# Patient Record
Sex: FEMALE | Race: WHITE
[De-identification: names, ages, dates, MRNs, and addresses within clinical notes are randomized per-mention and may not be internally consistent; named-entity substitution may affect disease eponyms.]

---

## 2017-03-01 ENCOUNTER — HOSPITAL ENCOUNTER (EMERGENCY)
Dept: HOSPITAL 17 - NEPA | Age: 77
Discharge: HOME | End: 2017-03-01
Payer: COMMERCIAL

## 2017-03-01 VITALS
HEART RATE: 72 BPM | SYSTOLIC BLOOD PRESSURE: 143 MMHG | RESPIRATION RATE: 16 BRPM | OXYGEN SATURATION: 100 % | DIASTOLIC BLOOD PRESSURE: 61 MMHG

## 2017-03-01 VITALS
HEIGHT: 68 IN | OXYGEN SATURATION: 100 % | DIASTOLIC BLOOD PRESSURE: 57 MMHG | WEIGHT: 134.48 LBS | HEART RATE: 71 BPM | RESPIRATION RATE: 16 BRPM | SYSTOLIC BLOOD PRESSURE: 114 MMHG | TEMPERATURE: 98.2 F | BODY MASS INDEX: 20.38 KG/M2

## 2017-03-01 VITALS
SYSTOLIC BLOOD PRESSURE: 119 MMHG | RESPIRATION RATE: 16 BRPM | DIASTOLIC BLOOD PRESSURE: 56 MMHG | OXYGEN SATURATION: 99 % | HEART RATE: 78 BPM

## 2017-03-01 DIAGNOSIS — I10: ICD-10-CM

## 2017-03-01 DIAGNOSIS — M25.552: ICD-10-CM
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
## 2017-03-01 DIAGNOSIS — Z87.440: ICD-10-CM

## 2017-03-01 DIAGNOSIS — T40.2X5A: ICD-10-CM

## 2017-03-01 DIAGNOSIS — G89.29: ICD-10-CM

## 2017-03-01 DIAGNOSIS — R40.0: Primary | ICD-10-CM

## 2017-03-01 LAB
ALP SERPL-CCNC: 78 U/L (ref 45–117)
ALT SERPL-CCNC: 15 U/L (ref 10–53)
AMPHETAMINE, URINE: (no result)
ANION GAP SERPL CALC-SCNC: 11 MEQ/L (ref 5–15)
APAP SERPL-MCNC: 10.3 MCG/ML (ref 10–30)
APTT BLD: 23.2 SEC (ref 24.3–30.1)
AST SERPL-CCNC: 12 U/L (ref 15–37)
BARBITURATES, URINE: (no result)
BASOPHILS # BLD AUTO: 0 TH/MM3 (ref 0–0.2)
BASOPHILS NFR BLD: 0.5 % (ref 0–2)
BILIRUB SERPL-MCNC: 0.2 MG/DL (ref 0.2–1)
BUN SERPL-MCNC: 20 MG/DL (ref 7–18)
CHLORIDE SERPL-SCNC: 105 MEQ/L (ref 98–107)
COCAINE UR-MCNC: (no result) NG/ML
COLOR UR: YELLOW
COMMENT (UR): (no result)
CULTURE IF INDICATED: (no result)
EOSINOPHIL # BLD: 0.1 TH/MM3 (ref 0–0.4)
EOSINOPHIL NFR BLD: 1.4 % (ref 0–4)
ERYTHROCYTE [DISTWIDTH] IN BLOOD BY AUTOMATED COUNT: 13.8 % (ref 11.6–17.2)
GFR SERPLBLD BASED ON 1.73 SQ M-ARVRAT: 33 ML/MIN (ref 89–?)
GLUCOSE UR STRIP-MCNC: (no result) MG/DL
GRAN CASTS #/AREA URNS LPF: 1 /LPF
HCO3 BLD-SCNC: 21.3 MEQ/L (ref 21–32)
HCT VFR BLD CALC: 28.9 % (ref 35–46)
HEMO FLAGS: (no result)
HGB UR QL STRIP: (no result)
HYALINE CASTS #/AREA URNS LPF: 2 /LPF
INR PPP: 0.9 RATIO
KETONES UR STRIP-MCNC: (no result) MG/DL
LYMPHOCYTES # BLD AUTO: 1.3 TH/MM3 (ref 1–4.8)
LYMPHOCYTES NFR BLD AUTO: 16.5 % (ref 9–44)
MCH RBC QN AUTO: 32.1 PG (ref 27–34)
MCHC RBC AUTO-ENTMCNC: 32.9 % (ref 32–36)
MCV RBC AUTO: 97.5 FL (ref 80–100)
MONOCYTES NFR BLD: 5.4 % (ref 0–8)
NEUTROPHILS # BLD AUTO: 6.1 TH/MM3 (ref 1.8–7.7)
NEUTROPHILS NFR BLD AUTO: 76.2 % (ref 16–70)
NITRITE UR QL STRIP: (no result)
PLATELET # BLD: 354 TH/MM3 (ref 150–450)
POTASSIUM SERPL-SCNC: 4.6 MEQ/L (ref 3.5–5.1)
PROTHROMBIN TIME: 10.1 SEC (ref 9.8–11.6)
RBC # BLD AUTO: 2.96 MIL/MM3 (ref 4–5.3)
SODIUM SERPL-SCNC: 137 MEQ/L (ref 136–145)
SP GR UR STRIP: 1.01 (ref 1–1.03)
WBC # BLD AUTO: 8.1 TH/MM3 (ref 4–11)

## 2017-03-01 PROCEDURE — 70450 CT HEAD/BRAIN W/O DYE: CPT

## 2017-03-01 PROCEDURE — 85610 PROTHROMBIN TIME: CPT

## 2017-03-01 PROCEDURE — 80320 DRUG SCREEN QUANTALCOHOLS: CPT

## 2017-03-01 PROCEDURE — 87086 URINE CULTURE/COLONY COUNT: CPT

## 2017-03-01 PROCEDURE — 80329 ANALGESICS NON-OPIOID 1 OR 2: CPT

## 2017-03-01 PROCEDURE — 80053 COMPREHEN METABOLIC PANEL: CPT

## 2017-03-01 PROCEDURE — 84443 ASSAY THYROID STIM HORMONE: CPT

## 2017-03-01 PROCEDURE — 93005 ELECTROCARDIOGRAM TRACING: CPT

## 2017-03-01 PROCEDURE — 85730 THROMBOPLASTIN TIME PARTIAL: CPT

## 2017-03-01 PROCEDURE — 96360 HYDRATION IV INFUSION INIT: CPT

## 2017-03-01 PROCEDURE — P9612 CATHETERIZE FOR URINE SPEC: HCPCS

## 2017-03-01 PROCEDURE — 81001 URINALYSIS AUTO W/SCOPE: CPT

## 2017-03-01 PROCEDURE — 73502 X-RAY EXAM HIP UNI 2-3 VIEWS: CPT

## 2017-03-01 PROCEDURE — 96361 HYDRATE IV INFUSION ADD-ON: CPT

## 2017-03-01 PROCEDURE — G0480 DRUG TEST DEF 1-7 CLASSES: HCPCS

## 2017-03-01 PROCEDURE — 80307 DRUG TEST PRSMV CHEM ANLYZR: CPT

## 2017-03-01 PROCEDURE — 85025 COMPLETE CBC W/AUTO DIFF WBC: CPT

## 2017-03-01 PROCEDURE — 99285 EMERGENCY DEPT VISIT HI MDM: CPT
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
## 2017-03-01 NOTE — RADRPT
EXAM DATE/TIME:  03/01/2017 15:19 

 

[facility] COMPARISON:     No previous studies available for comparison.

 

                     

INDICATIONS :     Patient fell one month ago.

                     

MEDICAL HISTORY :            Hypertension. Diabetes mellitus type II. Cervical cancer.   

SURGICAL HISTORY :     None.   

ENCOUNTER:     Initial                                        

ACUITY:     1 month      

PAIN SCORE:     8/10

LOCATION:     Left  Hip.

 

FINDINGS:     

Examination of the left hip was performed with AP Pelvis.  The primary and secondary trabecular patte
rn of the femoral neck is intact.  The hip joint is of normal width without significant sclerosis or 
bony hypertrophy.  The acetabulum is grossly intact.

 

CONCLUSION:     Negative for fracture.  

 Pb López MD FACR on March 01, 2017 at 15:26           

Board Certified Radiologist.

 This report was verified electronically.
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
## 2017-03-03 NOTE — EKG
Date Performed: 03/01/2017       Time Performed: 14:08:28

 

PTAGE:      77 years

 

EKG:      Sinus rhythm 

 

 NORMAL ECG

 

PREVIOUS TRACING       : 09/14/2014 06.27 Compared to prior tracing no significant change

 

DOCTOR:   Griffin Sanchez  Interpretating Date/Time  03/03/2017 00:00:07

## 2017-05-14 ENCOUNTER — HOSPITAL ENCOUNTER (EMERGENCY)
Dept: HOSPITAL 17 - NEPD | Age: 77
Discharge: HOME | End: 2017-05-14
Payer: MEDICARE

## 2017-05-14 VITALS — WEIGHT: 125.66 LBS | HEIGHT: 68 IN | BODY MASS INDEX: 19.05 KG/M2

## 2017-05-14 VITALS
HEART RATE: 93 BPM | RESPIRATION RATE: 16 BRPM | OXYGEN SATURATION: 95 % | DIASTOLIC BLOOD PRESSURE: 58 MMHG | TEMPERATURE: 97.8 F | SYSTOLIC BLOOD PRESSURE: 121 MMHG

## 2017-05-14 DIAGNOSIS — S93.401A: Primary | ICD-10-CM

## 2017-05-14 DIAGNOSIS — E78.00: ICD-10-CM

## 2017-05-14 DIAGNOSIS — K21.9: ICD-10-CM

## 2017-05-14 DIAGNOSIS — S90.01XA: ICD-10-CM

## 2017-05-14 DIAGNOSIS — I10: ICD-10-CM

## 2017-05-14 DIAGNOSIS — E11.9: ICD-10-CM

## 2017-05-14 DIAGNOSIS — Z86.73: ICD-10-CM

## 2017-05-14 DIAGNOSIS — Z79.02: ICD-10-CM

## 2017-05-14 PROCEDURE — 73630 X-RAY EXAM OF FOOT: CPT

## 2017-05-14 PROCEDURE — 73610 X-RAY EXAM OF ANKLE: CPT

## 2017-05-14 PROCEDURE — 73590 X-RAY EXAM OF LOWER LEG: CPT

## 2017-05-14 PROCEDURE — 73060 X-RAY EXAM OF HUMERUS: CPT

## 2017-05-14 PROCEDURE — 99283 EMERGENCY DEPT VISIT LOW MDM: CPT

## 2017-05-14 NOTE — PD
Data


Data


Last Documented VS





Vital Signs








  Date Time  Temp Pulse Resp B/P Pulse Ox O2 Delivery O2 Flow Rate FiO2


 


5/14/17 11:14 97.8 93 16 121/58 95   








Orders





 Ankle, Complete ([id number]) (5/14/17 )


Foot, Complete ([id number]) (5/14/17 )


Humerus (Min 2vws) (5/14/17 )


Tibia/Fibula (Ap/Lat) (5/14/17 )


Complete Blood Count With Diff (5/14/17 11:31)


Basic Metabolic Panel (Bmp) (5/14/17 11:31)








MDM


Supervised Visit with CORNELIUS:  Yes


Narrative Course


The history, exam, and medical decision-making in the associated mid-level 

provider note were completed with my assistance. I reviewed and agree with the 

findings presented.  I attest that I had a face-to-face encounter with the 

patient on the same day, and personally performed and documented my assessment 

and findings in the medical record. 





*My assessment and Findings:





77-year-old with a fall last night.  She landed on her foot.  She is some pain 

on the lateral aspect of her left ankle.  X-rays are negative.  States she has 

been walking on it albeit with some discomfort.  She is a walker.  She lives 

with her sister.  We did test her in the emergency department in both the 

patient and her sister comfortable with discharge and outpatient follow-up.








Zackary Mills MD May 14, 2017 12:30

## 2017-05-14 NOTE — PD
HPI


Chief Complaint:  Injury


Time Seen by Provider:  11:15


Travel History


International Travel<30 days:  No


Contact w/Intl Traveler<30days:  No


Traveled to known affect area:  No





History of Present Illness


HPI


77 year old female presents to the ED with c/o of right ankle pain after 

falling last night. She reports she lost her balance while walking causing her 

to twist her R ankle and fell onto the ground. She denies head injury, Denies 

LOC, Denies headache, dizziness, change in vision, N/V, ADB pain , CP, or SOB. 

Patient has a PMH of CVA w/ left sided weakness and ambulates with a walker at 

home. She lives with her sister who was having difficulty helping her ambulate 

today prompting her to come to the ER.





PFSH


Past Medical History


Hx Anticoagulant Therapy:  Yes (plavix)


Anemia:  Yes


Arthritis:  Yes


Anxiety:  Yes


Depression:  Yes


Cancer:  Yes (CERVICAL CA)


High Cholesterol:  Yes


Chest Pain:  No


Congestive Heart Failure:  No


Cerebrovascular Accident:  Yes


Diabetes:  Yes


Patient Takes Glucophage:  No


Diminished Hearing:  No


Gastrointestinal Disorders:  Yes (esophageal strictures)


GERD:  Yes


Genitourinary:  Yes (incontinent)


Headaches:  No


Hypertension:  Yes


Immunizations Current:  No


Migraines:  No


Seizures:  Yes


PNEUMOCCOCAL Vaccine (Year):  2


Menopausal:  Yes





Past Surgical History


Appendectomy:  Yes


Cardiac Surgery:  No


Cholecystectomy:  Yes


Ear Surgery:  No


Endocrine Surgery:  Yes


Eye Surgery:  No


Genitourinary Surgery:  Yes


Gynecologic Surgery:  Yes (HYSTERECTOMY,CERVICAL CA REMOVED.)


Hysterectomy:  No


Neurologic Surgery:  Yes (C4,C5,C6 PLATE PLACED)


Oral Surgery:  Yes


Thoracic Surgery:  No


Other Surgery:  Yes (LAPAROSCOPIC GREGG(WRAP STOMACH AROUND ESOPHAGUS TO HELP 

WITH GERD))





Social History


Alcohol Use:  No (DENIES)


Tobacco Use:  No (DENIES)


Substance Use:  No





Allergies-Medications


(Allergen,Severity, Reaction):  


Coded Allergies:  


     Egg Allergy (Verified  Allergy, Intermediate, 5/14/17)


     Flu Vaccine (Verified  Allergy, Intermediate, 5/14/17)


 "ALLERGIC TO EGGS SO I CAN NOT HAVE THE FLU SHOT"


     Codeine (Verified  Allergy, Mild, 5/14/17)


     Demerol (Verified  Allergy, Mild, 5/14/17)


     Morphine (Verified  Allergy, Mild, 5/14/17)


     Lortab (Verified  Adverse Reaction, Mild, HEADACHE, 5/14/17)


Reported Meds & Prescriptions





Reported Meds & Active Scripts


Active


Reported


Metformin ER (Metformin HCl) 1,000 Mg Jass 1,000 Mg PO BID


     With evening meal


Vimpat (Lacosamide) 50 Mg Tab 50 Mg PO DAILY


Valium (Diazepam) 10 Mg Tab 10 Mg PO TID PRN


Plavix (Clopidogrel Bisulfate) 75 Mg Tab 75 Mg PO DAILY


Toprol XL (Metoprolol Succinate) 50 Mg Tab 50 Mg PO BID


Pantoprazole (Pantoprazole Sodium) 40 Mg Tab 40 Mg PO DAILY


Pravachol (Pravastatin) 40 Mg Tab 40 Mg PO HS


Sertraline (Sertraline HCl) 25 Mg Tab 25 Mg PO DAILY








Review of Systems


Except as stated in HPI:  all other systems reviewed are Neg





Physical Exam


Narrative


GENERAL: Alert well appearing elderly white female.


SKIN: Warm and dry.


HEAD: Atraumatic. Normocephalic. 


EYES: Pupils equal and round. No scleral icterus. No injection or drainage. 


ENT: No nasal bleeding or discharge.  Mucous membranes pink and moist.


NECK: Trachea midline. No JVD. No midline tenderness.


CARDIOVASCULAR: Regular rate and rhythm.  


RESPIRATORY: No accessory muscle use. Clear to auscultation. Breath sounds 

equal bilaterally. 


GASTROINTESTINAL: Abdomen soft, non-tender, nondistended. Hepatic and splenic 

margins not palpable. 


MUSCULOSKELETAL: R ankle: moderate amount of swelling ,ecchymosis, and 

tenderness over lateral malleolus & base of 5th metatarsal. Pulses present. 

Extremity warm, brisk cap refill. N/V/S intact.  R humerus: No deformity, 

proximal tenderness, N/V/S intact. Pulses present. No obvious deformities. 


NEUROLOGICAL: Awake and alert. No obvious cranial nerve deficits.  Motor 

grossly within normal limits.   Normal speech.


PSYCHIATRIC: Appropriate mood and affect; insight and judgment normal.





Data


Data


Last Documented VS





Vital Signs








  Date Time  Temp Pulse Resp B/P Pulse Ox O2 Delivery O2 Flow Rate FiO2


 


5/14/17 11:14 97.8 93 16 121/58 95   








Orders





 Ankle, Complete ([id number]) (5/14/17 )


Foot, Complete ([id number]) (5/14/17 )


Humerus (Min 2vws) (5/14/17 )


Tibia/Fibula (Ap/Lat) (5/14/17 )








MDM


Medical Decision Making


Medical Screen Exam Complete:  Yes


Emergency Medical Condition:  Yes


Medical Record Reviewed:  Yes


Differential Diagnosis


distal Tib/Fib fx vs metatarsal fx vs humeral fx


Narrative Course


77 year old elderly white female who sustained a fall last night at her home 

injuring her R ankle/foot & R humerus. She has PMH of CVA-left sided deficits, 

chronic pain, DM type 2, dyslipidemia. She has no evidence of head/facial 

trauma & reports she recalls the entire event. She declined head CT & given her 

appearance & PE no concern for subdural hematoma at this time. There is concern 

of Fx in the R distal Tib/Fib/foot. Xrays are pending.    





Xrays all negative for FX. Discussed findings with patient,. She would like to 

be discharge. Ankle/stirup splint applied by Nursing staff, patient ambulated 

without difficulty using walker. She has appointment with ortho on Tuesday.





Diagnosis





 Primary Impression:  


 Right ankle sprain


 Qualified Code:  S93.401A - Sprain of right ankle, unspecified ligament, 

initial encounter


 Additional Impressions:  


 Contusion


 Qualified Code:  S90.01XA - Contusion of right ankle, initial encounter


 Fall


 Qualified Code:  W19.XXXA - Fall, initial encounter


Patient Instructions:  General Instructions


Disposition:  01 DISCHARGE HOME


Condition:  Stable








Arin Patrick May 14, 2017 11:40





Arin Patrick May 14, 2017 11:40

## 2017-05-14 NOTE — RADRPT
EXAM DATE/TIME:  05/14/2017 11:37 

 

[facility] COMPARISON:     

No previous studies available for comparison.

 

                     

INDICATIONS :     

Right ankle pain post fall last night. 

                     

 

MEDICAL HISTORY :     

None.          

 

SURGICAL HISTORY :     

None.   

 

ENCOUNTER:     

Initial                                        

 

ACUITY:     

2 days      

 

PAIN SCORE:     

10/10

 

LOCATION:     

Right  ankle. 

 

FINDINGS:     

Three view exam was performed of the right ankle.  The bony structures are in normal alignment.  No e
vidence of fracture, dislocation. There is lateral soft tissue swelling.  The ankle mortise is intact
.  No radiopaque foreign bodies are seen.  Bony mineralization is under mineralized.

 

CONCLUSION:     

Soft tissue swelling without fracture.

 

 

 

 Roddy Morley MD on May 14, 2017 at 11:53           

Board Certified Radiologist.

 This report was verified electronically.

## 2017-05-14 NOTE — RADRPT
EXAM DATE/TIME:  05/14/2017 11:43 

 

[facility] COMPARISON:     

HUMERUS RIGHT (MIN 2VWS), November 15, 2015, 16:14.

 

                     

INDICATIONS :     

Right humerus pain post fall last night. 

                     

 

MEDICAL HISTORY :     

None.          

 

SURGICAL HISTORY :     

None.   

 

ENCOUNTER:     

Initial                                        

 

ACUITY:     

2 days      

 

PAIN SCORE:     

10/10

 

LOCATION:     

Right  humerus. 

 

FINDINGS:     

Two view examination of the right humerus demonstrates no evidence of fracture or dislocation.  Bony 
mineralization is normal.  The soft tissue structures are intact.

 

CONCLUSION:     No acute fracture.  

 

 

 

 Roddy Morley MD on May 14, 2017 at 11:54           

Board Certified Radiologist.

 This report was verified electronically.

## 2017-05-14 NOTE — RADRPT
EXAM DATE/TIME:  05/14/2017 11:40 

 

[facility] COMPARISON:     

No previous studies available for comparison.

 

                     

INDICATIONS :     

Right lower leg pain post fall last night.

                     

 

MEDICAL HISTORY :     

None.          

 

SURGICAL HISTORY :     

None.   

 

ENCOUNTER:     

Initial                                        

 

ACUITY:     

2 days      

 

PAIN SCORE:     

10/10

 

LOCATION:     

Right  tibia/fibula. 

 

FINDINGS:     

Two view examination of the right tibia demonstrates no evidence of fracture or dislocation.  Bony mi
neralization is normal. Soft tissue swelling.

 

CONCLUSION:     

Soft tissue swelling without fracture.

 

 

 

 Roddy Morley MD on May 14, 2017 at 12:02           

Board Certified Radiologist.

 This report was verified electronically.

## 2017-05-14 NOTE — RADRPT
EXAM DATE/TIME:  05/14/2017 11:38 

 

[facility] COMPARISON:     

No previous studies available for comparison.

 

                     

INDICATIONS :     

Right foot pain post fall last night. 

                     

 

MEDICAL HISTORY :     

None.          

 

SURGICAL HISTORY :     

None.   

 

ENCOUNTER:     

Initial                                        

 

ACUITY:     

2 days      

 

PAIN SCORE:     

10/10

 

LOCATION:     

Right  foot. 

 

FINDINGS:     

Three view examination of the right foot demonstrates soft tissue swelling. Questionable fracture bas
e of distal phalanx great toe. Degenerative changes first metatarsophalangeal joint and interphalange
al joints. Osteopenia.  The calcaneus is intact.  

 

CONCLUSION:     

1. Soft tissue swelling with questionable fracture base of distal phalanx of great toe.

2. Osteopenia.

 

 

 

 Roddy Morley MD on May 14, 2017 at 11:59           

Board Certified Radiologist.

 This report was verified electronically.

## 2017-06-11 ENCOUNTER — HOSPITAL ENCOUNTER (INPATIENT)
Dept: HOSPITAL 17 - NEPE | Age: 77
LOS: 3 days | Discharge: NURSING, REHAB OR LONG-TERM CARE | DRG: 301 | End: 2017-06-14
Attending: FAMILY MEDICINE | Admitting: FAMILY MEDICINE
Payer: MEDICARE

## 2017-06-11 VITALS
RESPIRATION RATE: 16 BRPM | OXYGEN SATURATION: 96 % | SYSTOLIC BLOOD PRESSURE: 151 MMHG | DIASTOLIC BLOOD PRESSURE: 65 MMHG | HEART RATE: 101 BPM

## 2017-06-11 VITALS
RESPIRATION RATE: 16 BRPM | SYSTOLIC BLOOD PRESSURE: 174 MMHG | DIASTOLIC BLOOD PRESSURE: 77 MMHG | HEART RATE: 84 BPM | TEMPERATURE: 98.4 F | OXYGEN SATURATION: 100 %

## 2017-06-11 VITALS
RESPIRATION RATE: 18 BRPM | OXYGEN SATURATION: 100 % | DIASTOLIC BLOOD PRESSURE: 74 MMHG | HEART RATE: 94 BPM | TEMPERATURE: 98.4 F | SYSTOLIC BLOOD PRESSURE: 144 MMHG

## 2017-06-11 VITALS
RESPIRATION RATE: 20 BRPM | SYSTOLIC BLOOD PRESSURE: 144 MMHG | DIASTOLIC BLOOD PRESSURE: 65 MMHG | TEMPERATURE: 98.1 F | HEART RATE: 84 BPM | OXYGEN SATURATION: 99 %

## 2017-06-11 VITALS — HEIGHT: 68 IN | WEIGHT: 130.95 LBS | BODY MASS INDEX: 19.85 KG/M2

## 2017-06-11 VITALS
DIASTOLIC BLOOD PRESSURE: 90 MMHG | RESPIRATION RATE: 16 BRPM | OXYGEN SATURATION: 98 % | SYSTOLIC BLOOD PRESSURE: 180 MMHG | HEART RATE: 78 BPM

## 2017-06-11 VITALS — OXYGEN SATURATION: 98 %

## 2017-06-11 VITALS — SYSTOLIC BLOOD PRESSURE: 158 MMHG | DIASTOLIC BLOOD PRESSURE: 75 MMHG

## 2017-06-11 VITALS — OXYGEN SATURATION: 96 %

## 2017-06-11 DIAGNOSIS — J42: ICD-10-CM

## 2017-06-11 DIAGNOSIS — M19.90: ICD-10-CM

## 2017-06-11 DIAGNOSIS — Z96.651: ICD-10-CM

## 2017-06-11 DIAGNOSIS — Z91.81: ICD-10-CM

## 2017-06-11 DIAGNOSIS — Z79.02: ICD-10-CM

## 2017-06-11 DIAGNOSIS — E11.9: ICD-10-CM

## 2017-06-11 DIAGNOSIS — I82.411: Primary | ICD-10-CM

## 2017-06-11 DIAGNOSIS — F32.9: ICD-10-CM

## 2017-06-11 DIAGNOSIS — Z86.73: ICD-10-CM

## 2017-06-11 DIAGNOSIS — F41.9: ICD-10-CM

## 2017-06-11 DIAGNOSIS — Z87.891: ICD-10-CM

## 2017-06-11 DIAGNOSIS — G40.909: ICD-10-CM

## 2017-06-11 DIAGNOSIS — S82.891D: ICD-10-CM

## 2017-06-11 DIAGNOSIS — Z85.41: ICD-10-CM

## 2017-06-11 DIAGNOSIS — Z79.84: ICD-10-CM

## 2017-06-11 DIAGNOSIS — E78.5: ICD-10-CM

## 2017-06-11 DIAGNOSIS — Z86.12: ICD-10-CM

## 2017-06-11 DIAGNOSIS — I82.441: ICD-10-CM

## 2017-06-11 DIAGNOSIS — I10: ICD-10-CM

## 2017-06-11 DIAGNOSIS — K21.9: ICD-10-CM

## 2017-06-11 LAB
ANION GAP SERPL CALC-SCNC: 11 MEQ/L (ref 5–15)
APTT BLD: 110.9 SEC (ref 24.3–30.1)
APTT BLD: 18.7 SEC (ref 24.3–30.1)
BASOPHILS # BLD AUTO: 0.1 TH/MM3 (ref 0–0.2)
BASOPHILS NFR BLD: 1 % (ref 0–2)
BUN SERPL-MCNC: 25 MG/DL (ref 7–18)
CHLORIDE SERPL-SCNC: 113 MEQ/L (ref 98–107)
CK MB SERPL-MCNC: 0.9 NG/ML (ref 0.5–3.6)
CK SERPL-CCNC: 121 U/L (ref 26–192)
EOSINOPHIL # BLD: 0.2 TH/MM3 (ref 0–0.4)
EOSINOPHIL NFR BLD: 3.2 % (ref 0–4)
ERYTHROCYTE [DISTWIDTH] IN BLOOD BY AUTOMATED COUNT: 15.1 % (ref 11.6–17.2)
GFR SERPLBLD BASED ON 1.73 SQ M-ARVRAT: 34 ML/MIN (ref 89–?)
HCO3 BLD-SCNC: 14.7 MEQ/L (ref 21–32)
HCT VFR BLD CALC: 29.4 % (ref 35–46)
HEMO FLAGS: (no result)
INR PPP: 1 RATIO
LYMPHOCYTES # BLD AUTO: 1.7 TH/MM3 (ref 1–4.8)
LYMPHOCYTES NFR BLD AUTO: 28.2 % (ref 9–44)
MCH RBC QN AUTO: 30.1 PG (ref 27–34)
MCHC RBC AUTO-ENTMCNC: 31.9 % (ref 32–36)
MCV RBC AUTO: 94.4 FL (ref 80–100)
MONOCYTES NFR BLD: 7.2 % (ref 0–8)
NEUTROPHILS # BLD AUTO: 3.6 TH/MM3 (ref 1.8–7.7)
NEUTROPHILS NFR BLD AUTO: 60.4 % (ref 16–70)
PLATELET # BLD: 357 TH/MM3 (ref 150–450)
POTASSIUM SERPL-SCNC: 4.8 MEQ/L (ref 3.5–5.1)
PROTHROMBIN TIME: 10.6 SEC (ref 9.8–11.6)
RBC # BLD AUTO: 3.12 MIL/MM3 (ref 4–5.3)
SODIUM SERPL-SCNC: 139 MEQ/L (ref 136–145)
WBC # BLD AUTO: 6 TH/MM3 (ref 4–11)

## 2017-06-11 PROCEDURE — 99285 EMERGENCY DEPT VISIT HI MDM: CPT

## 2017-06-11 PROCEDURE — 85027 COMPLETE CBC AUTOMATED: CPT

## 2017-06-11 PROCEDURE — 82552 ASSAY OF CPK IN BLOOD: CPT

## 2017-06-11 PROCEDURE — 82948 REAGENT STRIP/BLOOD GLUCOSE: CPT

## 2017-06-11 PROCEDURE — 85610 PROTHROMBIN TIME: CPT

## 2017-06-11 PROCEDURE — 71275 CT ANGIOGRAPHY CHEST: CPT

## 2017-06-11 PROCEDURE — 85730 THROMBOPLASTIN TIME PARTIAL: CPT

## 2017-06-11 PROCEDURE — 82550 ASSAY OF CK (CPK): CPT

## 2017-06-11 PROCEDURE — 85025 COMPLETE CBC W/AUTO DIFF WBC: CPT

## 2017-06-11 PROCEDURE — 84484 ASSAY OF TROPONIN QUANT: CPT

## 2017-06-11 PROCEDURE — 94664 DEMO&/EVAL PT USE INHALER: CPT

## 2017-06-11 PROCEDURE — 85379 FIBRIN DEGRADATION QUANT: CPT

## 2017-06-11 PROCEDURE — 94667 MNPJ CHEST WALL 1ST: CPT

## 2017-06-11 PROCEDURE — 70450 CT HEAD/BRAIN W/O DYE: CPT

## 2017-06-11 PROCEDURE — 94640 AIRWAY INHALATION TREATMENT: CPT

## 2017-06-11 PROCEDURE — 94150 VITAL CAPACITY TEST: CPT

## 2017-06-11 PROCEDURE — 76775 US EXAM ABDO BACK WALL LIM: CPT

## 2017-06-11 PROCEDURE — 71010: CPT

## 2017-06-11 PROCEDURE — 80048 BASIC METABOLIC PNL TOTAL CA: CPT

## 2017-06-11 PROCEDURE — 94668 MNPJ CHEST WALL SBSQ: CPT

## 2017-06-11 PROCEDURE — 93971 EXTREMITY STUDY: CPT

## 2017-06-11 PROCEDURE — 81001 URINALYSIS AUTO W/SCOPE: CPT

## 2017-06-11 RX ADMIN — ONDANSETRON PRN MG: 4 TABLET, ORALLY DISINTEGRATING ORAL at 19:59

## 2017-06-11 RX ADMIN — Medication SCH ML: at 13:45

## 2017-06-11 RX ADMIN — Medication SCH ML: at 19:58

## 2017-06-11 RX ADMIN — METOPROLOL SUCCINATE SCH MG: 50 TABLET, FILM COATED, EXTENDED RELEASE ORAL at 19:59

## 2017-06-11 RX ADMIN — ALBUTEROL SULFATE SCH MG: 2.5 SOLUTION RESPIRATORY (INHALATION) at 09:17

## 2017-06-11 RX ADMIN — INSULIN ASPART SCH: 100 INJECTION, SOLUTION INTRAVENOUS; SUBCUTANEOUS at 19:58

## 2017-06-11 RX ADMIN — HEPARIN SODIUM SCH MLS/HR: 10000 INJECTION, SOLUTION INTRAVENOUS at 14:46

## 2017-06-11 RX ADMIN — ALBUTEROL SULFATE SCH MG: 2.5 SOLUTION RESPIRATORY (INHALATION) at 09:18

## 2017-06-11 RX ADMIN — HYDROMORPHONE HYDROCHLORIDE PRN MG: 1 INJECTION, SOLUTION INTRAMUSCULAR; INTRAVENOUS; SUBCUTANEOUS at 20:00

## 2017-06-11 RX ADMIN — PRAVASTATIN SODIUM SCH MG: 40 TABLET ORAL at 19:59

## 2017-06-11 NOTE — RADRPT
EXAM DATE/TIME:  06/11/2017 10:00 

 

[facility] COMPARISON:     CT BRAIN W/O CONTRAST, March 01, 2017, 14:47.

 

INDICATIONS :     Posterior cephalgia for three days.

                      

RADIATION DOSE:     56.35 CTDIvol (mGy) 

 

 

MEDICAL HISTORY :     Stroke.  

SURGICAL HISTORY :      Appendectomy. Cholecystectomy.

ENCOUNTER:      Initial

ACUITY:      3 days

PAIN SCALE:      6/10

LOCATION:       Bilateral occipital head

 

TECHNIQUE:     Multiple contiguous axial images were obtained of the head.  Using automated exposure 
control and adjustment of the mA and/or kV according to patient size, radiation dose was kept as low 
as reasonably achievable to obtain optimal diagnostic quality images. 

 

FINDINGS:     

CEREBRUM:     The ventricles are normal for age.  No evidence of midline shift, mass lesion, hemorrha
ge or acute infarction.  No extra-axial fluid collections are seen.

POSTERIOR FOSSA:     The cerebellum and brainstem are intact.  The 4th ventricle is midline.  The cer
ebellopontine angle is unremarkable.

EXTRACRANIAL:     The visualized portion of the orbits is intact.

SKULL:     The calvaria is intact.  No evidence of skull fracture.

 

CONCLUSION:     Negative for acute process.  

 

 Pb López MD FACR on June 11, 2017 at 10:34           

Board Certified Radiologist.

 This report was verified electronically.

## 2017-06-11 NOTE — RADRPT
EXAM DATE/TIME:  06/11/2017 09:18 

 

[facility] COMPARISON:     

No previous studies available for comparison.

        

 

 

INDICATIONS :                

Right leg swelling. 

            

 

MEDICAL HISTORY :     

Hypercholesterolemia.  Arthritis.  Osteoporosis.  Cervical cancer. GERD. CVA. Seizures. Head trauma. 
Numbness. HTN. Esophageal strictures. Diabetes. Anemia. Depression. Anxiety. Anitcoagulant therapy, P
lavix. 

 

SURGICAL HISTORY :     

Appendectomy. Cholecystectomy.  Hysterectomy. C4-C6 plate placed. Cervical conization. Righ knee abby
nstruction. Right hammer toe repair. Laparoscopic owen. 

 

ENCOUNTER:     

Initial

 

ACUITY:     

4 - 6 days

 

PAIN SCORE:      

10/10

 

LOCATION:      

Right  leg.

            

                      

 

TECHNIQUE:     

Venous ultrasound of the leg was performed from the inguinal ligament to the proximal calf.  Real-kailash
e, color Doppler and spectral tracing, compression and augmentation techniques were used.  

 

FINDINGS:     

There is deep venous thrombosis from posterior tibial vein extending across the knee into the mid yogi
p femoral vein.

 

CONCLUSION:     

Venous Doppler positive for deep venous thrombosis beginning below the knee and extending to mid thig
h.

 

 

 

 Pb López MD FACR on June 11, 2017 at 9:46           

Board Certified Radiologist.

 This report was verified electronically.

## 2017-06-11 NOTE — RADRPT
EXAM DATE/TIME:  06/11/2017 09:12 

 

[facility] COMPARISON:     

CHEST SINGLE AP, September 14, 2014, 6:50.

 

                     

INDICATIONS :     

Short of breath

                     

 

MEDICAL HISTORY :     

Diabetes mellitus type II.          

 

SURGICAL HISTORY :     

Fusion, cervical.   Fracture right foot

 

ENCOUNTER:     

Initial                                        

 

ACUITY:     

1 month      

 

PAIN SCORE:     

0/10

 

LOCATION:     

Bilateral chest 

 

FINDINGS:     

A single view of the chest demonstrates the lungs to be symmetrically aerated without evidence of mas
s, infiltrate or effusion.  The cardiomediastinal contours are unremarkable.  Osseous structures are 
intact.  Previous surgical fusion is noted.

 

CONCLUSION:     No acute disease.  

 

 

 

 Pb López MD FACR on June 11, 2017 at 9:23           

Board Certified Radiologist.

 This report was verified electronically.

## 2017-06-11 NOTE — PD
HPI


Chief Complaint:  Respiratory Symptoms


Time Seen by Provider:  09:02


Travel History


International Travel<30 days:  No


Contact w/Intl Traveler<30days:  No


Traveled to known affect area:  No





History of Present Illness


HPI


This is a 77-year-old female history of type 2 diabetes mellitus, who presents 

today with complaints of shortness of breath and cough.  Patient states that 

last night she was up most the night coughing.  She denies any production in 

her cough.  She denies any fevers, chills.  She states she has a inhaler that 

does not seem to be working.  She has a doctor that comes to her house to 

listen to her lungs and stated that she sounded clear.  The patient has a right 

ankle fracture and is wearing a fracture boot.  She denies any increased 

swelling or pain of her leg.  She does reports spasms and cramping in her left 

thigh at times.  There are no other complaints time my examination.





PFSH


Past Medical History


Hx Anticoagulant Therapy:  Yes (plavix)


Anemia:  Yes


Arthritis:  Yes


Anxiety:  Yes


Depression:  Yes


Cancer:  Yes (CERVICAL CA)


High Cholesterol:  Yes


Chest Pain:  No


Congestive Heart Failure:  No


Cerebrovascular Accident:  Yes


Diabetes:  Yes


Patient Takes Glucophage:  No


Diminished Hearing:  No


Gastrointestinal Disorders:  Yes (esophageal strictures)


GERD:  Yes


Genitourinary:  Yes (incontinent)


Headaches:  No


Hypertension:  Yes


Immunizations Current:  No


Migraines:  No


Seizures:  Yes


PNEUMOCCOCAL Vaccine (Year):  2


Menopausal:  Yes





Past Surgical History


Appendectomy:  Yes


Cardiac Surgery:  No


Cholecystectomy:  Yes


Ear Surgery:  No


Endocrine Surgery:  Yes


Eye Surgery:  No


Genitourinary Surgery:  Yes


Gynecologic Surgery:  Yes (HYSTERECTOMY,CERVICAL CA REMOVED.)


Hysterectomy:  No


Neurologic Surgery:  Yes (C4,C5,C6 PLATE PLACED)


Oral Surgery:  Yes


Thoracic Surgery:  No


Other Surgery:  Yes (LAPAROSCOPIC GREGG(WRAP STOMACH AROUND ESOPHAGUS TO HELP 

WITH GERD))





Social History


Alcohol Use:  No (DENIES)


Tobacco Use:  No (DENIES)


Substance Use:  No





Allergies-Medications


(Allergen,Severity, Reaction):  


Coded Allergies:  


     Egg Allergy (Verified  Allergy, Intermediate, 5/14/17)


     Flu Vaccine (Verified  Allergy, Intermediate, 5/14/17)


 "ALLERGIC TO EGGS SO I CAN NOT HAVE THE FLU SHOT"


     Codeine (Verified  Allergy, Mild, 5/14/17)


     Demerol (Verified  Allergy, Mild, 5/14/17)


     Morphine (Verified  Allergy, Mild, 5/14/17)


     Lortab (Verified  Adverse Reaction, Mild, HEADACHE, 5/14/17)


Reported Meds & Prescriptions





Reported Meds & Active Scripts


Active


Reported


Proair Hfa 8.5 GM Inh (Albuterol Sulfate) 90 Mcg/Act Aer 2 Puff INH Q6H PRN


     108 mcg/actuation


Metformin ER (Metformin HCl) 1,000 Mg Jass 1,000 Mg PO BID


     With evening meal


Vimpat (Lacosamide) 50 Mg Tab 50 Mg PO DAILY


Valium (Diazepam) 10 Mg Tab 10 Mg PO TID PRN


Plavix (Clopidogrel Bisulfate) 75 Mg Tab 75 Mg PO DAILY


Toprol XL (Metoprolol Succinate) 50 Mg Tab 50 Mg PO BID


Pantoprazole (Pantoprazole Sodium) 40 Mg Tab 40 Mg PO DAILY


Pravachol (Pravastatin) 40 Mg Tab 40 Mg PO HS


Sertraline (Sertraline HCl) 25 Mg Tab 25 Mg PO DAILY








Review of Systems


Except as stated in HPI:  all other systems reviewed are Neg


General / Constitutional:  No: Fever, Chills


HENT:  No: Headaches, Lightheadedness


Cardiovascular:  No: Chest Pain or Discomfort, Palpitations


Respiratory:  Positive: Cough, Shortness of Breath,  No: Wheezing


Gastrointestinal:  No: Nausea, Vomiting, Abdominal Pain


Genitourinary:  No: Urgency, Frequency, Decreased Urinary Output


Musculoskeletal:  Positive: Other (right ankle fracture and a fracture boot.),  

No: Weakness, Pain


Neurologic:  No: Weakness, Dizziness, Headache





Physical Exam


Narrative


GENERAL: Well-nourished, well-developed patient, in no acute respiratory 

distress.


SKIN: Focused skin assessment warm/dry.


HEAD: Normocephalic/atraumatic.


EYES: No scleral icterus. No injection or drainage. 


NECK: Supple, trachea midline. No JVD or lymphadenopathy.


CARDIOVASCULAR: Regular rate and rhythm without murmurs, gallops, or rubs. 


RESPIRATORY: Breath sounds equal bilaterally. No accessory muscle use.  No 

Rales or rhonchi appreciated


GASTROINTESTINAL: Abdomen soft, non-tender, nondistended. 


MUSCULOSKELETAL: No cyanosis, or edema.  Right lower extremity in a fracture 

boot.  No palpable popliteal cords.  No calf tenderness.


NEUROLOGICAL: Awake and alert. Cranial nerves II through XII intact.  Motor 

grossly within normal limits. Five out of 5 muscle strength in all muscle 

groups.  Normal speech.





Data


Data


Last Documented VS





Vital Signs








  Date Time  Temp Pulse Resp B/P Pulse Ox O2 Delivery O2 Flow Rate FiO2


 


6/11/17 10:56  78 16 180/90 98   


 


6/11/17 09:15      Room Air  


 


6/11/17 08:30 98.4       








Orders





 Complete Blood Count With Diff (6/11/17 09:02)


Basic Metabolic Panel (Bmp) (6/11/17 09:02)


D-Dimer (6/11/17 09:02)


Ckmb (Isoenzyme) Profile (6/11/17 09:02)


Troponin I (6/11/17 09:02)


Iv Access Insert/Monitor (6/11/17 09:02)


Ecg Monitoring (6/11/17 09:02)


Oximetry (6/11/17 09:02)


Oxygen Administration (6/11/17 09:02)


Chest, Single Ap (6/11/17 09:02)


Sodium Chloride 0.9% Flush (Ns Flush) (6/11/17 09:15)


Albuterol-Ipratropium Neb (Duoneb Neb) (6/11/17 09:15)


Albuterol Neb (Albuterol Neb) (6/11/17 09:15)


Us Leg Venous Doppler (6/11/17 09:07)


Ct Brain W/O Iv Contrast(Rout) (6/11/17 09:18)


CKMB (6/11/17 09:10)


CK[facility]% (6/11/17 09:10)


Ct Pulmonary Angiogram (6/11/17 09:58)


Iodixanol 320 Inj (Rad Ct) (Visipaque 32 (6/11/17 10:20)


Admit Order (Ed Use Only) (6/11/17 11:52)





Labs





 Laboratory Tests








Test 6/11/17





 09:10


 


White Blood Count 6.0 TH/MM3


 


Red Blood Count 3.12 MIL/MM3


 


Hemoglobin 9.4 GM/DL


 


Hematocrit 29.4 %


 


Mean Corpuscular Volume 94.4 FL


 


Mean Corpuscular Hemoglobin 30.1 PG


 


Mean Corpuscular Hemoglobin 31.9 %





Concent 


 


Red Cell Distribution Width 15.1 %


 


Platelet Count 357 TH/MM3


 


Mean Platelet Volume 7.2 FL


 


Neutrophils (%) (Auto) 60.4 %


 


Lymphocytes (%) (Auto) 28.2 %


 


Monocytes (%) (Auto) 7.2 %


 


Eosinophils (%) (Auto) 3.2 %


 


Basophils (%) (Auto) 1.0 %


 


Neutrophils # (Auto) 3.6 TH/MM3


 


Lymphocytes # (Auto) 1.7 TH/MM3


 


Monocytes # (Auto) 0.4 TH/MM3


 


Eosinophils # (Auto) 0.2 TH/MM3


 


Basophils # (Auto) 0.1 TH/MM3


 


CBC Comment DIFF FINAL 


 


Differential Comment  


 


D-Dimer Quantitative (PE/DVT) 1.74 MG/L FEU


 


Sodium Level 139 MEQ/L


 


Potassium Level 4.8 MEQ/L


 


Chloride Level 113 MEQ/L


 


Carbon Dioxide Level 14.7 MEQ/L


 


Anion Gap 11 MEQ/L


 


Blood Urea Nitrogen 25 MG/DL


 


Creatinine 1.48 MG/DL


 


Estimat Glomerular Filtration 34 ML/MIN





Rate 


 


Random Glucose 104 MG/DL


 


Calcium Level 9.0 MG/DL


 


Total Creatine Kinase 121 U/L


 


Creatine Kinase MB 0.9 NG/ML


 


Troponin I 0.03 NG/ML











MDM


Medical Decision Making


Medical Screen Exam Complete:  Yes


Emergency Medical Condition:  Yes


Differential Diagnosis


COPD exacerbation versus bronchitis versus pulmonary embolus


Narrative Course


77-year-old female presents with right ankle fracture 6 weeks prior, presents 

today with shortness of breath.  The patient has a MDI at home that is not 

working.  She has a physician that comes to her house who listened to her lungs 

and said he did not hear anything.  The patient states that she coughed all 

night last night.  The patient had some swelling noted in the right lower 

extremity.  DVT ultrasound reveals DVT.  Pulmonary angiogram does not show 

pulmonary embolus in the central vessels.  Patient's hematomas noted to be 9.2.

  The patient had had a fall and was complaining of headache.  CT brain shows 

no evidence of acute cranial injury.  Given the fact that she is falling, has a 

DVT, has a hemoglobin of 9.2, I feel she meets inpatient criteria.  Case 

discussed with the resident service for admission.





Diagnosis





 Primary Impression:  


 Deep vein thrombosis (DVT) of right lower extremity


 Additional Impressions:  


 Anemia


 At risk for falls


 Shortness of breath


 cough


 Diabetes mellitus





Admitting Information


Admitting Physician Requests:  Admit








Jesus Milligan MD Jun 11, 2017 09:07





Jesus Milligan MD Jun 11, 2017 09:07

## 2017-06-11 NOTE — HHI.FPPN
Subjective


Remarks


Pt. seen, examined and discussed with Don Knowles and Olivier.





This is a 78 yo female with recent ankle fracture wearing a boot who presents 

with SOB and pain below her right knee.  Has been homebound until the evening 

prior to admission.  Report hx of chronic bronchitis, quit smoking 45 years 

ago.  She doesn't have a regular doctor; is seen by PAs in her home.  Used to 

see Dr. Galicia.  She has been falling quite a bit of late.  





Home meds are extensive and were reviewed with patient and her sister, with 

whom she lives.





Hx seizure disorder, elevated cholesterol, cervical CA, stroke on Plavix.  See H

&P for this admission for additional past, family, social history and ROS.  She 

does admit to frequent sweats and multiple episodes of nocturia.





At the time seen with the medicine time, she was no longer SOB and only c/o 

discomfort below right knee and in her right ankle.





Objective


Vitals





 Vital Signs








  Date Time  Temp Pulse Resp B/P Pulse Ox O2 Delivery O2 Flow Rate FiO2


 


6/11/17 10:56  78 16 180/90 98   


 


6/11/17 09:15      Room Air  


 


6/11/17 09:15      Room Air  


 


6/11/17 08:30 98.4 84 16 174/77 100   








Result Diagram:  


6/11/17 0910                                                                   

             6/11/17 0910





Other Results





Imaging





Last Impressions








CT Angiography 6/11/17 0958 Signed





Impressions: 





 Service Date/Time:  Sunday, June 11, 2017 10:36 - CONCLUSION:  Negative for 





 negative for central pulmonary emboli..     Pb López MD  FACR


 


Head CT 6/11/17 0918 Signed





Impressions: 





 Service Date/Time:  Sunday, June 11, 2017 10:00 - CONCLUSION: Negative for 

acute 





 process.     Pb López MD  FACR


 


Lower Extremity Ultrasound 6/11/17 0907 Signed





Impressions: 





 Service Date/Time:  Sunday, June 11, 2017 09:18 - CONCLUSION:  Venous Doppler 





 positive for deep venous thrombosis beginning below the knee and extending to 





 mid thigh.     Pb López MD  FACR


 


Chest X-Ray 6/11/17 0902 Signed





Impressions: 





 Service Date/Time:  Sunday, June 11, 2017 09:12 - CONCLUSION: No acute 

disease.  





      Pb López MD  FACR








Objective Remarks


O.  CONSTITUTIONAL/GEN:  normally nourished, in NAD.  


EYES: conjunctiva normal, PERRLA, EOMI.


ENT:    Mouth and  pharynx normal.  Dentures.


NECK:  thyroid midline, carotids symmetrical.  Neck supple.


LUNGS:  clear A-P, respiratory effort is normal.  


CARDIOVASCULAR:  RR without murmur or gallop.  No significant edema.  


GI/ABD:  soft without masses, without organomegaly.  BS +.


:  no CVA tenderness  


NEURO:  No focal deficits.  


SKIN:  color normal, no rashes noted.


HEME/LYMPH:  no bruising, petechia or significant adenopathy


MUSC:  back is normal in appearance.  Extremities are normal in appearance with 

boot right lower leg.  Some swelling below right knee with scar from previous 

surgery.


PSYCH/MENTAL STATUS:  Alert and oriented x 3.  Some mild cognitive issues.





A/P


Assessment and Plan


78 yo with DVT right leg and history of recent ankle fracture.  Also with hx 

stroke, cervical CA, seizures (last seizure 2 years ago).


Attending Attestation


Patient seen and examined.  Case reviewed and discussed with the resident team.

  Agree with plan of care as discussed with me and documented in the resident 

note.


Problem List:  


(1) Deep vein thrombosis (DVT) of right lower extremity


Status:  Acute


(2) Chronic disease


Status:  Acute


(3) Nutrition, metabolism, and development symptoms


Status:  Acute


(4) No contraindication to deep vein thrombosis (DVT) prophylaxis


Status:  Acute








Kira Bullock MD Jun 11, 2017 13:21

## 2017-06-11 NOTE — HHI.HP
HPI


Service


Family Medicine


Primary Care Physician


Non-Staff


Admission Diagnosis


right lower extremity dvt, respiratory distress, anemia,


Diagnoses:  


International Travel<30 Days:  No


Contact w/Intl Traveler<30days:  No


Known Affected Area:  No


History of Present Illness


Ms. Dickinson is a 78 y/o F with an extensive PMHx presenting with SOB. She 

states that over the last 2 weeks she has become increasingly short of breath 

with a nonproductive cough. She denies any fevers, chills, or hemoptysis. 

Overnight she states that she had a cough all night with shortness of breath 

resulting her to not sleep. She then decided to come to the ER for further 

evaluation. She has tried to use her Proair to assist with the SOB however her 

symptoms have not been relieved. She has been more inactive recently as she 

recently fractured her ankle and is in a medical boot. She had difficulty 

ambulating at baseline and requires a walker. She is seen regularly by [facility]. She was recently evaluated by a PA from the service without any 

complications. She currently lives at home with her adopted sister who assists 

her with care.  (Gabriel Knowles MD R1)





Review of Systems


Constitutional:  COMPLAINS OF: Night Sweats (Since ankle fracture),  DENIES: 

Chills


Eyes:  DENIES: Blurred vision


Ears, nose, mouth, throat:  COMPLAINS OF: Throat pain,  DENIES: Running Nose


Respiratory:  COMPLAINS OF: Cough (Since her fracture), Shortness of breath (

Since her fracture )


Cardiovascular:  DENIES: Chest pain


Gastrointestinal:  DENIES: Diarrhea, Nausea, Vomiting


Genitourinary:  DENIES: Dysuria


Musculoskeletal:  COMPLAINS OF: Joint pain


Integumentary:  DENIES: Rash


Hematologic/lymphatic:  DENIES: Lymphadenopathy


Neurologic:  DENIES: Headache


Psychiatric:  DENIES: Mood changes (Gabriel Knowles MD R1)





Past Family Social History


Past Medical History


Anemia - denies history of bleeding (disorder, GI, etc.)


Arthritis


Anxiety/depression


Cervical cancer


DM


Hyperlipidemia


Prior CVA


Diabetes mellitus


Esophageal stricture


Incontinence - wears pad at night only


Seizures - absence seizure, last seizure 2 years ago 


Chronic bronchitis


Hx of polio


Past Surgical History


Appendectomy


Cholecystectomy


Hysterectomy with cervical cancer.


C4-C6 plate placed


Laparoscopic Nissen procedure


Oral surgery


R knee replacement (Gabriel Knowles MD R1)


Allergies:  


Coded Allergies:  


     Egg Allergy (Verified  Allergy, Intermediate, 5/14/17)


     Flu Vaccine (Verified  Allergy, Intermediate, 5/14/17)


 "ALLERGIC TO EGGS SO I CAN NOT HAVE THE FLU SHOT"


     Codeine (Verified  Allergy, Mild, 5/14/17)


     Demerol (Verified  Allergy, Mild, 5/14/17)


     Morphine (Verified  Allergy, Mild, 5/14/17)


     Lortab (Verified  Adverse Reaction, Mild, HEADACHE, 5/14/17)


Family History


Unknown history as patient is adopted


Son - CAD


Social History


She lives at home with her adopted sister in a home. At baseline she has a 

walker to assist with ambulation. Has a dog at home


Smokingdenies tobacco history, stopped smoking 45 years ago


Alcoholdenies alcohol history, social drinker until the mid-90s, has abstained 

since


Illicit drugsdenies illicit drug history (Gabriel Knowles MD R1)





Physical Exam


Vital Signs





Vital Signs








  Date Time  Temp Pulse Resp B/P Pulse Ox O2 Delivery O2 Flow Rate FiO2


 


6/11/17 10:56  78 16 180/90 98   


 


6/11/17 09:15      Room Air  


 


6/11/17 09:15      Room Air  


 


6/11/17 08:30 98.4 84 16 174/77 100   








Physical Exam


GENERAL: Well-nourished, well-developed patient. No acute distress.


SKIN: Warm and dry. No rash.


HEENT: Atraumatic, normocephalic with EOMI. PERRLA. Oropharynx clear without 

erythema or exudate. Dentures in place. No rhinorrhea. No LAD or JVD 

appreciated.


CARDIOVASCULAR: Regular rate and rhythm without obvious murmurs, gallops, or 

rubs. 


RESPIRATORY: Clear to auscultation bilaterally with no CRW. No increased work 

of breathing.


GASTROINTESTINAL: Abdomen soft, non-tender, nondistended with positive bowel 

sounds. No masses or hepatosplenomegaly appreciated. Mild suprapubic 

tenderness. 


MUSCULOSKELETAL: No cyanosis or edema. Strength grossly WNL. 2+ pulses in all 4 

extremities.


   Right lower extremity: Medical boot in place from toes to knees. Tenderness 

to palpation of the calf. Extremity appears swollen compared to left lower 

extremity without changes in skin. Sensation and range of motion intact 

throughout the lower extremity.


BACK: Nontender without obvious deformity. No CVA tenderness.


NEURO/PSYCH: Afocal. Awake, alert, and oriented x3. No gross abnormalities. 

Normal speech and interaction with examiners.


Laboratory





Laboratory Tests








Test 6/11/17





 09:10


 


White Blood Count 6.0 


 


Red Blood Count 3.12 


 


Hemoglobin 9.4 


 


Hematocrit 29.4 


 


Mean Corpuscular Volume 94.4 


 


Mean Corpuscular Hemoglobin 30.1 


 


Mean Corpuscular Hemoglobin 31.9 





Concent 


 


Red Cell Distribution Width 15.1 


 


Platelet Count 357 


 


Mean Platelet Volume 7.2 


 


Neutrophils (%) (Auto) 60.4 


 


Lymphocytes (%) (Auto) 28.2 


 


Monocytes (%) (Auto) 7.2 


 


Eosinophils (%) (Auto) 3.2 


 


Basophils (%) (Auto) 1.0 


 


Neutrophils # (Auto) 3.6 


 


Lymphocytes # (Auto) 1.7 


 


Monocytes # (Auto) 0.4 


 


Eosinophils # (Auto) 0.2 


 


Basophils # (Auto) 0.1 


 


CBC Comment DIFF FINAL 


 


Differential Comment  


 


D-Dimer Quantitative (PE/DVT) 1.74 


 


Sodium Level 139 


 


Potassium Level 4.8 


 


Chloride Level 113 


 


Carbon Dioxide Level 14.7 


 


Anion Gap 11 


 


Blood Urea Nitrogen 25 


 


Creatinine 1.48 


 


Estimat Glomerular Filtration 34 





Rate 


 


Random Glucose 104 


 


Calcium Level 9.0 


 


Total Creatine Kinase 121 


 


Creatine Kinase MB 0.9 


 


Troponin I 0.03 





 (Gabriel Knowles MD R1)


Result Diagram:  


6/11/17 0910                                                                   

             6/11/17 0910





Imaging





Last 72 hours Impressions








CT Angiography 6/11/17 0958 Signed





Impressions: 





 Service Date/Time:  Sunday, June 11, 2017 10:36 - CONCLUSION:  Negative for 





 negative for central pulmonary emboli..     Pb López MD  FACR


 


Head CT 6/11/17 0918 Signed





Impressions: 





 Service Date/Time:  Sunday, June 11, 2017 10:00 - CONCLUSION: Negative for 

acute 





 process.     Pb López MD  FACR


 


Lower Extremity Ultrasound 6/11/17 0907 Signed





Impressions: 





 Service Date/Time:  Sunday, June 11, 2017 09:18 - CONCLUSION:  Venous Doppler 





 positive for deep venous thrombosis beginning below the knee and extending to 





 mid thigh.     Pb López MD  FACR


 


Chest X-Ray 6/11/17 0902 Signed





Impressions: 





 Service Date/Time:  Sunday, June 11, 2017 09:12 - CONCLUSION: No acute 

disease.  





      Pb López MD  FACR





 (Gabriel Knowles MD R1)





Assessment and Plan


Assessment and Plan


Ms. Dickinson is a 78 y/o F with an extensive PMHx presenting with SOB found to 

have DVT without PE. She will be admitted for medical management of her DVT.


Code Status


FULL


Discussed Condition With


Dr. Milligan, ER physician


Dr. Kobe Aguayo (Gabriel Knowles MD R1)


Attending Attestation


Patient seen and examined.  Case reviewed and discussed with the resident team.

  Agree with plan of care as discussed with me and documented in the resident 

note. (Kira Bullokc MD)


Problem List:  


(1) Deep vein thrombosis (DVT) of right lower extremity


Status:  Acute


Plan:  Patient admitted with RLE DVT without evidence of PE. Patient has had 

decreased mobility secondary to R ankle fracture secondary to fall. 





Chest x-ray: No acute disease


Right lower extremity Doppler ultrasound: Venous Doppler positive for DVT 

beginning below the knee and extending to the mid thigh.


Pulmonary CTA: Negative for central pulmonary emboli.





CBC: H/H 9/29


BMP: BUN 25, creatinine 1.48


Troponin 0.03


Coagulation panel: Pending


D-dimer: 1.74


Hemoccult: Pending





Fall precautions


Pulse oximetry with nasal cannula when necessary


Incentive spirometry with Acapella





Medications:


Toradol and breathing treatments given in ER


Tylenol 500 mg every 6 hours when necessary for pain or fever


DuoNeb every 6 hours when necessary for shortness of breath or wheezing


Heparin drip per protocol with 80 units per kilogram bolus administered





(2) Creatinine elevation


Status:  Acute


Plan:  Patient with elevated creatinine to 1.48. Baseline per chart review 

approximately less than 1 per 2014.





BMP: BUN 25, creatinine 1.48


Renal ultrasound: Pending


1 L normal saline at maintenance rate 





(3) Chronic disease


Status:  Acute


Plan:  Seizure disordercontinued Vimpat 50 mg daily


Diabetes mellitushold metformin, sliding scale insulin per protocol


Anxietycontinue Valium 2 mg 3 times a day


GERDcontinue Protonix 40 mg daily


Hyperlipidemiacontinue pravastatin 40 mg nightly


Depressioncontinue sertraline 25 mg daily


History TIAcontinue Plavix





(4) Nutrition, metabolism, and development symptoms


Status:  Acute


Plan:  Diet: Diabetic diet as tolerated


Fluids: Tolerating by mouth fluids, 1 L bolus given at maintenance and ER


Electrolytes: Within normal limits, continue to monitor


Prophylaxis: DuoNeb's when necessary for shortness of breath/wheezing, Tylenol 

when necessary for pain





(5) No contraindication to deep vein thrombosis (DVT) prophylaxis


Status:  Acute


Plan:  Heparin drip per protocol for DVT


SCD/TEDs


 (Gabriel Knowles MD R1)





Physician Certification


2 Midnight Certification Type:  Admission for Inpatient Services


Order for Inpatient Services


The services are ordered in accordance with Medicare regulations or non-

Medicare payer requirements, as applicable.  In the case of services not 

specified as inpatient-only, they are appropriately provided as inpatient 

services in accordance with the 2-midnight benchmark.


Estimated LOS (days):  3


3 days is the estimated time the patient will need to remain in the hospital, 

assuming treatment plan goals are met and no additional complications.


Post-Hospital Plan:  Home (Gabriel Knowles MD R1)








Gabriel Knowles MD R1 Jun 11, 2017 12:19


Kira Bullock MD Jun 12, 2017 11:42

## 2017-06-11 NOTE — RADRPT
EXAM DATE/TIME:  06/11/2017 19:56 

 

[facility] COMPARISON:     

No previous studies available for comparison.

 

EXTERNAL COMPARISON :    

[facility], CT ABDOMEN & PELVIS W & W/O CONTRAST, November 29, 2012

 

 

INDICATIONS :     

Increased BUN/Creatinine.

                     

 

MEDICAL HISTORY :     

Hypercholesterolemia. Hypertension. Gastroesophageal reflux disease. Cerebrovascular accident. Seizur
es. Head trauma. Anticoagulant therapy. Incontinence. Right ankle fracture. Esophageal strictures. Ar
thritis. Osteoporosis. Diabetes. Depression. Anxiety. Cervical cancer.

 

SURGICAL HISTORY :     

Cholecystectomy. Appendectomy.   Cervical conization. Multiple spine surgeries. Right knee surgery. R
ight foot hammer toe repair. 

 

ENCOUNTER:     

Initial

 

ACUITY:     

1 day

 

PAIN SCORE:     

3/10

 

LOCATION:     

Bilateral flank 

MEASUREMENTS:     

 

RIGHT KIDNEY:     

10.1 x 4.0 x 4.3 cm

 

LEFT KIDNEY:     

10.8 x 5.0 x 5.2 cm

 

FINDINGS:     

 

RIGHT KIDNEY:     

Renal cortex is normal in thickness and echotexture.  No hydronephrosis, stone, or mass.  

 

LEFT KIDNEY:     

Renal cortex is normal in thickness and echotexture.  No hydronephrosis, stone, or mass.  

 

BLADDER:     

Mildly distended.

 

CONCLUSION:     

Negative renal sonogram.  No evidence of hydronephrosis.

 

 

 

 Sean Barajas MD on June 11, 2017 at 21:09           

Board Certified Radiologist.

 This report was verified electronically.

## 2017-06-11 NOTE — RADRPT
EXAM DATE/TIME:  06/11/2017 10:36 

 

[facility] COMPARISON:     

No previous studies available for comparison.

 

 

INDICATIONS :     

Shortness of breath.

                      

 

IV CONTRAST:     

50 cc Visipaque (iodixanol) IV 

 

 

RADIATION DOSE:     

5.21 CTDIvol (mGy) 

 

 

MEDICAL HISTORY :     

Stroke. Carcinoma, not otherwise specified. 

 

SURGICAL HISTORY :      

Hysterectomy. Appendectomy.Cholecystectomy.Neck.

 

ENCOUNTER:      

Initial

 

ACUITY:      

1 day

 

PAIN SCALE:      

4/10

 

LOCATION:       

Bilateral chest 

 

TECHNIQUE:     

Volumetric scanning of the chest was performed using a pulmonary embolism protocol MIP images were re
constructed.  Using automated exposure control and adjustment of the mA and/or kV according to patien
t size, radiation dose was kept as low as reasonably achievable to obtain optimal diagnostic quality 
images. 

 

FINDINGS:     

 

PULMONARY ARTERIES:

No filling defects are seen in the pulmonary arteries through the segmental level.

 

LUNGS:     

There is no consolidation or pneumothorax .  No concerning pulmonary nodule is visualized.

 

PLEURAE:     

There is no pleural thickening or pleural effusion.

 

MEDIASTINUM:     

There is good visualization of the great vessels of the middle mediastinum.  No evidence of mediastin
al or hilar adenopathy/mass.

 

MUSCULOSKELETAL:     

Within normal limits for patient age.

 

MISCELLANEOUS:     

The visualized upper abdominal organs demonstrate no acute abnormality.

 

CONCLUSION:     

Negative for negative for central pulmonary emboli..

 

 

 

 Pb López MD FACR on June 11, 2017 at 10:48           

Board Certified Radiologist.

 This report was verified electronically.

## 2017-06-12 VITALS — OXYGEN SATURATION: 97 %

## 2017-06-12 VITALS
TEMPERATURE: 98.3 F | HEART RATE: 91 BPM | SYSTOLIC BLOOD PRESSURE: 157 MMHG | DIASTOLIC BLOOD PRESSURE: 75 MMHG | OXYGEN SATURATION: 100 % | RESPIRATION RATE: 16 BRPM

## 2017-06-12 VITALS
HEART RATE: 81 BPM | OXYGEN SATURATION: 99 % | DIASTOLIC BLOOD PRESSURE: 81 MMHG | RESPIRATION RATE: 20 BRPM | SYSTOLIC BLOOD PRESSURE: 176 MMHG | TEMPERATURE: 98.3 F

## 2017-06-12 VITALS
SYSTOLIC BLOOD PRESSURE: 151 MMHG | OXYGEN SATURATION: 96 % | RESPIRATION RATE: 20 BRPM | TEMPERATURE: 98.1 F | DIASTOLIC BLOOD PRESSURE: 69 MMHG | HEART RATE: 74 BPM

## 2017-06-12 VITALS
TEMPERATURE: 98.1 F | DIASTOLIC BLOOD PRESSURE: 61 MMHG | SYSTOLIC BLOOD PRESSURE: 135 MMHG | HEART RATE: 83 BPM | OXYGEN SATURATION: 97 % | RESPIRATION RATE: 18 BRPM

## 2017-06-12 VITALS
TEMPERATURE: 98 F | HEART RATE: 76 BPM | OXYGEN SATURATION: 96 % | DIASTOLIC BLOOD PRESSURE: 66 MMHG | RESPIRATION RATE: 18 BRPM | SYSTOLIC BLOOD PRESSURE: 140 MMHG

## 2017-06-12 VITALS
OXYGEN SATURATION: 96 % | TEMPERATURE: 98.2 F | HEART RATE: 85 BPM | RESPIRATION RATE: 18 BRPM | SYSTOLIC BLOOD PRESSURE: 159 MMHG | DIASTOLIC BLOOD PRESSURE: 69 MMHG

## 2017-06-12 LAB
ANION GAP SERPL CALC-SCNC: 9 MEQ/L (ref 5–15)
APTT BLD: 41 SEC (ref 24.3–30.1)
APTT BLD: 43.9 SEC (ref 24.3–30.1)
APTT BLD: 44.9 SEC (ref 24.3–30.1)
BASOPHILS # BLD AUTO: 0 TH/MM3 (ref 0–0.2)
BASOPHILS NFR BLD: 0.6 % (ref 0–2)
BUN SERPL-MCNC: 21 MG/DL (ref 7–18)
CHLORIDE SERPL-SCNC: 114 MEQ/L (ref 98–107)
COLOR UR: (no result)
COMMENT (UR): (no result)
CULTURE IF INDICATED: (no result)
EOSINOPHIL # BLD: 0.1 TH/MM3 (ref 0–0.4)
EOSINOPHIL NFR BLD: 1.8 % (ref 0–4)
ERYTHROCYTE [DISTWIDTH] IN BLOOD BY AUTOMATED COUNT: 15.1 % (ref 11.6–17.2)
GFR SERPLBLD BASED ON 1.73 SQ M-ARVRAT: 44 ML/MIN (ref 89–?)
GLUCOSE UR STRIP-MCNC: (no result) MG/DL
HCO3 BLD-SCNC: 20.1 MEQ/L (ref 21–32)
HCT VFR BLD CALC: 26.8 % (ref 35–46)
HEMO FLAGS: (no result)
HGB UR QL STRIP: (no result)
HYALINE CASTS #/AREA URNS LPF: 1 /LPF
KETONES UR STRIP-MCNC: (no result) MG/DL
LYMPHOCYTES # BLD AUTO: 3 TH/MM3 (ref 1–4.8)
LYMPHOCYTES NFR BLD AUTO: 44.8 % (ref 9–44)
MCH RBC QN AUTO: 30.2 PG (ref 27–34)
MCHC RBC AUTO-ENTMCNC: 31.9 % (ref 32–36)
MCV RBC AUTO: 94.5 FL (ref 80–100)
MONOCYTES NFR BLD: 8.6 % (ref 0–8)
MUCOUS THREADS #/AREA URNS LPF: (no result) /LPF
NEUTROPHILS # BLD AUTO: 3 TH/MM3 (ref 1.8–7.7)
NEUTROPHILS NFR BLD AUTO: 44.2 % (ref 16–70)
NITRITE UR QL STRIP: (no result)
PLATELET # BLD: 277 TH/MM3 (ref 150–450)
POTASSIUM SERPL-SCNC: 4.7 MEQ/L (ref 3.5–5.1)
RBC # BLD AUTO: 2.84 MIL/MM3 (ref 4–5.3)
SODIUM SERPL-SCNC: 143 MEQ/L (ref 136–145)
SP GR UR STRIP: 1.02 (ref 1–1.03)
WBC # BLD AUTO: 6.8 TH/MM3 (ref 4–11)

## 2017-06-12 RX ADMIN — HEPARIN SODIUM SCH MLS/HR: 10000 INJECTION, SOLUTION INTRAVENOUS at 16:25

## 2017-06-12 RX ADMIN — INSULIN ASPART SCH: 100 INJECTION, SOLUTION INTRAVENOUS; SUBCUTANEOUS at 21:00

## 2017-06-12 RX ADMIN — INSULIN ASPART SCH: 100 INJECTION, SOLUTION INTRAVENOUS; SUBCUTANEOUS at 17:32

## 2017-06-12 RX ADMIN — Medication SCH ML: at 21:15

## 2017-06-12 RX ADMIN — METOPROLOL SUCCINATE SCH MG: 50 TABLET, FILM COATED, EXTENDED RELEASE ORAL at 09:28

## 2017-06-12 RX ADMIN — ONDANSETRON PRN MG: 4 TABLET, ORALLY DISINTEGRATING ORAL at 16:25

## 2017-06-12 RX ADMIN — HYDROMORPHONE HYDROCHLORIDE PRN MG: 1 INJECTION, SOLUTION INTRAMUSCULAR; INTRAVENOUS; SUBCUTANEOUS at 10:31

## 2017-06-12 RX ADMIN — SERTRALINE HYDROCHLORIDE SCH MG: 50 TABLET, FILM COATED ORAL at 09:27

## 2017-06-12 RX ADMIN — PANTOPRAZOLE SCH MG: 40 TABLET, DELAYED RELEASE ORAL at 09:27

## 2017-06-12 RX ADMIN — HYDROMORPHONE HYDROCHLORIDE PRN MG: 1 INJECTION, SOLUTION INTRAMUSCULAR; INTRAVENOUS; SUBCUTANEOUS at 02:08

## 2017-06-12 RX ADMIN — DIAZEPAM PRN MG: 10 TABLET ORAL at 21:15

## 2017-06-12 RX ADMIN — LACOSAMIDE SCH MG: 50 TABLET, FILM COATED ORAL at 09:29

## 2017-06-12 RX ADMIN — INSULIN ASPART SCH: 100 INJECTION, SOLUTION INTRAVENOUS; SUBCUTANEOUS at 12:42

## 2017-06-12 RX ADMIN — HYDROMORPHONE HYDROCHLORIDE PRN MG: 1 INJECTION, SOLUTION INTRAMUSCULAR; INTRAVENOUS; SUBCUTANEOUS at 16:25

## 2017-06-12 RX ADMIN — Medication SCH ML: at 09:00

## 2017-06-12 RX ADMIN — APIXABAN SCH MG: 5 TABLET, FILM COATED ORAL at 21:15

## 2017-06-12 RX ADMIN — HYDROMORPHONE HYDROCHLORIDE PRN MG: 1 INJECTION, SOLUTION INTRAMUSCULAR; INTRAVENOUS; SUBCUTANEOUS at 22:35

## 2017-06-12 RX ADMIN — PRAVASTATIN SODIUM SCH MG: 40 TABLET ORAL at 21:15

## 2017-06-12 RX ADMIN — METOPROLOL SUCCINATE SCH MG: 50 TABLET, FILM COATED, EXTENDED RELEASE ORAL at 21:15

## 2017-06-12 RX ADMIN — INSULIN ASPART SCH: 100 INJECTION, SOLUTION INTRAVENOUS; SUBCUTANEOUS at 06:10

## 2017-06-12 RX ADMIN — ONDANSETRON PRN MG: 4 TABLET, ORALLY DISINTEGRATING ORAL at 02:10

## 2017-06-12 RX ADMIN — HYDROMORPHONE HYDROCHLORIDE PRN MG: 1 INJECTION, SOLUTION INTRAMUSCULAR; INTRAVENOUS; SUBCUTANEOUS at 06:11

## 2017-06-12 RX ADMIN — CLOPIDOGREL BISULFATE SCH MG: 75 TABLET, FILM COATED ORAL at 09:29

## 2017-06-12 NOTE — HHI.FPPN
Subjective


Remarks


Patient seen and examined this morning by medical team. No acute events 

overnight with vital signs stable. Patient states that Dilaudid medication was 

well-tolerated with Zofran administration and has helped her pain. Otherwise 

she has no complaints and denies any fevers, chills, shortness of breath, chest 

pain, abdominal pain, or NVD. (Gabriel Knowles MD R1)





Objective


Vitals





 Vital Signs








  Date Time  Temp Pulse Resp B/P Pulse Ox O2 Delivery O2 Flow Rate FiO2


 


6/12/17 12:00 98.1 74 20 151/69 96   


 


6/12/17 11:01   18     


 


6/12/17 08:42     97   21


 


6/12/17 08:00 98.2 85 18 159/69 96   


 


6/12/17 04:00      Room Air  


 


6/12/17 04:00 98.0 76 18 140/66 96   


 


6/12/17 00:00      Room Air  


 


6/12/17 00:00 98.1 83 18 135/61 97   


 


6/11/17 20:00      Room Air  


 


6/11/17 20:00 98.1 84 20 144/65 99   


 


6/11/17 16:24     96   21


 


6/11/17 16:00 98.4 94 18 144/74 100   


 


6/11/17 14:49  101 16 151/65 96   








 I/O








 6/11/17 6/11/17 6/11/17 6/12/17 6/12/17 6/12/17





 07:00 15:00 23:00 07:00 15:00 23:00


 


Intake Total   1112 ml 480 ml  


 


Balance   1112 ml 480 ml  


 


      


 


Intake Oral   240 ml 480 ml  


 


IV Total   872 ml   


 


# Voids   1 2  


 


# Bowel Movements   0 0  





 (Gabriel Knowles MD R1)


Result Diagram:  


6/12/17 0112 6/12/17 0112





Objective Remarks


GENERAL: Well-nourished, well-developed patient. No acute distress.


SKIN: Warm and dry. No rash. Multiple small ecchymosis on extremities likely 

secondary to anticoagulation.


HEENT: Atraumatic, normocephalic with EOMI. MMM. No rhinorrhea. No JVD or LAD 

appreciated.


CARDIOVASCULAR: Regular rate and rhythm without obvious murmurs, gallops, or 

rubs. 


RESPIRATORY: Clear to auscultation bilaterally with no CRW. No increased work 

of breathing.


GASTROINTESTINAL: Abdomen soft, non-tender, nondistended with positive bowel 

sounds. No masses or hepatosplenomegaly appreciated. Mild suprapubic 

tenderness. 


MUSCULOSKELETAL: No cyanosis or edema. Strength grossly WNL. 2+ pulses in all 4 

extremities.


   Right lower extremity: Medical boot in place from toes to knees. Boot 

removed to check for sensation and pulses are both intact. Tenderness to 

palpation of the calf and ankle. Extremity appears swollen compared to left 

lower extremity without changes in skin or warmth. Sensation and range of 

motion intact throughout the lower extremity..


NEURO/PSYCH: Afocal. Awake, alert, and oriented x3. No gross abnormalities. 

Normal speech and interaction with examiners. (Gabriel Knowles MD R1)





A/P


Assessment and Plan


Ms. Dickinson is a 76 y/o F with an extensive PMHx presenting with SOB found to 

have DVT without PE. She will be admitted for medical management of her DVT.


Discharge Planning


Pending clinical improvement and initiation of oral anticoagulant. (Gabriel Knowles MD R1)


Attending Attestation


Patient seen and examined.  Case reviewed and discussed with the resident team.

  Agree with plan of care as discussed with me and documented in the resident 

note. (Kira Bullock MD)


Problem List:  


(1) Deep vein thrombosis (DVT) of right lower extremity


Status:  Acute


Plan:  Patient admitted with RLE DVT without evidence of PE. Patient has had 

decreased mobility secondary to R ankle fracture secondary to fall. 





Chest x-ray: No acute disease


Right lower extremity Doppler ultrasound: Venous Doppler positive for DVT 

beginning below the knee and extending to the mid thigh.


Pulmonary CTA: Negative for central pulmonary emboli.





CBC: H/H 8.6/26.8


BMP: Creatinine improved from 1.48-1.2


Hemoccult: Pending





Fall precautions


Pulse oximetry with nasal cannula when necessary


Incentive spirometry with Acapella





Medications:


Toradol and breathing treatments given in ER


Dilaudid 0.2 mg every 4 hours when necessary for pain (pretreat with Zofran 4 

mg)


Tylenol 500 mg every 6 hours when necessary for pain or fever


DuoNeb every 6 hours when necessary for shortness of breath or wheezing


Heparin drip per protocol with 80 units per kilogram bolus administered; 

scheduled for discontinuation on 6/12 at 2100


Start Eliquis 10 mg twice a day for first 7 days followed by maintenance 

therapy of 5 mg twice a day at discharge





(2) Creatinine elevation


Status:  Acute


Plan:  Patient with elevated creatinine to 1.48. Baseline per chart review 

approximately less than 1 per 2014.





BMP: Creatinine improved from 1.48 to 1.2


Renal ultrasound: Negative renal ultrasound


1 L normal saline at maintenance rate 





(3) Chronic disease


Status:  Acute


Plan:  Seizure disordercontinued Vimpat 50 mg daily


Diabetes mellitushold metformin, sliding scale insulin per protocol


Anxietycontinue Valium 2 mg 3 times a day


GERDcontinue Protonix 40 mg daily


Hyperlipidemiacontinue pravastatin 40 mg nightly


Depressioncontinue sertraline 25 mg daily


History TIAcontinue Plavix





(4) Nutrition, metabolism, and development symptoms


Status:  Acute


Plan:  Diet: Diabetic diet as tolerated


Fluids: Tolerating by mouth fluids, 1 L bolus given at maintenance and ER


Electrolytes: Within normal limits, continue to monitor


Prophylaxis: DuoNeb's when necessary for shortness of breath/wheezing, Tylenol 

when necessary for pain/fever





(5) No contraindication to deep vein thrombosis (DVT) prophylaxis


Status:  Acute


Plan:  Heparin drip per protocol for DVT and starting Eliquis for continued 

anticoagulation


SCD/TEDs


 (Gabriel Knowles MD R1)








Gabriel Knowles MD R1 Jun 12, 2017 14:52


Kira Bullock MD Jun 13, 2017 08:22

## 2017-06-13 VITALS
SYSTOLIC BLOOD PRESSURE: 152 MMHG | OXYGEN SATURATION: 98 % | HEART RATE: 76 BPM | TEMPERATURE: 98 F | RESPIRATION RATE: 16 BRPM | DIASTOLIC BLOOD PRESSURE: 73 MMHG

## 2017-06-13 VITALS
TEMPERATURE: 98 F | HEART RATE: 119 BPM | SYSTOLIC BLOOD PRESSURE: 158 MMHG | RESPIRATION RATE: 20 BRPM | DIASTOLIC BLOOD PRESSURE: 90 MMHG | OXYGEN SATURATION: 98 %

## 2017-06-13 VITALS
OXYGEN SATURATION: 99 % | DIASTOLIC BLOOD PRESSURE: 82 MMHG | RESPIRATION RATE: 18 BRPM | SYSTOLIC BLOOD PRESSURE: 136 MMHG | TEMPERATURE: 98.5 F | HEART RATE: 82 BPM

## 2017-06-13 VITALS
TEMPERATURE: 97.4 F | OXYGEN SATURATION: 99 % | HEART RATE: 113 BPM | RESPIRATION RATE: 20 BRPM | SYSTOLIC BLOOD PRESSURE: 159 MMHG | DIASTOLIC BLOOD PRESSURE: 86 MMHG

## 2017-06-13 VITALS
OXYGEN SATURATION: 100 % | RESPIRATION RATE: 20 BRPM | SYSTOLIC BLOOD PRESSURE: 163 MMHG | HEART RATE: 88 BPM | DIASTOLIC BLOOD PRESSURE: 80 MMHG | TEMPERATURE: 98.3 F

## 2017-06-13 VITALS
RESPIRATION RATE: 18 BRPM | OXYGEN SATURATION: 99 % | DIASTOLIC BLOOD PRESSURE: 79 MMHG | TEMPERATURE: 98.1 F | SYSTOLIC BLOOD PRESSURE: 147 MMHG | HEART RATE: 89 BPM

## 2017-06-13 LAB
ANION GAP SERPL CALC-SCNC: 11 MEQ/L (ref 5–15)
BUN SERPL-MCNC: 16 MG/DL (ref 7–18)
CHLORIDE SERPL-SCNC: 108 MEQ/L (ref 98–107)
ERYTHROCYTE [DISTWIDTH] IN BLOOD BY AUTOMATED COUNT: 14.3 % (ref 11.6–17.2)
GFR SERPLBLD BASED ON 1.73 SQ M-ARVRAT: 41 ML/MIN (ref 89–?)
HCO3 BLD-SCNC: 17.7 MEQ/L (ref 21–32)
HCT VFR BLD CALC: 32.4 % (ref 35–46)
MCH RBC QN AUTO: 29.5 PG (ref 27–34)
MCHC RBC AUTO-ENTMCNC: 31.3 % (ref 32–36)
MCV RBC AUTO: 94.5 FL (ref 80–100)
PLATELET # BLD: 340 TH/MM3 (ref 150–450)
POTASSIUM SERPL-SCNC: 4.8 MEQ/L (ref 3.5–5.1)
RBC # BLD AUTO: 3.42 MIL/MM3 (ref 4–5.3)
REVIEW FLAG: (no result)
SODIUM SERPL-SCNC: 137 MEQ/L (ref 136–145)
WBC # BLD AUTO: 5.9 TH/MM3 (ref 4–11)

## 2017-06-13 RX ADMIN — APIXABAN SCH MG: 5 TABLET, FILM COATED ORAL at 20:45

## 2017-06-13 RX ADMIN — Medication SCH ML: at 09:00

## 2017-06-13 RX ADMIN — METOPROLOL SUCCINATE SCH MG: 50 TABLET, FILM COATED, EXTENDED RELEASE ORAL at 09:21

## 2017-06-13 RX ADMIN — METOPROLOL SUCCINATE SCH MG: 50 TABLET, FILM COATED, EXTENDED RELEASE ORAL at 20:45

## 2017-06-13 RX ADMIN — PANTOPRAZOLE SCH MG: 40 TABLET, DELAYED RELEASE ORAL at 09:21

## 2017-06-13 RX ADMIN — INSULIN ASPART SCH: 100 INJECTION, SOLUTION INTRAVENOUS; SUBCUTANEOUS at 12:58

## 2017-06-13 RX ADMIN — HYDROMORPHONE HYDROCHLORIDE PRN MG: 1 INJECTION, SOLUTION INTRAMUSCULAR; INTRAVENOUS; SUBCUTANEOUS at 22:11

## 2017-06-13 RX ADMIN — PRAVASTATIN SODIUM SCH MG: 40 TABLET ORAL at 20:45

## 2017-06-13 RX ADMIN — DIAZEPAM PRN MG: 10 TABLET ORAL at 04:25

## 2017-06-13 RX ADMIN — APIXABAN SCH MG: 5 TABLET, FILM COATED ORAL at 09:22

## 2017-06-13 RX ADMIN — CLOPIDOGREL BISULFATE SCH MG: 75 TABLET, FILM COATED ORAL at 09:21

## 2017-06-13 RX ADMIN — INSULIN ASPART SCH: 100 INJECTION, SOLUTION INTRAVENOUS; SUBCUTANEOUS at 16:00

## 2017-06-13 RX ADMIN — INSULIN ASPART SCH: 100 INJECTION, SOLUTION INTRAVENOUS; SUBCUTANEOUS at 20:46

## 2017-06-13 RX ADMIN — HYDROMORPHONE HYDROCHLORIDE PRN MG: 1 INJECTION, SOLUTION INTRAMUSCULAR; INTRAVENOUS; SUBCUTANEOUS at 17:38

## 2017-06-13 RX ADMIN — HYDROMORPHONE HYDROCHLORIDE PRN MG: 1 INJECTION, SOLUTION INTRAMUSCULAR; INTRAVENOUS; SUBCUTANEOUS at 09:25

## 2017-06-13 RX ADMIN — INSULIN ASPART SCH: 100 INJECTION, SOLUTION INTRAVENOUS; SUBCUTANEOUS at 06:05

## 2017-06-13 RX ADMIN — LACOSAMIDE SCH MG: 50 TABLET, FILM COATED ORAL at 09:21

## 2017-06-13 RX ADMIN — SERTRALINE HYDROCHLORIDE SCH MG: 50 TABLET, FILM COATED ORAL at 09:21

## 2017-06-13 RX ADMIN — HYDROMORPHONE HYDROCHLORIDE PRN MG: 1 INJECTION, SOLUTION INTRAMUSCULAR; INTRAVENOUS; SUBCUTANEOUS at 05:02

## 2017-06-13 RX ADMIN — Medication SCH ML: at 20:46

## 2017-06-13 NOTE — HHI.DS
Gabriel Knowles MD R1 6/13/17 1037:


Discharge Summary


Admission Date


Jun 11, 2017 at 12:45


Discharge Date:  Jun 14, 2017


Admitting Diagnosis


right lower extremity dvt, respiratory distress, anemia,





(1) Deep vein thrombosis (DVT) of right lower extremity


Diagnosis:  Principal


Plan:  Patient admitted with RLE DVT without evidence of PE. Patient has had 

decreased mobility secondary to R ankle fracture secondary to fall. 





Chest x-ray: No acute disease


Right lower extremity Doppler ultrasound: Venous Doppler positive for DVT 

beginning below the knee and extending to the mid thigh.


Pulmonary CTA: Negative for central pulmonary emboli.





CBC: H/H 10.1/32.4


BMP: Creatinine improved from 1.27


Hemoccult: Pending





Fall precautions


Pulse oximetry with nasal cannula when necessary


Incentive spirometry with Acapella





Medications:


Toradol and breathing treatments given in ER


Dilaudid 0.2 mg every 4 hours when necessary for pain (pretreat with Zofran 4 

mg)


Tylenol 500 mg every 6 hours when necessary for pain or fever


DuoNeb every 6 hours when necessary for shortness of breath or wheezing


Heparin drip per protocol with 80 units per kilogram bolus administered; 

scheduled for discontinuation on 6/12 at 2100


Start Eliquis 10 mg twice a day for first 7 days followed by maintenance 

therapy of 5 mg twice a day at discharge; BMP ordered one week after discharge 

for evaluation of kidney function





(2) Creatinine elevation


Diagnosis:  Principal


Plan:  Patient with elevated creatinine to 1.48. Baseline per chart review 

approximately less than 1 per 2014.





BMP: Creatinine 1.27


Renal ultrasound: Negative renal ultrasound


1 L normal saline at maintenance rate 


BMP ordered one week after discharge for evaluation of kidney function while 

on Eliquis





(3) Chronic disease


Diagnosis:  Secondary


Plan:  Seizure disordercontinued Vimpat 50 mg daily


Diabetes mellitushold metformin, sliding scale insulin per protocol


Anxietycontinue Valium 2 mg 3 times a day


GERDcontinue Protonix 40 mg daily


Hyperlipidemiacontinue pravastatin 40 mg nightly


Depressioncontinue sertraline 25 mg daily


History TIAcontinue Plavix





(4) Nutrition, metabolism, and development symptoms


Diagnosis:  Principal


Plan:  Diet: Diabetic diet as tolerated


Fluids: Tolerating by mouth fluids, 1 L bolus given at maintenance and ER


Electrolytes: Within normal limits, continue to monitor


Prophylaxis: DuoNeb's when necessary for shortness of breath/wheezing, Tylenol 

when necessary for pain/fever





(5) No contraindication to deep vein thrombosis (DVT) prophylaxis


Diagnosis:  Principal


Plan:  Heparin drip per protocol for DVT discontinued 6/12. Patient started on 

Eliquis for discharge. 


SCD/TEDs





Brief History


Ms. Dickinson is a 78 y/o F with an extensive PMHx presenting with SOB. She 

states that over the last 2 weeks she has become increasingly short of breath 

with a nonproductive cough. She denies any fevers, chills, or hemoptysis. 

Overnight she states that she had a cough all night with shortness of breath 

resulting her to not sleep. She then decided to come to the ER for further 

evaluation. She has tried to use her Proair to assist with the SOB however her 

symptoms have not been relieved. She has been more inactive recently as she 

recently fractured her ankle and is in a medical boot. She had difficulty 

ambulating at baseline and requires a walker. She is seen regularly by [facility]. She was recently evaluated by a PA from the service without any 

complications. She currently lives at home with her adopted sister who assists 

her with care.


CBC/BMP:  


6/13/17 0503                                                                   

             6/13/17 0503





Significant Findings





Laboratory Tests








Test 6/11/17 6/11/17 6/11/17 6/12/17





 09:10 14:15 22:00 00:30


 


Red Blood Count 3.12 MIL/MM3   





 (4.00-5.30)   


 


Hemoglobin 9.4 GM/DL   





 (11.6-15.3)   


 


Hematocrit 29.4 %   





 (35.0-46.0)   


 


Mean Corpuscular Hemoglobin 31.9 %   





Concent (32.0-36.0)   


 


D-Dimer Quantitative (PE/DVT) 1.74 MG/L FEU   





 (0.00-0.50)   


 


Chloride Level 113 MEQ/L   





 ()   


 


Carbon Dioxide Level 14.7 MEQ/L   





 (21.0-32.0)   


 


Blood Urea Nitrogen 25 MG/DL (7-18)   


 


Creatinine 1.48 MG/DL   





 (0.50-1.00)   


 


Estimat Glomerular Filtration 34 ML/MIN (>89)   





Rate    


 


Activated Partial  18.7 .9 SEC 





Thromboplast Time  (24.3-30.1) (24.3-30.1) 


 


Urine Mucus    FEW /lpf (OCC)


 


    





Test 6/12/17 6/12/17 6/12/17 6/13/17





 01:12 11:00 19:34 05:03


 


Red Blood Count 2.84 MIL/MM3   3.42 MIL/MM3





 (4.00-5.30)   (4.00-5.30)


 


Hemoglobin 8.6 GM/DL   10.1 GM/DL





 (11.6-15.3)   (11.6-15.3)


 


Hematocrit 26.8 %   32.4 %





 (35.0-46.0)   (35.0-46.0)


 


Mean Corpuscular Hemoglobin 31.9 %   31.3 %





Concent (32.0-36.0)   (32.0-36.0)


 


Lymphocytes (%) (Auto) 44.8 %   





 (9.0-44.0)   


 


Monocytes (%) (Auto) 8.6 % (0.0-8.0)   


 


Activated Partial 44.9 SEC 41.0 SEC 43.9 SEC 





Thromboplast Time (24.3-30.1) (24.3-30.1) (24.3-30.1) 


 


Chloride Level 114 MEQ/L   108 MEQ/L





 ()   ()


 


Carbon Dioxide Level 20.1 MEQ/L   17.7 MEQ/L





 (21.0-32.0)   (21.0-32.0)


 


Blood Urea Nitrogen 21 MG/DL (7-18)   


 


Creatinine 1.20 MG/DL   1.27 MG/DL





 (0.50-1.00)   (0.50-1.00)


 


Estimat Glomerular Filtration 44 ML/MIN (>89)   41 ML/MIN (>89)





Rate    


 


Calcium Level 8.4 MG/DL   





 (8.5-10.1)   








PE at Discharge


GENERAL: Well-nourished, well-developed patient. No acute distress.


SKIN: Warm and dry. No rash. Multiple small ecchymosis on extremities likely 

secondary to initiation of anticoagulation.


HEENT: Atraumatic, normocephalic with EOMI. MMM. No rhinorrhea. No JVD or LAD 

appreciated.


CARDIOVASCULAR: Regular rate and rhythm without obvious murmurs, gallops, or 

rubs. 


RESPIRATORY: Clear to auscultation bilaterally with no CRW. No increased work 

of breathing.


GASTROINTESTINAL: Abdomen soft, non-tender, nondistended with positive bowel 

sounds. No masses or hepatosplenomegaly appreciated. Mild suprapubic tenderness 

resolved. 


MUSCULOSKELETAL: No cyanosis or edema. Strength grossly WNL. 2+ pulses in all 4 

extremities.


   Right lower extremity: Medical boot in place from toes to knees. Boot 

removed to check for sensation and pulses are both intact. Tenderness to 

palpation of the calf and ankle. Extremity appears swollen compared to left 

lower extremity without changes in skin or warmth. Sensation and range of 

motion intact throughout the lower extremity. Popliteal, DP, and PT pulses 

intact.


NEURO/PSYCH: Afocal. Awake, alert, and oriented x3. No gross abnormalities. 

Normal speech and interaction with examiners.


Hospital Course


Patient was admitted for right lower extremity DVT. Patient placed on heparin 

drip per protocol with 80 units per kilogram bolus administered. She was 

thoroughly monitored with repeat PTT and drip titrated appropriately. Her pain 

was controlled with Dilaudid 0.2 mg an premedication by Zofran without 

complication. On hospital day 2 she was transitioned off heparin drip and 

started on Eliquis was 10 mg twice a day. On hospital day 3 she was discharged 

home with home health nursing and PT orders per PT's evaluation with 

recommendations. However, her sister, who is her caregiver at home, refused 

discharge on hospital day three after initially agreeing to discharge with the 

Medical team earlier in the day. Per Case Management and Nursing staff, her 

sister needs to arrange an outside extra caregiver to assist both of them 

before she can be taken home. Discharge to rehabilitation was discussed, 

however PT recommended home with home health and was initially refused by 

patient and caregiver. On hospital day 4, after further discussion with staff, 

the patient and caregiver requested discharge to rehabilitation facility for 

further monitoring, PT, and assistance with ADLs. She will continue Eliquis 

10mg twice a day for a total of 7 days (10 tabs and prescription). She will 

decrease Eliquis to 5mg twice a day for a minimum of 3 months. A follow up BMP 

was ordered for 1 week to evaluated kidney function while on Eliquis. She was 

also advised to follow up with her PCP in 1 week for re-evaluation. Patient 

reports she will follow up with Dr. Joseph for her RLE fracture.


Pt Condition on Discharge:  Stable


Discharge Disposition:  Discharge to SNF


Discharge Instructions


DIET: Follow Instructions for:  As Tolerated, No Restrictions


Speech Therapy-Diet Recommends:  Regular


Activities you can perform:  Partial Weight Bearing


Follow up Referrals:  


PCP Follow-up - 1 Week





New Orders:  


BASIC METABOLIC PROF - 1 Week





New Medications:  


Apixaban (Eliquis) 5 Mg Tab


5 MG PO BID Please take Eliquis 10mg (2 tablets) twice a day for the first 6 

days upon discharge. Then continue Eliquis with 5mg (1 tablet) twice a day for 

up to 3 months. Blood Clot Prevention #180 Ref 1 TAB


Apixaban (Eliquis) 5 Mg Tab


10 MG PO BID Please take Eliquis 10mg (2 tablets) twice a day for the first 6 

days upon discharge. Then continue Eliquis with 5mg (1 tablet) twice a day for 

up to 3 months. #12 TAB


 


Continued Medications:  


Albuterol 8.5 GM Inh (Proair Hfa 8.5 GM Inh) 90 Mcg/Act Aer


2 PUFF INH Q6H 108 mcg/actuation PRN SHORTNESS OF BREATH #1 Ref 0 INHALER


Clopidogrel (Plavix) 75 Mg Tab


75 MG PO DAILY Blood Clot Prevention #30 Ref 0 TAB


Diazepam (Valium) 10 Mg Tab


10 MG PO TID PRN ANXIETY Ref 0 TAB


Lacosamide (Vimpat) 50 Mg Tab


50 MG PO DAILY Control Seizures #60 Ref 0 TAB


Metformin ER (Metformin ER) 1,000 Mg Jass


1000 MG PO BID With evening meal Blood Sugar Management #30 Ref 0 TAB


Metoprolol Succinate ER 24 HR (Toprol XL) 50 Mg Tab


50 MG PO BID #30 Ref 0 TAB


Pantoprazole (Pantoprazole) 40 Mg Tab


40 MG PO DAILY Reflux #30 Ref 0 TAB


Pravastatin (Pravachol) 40 Mg Tab


40 MG PO HS Cholesterol Management #30 Ref 0 TAB


Sertraline (Sertraline) 25 Mg Tab


25 MG PO DAILY #30 Ref 0 TAB








Kira Bullock MD 6/14/17 1152:


Discharge Summary


Discharge Date:  Jun 14, 2017





CBC/BMP:  


6/13/17 0503                                                                   

             6/13/17 0503





Discharge Instructions


Follow up Referrals:  


PCP Follow-up - 1 Week





New Orders:  


BASIC METABOLIC PROF - 1 Week





New Medications:  


Apixaban (Eliquis) 5 Mg Tab


5 MG PO BID Please take Eliquis 10mg (2 tablets) twice a day for the first 6 

days upon discharge. Then continue Eliquis with 5mg (1 tablet) twice a day for 

up to 3 months. Blood Clot Prevention #180 Ref 1 TAB


Apixaban (Eliquis) 5 Mg Tab


10 MG PO BID Please take Eliquis 10mg (2 tablets) twice a day for the first 6 

days upon discharge. Then continue Eliquis with 5mg (1 tablet) twice a day for 

up to 3 months. #12 TAB


 


Continued Medications:  


Albuterol 8.5 GM Inh (Proair Hfa 8.5 GM Inh) 90 Mcg/Act Aer


2 PUFF INH Q6H 108 mcg/actuation PRN SHORTNESS OF BREATH #1 Ref 0 INHALER


Clopidogrel (Plavix) 75 Mg Tab


75 MG PO DAILY Blood Clot Prevention #30 Ref 0 TAB


Diazepam (Valium) 10 Mg Tab


10 MG PO TID PRN ANXIETY Ref 0 TAB


Lacosamide (Vimpat) 50 Mg Tab


50 MG PO DAILY Control Seizures #60 Ref 0 TAB


Metformin ER (Metformin ER) 1,000 Mg Jass


1000 MG PO BID With evening meal Blood Sugar Management #30 Ref 0 TAB


Metoprolol Succinate ER 24 HR (Toprol XL) 50 Mg Tab


50 MG PO BID #30 Ref 0 TAB


Pantoprazole (Pantoprazole) 40 Mg Tab


40 MG PO DAILY Reflux #30 Ref 0 TAB


Pravastatin (Pravachol) 40 Mg Tab


40 MG PO HS Cholesterol Management #30 Ref 0 TAB


Sertraline (Sertraline) 25 Mg Tab


25 MG PO DAILY #30 Ref 0 TAB











Gabriel Knowles MD R1 Jun 13, 2017 10:37


Kira Bullock MD Jun 14, 2017 11:52

## 2017-06-13 NOTE — HHI.FPPN
Subjective


Remarks


Patient seen and examined this morning. No acute events overnight. Patient 

hypertensive up to 176/81 that was treated with clonidine which responded 

appropriately. Patient states that she is back at her baseline and would like 

to be discharged home today in the medical clearance. Her only complaint this 

morning is continued pain in her right lower extremity from ankle to her knee 

related to her fracture and DVT. She states that the pain has significantly 

decreased since her admission that is tolerable currently. She denies any fevers

, chills, shortness of breath, chest pain, NVD, or abdominal pain. (Gabriel Knowles MD R1)





Objective


Vitals





 Vital Signs








  Date Time  Temp Pulse Resp B/P Pulse Ox O2 Delivery O2 Flow Rate FiO2


 


6/13/17 08:00 98.5 82 18 136/82 99   


 


6/13/17 04:00 98.3 88 20 163/80 100   


 


6/13/17 04:00      Room Air  


 


6/13/17 00:00      Nasal Cannula 1.00 


 


6/13/17 00:00 98.0 76 16 152/73 98   


 


6/12/17 20:00 98.3 91 16 157/75 100   


 


6/12/17 20:00      Nasal Cannula 1.00 


 


6/12/17 16:55   18     


 


6/12/17 16:00 98.3 81 20 176/81 99   


 


6/12/17 12:00 98.1 74 20 151/69 96   








 I/O








 6/12/17 6/12/17 6/12/17 6/13/17 6/13/17 6/13/17





 07:00 15:00 23:00 07:00 15:00 23:00


 


Intake Total 480 ml 480 ml 58 ml   


 


Output Total  1050 ml 950 ml   


 


Balance 480 ml -570 ml -892 ml   


 


      


 


Intake Oral 480 ml 480 ml    


 


IV Total   58 ml   


 


Output Urine Total  1050 ml 950 ml   


 


# Voids 2  3 0  


 


# Bowel Movements 0 0    





 (Gabriel Knowles MD R1)


Result Diagram:  


6/13/17 0503                                                                   

             6/13/17 0503





Objective Remarks


GENERAL: Well-nourished, well-developed patient. No acute distress.


SKIN: Warm and dry. No rash. Multiple small ecchymosis on extremities likely 

secondary to initiation of anticoagulation.


HEENT: Atraumatic, normocephalic with EOMI. MMM. No rhinorrhea. No JVD or LAD 

appreciated.


CARDIOVASCULAR: Regular rate and rhythm without obvious murmurs, gallops, or 

rubs. 


RESPIRATORY: Clear to auscultation bilaterally with no CRW. No increased work 

of breathing.


GASTROINTESTINAL: Abdomen soft, non-tender, nondistended with positive bowel 

sounds. No masses or hepatosplenomegaly appreciated. Mild suprapubic tenderness 

resolved. 


MUSCULOSKELETAL: No cyanosis or edema. Strength grossly WNL. 2+ pulses in all 4 

extremities.


   Right lower extremity: Medical boot in place from toes to knees. Boot 

removed to check for sensation and pulses are both intact. Tenderness to 

palpation of the calf and ankle. Extremity appears swollen compared to left 

lower extremity without changes in skin or warmth. Sensation and range of 

motion intact throughout the lower extremity. Popliteal, DP, and PT pulses 

intact.


NEURO/PSYCH: Afocal. Awake, alert, and oriented x3. No gross abnormalities. 

Normal speech and interaction with examiners. (Gabriel Knowles MD R1)





A/P


Assessment and Plan


Ms. Dickinson is a 76 y/o F with an extensive PMHx presenting with SOB found to 

have DVT without PE. She will be admitted for medical management of her DVT.


Discharge Planning


Medical team will plan for discharge home today with home health PT. (Gabriel Knowles MD R1)


Attending Attestation


Patient and her sister were in the room, the told me they felt safe to go home, 

there is a bedside commode at home, and she has her own walker.


Patient seen and examined.  Case reviewed and discussed with the resident team.

  Agree with plan of care as discussed with me and documented in the resident 

note. (Kira Bullock MD)


Problem List:  


(1) Deep vein thrombosis (DVT) of right lower extremity


Status:  Acute


Plan:  Patient admitted with RLE DVT without evidence of PE. Patient has had 

decreased mobility secondary to R ankle fracture secondary to fall. 





Chest x-ray: No acute disease


Right lower extremity Doppler ultrasound: Venous Doppler positive for DVT 

beginning below the knee and extending to the mid thigh.


Pulmonary CTA: Negative for central pulmonary emboli.





CBC: H/H 10.1/32.4


BMP: Creatinine improved from 1.27


Hemoccult: Pending





Fall precautions


Pulse oximetry with nasal cannula when necessary


Incentive spirometry with Acapella





Medications:


Toradol and breathing treatments given in ER


Dilaudid 0.2 mg every 4 hours when necessary for pain (pretreat with Zofran 4 

mg)


Tylenol 500 mg every 6 hours when necessary for pain or fever


DuoNeb every 6 hours when necessary for shortness of breath or wheezing


Heparin drip per protocol with 80 units per kilogram bolus administered; 

scheduled for discontinuation on 6/12 at 2100


Start Eliquis 10 mg twice a day for first 7 days followed by maintenance 

therapy of 5 mg twice a day at discharge; BMP ordered one week after discharge 

for evaluation of kidney function





(2) Creatinine elevation


Status:  Acute


Plan:  Patient with elevated creatinine to 1.48. Baseline per chart review 

approximately less than 1 per 2014.





BMP: Creatinine 1.27


Renal ultrasound: Negative renal ultrasound


1 L normal saline at maintenance rate 


BMP ordered one week after discharge for evaluation of kidney function while 

on Eliquis





(3) Chronic disease


Status:  Acute


Plan:  Seizure disordercontinued Vimpat 50 mg daily


Diabetes mellitushold metformin, sliding scale insulin per protocol


Anxietycontinue Valium 2 mg 3 times a day


GERDcontinue Protonix 40 mg daily


Hyperlipidemiacontinue pravastatin 40 mg nightly


Depressioncontinue sertraline 25 mg daily


History TIAcontinue Plavix





(4) Nutrition, metabolism, and development symptoms


Status:  Acute


Plan:  Diet: Diabetic diet as tolerated


Fluids: Tolerating by mouth fluids, 1 L bolus given at maintenance and ER


Electrolytes: Within normal limits, continue to monitor


Prophylaxis: DuoNeb's when necessary for shortness of breath/wheezing, Tylenol 

when necessary for pain/fever





(5) No contraindication to deep vein thrombosis (DVT) prophylaxis


Status:  Acute


Plan:  Heparin drip per protocol for DVT discontinued 6/12. Patient started on 

Eliquis for discharge. 


SCD/TEDs


 (Gabriel Knowles MD R1)








Gabreil Knowles MD R1 Jun 13, 2017 10:24


Kira Bullock MD Jun 13, 2017 16:28

## 2017-06-13 NOTE — HHI.DCPOC
Discharge Care Plan


Diagnosis:  


(1) Deep vein thrombosis (DVT) of right lower extremity


Goals to Promote Your Health


* To prevent worsening of your condition and complications


* To maintain your health at the optimal level


Directions to Meet Your Goals


*** Take your medications as prescribed


*** Follow your dietary instruction


*** Follow activity as directed








*** Keep your appointments as scheduled


*** Take your immunizations and boosters as scheduled


*** If your symptoms worsen call your PCP, if no PCP go to Urgent Care Center 

or Emergency Room***


*** Smoking is Dangerous to Your Health. Avoid second hand smoke***


***Call the 24-hour hour crisis hotline for domestic abuse at 1-883.616.5517***








Gabriel Knowles MD R1 Jun 13, 2017 07:25

## 2017-06-13 NOTE — HHI.FF
Face to Face Verification


Diagnosis:  


(1) Deep vein thrombosis (DVT) of right lower extremity


Physical Therapy


Order:  Evaluate and Treat, Improve ambulation, Strength and gait training





Home Health Nursing


Order:     Medical education


      Signs/symptoms of disease process


      Medication education-adverse effect


      Nursing assessment with vital signs








I have seen patient Simi Dickinson on 6/13/17. My clinical findings 

support the need for the requested home health care services because:


Ltd mobility - disease progression


Patient has SOB


Deconditioned w/ increased weakness


Med compliance is questionable


Impaired cognition/judgement


High risk of falls


Infection w/ risk of complications








I certify that my clinical findings support that this patient is homebound 

because:


Impaired cognitive ability/safety


Unsteady gait/balance


Non-ambulatory-confined bed/chair








Gabriel Knowles MD R1 Jun 13, 2017 07:27

## 2017-06-14 VITALS
DIASTOLIC BLOOD PRESSURE: 70 MMHG | RESPIRATION RATE: 18 BRPM | HEART RATE: 83 BPM | OXYGEN SATURATION: 98 % | TEMPERATURE: 97.9 F | SYSTOLIC BLOOD PRESSURE: 158 MMHG

## 2017-06-14 VITALS
SYSTOLIC BLOOD PRESSURE: 168 MMHG | TEMPERATURE: 98.2 F | DIASTOLIC BLOOD PRESSURE: 76 MMHG | RESPIRATION RATE: 20 BRPM | HEART RATE: 94 BPM | OXYGEN SATURATION: 98 %

## 2017-06-14 VITALS
OXYGEN SATURATION: 99 % | TEMPERATURE: 97.9 F | RESPIRATION RATE: 16 BRPM | HEART RATE: 83 BPM | SYSTOLIC BLOOD PRESSURE: 153 MMHG | DIASTOLIC BLOOD PRESSURE: 77 MMHG

## 2017-06-14 VITALS
OXYGEN SATURATION: 99 % | HEART RATE: 80 BPM | TEMPERATURE: 98.4 F | SYSTOLIC BLOOD PRESSURE: 151 MMHG | RESPIRATION RATE: 18 BRPM | DIASTOLIC BLOOD PRESSURE: 83 MMHG

## 2017-06-14 RX ADMIN — HYDROMORPHONE HYDROCHLORIDE PRN MG: 1 INJECTION, SOLUTION INTRAMUSCULAR; INTRAVENOUS; SUBCUTANEOUS at 14:19

## 2017-06-14 RX ADMIN — HYDROMORPHONE HYDROCHLORIDE PRN MG: 1 INJECTION, SOLUTION INTRAMUSCULAR; INTRAVENOUS; SUBCUTANEOUS at 02:38

## 2017-06-14 RX ADMIN — SERTRALINE HYDROCHLORIDE SCH MG: 50 TABLET, FILM COATED ORAL at 08:26

## 2017-06-14 RX ADMIN — Medication SCH ML: at 08:26

## 2017-06-14 RX ADMIN — DIAZEPAM PRN MG: 10 TABLET ORAL at 08:26

## 2017-06-14 RX ADMIN — INSULIN ASPART SCH: 100 INJECTION, SOLUTION INTRAVENOUS; SUBCUTANEOUS at 11:00

## 2017-06-14 RX ADMIN — HYDROMORPHONE HYDROCHLORIDE PRN MG: 1 INJECTION, SOLUTION INTRAMUSCULAR; INTRAVENOUS; SUBCUTANEOUS at 10:16

## 2017-06-14 RX ADMIN — METOPROLOL SUCCINATE SCH MG: 50 TABLET, FILM COATED, EXTENDED RELEASE ORAL at 08:26

## 2017-06-14 RX ADMIN — INSULIN ASPART SCH: 100 INJECTION, SOLUTION INTRAVENOUS; SUBCUTANEOUS at 06:03

## 2017-06-14 RX ADMIN — PANTOPRAZOLE SCH MG: 40 TABLET, DELAYED RELEASE ORAL at 08:26

## 2017-06-14 RX ADMIN — HYDROMORPHONE HYDROCHLORIDE PRN MG: 1 INJECTION, SOLUTION INTRAMUSCULAR; INTRAVENOUS; SUBCUTANEOUS at 06:01

## 2017-06-14 RX ADMIN — APIXABAN SCH MG: 5 TABLET, FILM COATED ORAL at 08:26

## 2017-06-14 RX ADMIN — CLOPIDOGREL BISULFATE SCH MG: 75 TABLET, FILM COATED ORAL at 08:26

## 2017-06-14 RX ADMIN — LACOSAMIDE SCH MG: 50 TABLET, FILM COATED ORAL at 08:26

## 2017-06-14 NOTE — HHI.FPPN
Subjective


Remarks


Patient seen and examined this morning. No acute events with vital signs 

stable. Patient reports that she discussed with case management and her sister/

caregiver that she is more suited to go to rehabilitation Center as compared to 

home with home health physical therapy. She says that she feels back to her 

baseline and is ready to be discharged to rehabilitation facility. She 

continues to endorse right lower extremity pain related to her ankle fracture. 

She denies any fevers, chills, shortness of breath, chest pain, NVD, or 

abdominal pain. (Gabriel Knowles MD R1)





Objective


Vitals





 Vital Signs








  Date Time  Temp Pulse Resp B/P Pulse Ox O2 Delivery O2 Flow Rate FiO2


 


6/14/17 04:00      Room Air  


 


6/14/17 04:00 97.9 83 16 153/77 99   


 


6/14/17 00:00      Room Air  


 


6/14/17 00:00 97.9 83 18 158/70 98   


 


6/13/17 20:00 98.1 89 18 147/79 99   


 


6/13/17 20:00      Room Air  


 


6/13/17 16:00 97.4 113 20 159/86 99   


 


6/13/17 12:00 98.0 119 20 158/90 98   








 I/O








 6/13/17 6/13/17 6/13/17 6/14/17 6/14/17 6/14/17





 07:00 15:00 23:00 07:00 15:00 23:00


 


Intake Total  480 ml    


 


Output Total  850 ml    


 


Balance  -370 ml    


 


      


 


Intake Oral  480 ml    


 


Output Urine Total  850 ml    


 


# Voids 0  1 2  


 


# Bowel Movements  1 0 1  





 (Gabriel Knowles MD R1)


Result Diagram:  


6/13/17 0503                                                                   

             6/13/17 0503





Objective Remarks


GENERAL: Elderly 77-year-old female lying in bed in no acute distress. 


SKIN: Warm and dry. No rash. Multiple small ecchymosis on extremities likely 

secondary to initiation of anticoagulation.


HEENT: Atraumatic, normocephalic with EOMI. MMM. No rhinorrhea. No JVD or LAD 

appreciated.


CARDIOVASCULAR: Regular rate and rhythm without obvious murmurs, gallops, or 

rubs. 


RESPIRATORY: Clear to auscultation bilaterally with no CRW. No increased work 

of breathing.


GASTROINTESTINAL: Abdomen soft, non-tender, nondistended with positive bowel 

sounds. No masses or hepatosplenomegaly appreciated. Mild suprapubic tenderness 

resolved. 


MUSCULOSKELETAL: No cyanosis or edema. Strength grossly WNL. 2+ pulses in all 4 

extremities.


   Right lower extremity: Medical boot in place from toes to knees. Boot 

removed to check for sensation and pulses are both intact. Tenderness to 

palpation of the calf and ankle. Extremity without changes in skin or warmth. 

Swelling of right lower extremity improved upon admission exam. Sensation and 

range of motion intact throughout the lower extremity. Popliteal, DP, and PT 

pulses intact.


NEURO/PSYCH: Afocal. Awake, alert, and oriented x3. No gross abnormalities. 

Normal speech and interaction with examiners. (Gabriel Knowles MD R1)





A/P


Assessment and Plan


Ms. Dickinson is a 78 y/o F with an extensive PMHx presenting with SOB found to 

have DVT without PE. She will be admitted for medical management of her DVT.


Discharge Planning


Medical team will plan for discharge to rehabilitation facility. (Gabriel Knowles MD R1)


Attending Attestation


Patient seen and examined.  Case reviewed and discussed with the resident team.

  Agree with plan of care as discussed with me and documented in the resident 

note. (Kira Bullock MD)


Problem List:  


(1) Deep vein thrombosis (DVT) of right lower extremity


Status:  Acute


Plan:  Patient admitted with RLE DVT without evidence of PE. Patient has had 

decreased mobility secondary to R ankle fracture secondary to fall. 





Chest x-ray: No acute disease


Right lower extremity Doppler ultrasound: Venous Doppler positive for DVT 

beginning below the knee and extending to the mid thigh.


Pulmonary CTA: Negative for central pulmonary emboli.





CBC: H/H 10.1/32.4


BMP: Creatinine improved from 1.27





Fall precautions


Pulse oximetry with nasal cannula when necessary


Incentive spirometry with Acapella





Medications:


Toradol and breathing treatments given in ER


Dilaudid 0.2 mg every 4 hours when necessary for pain (pretreat with Zofran 4 

mg)


Tylenol 500 mg every 6 hours when necessary for pain or fever


DuoNeb every 6 hours when necessary for shortness of breath or wheezing


Heparin drip per protocol with 80 units per kilogram bolus administered; 

scheduled for discontinuation on 6/12 at 2100


Start Eliquis 10 mg twice a day for first 7 days followed by maintenance 

therapy of 5 mg twice a day at discharge; BMP ordered one week after discharge 

for evaluation of kidney function





(2) Creatinine elevation


Status:  Acute


Plan:  Patient with elevated creatinine to 1.48. Baseline per chart review 

approximately less than 1 per 2014.





BMP: Creatinine 1.27


Renal ultrasound: Negative renal ultrasound


1 L normal saline at maintenance rate 


BMP ordered one week after discharge for evaluation of kidney function while 

on Eliquis





(3) Chronic disease


Status:  Acute


Plan:  Seizure disordercontinued Vimpat 50 mg daily


Diabetes mellitushold metformin, sliding scale insulin per protocol


Anxietycontinue Valium 2 mg 3 times a day


GERDcontinue Protonix 40 mg daily


Hyperlipidemiacontinue pravastatin 40 mg nightly


Depressioncontinue sertraline 25 mg daily


History TIAcontinue Plavix





(4) Nutrition, metabolism, and development symptoms


Status:  Acute


Plan:  Diet: Diabetic diet as tolerated


Fluids: Tolerating by mouth fluids, 1 L bolus given at maintenance and ER


Electrolytes: Within normal limits, continue to monitor


Prophylaxis: DuoNeb's when necessary for shortness of breath/wheezing, Tylenol 

when necessary for pain/fever





(5) No contraindication to deep vein thrombosis (DVT) prophylaxis


Status:  Acute


Plan:  Heparin drip per protocol for DVT discontinued 6/12. Patient started on 

Eliquis for discharge. 


SCD/TEDs


 (Gabriel Knowles MD R1)








Gabriel Knowles MD R1 Jun 14, 2017 09:01


Kira Bullock MD Jun 14, 2017 11:55

## 2017-07-18 ENCOUNTER — HOSPITAL ENCOUNTER (EMERGENCY)
Dept: HOSPITAL 17 - NEPE | Age: 77
Discharge: HOME | End: 2017-07-18
Payer: MEDICARE

## 2017-07-18 VITALS
HEART RATE: 68 BPM | SYSTOLIC BLOOD PRESSURE: 134 MMHG | OXYGEN SATURATION: 99 % | RESPIRATION RATE: 16 BRPM | DIASTOLIC BLOOD PRESSURE: 60 MMHG

## 2017-07-18 VITALS
OXYGEN SATURATION: 99 % | SYSTOLIC BLOOD PRESSURE: 155 MMHG | TEMPERATURE: 98.4 F | HEART RATE: 69 BPM | DIASTOLIC BLOOD PRESSURE: 70 MMHG | RESPIRATION RATE: 18 BRPM

## 2017-07-18 VITALS — DIASTOLIC BLOOD PRESSURE: 65 MMHG | SYSTOLIC BLOOD PRESSURE: 121 MMHG

## 2017-07-18 VITALS — WEIGHT: 141.1 LBS | HEIGHT: 65 IN | BODY MASS INDEX: 23.51 KG/M2

## 2017-07-18 VITALS
HEART RATE: 72 BPM | OXYGEN SATURATION: 99 % | SYSTOLIC BLOOD PRESSURE: 172 MMHG | RESPIRATION RATE: 18 BRPM | DIASTOLIC BLOOD PRESSURE: 74 MMHG

## 2017-07-18 VITALS
RESPIRATION RATE: 18 BRPM | HEART RATE: 68 BPM | SYSTOLIC BLOOD PRESSURE: 155 MMHG | DIASTOLIC BLOOD PRESSURE: 70 MMHG | OXYGEN SATURATION: 99 %

## 2017-07-18 DIAGNOSIS — G89.29: ICD-10-CM

## 2017-07-18 DIAGNOSIS — E78.00: ICD-10-CM

## 2017-07-18 DIAGNOSIS — M13.80: ICD-10-CM

## 2017-07-18 DIAGNOSIS — I10: ICD-10-CM

## 2017-07-18 DIAGNOSIS — R51: ICD-10-CM

## 2017-07-18 DIAGNOSIS — D64.9: ICD-10-CM

## 2017-07-18 DIAGNOSIS — M54.9: Primary | ICD-10-CM

## 2017-07-18 DIAGNOSIS — K21.9: ICD-10-CM

## 2017-07-18 DIAGNOSIS — E11.9: ICD-10-CM

## 2017-07-18 LAB
ALP SERPL-CCNC: 90 U/L (ref 45–117)
ALT SERPL-CCNC: 16 U/L (ref 10–53)
AMPHETAMINE, URINE: (no result)
ANION GAP SERPL CALC-SCNC: 8 MEQ/L (ref 5–15)
APAP SERPL-MCNC: (no result) MCG/ML (ref 10–30)
AST SERPL-CCNC: 20 U/L (ref 15–37)
BACTERIA #/AREA URNS HPF: (no result) /HPF
BARBITURATES, URINE: (no result)
BILIRUB SERPL-MCNC: 0.2 MG/DL (ref 0.2–1)
BUN SERPL-MCNC: 17 MG/DL (ref 7–18)
CHLORIDE SERPL-SCNC: 109 MEQ/L (ref 98–107)
CK SERPL-CCNC: 76 U/L (ref 26–192)
COCAINE UR-MCNC: (no result) NG/ML
COLOR UR: YELLOW
COMMENT (UR): (no result)
CULTURE IF INDICATED: (no result)
EOSINOPHIL NFR BLD: 6 % (ref 0–4)
ERYTHROCYTE [DISTWIDTH] IN BLOOD BY AUTOMATED COUNT: 14.2 % (ref 11.6–17.2)
GFR SERPLBLD BASED ON 1.73 SQ M-ARVRAT: 33 ML/MIN (ref 89–?)
GLUCOSE UR STRIP-MCNC: (no result) MG/DL
HCO3 BLD-SCNC: 21.3 MEQ/L (ref 21–32)
HCT VFR BLD CALC: 29 % (ref 35–46)
HEMO FLAGS: (no result)
HGB UR QL STRIP: (no result)
HYALINE CASTS #/AREA URNS LPF: 5 /LPF
INR PPP: 0.9 RATIO
KETONES UR STRIP-MCNC: (no result) MG/DL
MCH RBC QN AUTO: 31.2 PG (ref 27–34)
MCHC RBC AUTO-ENTMCNC: 33.6 % (ref 32–36)
MCV RBC AUTO: 93 FL (ref 80–100)
MUCOUS THREADS #/AREA URNS LPF: (no result) /LPF
NEUTS BAND # BLD MANUAL: 4.1 TH/MM3 (ref 1.8–7.7)
NEUTS SEG NFR BLD MANUAL: 59 % (ref 16–70)
NITRITE UR QL STRIP: (no result)
OVALOCYTES BLD QL SMEAR: (no result)
PLAT MORPH BLD: NORMAL
PLATELET # BLD: 402 TH/MM3 (ref 150–450)
PLATELET BLD QL SMEAR: NORMAL
POTASSIUM SERPL-SCNC: 4.5 MEQ/L (ref 3.5–5.1)
PROTHROMBIN TIME: 9.9 SEC (ref 9.8–11.6)
RBC # BLD AUTO: 3.12 MIL/MM3 (ref 4–5.3)
SCAN/DIFF: (no result)
SODIUM SERPL-SCNC: 138 MEQ/L (ref 136–145)
SP GR UR STRIP: 1.01 (ref 1–1.03)
SQUAMOUS #/AREA URNS HPF: <1 /HPF (ref 0–5)
WBC # BLD AUTO: 6.9 TH/MM3 (ref 4–11)
WBC DIFF SAMPLE: 100

## 2017-07-18 PROCEDURE — 71010: CPT

## 2017-07-18 PROCEDURE — 85027 COMPLETE CBC AUTOMATED: CPT

## 2017-07-18 PROCEDURE — 85007 BL SMEAR W/DIFF WBC COUNT: CPT

## 2017-07-18 PROCEDURE — 84484 ASSAY OF TROPONIN QUANT: CPT

## 2017-07-18 PROCEDURE — 96360 HYDRATION IV INFUSION INIT: CPT

## 2017-07-18 PROCEDURE — 82550 ASSAY OF CK (CPK): CPT

## 2017-07-18 PROCEDURE — 70450 CT HEAD/BRAIN W/O DYE: CPT

## 2017-07-18 PROCEDURE — 83605 ASSAY OF LACTIC ACID: CPT

## 2017-07-18 PROCEDURE — 93005 ELECTROCARDIOGRAM TRACING: CPT

## 2017-07-18 PROCEDURE — 81001 URINALYSIS AUTO W/SCOPE: CPT

## 2017-07-18 PROCEDURE — 99285 EMERGENCY DEPT VISIT HI MDM: CPT

## 2017-07-18 PROCEDURE — 80053 COMPREHEN METABOLIC PANEL: CPT

## 2017-07-18 PROCEDURE — 87040 BLOOD CULTURE FOR BACTERIA: CPT

## 2017-07-18 PROCEDURE — 85610 PROTHROMBIN TIME: CPT

## 2017-07-18 PROCEDURE — 80307 DRUG TEST PRSMV CHEM ANLYZR: CPT

## 2017-07-18 PROCEDURE — 82140 ASSAY OF AMMONIA: CPT

## 2017-07-18 PROCEDURE — 87086 URINE CULTURE/COLONY COUNT: CPT

## 2017-07-18 NOTE — RADRPT
EXAM DATE/TIME:  07/18/2017 09:21 

 

[facility] COMPARISON:     

CT BRAIN W/O CONTRAST, June 11, 2017, 10:00.

 

 

INDICATIONS :     

Headache, altered mental status

                      

 

RADIATION DOSE:     

56.38 CTDIvol (mGy) 

 

 

 

MEDICAL HISTORY :     

Cerebrovascular disease. Diabetes mellitus type 1. Hypertension.

 

SURGICAL HISTORY :      

Appendectomy. Cholecystectomy.

 

ENCOUNTER:      

Initial

 

ACUITY:      

1 day

 

PAIN SCALE:      

Non-responsive

 

LOCATION:        

cranial 

 

TECHNIQUE:     

Multiple contiguous axial images were obtained of the head.  Using automated exposure control and adj
ustment of the mA and/or kV according to patient size, radiation dose was kept as low as reasonably a
chievable to obtain optimal diagnostic quality images.   DICOM format image data is available electro
nically for review and comparison.  

 

FINDINGS:     

 

CEREBRUM:     

The ventricles are normal for age.  No evidence of midline shift, mass lesion, hemorrhage or acute in
farction.  No extra-axial fluid collections are seen.

 

POSTERIOR FOSSA:     

The cerebellum and brainstem are intact.  The 4th ventricle is midline.  The cerebellopontine angle i
s unremarkable.

 

EXTRACRANIAL:     

The visualized portion of the orbits is intact.

 

SKULL:     

The calvaria is intact.  No evidence of skull fracture.

 

CONCLUSION:     

Normal examination.  Age-appropriate atrophy

 

 

 

 Zackary Dyer MD on July 18, 2017 at 9:35           

Board Certified Radiologist.

 This report was verified electronically.

## 2017-07-18 NOTE — PD
HPI


Chief Complaint:  headache and back pain


Time Seen by Provider:  08:23


Travel History


International Travel<30 days:  No


Contact w/Intl Traveler<30days:  No


Traveled to known affect area:  No





History of Present Illness


HPI


77-year-old female was brought in by EMS when sister called 911.  Patient is 

with her sister and apparently was complaining of headache and back pain.  

Patient here was very dysarthric and I was having difficult time understanding 

what she was saying.  I was told that patient has chronic pain and is on quite 

a few pain medications.  Vital signs were stable.  She was awake and 

maintaining her respirations.  Patient appears much older than her stated age.  

There was no family member at that time and history was obtained mostly from 

the paramedics.





[facility]


Past Medical History


*** Narrative Medical


List of her past medical, surgical, social and family history is reviewed from 

the nursing note.


Hx Anticoagulant Therapy:  Yes (plavix)


Anemia:  Yes


Arthritis:  Yes


Anxiety:  Yes


Depression:  Yes


Cancer:  Yes (CERVICAL CA)


High Cholesterol:  Yes


Chest Pain:  No


Congestive Heart Failure:  No


Cerebrovascular Accident:  Yes


Diabetes:  Yes


Diminished Hearing:  No


Gastrointestinal Disorders:  Yes (esophageal strictures)


GERD:  Yes


Genitourinary:  Yes (incontinent)


Headaches:  No


Hypertension:  Yes


Immunizations Current:  No


Migraines:  No


Seizures:  Yes


PNEUMOCCOCAL Vaccine (Year):  2


Menopausal:  Yes





Past Surgical History


Appendectomy:  Yes


Cardiac Surgery:  No


Cholecystectomy:  Yes


Ear Surgery:  No


Endocrine Surgery:  Yes


Eye Surgery:  No


Genitourinary Surgery:  Yes


Gynecologic Surgery:  Yes (HYSTERECTOMY,CERVICAL CA REMOVED.)


Hysterectomy:  No


Neurologic Surgery:  Yes (C4,C5,C6 PLATE PLACED)


Oral Surgery:  Yes


Thoracic Surgery:  No


Other Surgery:  Yes (LAPAROSCOPIC GREGG(WRAP STOMACH AROUND ESOPHAGUS TO HELP 

WITH GERD))





Social History


Alcohol Use:  No (DENIES)


Tobacco Use:  No (DENIES)


Substance Use:  No





Allergies-Medications


(Allergen,Severity, Reaction):  


Coded Allergies:  


     Egg Allergy (Verified  Allergy, Intermediate, 7/18/17)


     Flu Vaccine (Verified  Allergy, Intermediate, 7/18/17)


 "ALLERGIC TO EGGS SO I CAN NOT HAVE THE FLU SHOT"


     Codeine (Verified  Allergy, Mild, 7/18/17)


     Demerol (Verified  Allergy, Mild, 7/18/17)


     Morphine (Verified  Allergy, Mild, 7/18/17)


     Lortab (Verified  Adverse Reaction, Mild, HEADACHE, 7/18/17)


Comments


List of her allergies reviewed from the nursing note.


Reported Meds & Prescriptions





Reported Meds & Active Scripts


Active


Eliquis (Apixaban) 5 Mg Tab 10 Mg PO BID


     Please take Eliquis 10mg (2 tablets) twice a day for the first 6 days


     upon discharge. Then continue Eliquis with 5mg (1 tablet) twice a day


     for up to 3 months.


Eliquis (Apixaban) 5 Mg Tab 5 Mg PO BID


     Please take Eliquis 10mg (2 tablets) twice a day for the first 6 days


     upon discharge. Then continue Eliquis with 5mg (1 tablet) twice a day


     for up to 3 months.


Reported


Proair Hfa 8.5 GM Inh (Albuterol Sulfate) 90 Mcg/Act Aer 2 Puff INH Q6H PRN


     108 mcg/actuation


Metformin ER (Metformin HCl) 1,000 Mg Jass 1,000 Mg PO BID


     With evening meal


Vimpat (Lacosamide) 50 Mg Tab 50 Mg PO DAILY


Valium (Diazepam) 10 Mg Tab 10 Mg PO TID PRN


Plavix (Clopidogrel Bisulfate) 75 Mg Tab 75 Mg PO DAILY


Toprol XL (Metoprolol Succinate) 50 Mg Tab 50 Mg PO BID


Pantoprazole (Pantoprazole Sodium) 40 Mg Tab 40 Mg PO DAILY


Pravachol (Pravastatin) 40 Mg Tab 40 Mg PO HS


Sertraline (Sertraline HCl) 25 Mg Tab 25 Mg PO DAILY





Narrative Medication


List of her home medications reviewed from the nursing note





Review of Systems


Except as stated in HPI:  all other systems reviewed are Neg





Physical Exam


Narrative


GENERAL: Awake, moaning and difficult to understand.  Not verbalizing answers 

appropriately.  Elderly but appears older than the stated age


SKIN: Focused skin assessment warm/dry.


HEAD: Atraumatic. Normocephalic. 


EYES: Pupils equal and round. No scleral icterus. No injection or drainage. 


ENT: No nasal bleeding or discharge.  Mucous membranes pink and moist.


NECK: Trachea midline. No JVD. 


CARDIOVASCULAR: Regular rate and rhythm.  No murmur appreciated.


RESPIRATORY: No accessory muscle use. Clear to auscultation. Breath sounds 

equal bilaterally. 


GASTROINTESTINAL: Abdomen soft, non-tender, nondistended. Hepatic and splenic 

margins not palpable. 


MUSCULOSKELETAL: No obvious deformities. No clubbing.  No cyanosis.  No edema.  

Patient was rolled of and back was palpated.  No step-offs or contusions.


NEUROLOGICAL: Awake and alert. No obvious cranial nerve deficits.  Motor 

grossly within normal limits. Normal speech.


PSYCHIATRIC: Appropriate mood and affect; insight and judgment normal.





Data


Data


Last Documented VS





Vital Signs








  Date Time  Temp Pulse Resp B/P Pulse Ox O2 Delivery O2 Flow Rate FiO2


 


7/18/17 08:37 98.4 71 18 155/70 99 Room Air  








Orders





 Electrocardiogram (7/18/17 08:26)


Ammonia (7/18/17 08:26)


Complete Blood Count With Diff (7/18/17 08:26)


Comprehensive Metabolic Panel (7/18/17 08:26)


Creatine Kinase (Cpk) (7/18/17 08:26)


Prothrombin Time / Inr (Pt) (7/18/17 08:26)


Troponin I (7/18/17 08:26)


Urinalysis - C+S If Indicated (7/18/17 08:26)


Lactic Acid Sepsis Protocol (7/18/17 08:26)


Blood Culture (7/18/17 08:26)


Chest, Single Ap (7/18/17 08:26)


Ct Brain W/O Iv Contrast(Rout) (7/18/17 08:26)


Blood Glucose (7/18/17 08:26)


Ecg Monitoring (7/18/17 08:26)


Iv Access Insert/Monitor (7/18/17 08:26)


Oximetry (7/18/17 08:26)


Sodium Chloride 0.9% Flush (Ns Flush) (7/18/17 08:30)


Sodium Chlor 0.9% 1000 Ml Inj (Ns 1000 M (7/18/17 08:26)


Drug Screen, Random Urine (7/18/17 08:26)


Alcohol (Ethanol) (7/18/17 08:26)


Tylenol (Acetaminophen) (7/18/17 08:26)


Salicylates (Aspirin) (7/18/17 08:26)


Urine Culture (7/18/17 09:05)





Labs





 Laboratory Tests








Test 7/18/17





 09:05


 


White Blood Count 6.9 TH/MM3


 


Red Blood Count 3.12 MIL/MM3


 


Hemoglobin 9.7 GM/DL


 


Hematocrit 29.0 %


 


Mean Corpuscular Volume 93.0 FL


 


Mean Corpuscular Hemoglobin 31.2 PG


 


Mean Corpuscular Hemoglobin 33.6 %





Concent 


 


Red Cell Distribution Width 14.2 %


 


Platelet Count 402 TH/MM3


 


Mean Platelet Volume 7.4 FL


 


Neutrophils (%) (Auto)  %


 


Lymphocytes (%) (Auto)  %


 


Monocytes (%) (Auto)  %


 


Eosinophils (%) (Auto)  %


 


Basophils (%) (Auto)  %


 


Neutrophils # (Auto)  TH/MM3


 


Lymphocytes # (Auto)  TH/MM3


 


Monocytes # (Auto)  TH/MM3


 


Eosinophils # (Auto)  TH/MM3


 


Basophils # (Auto)  TH/MM3


 


CBC Comment AUTO DIFF 


 


Differential Total Cells 100 





Counted 


 


Neutrophils % (Manual) 59 %


 


Lymphocytes % 28 %


 


Monocytes % 7 %


 


Eosinophils % 6 %


 


Neutrophils # (Manual) 4.1 TH/MM3


 


Differential Comment FINAL DIFF





 MANUAL


 


Platelet Estimate NORMAL 


 


Platelet Morphology Comment NORMAL 


 


Ovalocytes 2+ 


 


Prothrombin Time 9.9 SEC


 


Prothromb Time International 0.9 RATIO





Ratio 


 


Urine Color YELLOW 


 


Urine Turbidity CLEAR 


 


Urine pH 5.5 


 


Urine Specific Gravity 1.015 


 


Urine Protein TRACE mg/dL


 


Urine Glucose (UA) NEG mg/dL


 


Urine Ketones NEG mg/dL


 


Urine Occult Blood NEG 


 


Urine Nitrite NEG 


 


Urine Bilirubin NEG 


 


Urine Urobilinogen LESS THAN 2.0





 MG/DL


 


Urine Leukocyte Esterase NEG 


 


Urine RBC LESS THAN 1





 /hpf


 


Urine WBC LESS THAN 1





 /hpf


 


Urine Squamous Epithelial <1 /hpf





Cells 


 


Urine Bacteria RARE /hpf


 


Urine Hyaline Casts 5 /lpf


 


Urine Mucus FEW /lpf


 


Microscopic Urinalysis Comment CATH-CULTURE





 IND


 


Sodium Level 138 MEQ/L


 


Potassium Level 4.5 MEQ/L


 


Chloride Level 109 MEQ/L


 


Carbon Dioxide Level 21.3 MEQ/L


 


Anion Gap 8 MEQ/L


 


Blood Urea Nitrogen 17 MG/DL


 


Creatinine 1.52 MG/DL


 


Estimat Glomerular Filtration 33 ML/MIN





Rate 


 


Random Glucose 101 MG/DL


 


Lactic Acid Level 1.7 mmol/L


 


Calcium Level 8.7 MG/DL


 


Total Bilirubin 0.2 MG/DL


 


Aspartate Amino Transf 20 U/L





(AST/SGOT) 


 


Alanine Aminotransferase 16 U/L





(ALT/SGPT) 


 


Alkaline Phosphatase 90 U/L


 


Ammonia 30 MCMOL/L


 


Total Creatine Kinase 76 U/L


 


Troponin I 0.02 NG/ML


 


Total Protein 7.9 GM/DL


 


Albumin 3.6 GM/DL


 


Salicylates Level LESS THAN 1.7





 MG/DL


 


Urine Opiates Screen NEG 


 


Acetaminophen Level LESS THAN 2.0





 MCG/ML


 


Urine Barbiturates Screen NEG 


 


Urine Amphetamines Screen NEG 


 


Urine Benzodiazepines Screen POS 


 


Urine Cocaine Screen NEG 


 


Urine Cannabinoids Screen NEG 


 


Ethyl Alcohol Level LESS THAN 3





 MG/DL











MDM


Medical Decision Making


Medical Screen Exam Complete:  Yes


Emergency Medical Condition:  Yes


Medical Record Reviewed:  Yes


Interpretation(s)


Twelve-lead EKG was reviewed by me.  Normal sinus rhythm, normal axis, 

nonspecific ST-T wave changes.  Heart rate of 65 bpm.


Differential Diagnosis


Intracranial bleed, electrolyte abnormalities, chronic pain


Narrative Course


10:12 AM blood test results of back and within acceptable limits CT scan of the 

head and chest x-rays within normal limit as well.  At this point I have 

nothing else to pursue and I'm comfortable discharging this patient home.  I 

was told her sister is coming and she'll take her home.





11:13 AM her sister is here and I just spoke with her.  There has been no falls 

involved.  Patient said that she was just recently discharged from a 

rehabilitation place.  I discussed the patient's insurance situation with the 

ED case management and I was told that patient has Medicare and qualifies 

rehabilitation only after 3 days of hospitalization.  I do not have any reason 

to admit her at this point.  I will order home health care through face-to-face.





Procedures


EKG Prior to Arrival:  No





Diagnosis





 Primary Impression:  


 Chronic back pain


 Qualified Code:  M54.9 - Chronic back pain, unspecified back location, 

unspecified back pain laterality


Referrals:  


Primary Care Physician





***Additional Instructions:


Please return to the ER if the condition worsens or any other new concerns.  

Otherwise follow-up with your primary care.


***Med/Other Pt SpecificInfo:  No Change to Meds


Disposition:  01 DISCHARGE HOME


Condition:  Stable








Johnna Lackey MD Jul 18, 2017 08:23

## 2017-07-18 NOTE — RADRPT
EXAM DATE/TIME:  07/18/2017 08:38 

 

[facility] COMPARISON:     

CHEST SINGLE AP, June 11, 2017, 9:12.

 

                     

INDICATIONS :     

Headaches

                     

 

MEDICAL HISTORY :     

Stroke.          

 

SURGICAL HISTORY :     

None.   

 

ENCOUNTER:     

Initial                                        

 

ACUITY:     

1 day      

 

PAIN SCORE:     

Non-responsive.

 

LOCATION:     

Bilateral chest 

 

FINDINGS:     

A single view of the chest demonstrates the lungs to be symmetrically aerated without evidence of mas
s, infiltrate or effusion.  The cardiomediastinal contours are unremarkable.  Osseous structures are 
intact.

 

CONCLUSION:     Normal examination.  

 

 

 

 Zackary Dyer MD on July 18, 2017 at 9:04           

Board Certified Radiologist.

 This report was verified electronically.

## 2017-07-18 NOTE — HHI.FF
Face to Face Verification


Diagnosis:  


(1) Difficulty walking


(2) Chronic pain


Physical Therapy


Order:  Evaluate and Treat, Improve ambulation, Strength and gait training





Occupational Therapy


Order:  Evaluate and Treat, Improve ADL, Gross motor coordination, Fine motor 

coordination





Home Health Nursing


Order:     Medical education


      Diabetic education


      Medication education-adverse effect


      Nursing assessment with vital signs





Home Health Aide


Order: To Assist In:  Bathing and personal care








Order: To Evaluate:  Support services


Order: To Provide:  Long range planning, Community services








I have seen patient Simi Dickinson on 7/18/17. My clinical findings 

support the need for the requested home health care services because:





Patient seems debilitated and her sister who tries to take care of her is frail 

as well.  Patient requires help with strengthening exercises and ADLs.


Ltd mobility - disease progression


Deconditioned w/ increased weakness


Limited ability to care for self


High risk of falls








I certify that my clinical findings support that this patient is homebound 

because:





Patient seems debilitated and her sister who tries to take care of her is frail 

as well.  Patient requires help with strengthening exercises and ADLs.


Unsteady gait/balance


Non-ambulatory-confined bed/chair


Unable to use public transportation








Johnna Lackey MD Jul 18, 2017 11:18

## 2017-07-18 NOTE — EKG
Date Performed: 07/18/2017       Time Performed: 08:52:41

 

PTAGE:      77 years

 

EKG:      Sinus rhythm 

 

 NON-SPECIFIC ST/T WAVE CHANGES INTERPRETATION BASED ON A DEFAULT AGE OF 42 YEARS 

 

 PREVIOUS TRACING            : 03/01/2017 14.08 Compared to prior tracing no significant change

 

DOCTOR:   Victoriano Todd  Interpretating Date/Time  07/18/2017 18:01:34

## 2017-09-25 ENCOUNTER — HOSPITAL ENCOUNTER (INPATIENT)
Dept: HOSPITAL 17 - NEPC | Age: 77
LOS: 7 days | Discharge: NURSING, REHAB OR LONG-TERM CARE | DRG: 917 | End: 2017-10-02
Attending: HOSPITALIST | Admitting: HOSPITALIST
Payer: MEDICARE

## 2017-09-25 VITALS
SYSTOLIC BLOOD PRESSURE: 152 MMHG | RESPIRATION RATE: 20 BRPM | OXYGEN SATURATION: 98 % | TEMPERATURE: 98.3 F | HEART RATE: 64 BPM | DIASTOLIC BLOOD PRESSURE: 69 MMHG

## 2017-09-25 VITALS — HEIGHT: 68 IN | WEIGHT: 147.27 LBS | BODY MASS INDEX: 22.32 KG/M2

## 2017-09-25 VITALS — SYSTOLIC BLOOD PRESSURE: 146 MMHG | HEART RATE: 65 BPM | OXYGEN SATURATION: 100 % | DIASTOLIC BLOOD PRESSURE: 65 MMHG

## 2017-09-25 VITALS — SYSTOLIC BLOOD PRESSURE: 177 MMHG | OXYGEN SATURATION: 97 % | DIASTOLIC BLOOD PRESSURE: 71 MMHG

## 2017-09-25 VITALS
RESPIRATION RATE: 18 BRPM | HEART RATE: 77 BPM | DIASTOLIC BLOOD PRESSURE: 81 MMHG | SYSTOLIC BLOOD PRESSURE: 177 MMHG | OXYGEN SATURATION: 100 %

## 2017-09-25 VITALS
DIASTOLIC BLOOD PRESSURE: 79 MMHG | OXYGEN SATURATION: 100 % | RESPIRATION RATE: 18 BRPM | SYSTOLIC BLOOD PRESSURE: 194 MMHG | TEMPERATURE: 98.1 F | HEART RATE: 79 BPM

## 2017-09-25 VITALS
RESPIRATION RATE: 20 BRPM | DIASTOLIC BLOOD PRESSURE: 72 MMHG | HEART RATE: 66 BPM | TEMPERATURE: 99 F | OXYGEN SATURATION: 100 % | SYSTOLIC BLOOD PRESSURE: 159 MMHG

## 2017-09-25 VITALS
RESPIRATION RATE: 16 BRPM | OXYGEN SATURATION: 99 % | SYSTOLIC BLOOD PRESSURE: 161 MMHG | TEMPERATURE: 96.5 F | DIASTOLIC BLOOD PRESSURE: 73 MMHG | HEART RATE: 77 BPM

## 2017-09-25 VITALS — OXYGEN SATURATION: 99 %

## 2017-09-25 DIAGNOSIS — R32: ICD-10-CM

## 2017-09-25 DIAGNOSIS — E86.0: ICD-10-CM

## 2017-09-25 DIAGNOSIS — Z79.84: ICD-10-CM

## 2017-09-25 DIAGNOSIS — M25.521: ICD-10-CM

## 2017-09-25 DIAGNOSIS — M19.90: ICD-10-CM

## 2017-09-25 DIAGNOSIS — A04.72: ICD-10-CM

## 2017-09-25 DIAGNOSIS — F41.9: ICD-10-CM

## 2017-09-25 DIAGNOSIS — E78.00: ICD-10-CM

## 2017-09-25 DIAGNOSIS — D64.9: ICD-10-CM

## 2017-09-25 DIAGNOSIS — F32.9: ICD-10-CM
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
## 2017-09-25 DIAGNOSIS — R78.81: ICD-10-CM

## 2017-09-25 DIAGNOSIS — Z86.73: ICD-10-CM

## 2017-09-25 DIAGNOSIS — Z85.41: ICD-10-CM

## 2017-09-25 DIAGNOSIS — I12.9: ICD-10-CM

## 2017-09-25 DIAGNOSIS — M25.511: ICD-10-CM
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
## 2017-09-25 DIAGNOSIS — K21.9: ICD-10-CM

## 2017-09-25 DIAGNOSIS — T50.991A: Primary | ICD-10-CM

## 2017-09-25 DIAGNOSIS — G40.909: ICD-10-CM

## 2017-09-25 DIAGNOSIS — Z86.12: ICD-10-CM

## 2017-09-25 DIAGNOSIS — M79.671: ICD-10-CM

## 2017-09-25 DIAGNOSIS — Z96.651: ICD-10-CM

## 2017-09-25 DIAGNOSIS — N17.9: ICD-10-CM

## 2017-09-25 DIAGNOSIS — N18.3: ICD-10-CM

## 2017-09-25 DIAGNOSIS — G92: ICD-10-CM

## 2017-09-25 DIAGNOSIS — E11.22: ICD-10-CM

## 2017-09-25 DIAGNOSIS — S40.811A: ICD-10-CM
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
## 2017-09-25 DIAGNOSIS — S40.812A: ICD-10-CM

## 2017-09-25 DIAGNOSIS — F03.90: ICD-10-CM

## 2017-09-25 DIAGNOSIS — J42: ICD-10-CM

## 2017-09-25 LAB
ACANTHOCYTES BLD QL SMEAR: (no result)
ALP SERPL-CCNC: 111 U/L (ref 45–117)
ALT SERPL-CCNC: 16 U/L (ref 10–53)
ANION GAP SERPL CALC-SCNC: 11 MEQ/L (ref 5–15)
APTT BLD: 23.6 SEC (ref 24.3–30.1)
AST SERPL-CCNC: 15 U/L (ref 15–37)
BACTERIA #/AREA URNS HPF: (no result) /HPF
BASE EXCESS BLD CALC-SCNC: -6.9 MMOL/L (ref -2–2)
BASOPHILS # BLD AUTO: 0.1 TH/MM3 (ref 0–0.2)
BASOPHILS NFR BLD: 0.8 % (ref 0–2)
BENZODIAZEPINES PNL UR: 96 % (ref 90–100)
BILIRUB SERPL-MCNC: 0.2 MG/DL (ref 0.2–1)
BLOOD GAS CARBOXYHEMOGLOBIN: 1.1 % (ref 0–4)
BLOOD GAS HCO3: 18 MMOL/L (ref 22–26)
BLOOD GAS OXYGEN CONTENT: 12.1 VOL % (ref 12–20)
BLOOD GAS PCO2: 37 MMHG (ref 38–42)
BUN SERPL-MCNC: 23 MG/DL (ref 7–18)
CHLORIDE SERPL-SCNC: 109 MEQ/L (ref 98–107)
CK SERPL-CCNC: 61 U/L (ref 26–192)
COLOR UR: YELLOW
COMMENT (UR): (no result)
CRITICAL VALUE: NO
CULTURE IF INDICATED: (no result)
DRAW SITE: (no result)
EOSINOPHIL # BLD: 0.2 TH/MM3 (ref 0–0.4)
EOSINOPHIL NFR BLD: 2 % (ref 0–4)
EOSINOPHIL NFR BLD: 2.1 % (ref 0–4)
ERYTHROCYTE [DISTWIDTH] IN BLOOD BY AUTOMATED COUNT: 13.5 % (ref 11.6–17.2)
GFR SERPLBLD BASED ON 1.73 SQ M-ARVRAT: 30 ML/MIN (ref 89–?)
GLUCOSE UR STRIP-MCNC: (no result) MG/DL
HCO3 BLD-SCNC: 17.5 MEQ/L (ref 21–32)
HCT VFR BLD CALC: 27.6 % (ref 35–46)
HEMO FLAGS: (no result)
HGB UR QL STRIP: (no result)
HYALINE CASTS #/AREA URNS LPF: 3 /LPF
INR PPP: 1 RATIO
KETONES UR STRIP-MCNC: (no result) MG/DL
LYMPHOCYTES # BLD AUTO: 2.2 TH/MM3 (ref 1–4.8)
LYMPHOCYTES NFR BLD AUTO: 20.7 % (ref 9–44)
MCH RBC QN AUTO: 30 PG (ref 27–34)
MCHC RBC AUTO-ENTMCNC: 32.6 % (ref 32–36)
MCV RBC AUTO: 92.2 FL (ref 80–100)
METHGB MFR BLDA: 0.4 % (ref 0–2)
MONOCYTES NFR BLD: 6.8 % (ref 0–8)
MUCOUS THREADS #/AREA URNS LPF: (no result) /LPF
MYELOCYTES NFR BLD: 1 % (ref 0–0)
NEUTROPHILS # BLD AUTO: 7.3 TH/MM3 (ref 1.8–7.7)
NEUTROPHILS NFR BLD AUTO: 69.6 % (ref 16–70)
NEUTS BAND # BLD MANUAL: 7.6 TH/MM3 (ref 1.8–7.7)
NEUTS BAND NFR BLD: 1 % (ref 0–6)
NEUTS SEG NFR BLD MANUAL: 70 % (ref 16–70)
NITRITE UR QL STRIP: (no result)
NUMBER OF ARTERIAL PUNCTURES: 1
O2/TOTAL GAS SETTING VFR VENT: 21 %
OVALOCYTES BLD QL SMEAR: (no result)
PLAT MORPH BLD: NORMAL
PLATELET # BLD: 348 TH/MM3 (ref 150–450)
PLATELET BLD QL SMEAR: NORMAL
PO2 BLD: 100 MMHG (ref 61–120)
POTASSIUM SERPL-SCNC: 4.2 MEQ/L (ref 3.5–5.1)
PROTHROMBIN TIME: 10.7 SEC (ref 9.8–11.6)
RBC # BLD AUTO: 3 MIL/MM3 (ref 4–5.3)
SALICYLATES SERPL-MCNC: 8.8 G/DL (ref 12–16)
SCAN/DIFF: (no result)
SODIUM SERPL-SCNC: 137 MEQ/L (ref 136–145)
SP GR UR STRIP: 1.02 (ref 1–1.03)
STAT: YES
TEMP CORR TO: 98.6
ULNAR PULSE: PRESENT
WBC # BLD AUTO: 10.5 TH/MM3 (ref 4–11)
WBC DIFF SAMPLE: 100

## 2017-09-25 PROCEDURE — 83605 ASSAY OF LACTIC ACID: CPT

## 2017-09-25 PROCEDURE — 70450 CT HEAD/BRAIN W/O DYE: CPT

## 2017-09-25 PROCEDURE — 85027 COMPLETE CBC AUTOMATED: CPT

## 2017-09-25 PROCEDURE — 87493 C DIFF AMPLIFIED PROBE: CPT

## 2017-09-25 PROCEDURE — 74176 CT ABD & PELVIS W/O CONTRAST: CPT

## 2017-09-25 PROCEDURE — 83690 ASSAY OF LIPASE: CPT

## 2017-09-25 PROCEDURE — 96366 THER/PROPH/DIAG IV INF ADDON: CPT

## 2017-09-25 PROCEDURE — 85007 BL SMEAR W/DIFF WBC COUNT: CPT

## 2017-09-25 PROCEDURE — 82140 ASSAY OF AMMONIA: CPT

## 2017-09-25 PROCEDURE — 85610 PROTHROMBIN TIME: CPT

## 2017-09-25 PROCEDURE — 73030 X-RAY EXAM OF SHOULDER: CPT

## 2017-09-25 PROCEDURE — 96365 THER/PROPH/DIAG IV INF INIT: CPT

## 2017-09-25 PROCEDURE — 36600 WITHDRAWAL OF ARTERIAL BLOOD: CPT

## 2017-09-25 PROCEDURE — 83880 ASSAY OF NATRIURETIC PEPTIDE: CPT

## 2017-09-25 PROCEDURE — 80048 BASIC METABOLIC PNL TOTAL CA: CPT

## 2017-09-25 PROCEDURE — 87040 BLOOD CULTURE FOR BACTERIA: CPT

## 2017-09-25 PROCEDURE — 76937 US GUIDE VASCULAR ACCESS: CPT

## 2017-09-25 PROCEDURE — 87077 CULTURE AEROBIC IDENTIFY: CPT

## 2017-09-25 PROCEDURE — 71010: CPT

## 2017-09-25 PROCEDURE — 86403 PARTICLE AGGLUT ANTBDY SCRN: CPT

## 2017-09-25 PROCEDURE — 87506 IADNA-DNA/RNA PROBE TQ 6-11: CPT

## 2017-09-25 PROCEDURE — G0378 HOSPITAL OBSERVATION PER HR: HCPCS

## 2017-09-25 PROCEDURE — 87186 SC STD MICRODIL/AGAR DIL: CPT

## 2017-09-25 PROCEDURE — 81001 URINALYSIS AUTO W/SCOPE: CPT

## 2017-09-25 PROCEDURE — 82805 BLOOD GASES W/O2 SATURATION: CPT

## 2017-09-25 PROCEDURE — 83735 ASSAY OF MAGNESIUM: CPT

## 2017-09-25 PROCEDURE — 96361 HYDRATE IV INFUSION ADD-ON: CPT

## 2017-09-25 PROCEDURE — 87205 SMEAR GRAM STAIN: CPT

## 2017-09-25 PROCEDURE — 96375 TX/PRO/DX INJ NEW DRUG ADDON: CPT

## 2017-09-25 PROCEDURE — 87086 URINE CULTURE/COLONY COUNT: CPT

## 2017-09-25 PROCEDURE — 73080 X-RAY EXAM OF ELBOW: CPT

## 2017-09-25 PROCEDURE — 84443 ASSAY THYROID STIM HORMONE: CPT

## 2017-09-25 PROCEDURE — 82948 REAGENT STRIP/BLOOD GLUCOSE: CPT

## 2017-09-25 PROCEDURE — 84484 ASSAY OF TROPONIN QUANT: CPT

## 2017-09-25 PROCEDURE — 80053 COMPREHEN METABOLIC PANEL: CPT

## 2017-09-25 PROCEDURE — 82550 ASSAY OF CK (CPK): CPT

## 2017-09-25 PROCEDURE — 85025 COMPLETE CBC W/AUTO DIFF WBC: CPT

## 2017-09-25 PROCEDURE — 93005 ELECTROCARDIOGRAM TRACING: CPT

## 2017-09-25 PROCEDURE — 85730 THROMBOPLASTIN TIME PARTIAL: CPT

## 2017-09-25 RX ADMIN — STANDARDIZED SENNA CONCENTRATE AND DOCUSATE SODIUM SCH TAB: 8.6; 5 TABLET, FILM COATED ORAL at 21:18

## 2017-09-25 RX ADMIN — APIXABAN SCH MG: 5 TABLET, FILM COATED ORAL at 10:00

## 2017-09-25 RX ADMIN — INSULIN ASPART SCH: 100 INJECTION, SOLUTION INTRAVENOUS; SUBCUTANEOUS at 21:00

## 2017-09-25 RX ADMIN — ACETAMINOPHEN PRN MG: 325 TABLET ORAL at 21:35

## 2017-09-25 RX ADMIN — INSULIN ASPART SCH: 100 INJECTION, SOLUTION INTRAVENOUS; SUBCUTANEOUS at 17:00

## 2017-09-25 RX ADMIN — Medication SCH ML: at 21:00

## 2017-09-25 RX ADMIN — APIXABAN SCH MG: 5 TABLET, FILM COATED ORAL at 21:19

## 2017-09-25 RX ADMIN — METOPROLOL SUCCINATE SCH MG: 50 TABLET, FILM COATED, EXTENDED RELEASE ORAL at 10:24

## 2017-09-25 RX ADMIN — PHENYTOIN SODIUM SCH MLS/HR: 50 INJECTION INTRAMUSCULAR; INTRAVENOUS at 12:58

## 2017-09-25 RX ADMIN — CLOPIDOGREL BISULFATE SCH MG: 75 TABLET, FILM COATED ORAL at 10:23

## 2017-09-25 RX ADMIN — PRAVASTATIN SODIUM SCH MG: 40 TABLET ORAL at 21:18

## 2017-09-25 RX ADMIN — PHENYTOIN SODIUM SCH MLS/HR: 50 INJECTION INTRAMUSCULAR; INTRAVENOUS at 21:22

## 2017-09-25 RX ADMIN — METOPROLOL SUCCINATE SCH MG: 50 TABLET, FILM COATED, EXTENDED RELEASE ORAL at 21:18

## 2017-09-25 RX ADMIN — ACETAMINOPHEN PRN MG: 325 TABLET ORAL at 10:25

## 2017-09-25 NOTE — HHI.HP
__________________________________________________





[facility]


Service


[facility]


Primary Care Physician


Non-Staff


Admission Diagnosis





altered mental status.  Acute kidney injury


Diagnoses:  


Chief Complaint:  


Confusion


Travel History


International Travel<30 Days:  No


Contact w/Intl Traveler <30 Da:  No


Traveled to Known Affected Are:  No


History of Present Illness


The patient is a 77-year-old female with a past medical history of CVA and 

polio who is presenting to the hospital with altered mental status.  The 

patient currently lives with her sister, who happens to be her caretaker.  

Apparently the patient missed her morning medications yesterday and doubled up 

on her medications in the evening.  After that the patient was found to be 

lethargic and her sister tried to track her out of bed and the patient fell on 

top of her sister.  They both sustained some abrasions from the fall.  The 

patient's sister does believe that doubling up on the medications caused her 

sisters confusion.  The patient is complaining of urinating a lot but otherwise 

is unable to describe the circumstances leading to her hospitalization.  She 

does endorse some vague aches and pains.  She denies any diarrhea or fever.  

She did endorse some abdominal pain in her right lower abdomen.  She was unable 

to be more specific.  The patient's sister states that the patient Discussed 

with her sister over the phone, who helped with the history.





Review of Systems


ROS Limitations:  Clinical Condition, Altered Mental Status, Poor Historian


Except as stated in HPI:  all other systems reviewed are Neg





Past Family Social History


Past Medical History


Anemia 


OA


Anxiety/depression


Cervical cancer


DM


Hyperlipidemia


CVA


Diabetes mellitus


Esophageal stricture


GERD


Incontinence


Seizures


Chronic bronchitis


Hx of polio


Past Surgical History


Appendectomy


Cholecystectomy


Hysterectomy with cervical cancer.


C4-C6 plate placed


Laparoscopic Nissen procedure


Oral surgery


R knee replacement


Allergies:  


Coded Allergies:  


     Influenza Virus Vaccines (Unverified  Allergy, Intermediate, 8/15/17)


 "ALLERGIC TO EGGS SO I CAN NOT HAVE THE FLU SHOT"


     egg (Unverified  Allergy, Intermediate, 8/15/17)


     codeine (Unverified  Allergy, Mild, 8/15/17)


     meperidine (Unverified  Allergy, Mild, 8/15/17)


     morphine (Unverified  Allergy, Mild, 8/15/17)


     acetaminophen (Unverified  Adverse Reaction, Mild, HEADACHE, 8/15/17)


     hydrocodone (Unverified  Adverse Reaction, Mild, HEADACHE, 8/15/17)


Active Ordered Medications





Current Medications








 Medications


  (Trade)  Dose


 Ordered  Sig/Aaron


 Route  Start Time


 Stop Time Status Last Admin


 


 Sodium Chloride  1,000 ml @ 


 100 mls/hr  Q10H


 IV  17 09:02


    17 12:58


 


 


  (NS Flush)  2 ml  UNSCH  PRN


 IV FLUSH  17 09:15


     


 


 


  (NS Flush)  2 ml  BID


 IV FLUSH  17 21:00


     


 


 


  (Tylenol)  650 mg  Q4H  PRN


 PO  17 09:15


    17 10:25


 


 


  (Tylenol)  650 mg  Q6H  PRN


 PO  17 09:15


     


 


 


  (Narcan Inj)  0.4 mg  UNSCH  PRN


 IV PUSH  17 09:15


     


 


 


  (Ruby-Colace)  1 tab  BID


 PO  17 21:00


     


 


 


  (Eliquis)  5 mg  BID


 PO  17 10:00


     


 


 


  (Plavix)  75 mg  DAILY


 PO  17 10:00


    17 10:23


 


 


  (Vimpat)  100 mg  BID


 PO  17 10:00


     


 


 


  (Toprol Xl)  50 mg  BID


 PO  17 10:00


    17 10:24


 


 


  (Protonix)  40 mg  DAILY


 PO  17 09:00


     


 


 


  (Pravachol)  40 mg  HS


 PO  17 21:00


     


 


 


  (Zoloft)  50 mg  DAILY


 PO  17 09:00


     


 


 


  (Duoneb Neb)  1 ampule  Q2HR NEB  PRN


 NEB  17 09:15


     


 


 


  (Ativan)  0.5 mg  Q6H  PRN


 PO  17 09:15


     


 








Family History


The pt was adopted


Social History


The patient does not smoke or drink





Physical Exam


Vital Signs





Vital Signs








  Date Time  Temp Pulse Resp B/P (MAP) Pulse Ox O2 Delivery O2 Flow Rate FiO2


 


17 12:05 98.3 64 20 152/69 (96) 98   


 


17 11:01        


 


17 09:43     99   21


 


17 08:36  65  146/65 (92) 100 Room Air  


 


17 07:51     97 Room Air  


 


17 07:51    177/71 (106)    


 


17 05:18  77 18 177/81 (113) 100   


 


17 05:12  66 18  100 Room Air  


 


17 05:07 98.1 79 18 194/79 (117) 100   








Physical Exam


GENERAL: Well-nourished, well-developed patient.


SKIN: Focused skin assessment warm/dry.


HEAD: Normocephalic.


EYES: No scleral icterus. No injection or drainage. 


NECK: Supple, trachea midline. No JVD or lymphadenopathy.


CARDIOVASCULAR: Regular rate and rhythm without murmurs, gallops, or rubs. 


RESPIRATORY: Breath sounds equal bilaterally. No accessory muscle use.


GASTROINTESTINAL: Abdomen soft, slightly tender in the right lower quadrant. 


MUSCULOSKELETAL: No cyanosis, or edema. 


BACK: Nontender without obvious deformity. No CVA tenderness. 


NEURO: Patient follows commands. Seems confused.  Deep tendon reflexes 1+ and 

equal.  Negative Babinski.


PSYCH: Calm.


Laboratory





Laboratory Tests








Test


  17


05:29 17


06:10 17


06:54 17


09:35


 


White Blood Count 10.5    


 


Red Blood Count 3.00    


 


Hemoglobin 9.0    


 


Hematocrit 27.6    


 


Mean Corpuscular Volume 92.2    


 


Mean Corpuscular Hemoglobin 30.0    


 


Mean Corpuscular Hemoglobin


Concent 32.6 


  


  


  


 


 


Red Cell Distribution Width 13.5    


 


Platelet Count 348    


 


Mean Platelet Volume 7.5    


 


Neutrophils (%) (Auto) 69.6    


 


Lymphocytes (%) (Auto) 20.7    


 


Monocytes (%) (Auto) 6.8    


 


Eosinophils (%) (Auto) 2.1    


 


Basophils (%) (Auto) 0.8    


 


Neutrophils # (Auto) 7.3    


 


Lymphocytes # (Auto) 2.2    


 


Monocytes # (Auto) 0.7    


 


Eosinophils # (Auto) 0.2    


 


Basophils # (Auto) 0.1    


 


CBC Comment AUTO DIFF    


 


Differential Total Cells


Counted 100 


  


  


  


 


 


Neutrophils % (Manual) 70    


 


Band Neutrophils % 1    


 


Lymphocytes % 21    


 


Monocytes % 5    


 


Eosinophils % 2    


 


Neutrophils # (Manual) 7.6    


 


Myelocytes 1    


 


Differential Comment


  FINAL DIFF


MANUAL 


  


  


 


 


Platelet Estimate NORMAL    


 


Platelet Morphology Comment NORMAL    


 


Ovalocytes 1+    


 


Acanthocytes OCC    


 


Urine Color YELLOW    


 


Urine Turbidity CLEAR    


 


Urine pH 5.0    


 


Urine Specific Gravity 1.016    


 


Urine Protein TRACE    


 


Urine Glucose (UA) NEG    


 


Urine Ketones NEG    


 


Urine Occult Blood NEG    


 


Urine Nitrite NEG    


 


Urine Bilirubin NEG    


 


Urine Urobilinogen LESS THAN 2.0    


 


Urine Leukocyte Esterase NEG    


 


Urine WBC LESS THAN 1    


 


Urine Amorphous Sediment RARE    


 


Urine Bacteria OCC    


 


Urine Hyaline Casts 3    


 


Urine Mucus FEW    


 


Microscopic Urinalysis Comment


  CATH-CULTURE


IND 


  


  


 


 


Blood Urea Nitrogen 23    


 


Creatinine 1.66    


 


Random Glucose 193    


 


Total Protein 7.6    


 


Albumin 3.8    


 


Calcium Level 8.5    


 


Alkaline Phosphatase 111    


 


Aspartate Amino Transf


(AST/SGOT) 15 


  


  


  


 


 


Alanine Aminotransferase


(ALT/SGPT) 16 


  


  


  


 


 


Total Bilirubin 0.2    


 


Sodium Level 137    


 


Potassium Level 4.2    


 


Chloride Level 109    


 


Carbon Dioxide Level 17.5    


 


Anion Gap 11    


 


Estimat Glomerular Filtration


Rate 30 


  


  


  


 


 


Total Creatine Kinase 61    


 


Troponin I LESS THAN 0.02    


 


B-Type Natriuretic Peptide 61    


 


Thyroid Stimulating Hormone


3rd Gen 4.080 


  


  


  


 


 


Lactic Acid Level  3.2   


 


Prothrombin Time   10.7  


 


Prothromb Time International


Ratio 


  


  1.0 


  


 


 


Activated Partial


Thromboplast Time 


  


  23.6 


  


 


 


Blood Gas Puncture Site    LT RADIAL 


 


Blood Gas Patient Temperature    98.6 


 


Blood Gas HCO3    18 


 


Blood Gas Base Excess    -6.9 


 


Blood Gas Oxygen Saturation    96 


 


Arterial Blood pH    7.31 


 


Arterial Blood Partial


Pressure CO2 


  


  


  37 


 


 


Arterial Blood Partial


Pressure O2 


  


  


  100 


 


 


Arterial Blood Oxygen Content    12.1 


 


Arterial Blood


Carboxyhemoglobin 


  


  


  1.1 


 


 


Arterial Blood Methemoglobin    0.4 


 


Blood Gas Hemoglobin    8.8 


 


Blood Gas Inspired Oxygen    21 














 Date/Time


Source Procedure


Growth Status


 


 


 17 07:20


Blood Peripheral Aerobic Blood Culture


Pending Received


 


 17 07:20


Blood Peripheral Anaerobic Blood Culture


Pending Received


 


 17 05:29


Urine Catheterized Urine Urine Culture


Pending Received








Result Diagram:  


17





Imaging





Last Impressions








Head CT 17 Signed





Impressions: 





 Service Date/Time:  2017 05:41 - CONCLUSION:  Normal 





 examination.       Serjio Lutz MD 


 


Chest X-Ray 17 Signed





Impressions: 





 Service Date/Time:  2017 05:46 - CONCLUSION:  1. No 

active 





 disease.     MD Mary Davisi VTE Risk Assessment


Caprini VTE Risk Assessment:  Mod/High Risk (score >= 2)


Caprini Risk Assessment Model











 Point Value = 1          Point Value = 2  Point Value = 3  Point Value = 5


 


Age 41-60


Minor surgery


BMI > 25 kg/m2


Swollen legs


Varicose veins


Pregnancy or postpartum


History of unexplained or recurrent


   spontaneous 


Oral contraceptives or hormone


   replacement


Sepsis (< 1 month)


Serious lung disease, including


   pneumonia (< 1 month)


Abnormal pulmonary function


Acute myocardial infarction


Congestive heart failure (< 1 month)


History of inflammatory bowel disease


Medical patient at bed rest Age 61-74


Arthroscopic surgery


Major open surgery (> 45 min)


Laparoscopic surgery (> 45 min)


Malignancy


Confined to bed (> 72 hours)


Immobilizing plaster cast


Central venous access Age >= 75


History of VTE


Family history of VTE


Factor V Leiden


Prothrombin 37844K


Lupus anticoagulant


Anticardiolipin antibodies


Elevated serum homocysteine


Heparin-induced thrombocytopenia


Other congenital or acquired


   thrombophilia Stroke (< 1 month)


Elective arthroplasty


Hip, pelvis, or leg fracture


Acute spinal cord injury (< 1 month)








Prophylaxis Regimen











   Total Risk


Factor Score Risk Level Prophylaxis Regimen


 


0-1      Low Early ambulation


 


2 Moderate Order ONE of the following:


*Sequential Compression Device (SCD)


*Heparin 5000 units SQ BID


 


3-4 Higher Order ONE of the following medications:


*Heparin 5000 units SQ TID


*Enoxaparin/Lovenox 40 mg SQ daily (WT < 150 kg, CrCl > 30 mL/min)


*Enoxaparin/Lovenox 30 mg SQ daily (WT < 150 kg, CrCl > 10-29 mL/min)


*Enoxaparin/Lovenox 30 mg SQ BID (WT < 150 kg, CrCl > 30 mL/min)


AND/OR


*Sequential Compression Device (SCD)


 


5 or more Highest Order ONE of the following medications:


*Heparin 5000 units SQ TID (Preferred with Epidurals)


*Enoxaparin/Lovenox 40 mg SQ daily (WT < 150 kg, CrCl > 30 mL/min)


*Enoxaparin/Lovenox 30 mg SQ daily (WT < 150 kg, CrCl > 10-29 mL/min)


*Enoxaparin/Lovenox 30 mg SQ BID (WT < 150 kg, CrCl > 30 mL/min)


AND


*Sequential Compression Device (SCD)











Assessment and Plan


Assessment and Plan


Acute metabolic encephalopathy


Seems to be secondary to doubling up on the patient's medications.  She is on 

high doses of Valium, which her sister states she takes once every few days.  

She did have an elevated lactic acid level.  No source of infection was 

identified.  ABG noted.  TSH slightly elevated.  UA indicative of possible 

infection.  Status post vancomycin and Zosyn in the emergency department.


- Continue IV fluids.


- Neuro checks.


- Avoid sedating medications.


- PT/ OT.


- Case management consult requested.


- Check an ammonia level.


- Continue ceftriaxone for now.





Abdominal pain


Right lower quadrant pain elicited on examination.


- CT of the abdomen is pending.





Renal insufficiency


Acute on chronic.


- Continue with IV fluids.


- Avoid nephrotoxic agents.





GERD


The patient has a long history of GERD and once required a feeding tube.


- Continue PPI.





Diabetes


On metformin as an outpatient.


- Hold metformin.


- Insulin sliding scale and diabetic diet.





PPx: Heparin


Discussed Condition With


Pt, pt's sister, Teo Adan DO Sep 25, 2017 13:18

## 2017-09-25 NOTE — RADRPT
EXAM DATE/TIME:  09/25/2017 17:16 

 

[facility] COMPARISON:     

No previous studies available for comparison.

 

 

INDICATIONS :     

Patient complains of abdominal pain.

                  

 

ORAL CONTRAST:      

Prescribed oral contrast ingested.

                  

 

RADIATION DOSE:     

13.23 CTDIvol (mGy) 

 

 

MEDICAL HISTORY :     

Cardiovascular disease. Diabetes mellitus type 1. Osteoarthritis.polio, cervical cancer

 

SURGICAL HISTORY :      

Appendectomy. Hysterectomy.Cholecystectomy.

 

ENCOUNTER:      

Initial

 

ACUITY:      

1 day

 

PAIN SCALE:      

5/10

 

LOCATION:         

abdomen

 

TECHNIQUE:     

Volumetric scanning of the abdomen and pelvis was performed.  Using automated exposure control and ad
justment of the mA and/or kV according to patient size, radiation dose was kept as low as reasonably 
achievable to obtain optimal diagnostic quality images.  DICOM format image data is available electro
nically for review and comparison.  

 

FINDINGS:     

Small pericardial effusion. Cholecystectomy clips are noted. Liver, spleen, pancreas, adrenal glands,
 kidneys are unremarkable. Urinary bladder unremarkable. The patient is status post hysterectomy. The
re is no evidence of bowel obstruction. No free fluid or free air. No adenopathy or aneurysm. Scatter
ed atherosclerotic calcifications are identified. The patient is status post appendectomy. Lung bases
 are clear. There are degenerative changes of the spine noted postlaminectomy changes are seen in the
 lower lumbar spine at L3.

 

CONCLUSION:     

1. Small pericardial effusion.

2. Atherosclerosis.

3. No inflammatory changes are identified.

 

 

 

 Alejandro Salinas MD on September 25, 2017 at 17:27           

Board Certified Radiologist.

 This report was verified electronically.

## 2017-09-25 NOTE — EKG
Date Performed: 09/25/2017       Time Performed: 05:56:36

 

PTAGE:      77 years

 

EKG:      Sinus rhythm 

 

 WITH SINUS ARRHYTHMIA NORMAL ECG Compared to prior tracing no significant change 

 

 PREVIOUS TRACING            : 07/18/2017 08.52

 

DOCTOR:   Christiano Pedro  Interpretating Date/Time  09/25/2017 13:58:28

## 2017-09-25 NOTE — RADRPT
EXAM DATE/TIME:  09/25/2017 05:46 

 

[facility] COMPARISON:     

CHEST SINGLE AP, July 18, 2017, 8:38.

 

                     

INDICATIONS :     

Shortness of breath, weakness

                     

 

MEDICAL HISTORY :     

Diabetes mellitus type I.  Hypertension  Cerebrovascular disease.       

 

SURGICAL HISTORY :     

Appendectomy. Cholecystectomy.  

 

ENCOUNTER:     

Initial                                        

 

ACUITY:     

1 day      

 

PAIN SCORE:     

Non-responsive.

 

LOCATION:     

Bilateral chest 

 

FINDINGS:     

A single view of the chest demonstrates the lungs to be symmetrically aerated without evidence of mas
s, infiltrate or effusion.  The cardiomediastinal contours are unremarkable.  Osseous structures are 
intact.

 

CONCLUSION:     

1. No active disease.

 

 

 

 Serjio Lutz MD on September 25, 2017 at 6:00           

Board Certified Radiologist.

 This report was verified electronically.

## 2017-09-25 NOTE — RADRPT
EXAM DATE/TIME:  09/25/2017 16:56 

 

[facility] COMPARISON:     

No previous studies available for comparison.

 

                     

INDICATIONS :     

Right elbow pain and skin tears post fall.

                     

 

MEDICAL HISTORY :            

Diabetes mellitus type I. Hypertension Cerebrovascular disease.   

 

SURGICAL HISTORY :     

Appendectomy. Cholecystectomy.  

 

ENCOUNTER:     

Initial                                        

 

ACUITY:     

1 day      

 

PAIN SCORE:     

8/10

 

LOCATION:     

Right  elbow.

 

FINDINGS:     

Multiple view examination of the right elbow demonstrates no soft tissue swelling, joint effusion, or
 fracture.  The osseous structures are in normal alignment.  Bony mineralization is normal.

 

CONCLUSION:     No acute disease.  

 

 

 

 

 Alejandro Salinas MD on September 25, 2017 at 17:16           

Board Certified Radiologist.

 This report was verified electronically.

## 2017-09-25 NOTE — PD
HPI


Chief Complaint:  Altered Mental Status


Time Seen by Provider:  05:26


Travel History


International Travel<30 days:  No


Contact w/Intl Traveler<30days:  No


Traveled to known affect area:  No





History of Present Illness


HPI


77-year-old female was brought in by EMS for altered mental status.  Patient 

was found by her sister with altered mental status tonight at home.  Patient's 

sister states the patient normally awake and alert oriented 3.  Patient 

speaking gibberish and I am unable to understand the patient at this point.  

Patient had a fall this morning.  Patient has history of hypertension, diabetes

, CVA, seizure disorder, anemia, esophageal stricture and urinary incontinence.

  Patient's on Eliquis, Plavix, Valium, metformin and other medications.





PFSH


Past Medical History


Hx Anticoagulant Therapy:  Yes (plavix)


Anemia:  Yes


Arthritis:  Yes


Anxiety:  Yes


Depression:  Yes


Cancer:  Yes (CERVICAL CA)


Cardiovascular Problems:  Yes


High Cholesterol:  Yes


Chest Pain:  No


Congestive Heart Failure:  No


Cerebrovascular Accident:  Yes


Diabetes:  Yes


Patient Takes Glucophage:  Yes


Diminished Hearing:  No


Gastrointestinal Disorders:  Yes (esophageal strictures)


GERD:  Yes


Genitourinary:  Yes (incontinent)


Headaches:  No


Hypertension:  Yes


Musculoskeletal:  Yes (fracture to right ankle )


Immunizations Current:  No


Migraines:  No


Seizures:  Yes


PNEUMOCCOCAL Vaccine (Year):  2


Menopausal:  Yes





Past Surgical History


Appendectomy:  Yes


Cardiac Surgery:  No


Cholecystectomy:  Yes


Ear Surgery:  No


Endocrine Surgery:  Yes


Eye Surgery:  No


Genitourinary Surgery:  Yes


Gynecologic Surgery:  Yes (HYSTERECTOMY,CERVICAL CA REMOVED.)


Hysterectomy:  No


Neurologic Surgery:  Yes (C4,C5,C6 PLATE PLACED)


Oral Surgery:  Yes


Thoracic Surgery:  No


Other Surgery:  Yes (LAPAROSCOPIC GREGG(WRAP STOMACH AROUND ESOPHAGUS TO HELP 

WITH GERD))





Social History


Alcohol Use:  No (DENIES)


Tobacco Use:  No (DENIES)


Substance Use:  No





Allergies-Medications


(Allergen,Severity, Reaction):  


Coded Allergies:  


     Influenza Virus Vaccines (Unverified  Allergy, Intermediate, 8/15/17)


 "ALLERGIC TO EGGS SO I CAN NOT HAVE THE FLU SHOT"


     egg (Unverified  Allergy, Intermediate, 8/15/17)


     codeine (Unverified  Allergy, Mild, 8/15/17)


     meperidine (Unverified  Allergy, Mild, 8/15/17)


     morphine (Unverified  Allergy, Mild, 8/15/17)


     acetaminophen (Unverified  Adverse Reaction, Mild, HEADACHE, 8/15/17)


     hydrocodone (Unverified  Adverse Reaction, Mild, HEADACHE, 8/15/17)


Reported Meds & Prescriptions





Reported Meds & Active Scripts


Active


Eliquis (Apixaban) 5 Mg Tab 10 Mg PO BID


     Please take Eliquis 10mg (2 tablets) twice a day for the first 6 days


     upon discharge. Then continue Eliquis with 5mg (1 tablet) twice a day


     for up to 3 months.


Eliquis (Apixaban) 5 Mg Tab 5 Mg PO BID


     Please take Eliquis 10mg (2 tablets) twice a day for the first 6 days


     upon discharge. Then continue Eliquis with 5mg (1 tablet) twice a day


     for up to 3 months.


Reported


Pravastatin 40 Mg Tab 40 Mg PO DAILY


Dexilant (Dexlansoprazole) 60 Mg Cap.bp 1 Cap PO DAILY


Baclofen 10 Mg Tab 10 Mg PO TID


Vimpat (Lacosamide) 100 Mg Tab 100 Mg PO BID


Proair Hfa 8.5 GM Inh (Albuterol Sulfate) 90 Mcg/Act Aer 2 Puff INH Q6H PRN


     108 mcg/actuation


Metformin ER (Metformin HCl) 1,000 Mg Jass 1,000 Mg PO BID


     With evening meal


Valium (Diazepam) 10 Mg Tab 10 Mg PO TID PRN


Plavix (Clopidogrel Bisulfate) 75 Mg Tab 75 Mg PO DAILY


Toprol XL (Metoprolol Succinate) 50 Mg Tab 50 Mg PO BID


Pantoprazole (Pantoprazole Sodium) 40 Mg Tab 40 Mg PO DAILY


Pravachol (Pravastatin) 40 Mg Tab 40 Mg PO HS


Sertraline (Sertraline HCl) 25 Mg Tab 50 Mg PO DAILY








Review of Systems


ROS Limitations:  Altered Mental Status





Physical Exam


Narrative


GENERAL: Well-nourished, well-developed patient.


SKIN: Focused skin assessment warm/dry.


HEAD: Normocephalic.


EYES: No scleral icterus. No injection or drainage. 


NECK: Supple, trachea midline. No JVD or lymphadenopathy.


CARDIOVASCULAR: Regular rate and rhythm without murmurs, gallops, or rubs. 


RESPIRATORY: Breath sounds equal bilaterally. No accessory muscle use.


GASTROINTESTINAL: Abdomen soft, non-tender, nondistended. 


MUSCULOSKELETAL: No cyanosis, or edema. 


BACK: Nontender without obvious deformity. No CVA tenderness. 


Neurologic exam: Patient's lying in bed, open her eyes on command.  Patient 

followed commands by squeezing my hands and moving lower extremity.  Patient 

however speaking gibberish.  Deep tendon reflexes 1+ and equal.  Negative 

Babinski.





Data


Data


Last Documented VS





Vital Signs








  Date Time  Temp Pulse Resp B/P (MAP) Pulse Ox O2 Delivery O2 Flow Rate FiO2


 


9/25/17 05:18  77 18 177/81 (113) 100   


 


9/25/17 05:12      Room Air  


 


9/25/17 05:07 98.1       








Orders





 Orders


Electrocardiogram (9/25/17 05:26)


Complete Blood Count With Diff (9/25/17 05:26)


Comprehensive Metabolic Panel (9/25/17 05:26)


Creatine Kinase (Cpk) (9/25/17 05:26)


Troponin I (9/25/17 05:26)


B-Type Natriuretic Peptide (9/25/17 05:26)


Prothrombin Time / Inr (Pt) (9/25/17 05:26)


Act Partial Throm Time (Ptt) (9/25/17 05:26)


Urinalysis - C+S If Indicated (9/25/17 05:26)


Thyroid Stimulating Hormone (9/25/17 05:26)


Chest, Single Ap (9/25/17 05:26)


Ct Brain W/O Iv Contrast(Rout) (9/25/17 05:26)


Iv Access Insert/Monitor (9/25/17 05:26)


Ecg Monitoring (9/25/17 05:26)


Oximetry (9/25/17 05:26)


Lactic Acid (9/25/17 06:02)


Urine Culture (9/25/17 05:29)


Vancomycin Inj (Vancomycin Inj) (9/25/17 07:15)


Piperacil-Tazo 3.375 Gm Premix (Zosyn 3. (9/25/17 07:15)


Blood Culture (9/25/17 07:12)





Labs





Laboratory Tests








Test


  9/25/17


05:29 9/25/17


06:10 9/25/17


06:54


 


White Blood Count 10.5 TH/MM3   


 


Red Blood Count 3.00 MIL/MM3   


 


Hemoglobin 9.0 GM/DL   


 


Hematocrit 27.6 %   


 


Mean Corpuscular Volume 92.2 FL   


 


Mean Corpuscular Hemoglobin 30.0 PG   


 


Mean Corpuscular Hemoglobin


Concent 32.6 % 


  


  


 


 


Red Cell Distribution Width 13.5 %   


 


Platelet Count 348 TH/MM3   


 


Mean Platelet Volume 7.5 FL   


 


Neutrophils (%) (Auto) 69.6 %   


 


Lymphocytes (%) (Auto) 20.7 %   


 


Monocytes (%) (Auto) 6.8 %   


 


Eosinophils (%) (Auto) 2.1 %   


 


Basophils (%) (Auto) 0.8 %   


 


Neutrophils # (Auto) 7.3 TH/MM3   


 


Lymphocytes # (Auto) 2.2 TH/MM3   


 


Monocytes # (Auto) 0.7 TH/MM3   


 


Eosinophils # (Auto) 0.2 TH/MM3   


 


Basophils # (Auto) 0.1 TH/MM3   


 


CBC Comment AUTO DIFF   


 


Differential Total Cells


Counted 100 


  


  


 


 


Neutrophils % (Manual) 70 %   


 


Band Neutrophils % 1 %   


 


Lymphocytes % 21 %   


 


Monocytes % 5 %   


 


Eosinophils % 2 %   


 


Neutrophils # (Manual) 7.6 TH/MM3   


 


Myelocytes 1 %   


 


Differential Comment


  FINAL DIFF


MANUAL 


  


 


 


Platelet Estimate NORMAL   


 


Platelet Morphology Comment NORMAL   


 


Ovalocytes 1+   


 


Acanthocytes OCC   


 


Urine Color YELLOW   


 


Urine Turbidity CLEAR   


 


Urine pH 5.0   


 


Urine Specific Gravity 1.016   


 


Urine Protein TRACE mg/dL   


 


Urine Glucose (UA) NEG mg/dL   


 


Urine Ketones NEG mg/dL   


 


Urine Occult Blood NEG   


 


Urine Nitrite NEG   


 


Urine Bilirubin NEG   


 


Urine Urobilinogen


  LESS THAN 2.0


MG/DL 


  


 


 


Urine Leukocyte Esterase NEG   


 


Urine WBC


  LESS THAN 1


/hpf 


  


 


 


Urine Amorphous Sediment RARE   


 


Urine Bacteria OCC /hpf   


 


Urine Hyaline Casts 3 /lpf   


 


Urine Mucus FEW /lpf   


 


Microscopic Urinalysis Comment


  CATH-CULTURE


IND 


  


 


 


Blood Urea Nitrogen 23 MG/DL   


 


Creatinine 1.66 MG/DL   


 


Random Glucose 193 MG/DL   


 


Total Protein 7.6 GM/DL   


 


Albumin 3.8 GM/DL   


 


Calcium Level 8.5 MG/DL   


 


Alkaline Phosphatase 111 U/L   


 


Aspartate Amino Transf


(AST/SGOT) 15 U/L 


  


  


 


 


Alanine Aminotransferase


(ALT/SGPT) 16 U/L 


  


  


 


 


Total Bilirubin 0.2 MG/DL   


 


Sodium Level 137 MEQ/L   


 


Potassium Level 4.2 MEQ/L   


 


Chloride Level 109 MEQ/L   


 


Carbon Dioxide Level 17.5 MEQ/L   


 


Anion Gap 11 MEQ/L   


 


Estimat Glomerular Filtration


Rate 30 ML/MIN 


  


  


 


 


Total Creatine Kinase 61 U/L   


 


Troponin I


  LESS THAN 0.02


NG/ML 


  


 


 


B-Type Natriuretic Peptide 61 PG/ML   


 


Thyroid Stimulating Hormone


3rd Gen 4.080 uIU/ML 


  


  


 


 


Lactic Acid Level  3.2 mmol/L  











MDM


Medical Decision Making


Medical Screen Exam Complete:  Yes


Emergency Medical Condition:  Yes


Interpretation(s)


6:14 AM.  EKG shows sinus rhythm nonspecific ST-T wave changes.


6:46 AM.


Last Impressions








Head CT 9/25/17 0526 Signed





Impressions: 





 Service Date/Time:  Monday, September 25, 2017 05:41 - CONCLUSION:  Normal 





 examination.       Serjio Lutz MD 


 


Chest X-Ray 9/25/17 0526 Signed





Impressions: 





 Service Date/Time:  Monday, September 25, 2017 05:46 - CONCLUSION:  1. No 

active 





 disease.     Serjio Lutz MD 





6:46 AM.  CBC hemoglobin 9.0 hematocrit 27.6.  WBC 10.5 with normal 

differential.  Bicarbonate is 17.5.  BUN 23.  Creatinine 1.66.  Cardiac enzymes 

are normal.  TSH 4.08.  UA negative for WBC.  Positive for bacteria.  Lactic 

acid 3.2.  BNP 61.


Differential Diagnosis


Differential diagnosis including TIA, CVA, dehydration, electrolyte imbalance, 

medication side effect.


Narrative Course


77-year-old female with altered mental status.  Normal saline solution 1 25 cc 

an hour.  Vancomycin 1 g IV given.  Zosyn 3.375 g IV given.  Normal saline 

solution 1 L IV bolus.





Diagnosis





 Primary Impression:  


 Altered mental status


 Qualified Codes:  R41.82 - Altered mental status, unspecified





Admitting Information


Admitting Physician Requests:  Admit











Job Harrington MD Sep 25, 2017 06:16

## 2017-09-25 NOTE — RADRPT
EXAM DATE/TIME:  09/25/2017 05:41 

 

[facility] COMPARISON:     

CT BRAIN W/O CONTRAST, July 18, 2017, 9:21.

 

 

INDICATIONS :     

Altered mental status.

                    

 

RADIATION DOSE:     

29.92 CTDIvol (mGy) 

 

 

 

MEDICAL HISTORY :     

Gastroesophageal reflux disease. Hypertension. Diabetes mellitus type 2.CVA. Cervical cancer. 

 

SURGICAL HISTORY :      

Appendectomy. Cholecystectomy.Cervical fusion.

 

ENCOUNTER:      

Initial

 

ACUITY:      

1 day

 

PAIN SCALE:      

Non-responsive

 

LOCATION:        

cranial 

 

TECHNIQUE:     

Multiple contiguous axial images were obtained of the head.  Using automated exposure control and adj
ustment of the mA and/or kV according to patient size, radiation dose was kept as low as reasonably a
chievable to obtain optimal diagnostic quality images.   DICOM format image data is available electro
nically for review and comparison.  

 

FINDINGS:     

 

CEREBRUM:     

The ventricles are normal for age.  No evidence of midline shift, mass lesion, hemorrhage or acute in
farction.  No extra-axial fluid collections are seen.

 

POSTERIOR FOSSA:     

The cerebellum and brainstem are intact.  The 4th ventricle is midline.  The cerebellopontine angle i
s unremarkable.

 

EXTRACRANIAL:     

The visualized portion of the orbits is intact.

 

SKULL:     

The calvaria is intact.  No evidence of skull fracture.

 

CONCLUSION:     

Normal examination.  

 

 

 

 Serjio Lutz MD on September 25, 2017 at 5:57           

Board Certified Radiologist.

 This report was verified electronically.

## 2017-09-26 VITALS
DIASTOLIC BLOOD PRESSURE: 70 MMHG | RESPIRATION RATE: 18 BRPM | SYSTOLIC BLOOD PRESSURE: 147 MMHG | HEART RATE: 71 BPM | TEMPERATURE: 98.3 F | OXYGEN SATURATION: 100 %

## 2017-09-26 VITALS
DIASTOLIC BLOOD PRESSURE: 67 MMHG | RESPIRATION RATE: 18 BRPM | HEART RATE: 93 BPM | OXYGEN SATURATION: 100 % | SYSTOLIC BLOOD PRESSURE: 154 MMHG | TEMPERATURE: 98 F

## 2017-09-26 VITALS
HEART RATE: 76 BPM | RESPIRATION RATE: 16 BRPM | SYSTOLIC BLOOD PRESSURE: 165 MMHG | DIASTOLIC BLOOD PRESSURE: 77 MMHG | OXYGEN SATURATION: 99 % | TEMPERATURE: 98.5 F

## 2017-09-26 VITALS
HEART RATE: 67 BPM | SYSTOLIC BLOOD PRESSURE: 156 MMHG | RESPIRATION RATE: 18 BRPM | TEMPERATURE: 98.5 F | OXYGEN SATURATION: 98 % | DIASTOLIC BLOOD PRESSURE: 68 MMHG

## 2017-09-26 VITALS
HEART RATE: 78 BPM | DIASTOLIC BLOOD PRESSURE: 79 MMHG | OXYGEN SATURATION: 100 % | SYSTOLIC BLOOD PRESSURE: 179 MMHG | RESPIRATION RATE: 18 BRPM | TEMPERATURE: 98.1 F

## 2017-09-26 VITALS
SYSTOLIC BLOOD PRESSURE: 162 MMHG | OXYGEN SATURATION: 99 % | RESPIRATION RATE: 18 BRPM | DIASTOLIC BLOOD PRESSURE: 72 MMHG | HEART RATE: 75 BPM | TEMPERATURE: 98.1 F

## 2017-09-26 VITALS — OXYGEN SATURATION: 94 %

## 2017-09-26 VITALS — OXYGEN SATURATION: 98 %

## 2017-09-26 LAB
ALP SERPL-CCNC: 113 U/L (ref 45–117)
ALT SERPL-CCNC: 9 U/L (ref 10–53)
ANION GAP SERPL CALC-SCNC: 8 MEQ/L (ref 5–15)
AST SERPL-CCNC: 13 U/L (ref 15–37)
BILIRUB SERPL-MCNC: 0.2 MG/DL (ref 0.2–1)
BUN SERPL-MCNC: 13 MG/DL (ref 7–18)
CHLORIDE SERPL-SCNC: 115 MEQ/L (ref 98–107)
EOSINOPHIL NFR BLD: 7 % (ref 0–4)
ERYTHROCYTE [DISTWIDTH] IN BLOOD BY AUTOMATED COUNT: 13.6 % (ref 11.6–17.2)
GFR SERPLBLD BASED ON 1.73 SQ M-ARVRAT: 43 ML/MIN (ref 89–?)
HCO3 BLD-SCNC: 19.8 MEQ/L (ref 21–32)
HCT VFR BLD CALC: 28.8 % (ref 35–46)
HEMO FLAGS: (no result)
MCH RBC QN AUTO: 29.2 PG (ref 27–34)
MCHC RBC AUTO-ENTMCNC: 31.9 % (ref 32–36)
MCV RBC AUTO: 91.4 FL (ref 80–100)
NEUTS BAND # BLD MANUAL: 3.8 TH/MM3 (ref 1.8–7.7)
NEUTS SEG NFR BLD MANUAL: 58 % (ref 16–70)
OVALOCYTES BLD QL SMEAR: (no result)
PLAT MORPH BLD: NORMAL
PLATELET # BLD: 365 TH/MM3 (ref 150–450)
PLATELET BLD QL SMEAR: NORMAL
POTASSIUM SERPL-SCNC: 4 MEQ/L (ref 3.5–5.1)
RBC # BLD AUTO: 3.15 MIL/MM3 (ref 4–5.3)
SCAN/DIFF: (no result)
SODIUM SERPL-SCNC: 143 MEQ/L (ref 136–145)
WBC # BLD AUTO: 6.5 TH/MM3 (ref 4–11)
WBC DIFF SAMPLE: 100

## 2017-09-26 RX ADMIN — LACOSAMIDE SCH MG: 50 TABLET, FILM COATED ORAL at 09:28

## 2017-09-26 RX ADMIN — INSULIN ASPART SCH: 100 INJECTION, SOLUTION INTRAVENOUS; SUBCUTANEOUS at 21:00

## 2017-09-26 RX ADMIN — METOPROLOL SUCCINATE SCH MG: 50 TABLET, FILM COATED, EXTENDED RELEASE ORAL at 09:28

## 2017-09-26 RX ADMIN — INSULIN ASPART SCH: 100 INJECTION, SOLUTION INTRAVENOUS; SUBCUTANEOUS at 17:00

## 2017-09-26 RX ADMIN — Medication SCH ML: at 09:00

## 2017-09-26 RX ADMIN — SODIUM CHLORIDE SCH MLS/HR: 900 INJECTION INTRAVENOUS at 09:26

## 2017-09-26 RX ADMIN — PHENYTOIN SODIUM SCH MLS/HR: 50 INJECTION INTRAMUSCULAR; INTRAVENOUS at 21:00

## 2017-09-26 RX ADMIN — METOPROLOL SUCCINATE SCH MG: 50 TABLET, FILM COATED, EXTENDED RELEASE ORAL at 21:00

## 2017-09-26 RX ADMIN — SODIUM CHLORIDE SCH MLS/HR: 900 INJECTION INTRAVENOUS at 13:05

## 2017-09-26 RX ADMIN — PHENYTOIN SODIUM SCH MLS/HR: 50 INJECTION INTRAMUSCULAR; INTRAVENOUS at 05:02

## 2017-09-26 RX ADMIN — APIXABAN SCH MG: 5 TABLET, FILM COATED ORAL at 21:00

## 2017-09-26 RX ADMIN — Medication SCH ML: at 21:00

## 2017-09-26 RX ADMIN — STANDARDIZED SENNA CONCENTRATE AND DOCUSATE SODIUM SCH TAB: 8.6; 5 TABLET, FILM COATED ORAL at 21:00

## 2017-09-26 RX ADMIN — PHENYTOIN SODIUM SCH MLS/HR: 50 INJECTION INTRAMUSCULAR; INTRAVENOUS at 09:28

## 2017-09-26 RX ADMIN — PHENYTOIN SODIUM SCH MLS/HR: 50 INJECTION INTRAMUSCULAR; INTRAVENOUS at 09:29

## 2017-09-26 RX ADMIN — PRAVASTATIN SODIUM SCH MG: 40 TABLET ORAL at 21:00

## 2017-09-26 RX ADMIN — ACETAMINOPHEN PRN MG: 325 TABLET ORAL at 17:33

## 2017-09-26 RX ADMIN — SERTRALINE HYDROCHLORIDE SCH MG: 50 TABLET, FILM COATED ORAL at 09:26

## 2017-09-26 RX ADMIN — PANTOPRAZOLE SCH MG: 40 TABLET, DELAYED RELEASE ORAL at 09:28

## 2017-09-26 RX ADMIN — INSULIN ASPART SCH: 100 INJECTION, SOLUTION INTRAVENOUS; SUBCUTANEOUS at 08:00

## 2017-09-26 RX ADMIN — ACETAMINOPHEN PRN MG: 325 TABLET ORAL at 13:13

## 2017-09-26 RX ADMIN — STANDARDIZED SENNA CONCENTRATE AND DOCUSATE SODIUM SCH TAB: 8.6; 5 TABLET, FILM COATED ORAL at 09:28

## 2017-09-26 RX ADMIN — CLOPIDOGREL BISULFATE SCH MG: 75 TABLET, FILM COATED ORAL at 09:27

## 2017-09-26 RX ADMIN — APIXABAN SCH MG: 5 TABLET, FILM COATED ORAL at 09:28

## 2017-09-26 RX ADMIN — ACETAMINOPHEN PRN MG: 325 TABLET ORAL at 10:00

## 2017-09-26 RX ADMIN — INSULIN ASPART SCH: 100 INJECTION, SOLUTION INTRAVENOUS; SUBCUTANEOUS at 12:00

## 2017-09-26 NOTE — RADRPT
EXAM DATE/TIME:  09/26/2017 14:45 

 

[facility] COMPARISON:     

SHOULDER RIGHT COMPLETE (>2VWS), November 15, 2015, 16:12.

 

                     

INDICATIONS :     

Right shoulder pain after fall.

                     

 

MEDICAL HISTORY :     

None.          

 

SURGICAL HISTORY :     

None.   

 

ENCOUNTER:     

Initial                                        

 

ACUITY:     

1 week      

 

PAIN SCORE:     

10/10

 

LOCATION:     

Right  shoulder.

 

FINDINGS:     

Multiple view examination of the right shoulder demonstrates no evidence of fracture or dislocation. 
 The glenohumeral and acromioclavicular joints are maintained.  There is normal range of motion betwe
en internal and external rotation.  Bony mineralization is normal.

 

CONCLUSION:     No acute disease.  

 

 

 

 Alejandro Salinas MD on September 26, 2017 at 15:06           

Board Certified Radiologist.

 This report was verified electronically.

## 2017-09-26 NOTE — HHI.PR
Subjective


Remarks


The pt doesn't think that she took too many medications prior to arrival. She 

complains of significant right shoulder pain. She also had right elbow pain. 

She denied any fever. Discussed with nursing at the bedside.





Objective


Vitals





Vital Signs








  Date Time  Temp Pulse Resp B/P (MAP) Pulse Ox O2 Delivery O2 Flow Rate FiO2


 


9/26/17 11:31 98.1 78 18 179/79 (112) 100   


 


9/26/17 08:02 98.5 76 16 165/77 (106) 99   


 


9/26/17 07:45     98   21


 


9/26/17 04:25 98.3 71 18 147/70 (95) 100   


 


9/26/17 00:29 98.5 67 18 156/68 (97) 98   


 


9/25/17 22:35   12     


 


9/25/17 20:17     99   


 


9/25/17 19:49 96.5 77 16 161/73 (102) 99   


 


9/25/17 18:03 99.0 66 20 159/72 (101) 100   














I/O      


 


 9/25/17 9/25/17 9/25/17 9/26/17 9/26/17 9/26/17





 07:00 15:00 23:00 07:00 15:00 23:00


 


Intake Total   1000 ml 240 ml 1100 ml 


 


Balance   1000 ml 240 ml 1100 ml 


 


      


 


Intake Oral    240 ml  


 


IV Total   1000 ml  1100 ml 


 


# Voids    5  








Result Diagram:  


9/26/17 0506                                                                   

             9/26/17 0506





Imaging





Last Impressions








Head CT 9/25/17 0526 Signed





Impressions: 





 Service Date/Time:  Monday, September 25, 2017 05:41 - CONCLUSION:  Normal 





 examination.       Serjio Lutz MD 


 


Chest X-Ray 9/25/17 0526 Signed





Impressions: 





 Service Date/Time:  Monday, September 25, 2017 05:46 - CONCLUSION:  1. No 

active 





 disease.     Serjio Lutz MD 


 


Elbow X-Ray 9/25/17 0000 Signed





Impressions: 





 Service Date/Time:  Monday, September 25, 2017 16:56 - CONCLUSION: No acute 





 disease.        Alejandro Salinas MD 


 


Abdomen/Pelvis CT 9/25/17 0000 Signed





Impressions: 





 Service Date/Time:  Monday, September 25, 2017 17:16 - CONCLUSION:  1. Small 





 pericardial effusion. 2. Atherosclerosis. 3. No inflammatory changes are 





 identified.     Alejandro Salinas MD 








Objective Remarks


GENERAL: Well-nourished, well-developed patient.


SKIN: Focused skin assessment warm/dry.


HEAD: Normocephalic.


EYES: No scleral icterus. No injection or drainage. 


NECK: Supple, trachea midline. No JVD or lymphadenopathy.


CARDIOVASCULAR: Regular rate and rhythm without murmurs, gallops, or rubs. 


RESPIRATORY: Breath sounds equal bilaterally. No accessory muscle use.


GASTROINTESTINAL: Abdomen soft, slightly tender in the right lower quadrant. 


MUSCULOSKELETAL: No cyanosis, or edema. 


BACK: Nontender without obvious deformity. No CVA tenderness. 


NEURO: Patient follows commands. Awake and alert.  


PSYCH: Mood and affect appropriate.


Procedures


None


Medications and IVs





Current Medications








 Medications


  (Trade)  Dose


 Ordered  Sig/Aaron


 Route  Start Time


 Stop Time Status Last Admin


 


 Sodium Chloride  1,000 ml @ 


 100 mls/hr  Q10H


 IV  9/25/17 09:02


    9/26/17 09:29


 


 


  (NS Flush)  2 ml  UNSCH  PRN


 IV FLUSH  9/25/17 09:15


     


 


 


  (NS Flush)  2 ml  BID


 IV FLUSH  9/25/17 21:00


     


 


 


  (Tylenol)  650 mg  Q4H  PRN


 PO  9/25/17 09:15


    9/26/17 13:13


 


 


  (Tylenol)  650 mg  Q6H  PRN


 PO  9/25/17 09:15


    9/26/17 10:00


 


 


  (Narcan Inj)  0.4 mg  UNSCH  PRN


 IV PUSH  9/25/17 09:15


     


 


 


  (Ruby-Colace)  1 tab  BID


 PO  9/25/17 21:00


    9/26/17 09:28


 


 


  (Eliquis)  5 mg  BID


 PO  9/25/17 10:00


    9/26/17 09:28


 


 


  (Plavix)  75 mg  DAILY


 PO  9/25/17 10:00


    9/26/17 09:27


 


 


  (Toprol Xl)  50 mg  BID


 PO  9/25/17 10:00


    9/26/17 09:28


 


 


  (Protonix)  40 mg  DAILY


 PO  9/26/17 09:00


    9/26/17 09:28


 


 


  (Pravachol)  40 mg  HS


 PO  9/25/17 21:00


    9/25/17 21:18


 


 


  (Zoloft)  50 mg  DAILY


 PO  9/26/17 09:00


    9/26/17 09:26


 


 


  (Duoneb Neb)  1 ampule  Q2HR NEB  PRN


 NEB  9/25/17 09:15


     


 


 


  (Ativan)  0.5 mg  Q6H  PRN


 PO  9/25/17 09:15


     


 


 


  (Vimpat)  50 mg  DAILY


 PO  9/26/17 09:00


    9/26/17 09:28


 


 


  (NovoLOG


 SUPPLEMENTAL


 SCALE)  1  ACHS SLIDING  SCALE


 SQ  9/25/17 17:00


     


 


 


 Ceftriaxone


 Sodium 1000 mg/


 Sodium Chloride  100 ml @ 


 200 mls/hr  Q24H


 IV  9/26/17 09:00


    9/26/17 09:26


 


 


  (D50w (Syr) Inj)  50 ml  UNSCH  PRN


 IV PUSH  9/25/17 13:45


     


 


 


  (Glucagon Inj)  1 mg  UNSCH  PRN


 OTHER  9/25/17 13:45


     


 


 


 Pharmacy Profile


 Note  0 ml @ 0


 mls/hr  UNSCH


 OTHER  9/26/17 10:15


     


 


 


 Vancomycin HCl


 1250 mg/Sodium


 Chloride  262.5 ml @ 


 250 mls/hr  Q24H


 IV  9/26/17 12:00


    9/26/17 13:05


 











A/P


Assessment and Plan


Acute metabolic encephalopathy


Seems to be secondary to doubling up on the patient's medications.  She is on 

high doses of Valium, which her sister states she takes once every few days.  

She did have an elevated lactic acid level.  No source of infection was 

identified.  ABG noted.  TSH slightly elevated.  UA indicative of possible 

infection.  Status post vancomycin and Zosyn in the emergency department. 

Mental status improving. 1 set of blood cultures positive for GPC.


- Continue IV fluids. 


- Neuro checks.


- Avoid sedating medications.


- PT/ OT.


- Case management consult requested.


- Check an ammonia level.


- Continue ceftriaxone and vancomycin. 





Bacteremia/ UTI


Unsure of significance. Urine culture negative so far.


- repeat blood cultures and start IV vancomycin.


- ID consult requested.


- continue ceftriaxone. 





Abdominal pain


Right lower quadrant pain elicited on examination. CT abdomen unremarkable.


- seems resolved.





Renal insufficiency


Acute on chronic.


- Continue with IV fluids. Improving.


- Avoid nephrotoxic agents.





GERD


The patient has a long history of GERD and once required a feeding tube.


- Continue PPI.





Diabetes


On metformin as an outpatient. Glucose well controlled.


- Hold metformin.


- Insulin sliding scale and diabetic diet.





PPx: Heparin


Discharge Planning


Awaiting ID Teo Traore DO Sep 26, 2017 13:43

## 2017-09-27 VITALS
SYSTOLIC BLOOD PRESSURE: 171 MMHG | DIASTOLIC BLOOD PRESSURE: 70 MMHG | HEART RATE: 83 BPM | TEMPERATURE: 98.1 F | OXYGEN SATURATION: 98 % | RESPIRATION RATE: 18 BRPM

## 2017-09-27 VITALS
SYSTOLIC BLOOD PRESSURE: 165 MMHG | OXYGEN SATURATION: 100 % | TEMPERATURE: 98.3 F | HEART RATE: 78 BPM | RESPIRATION RATE: 16 BRPM | DIASTOLIC BLOOD PRESSURE: 73 MMHG

## 2017-09-27 VITALS
TEMPERATURE: 98.4 F | DIASTOLIC BLOOD PRESSURE: 97 MMHG | SYSTOLIC BLOOD PRESSURE: 169 MMHG | OXYGEN SATURATION: 100 % | RESPIRATION RATE: 18 BRPM | HEART RATE: 92 BPM

## 2017-09-27 VITALS
TEMPERATURE: 98.2 F | OXYGEN SATURATION: 98 % | HEART RATE: 97 BPM | DIASTOLIC BLOOD PRESSURE: 80 MMHG | RESPIRATION RATE: 18 BRPM | SYSTOLIC BLOOD PRESSURE: 169 MMHG

## 2017-09-27 VITALS
HEART RATE: 83 BPM | SYSTOLIC BLOOD PRESSURE: 153 MMHG | OXYGEN SATURATION: 99 % | RESPIRATION RATE: 18 BRPM | TEMPERATURE: 98.1 F | DIASTOLIC BLOOD PRESSURE: 67 MMHG

## 2017-09-27 VITALS
DIASTOLIC BLOOD PRESSURE: 74 MMHG | OXYGEN SATURATION: 97 % | SYSTOLIC BLOOD PRESSURE: 157 MMHG | RESPIRATION RATE: 20 BRPM | TEMPERATURE: 98.4 F | HEART RATE: 71 BPM

## 2017-09-27 VITALS — OXYGEN SATURATION: 97 %

## 2017-09-27 VITALS — OXYGEN SATURATION: 99 %

## 2017-09-27 LAB
ANION GAP SERPL CALC-SCNC: 9 MEQ/L (ref 5–15)
BUN SERPL-MCNC: 11 MG/DL (ref 7–18)
CHLORIDE SERPL-SCNC: 114 MEQ/L (ref 98–107)
ERYTHROCYTE [DISTWIDTH] IN BLOOD BY AUTOMATED COUNT: 13.5 % (ref 11.6–17.2)
GFR SERPLBLD BASED ON 1.73 SQ M-ARVRAT: 49 ML/MIN (ref 89–?)
HCO3 BLD-SCNC: 19.2 MEQ/L (ref 21–32)
HCT VFR BLD CALC: 26.4 % (ref 35–46)
MAGNESIUM SERPL-MCNC: 2.2 MG/DL (ref 1.5–2.5)
MCH RBC QN AUTO: 30.2 PG (ref 27–34)
MCHC RBC AUTO-ENTMCNC: 33.4 % (ref 32–36)
MCV RBC AUTO: 90.4 FL (ref 80–100)
PLATELET # BLD: 302 TH/MM3 (ref 150–450)
POTASSIUM SERPL-SCNC: 3.9 MEQ/L (ref 3.5–5.1)
RBC # BLD AUTO: 2.92 MIL/MM3 (ref 4–5.3)
REVIEW FLAG: (no result)
SODIUM SERPL-SCNC: 142 MEQ/L (ref 136–145)
WBC # BLD AUTO: 7.3 TH/MM3 (ref 4–11)

## 2017-09-27 RX ADMIN — Medication SCH ML: at 09:12

## 2017-09-27 RX ADMIN — INSULIN ASPART SCH: 100 INJECTION, SOLUTION INTRAVENOUS; SUBCUTANEOUS at 20:33

## 2017-09-27 RX ADMIN — METOPROLOL SUCCINATE SCH MG: 50 TABLET, FILM COATED, EXTENDED RELEASE ORAL at 20:30

## 2017-09-27 RX ADMIN — STANDARDIZED SENNA CONCENTRATE AND DOCUSATE SODIUM SCH TAB: 8.6; 5 TABLET, FILM COATED ORAL at 20:30

## 2017-09-27 RX ADMIN — Medication SCH ML: at 20:30

## 2017-09-27 RX ADMIN — SODIUM CHLORIDE SCH MLS/HR: 900 INJECTION INTRAVENOUS at 09:12

## 2017-09-27 RX ADMIN — ACETAMINOPHEN PRN MG: 325 TABLET ORAL at 20:30

## 2017-09-27 RX ADMIN — INSULIN ASPART SCH: 100 INJECTION, SOLUTION INTRAVENOUS; SUBCUTANEOUS at 12:00

## 2017-09-27 RX ADMIN — PANTOPRAZOLE SCH MG: 40 TABLET, DELAYED RELEASE ORAL at 09:10

## 2017-09-27 RX ADMIN — SODIUM CHLORIDE SCH MLS/HR: 900 INJECTION INTRAVENOUS at 13:16

## 2017-09-27 RX ADMIN — METOPROLOL SUCCINATE SCH MG: 50 TABLET, FILM COATED, EXTENDED RELEASE ORAL at 09:11

## 2017-09-27 RX ADMIN — CLOPIDOGREL BISULFATE SCH MG: 75 TABLET, FILM COATED ORAL at 09:10

## 2017-09-27 RX ADMIN — STANDARDIZED SENNA CONCENTRATE AND DOCUSATE SODIUM SCH TAB: 8.6; 5 TABLET, FILM COATED ORAL at 09:00

## 2017-09-27 RX ADMIN — INSULIN ASPART SCH: 100 INJECTION, SOLUTION INTRAVENOUS; SUBCUTANEOUS at 17:00

## 2017-09-27 RX ADMIN — INSULIN ASPART SCH: 100 INJECTION, SOLUTION INTRAVENOUS; SUBCUTANEOUS at 08:00

## 2017-09-27 RX ADMIN — LACOSAMIDE SCH MG: 50 TABLET, FILM COATED ORAL at 09:11

## 2017-09-27 RX ADMIN — APIXABAN SCH MG: 5 TABLET, FILM COATED ORAL at 20:30

## 2017-09-27 RX ADMIN — ACETAMINOPHEN PRN MG: 325 TABLET ORAL at 09:10

## 2017-09-27 RX ADMIN — PHENYTOIN SODIUM SCH MLS/HR: 50 INJECTION INTRAMUSCULAR; INTRAVENOUS at 09:13

## 2017-09-27 RX ADMIN — ACETAMINOPHEN PRN MG: 325 TABLET ORAL at 01:24

## 2017-09-27 RX ADMIN — PRAVASTATIN SODIUM SCH MG: 40 TABLET ORAL at 20:30

## 2017-09-27 RX ADMIN — SERTRALINE HYDROCHLORIDE SCH MG: 50 TABLET, FILM COATED ORAL at 09:10

## 2017-09-27 RX ADMIN — PHENYTOIN SODIUM SCH MLS/HR: 50 INJECTION INTRAMUSCULAR; INTRAVENOUS at 01:02

## 2017-09-27 RX ADMIN — APIXABAN SCH MG: 5 TABLET, FILM COATED ORAL at 09:09

## 2017-09-27 NOTE — PD.ID.CON
History of Present Illness


Service


ID


Consult Requested By





Reason for Consult


Evaluation and Mment of Andrew alarcon staph bacteremia and ulcerations on right 

forearm and hand.


Primary Care Physician


Non-Staff


Diagnoses:  


History of Present Illness


The patient is a 77-year-old female with a past medical history of CVA and 

polio who is presenting to the hospital with altered mental status.  The 

patient currently lives with her sister, who happens to be her caretaker.  

Apparently the patient missed her morning medications yesterday and doubled up 

on her medications in the evening.  After that the patient was found to be 

lethargic and her sister tried to track her out of bed and the patient fell on 

top of her sister.  They both sustained some abrasions from the fall.  The 

patient's sister does believe that doubling up on the medications caused her 

sisters confusion.  The patient is complaining of urinating a lot but otherwise 

is unable to describe the circumstances leading to her hospitalization.  She 

does endorse some vague aches and pains.  She denies any diarrhea or fever.  

She did endorse some abdominal pain in her right lower abdomen.  She was unable 

to be more specific.  The patient's sister states that the patient Discussed 

with her sister over the phone, who helped with the history.





Hospital course: encephalopathy resolved. Wounds do not appear infected. 

Antibiotics deescalated. Blood cultures on admission positive ID consulted for 

evaluation and Mment.





Review of Systems


ROS Limitations:  Poor Historian





Past Family Social History


Allergies:  


Coded Allergies:  


     Influenza Virus Vaccines (Unverified  Allergy, Intermediate, 8/15/17)


 "ALLERGIC TO EGGS SO I CAN NOT HAVE THE FLU SHOT"


     egg (Unverified  Allergy, Intermediate, 8/15/17)


     codeine (Unverified  Allergy, Mild, 8/15/17)


     meperidine (Unverified  Allergy, Mild, 8/15/17)


     morphine (Unverified  Allergy, Mild, 8/15/17)


     acetaminophen (Unverified  Adverse Reaction, Mild, HEADACHE, 8/15/17)


     hydrocodone (Unverified  Adverse Reaction, Mild, HEADACHE, 8/15/17)


Past Medical History


Anemia 


OA


Anxiety/depression


Cervical cancer


DM


Hyperlipidemia


CVA


Diabetes mellitus


Esophageal stricture


GERD


Incontinence


Seizures


Chronic bronchitis


Hx of polio


Past Surgical History


Appendectomy


Cholecystectomy


Hysterectomy with cervical cancer.


C4-C6 plate placed


Laparoscopic Nissen procedure


Oral surgery


R knee replacement


Reported Medications


Reported Meds & Active Scripts


Active


Eliquis (Apixaban) 5 Mg Tab 10 Mg PO BID


     Please take Eliquis 10mg (2 tablets) twice a day for the first 6 days


     upon discharge. Then continue Eliquis with 5mg (1 tablet) twice a day


     for up to 3 months.


Eliquis (Apixaban) 5 Mg Tab 5 Mg PO BID


     Please take Eliquis 10mg (2 tablets) twice a day for the first 6 days


     upon discharge. Then continue Eliquis with 5mg (1 tablet) twice a day


     for up to 3 months.


Reported


Pravastatin 40 Mg Tab 40 Mg PO DAILY


Dexilant (Dexlansoprazole) 60 Mg Cap.bp 1 Cap PO DAILY


Baclofen 10 Mg Tab 10 Mg PO TID


Vimpat (Lacosamide) 100 Mg Tab 100 Mg PO BID


Proair Hfa 8.5 GM Inh (Albuterol Sulfate) 90 Mcg/Act Aer 2 Puff INH Q6H PRN


     108 mcg/actuation


Metformin ER (Metformin HCl) 1,000 Mg Jass 1,000 Mg PO BID


     With evening meal


Valium (Diazepam) 10 Mg Tab 10 Mg PO TID PRN


Plavix (Clopidogrel Bisulfate) 75 Mg Tab 75 Mg PO DAILY


Toprol XL (Metoprolol Succinate) 50 Mg Tab 50 Mg PO BID


Pantoprazole (Pantoprazole Sodium) 40 Mg Tab 40 Mg PO DAILY


Pravachol (Pravastatin) 40 Mg Tab 40 Mg PO HS


Sertraline (Sertraline HCl) 25 Mg Tab 50 Mg PO DAILY


Active Ordered Medications





Current Medications








 Medications


  (Trade)  Dose


 Ordered  Sig/Aaron


 Route  Start Time


 Stop Time Status Last Admin


 


  (NS Flush)  2 ml  UNSCH  PRN


 IV FLUSH  9/25/17 09:15


     


 


 


  (NS Flush)  2 ml  BID


 IV FLUSH  9/25/17 21:00


    9/27/17 20:30


 


 


  (Tylenol)  650 mg  Q4H  PRN


 PO  9/25/17 09:15


    9/27/17 09:10


 


 


  (Tylenol)  650 mg  Q6H  PRN


 PO  9/25/17 09:15


    9/27/17 20:30


 


 


  (Narcan Inj)  0.4 mg  UNSCH  PRN


 IV PUSH  9/25/17 09:15


     


 


 


  (Ruby-Colace)  1 tab  BID


 PO  9/25/17 21:00


    9/26/17 09:28


 


 


  (Eliquis)  5 mg  BID


 PO  9/25/17 10:00


    9/27/17 20:30


 


 


  (Plavix)  75 mg  DAILY


 PO  9/25/17 10:00


    9/27/17 09:10


 


 


  (Toprol Xl)  50 mg  BID


 PO  9/25/17 10:00


    9/27/17 20:30


 


 


  (Protonix)  40 mg  DAILY


 PO  9/26/17 09:00


    9/27/17 09:10


 


 


  (Pravachol)  40 mg  HS


 PO  9/25/17 21:00


    9/27/17 20:30


 


 


  (Zoloft)  50 mg  DAILY


 PO  9/26/17 09:00


    9/27/17 09:10


 


 


  (Duoneb Neb)  1 ampule  Q2HR NEB  PRN


 NEB  9/25/17 09:15


     


 


 


  (Ativan)  0.5 mg  Q6H  PRN


 PO  9/25/17 09:15


     


 


 


  (Vimpat)  50 mg  DAILY


 PO  9/26/17 09:00


    9/27/17 09:11


 


 


  (NovoLOG


 SUPPLEMENTAL


 SCALE)  1  ACHS SLIDING  SCALE


 SQ  9/25/17 17:00


     


 


 


  (D50w (Syr) Inj)  50 ml  UNSCH  PRN


 IV PUSH  9/25/17 13:45


     


 


 


  (Glucagon Inj)  1 mg  UNSCH  PRN


 OTHER  9/25/17 13:45


     


 


 


 Pharmacy Profile


 Note  0 ml @ 0


 mls/hr  UNSCH


 OTHER  9/26/17 10:15


     


 


 


 Vancomycin HCl


 1250 mg/Sodium


 Chloride  262.5 ml @ 


 250 mls/hr  Q24H


 IV  9/26/17 12:00


    9/27/17 13:16


 


 


 Miscellaneous


 Information  SPECIFIC LAB TO BE


 DRAWN:VANCOMYCIN


 TROUGH DATE TO...  ONCE  ONCE


 .XX  9/29/17 11:45


 9/29/17 11:46   


 


 


  (Norvasc)  5 mg  DAILY


 PO  9/27/17 13:30


    9/27/17 14:17


 


 


  (Vasotec Inj)  1.25 mg  Q6H  PRN


 IV PUSH  9/27/17 22:00


     


 


 


  (Lactinex)  1 tab  TID


 PO  9/28/17 09:00


     


 








Family History


The pt was adopted





Social History


The patient does not smoke or drink





Physical Exam


Vital Signs





Vital Signs








  Date Time  Temp Pulse Resp B/P (MAP) Pulse Ox O2 Delivery O2 Flow Rate FiO2


 


9/27/17 11:18 98.3 78 16 165/73 (103) 100   


 


9/27/17 07:30     97   21


 


9/27/17 07:29 98.4 71 20 157/74 (101) 97   


 


9/27/17 03:30 98.1 83 18 171/70 (103) 98   


 


9/27/17 03:00   18     


 


9/27/17 00:01 98.1 83 18 153/67 (95) 99   


 


9/26/17 20:12 98.0 93 18 154/67 (96) 100   


 


9/26/17 20:00     94   


 


9/26/17 15:50 98.1 75 18 162/72 (102) 99   








Physical Exam


GENERAL: This is a well-nourished, well-developed patient, in no apparent 

distress.


SKIN: Bilateral UE abrasions with no e.o infection


HEAD: Atraumatic. Normocephalic. No temporal or scalp tenderness.


EYES: Pupils equal round and reactive. Extraocular motions intact. No scleral 

icterus. No injection or drainage. 


ENT: Nose without bleeding, purulent drainage or septal hematoma. Throat 

without erythema, tonsillar hypertrophy or exudate. Uvula midline. Airway 

patent.


NECK: Trachea midline. Supple, nontender, no meningeal signs.


CARDIOVASCULAR: Regular rate and rhythm without murmurs, gallops, or rubs. 


RESPIRATORY: Clear to auscultation. Breath sounds equal bilaterally. No wheezes

, rales, or rhonchi.  


GASTROINTESTINAL: Abdomen soft, non-tender, nondistended. Prior PEG tube scar 

with no e/o infection


MUSCULOSKELETAL: Extremities without clubbing, cyanosis, or edema. 


NEUROLOGICAL: Awake and alert. Grossly non focal


Psych cooperative


IV line sites with no e.o infection.


Laboratory





Laboratory Tests








Test


  9/27/17


07:03


 


White Blood Count 7.3 


 


Red Blood Count 2.92 


 


Hemoglobin 8.8 


 


Hematocrit 26.4 


 


Mean Corpuscular Volume 90.4 


 


Mean Corpuscular Hemoglobin 30.2 


 


Mean Corpuscular Hemoglobin


Concent 33.4 


 


 


Red Cell Distribution Width 13.5 


 


Platelet Count 302 


 


Mean Platelet Volume 7.7 


 


Blood Urea Nitrogen 11 


 


Creatinine 1.09 


 


Random Glucose 117 


 


Calcium Level 8.6 


 


Magnesium Level 2.2 


 


Sodium Level 142 


 


Potassium Level 3.9 


 


Chloride Level 114 


 


Carbon Dioxide Level 19.2 


 


Anion Gap 9 


 


Estimat Glomerular Filtration


Rate 49 


 














 Date/Time


Source Procedure


Growth Status


 


 


 9/26/17 12:48


Blood Peripheral Aerobic Blood Culture - Preliminary


NO GROWTH IN 1 DAY Resulted


 


 9/26/17 12:48


Blood Peripheral Anaerobic Blood Culture - Preliminary


NO GROWTH IN 1 DAY Resulted


 


 9/25/17 05:29


Urine Catheterized Urine Urine Culture - Final


<10,000 CFU/ML GRAM POSITIVE SAVANNAH Complete








Result Diagram:  


9/27/17 0703                                                                   

             9/27/17 0703





Imaging





Last Impressions








Shoulder X-Ray 9/26/17 0000 Signed





Impressions: 





 Service Date/Time:  Tuesday, September 26, 2017 14:45 - CONCLUSION: No acute 





 disease.       Alejandro Salinas MD 


 


Head CT 9/25/17 0526 Signed





Impressions: 





 Service Date/Time:  Monday, September 25, 2017 05:41 - CONCLUSION:  Normal 





 examination.       Serjio Lutz MD 


 


Chest X-Ray 9/25/17 0526 Signed





Impressions: 





 Service Date/Time:  Monday, September 25, 2017 05:46 - CONCLUSION:  1. No 

active 





 disease.     Serjio Lutz MD 


 


Elbow X-Ray 9/25/17 0000 Signed





Impressions: 





 Service Date/Time:  Monday, September 25, 2017 16:56 - CONCLUSION: No acute 





 disease.        Alejandro Salinas MD 


 


Abdomen/Pelvis CT 9/25/17 0000 Signed





Impressions: 





 Service Date/Time:  Monday, September 25, 2017 17:16 - CONCLUSION:  1. Small 





 pericardial effusion. 2. Atherosclerosis. 3. No inflammatory changes are 





 identified.     Alejandro Salinas MD 











Assessment and Plan


Assessment and Plan


Coag neg staph bacteremia ? contamination.


RUE abrasions with no e.o infection


Acute encephalopathy on admission: resolved ? medication induced as improved 

without infection being treated. Not infection related.





Recs


DC Vanco IV


Observe off antibiotics.


Follow repeat blood cultures


If repeat blood cultures negative at 72 hours ok to discharge home from ID 

standpoint.


Recommend repeat blood cultures in 1 week after discharge to ensure clearance 

of bacteremia. Can be done by PCP please communicate importance to PCP as 

patient may have orthopedic surgery and important to document no persistent 

bacteremia prior to surgery.


Will sign off if repeat blood cultures are positive please call me back.


d/w Dr.Sayess sevilla RN and pt.











Minna Anderson MD Sep 27, 2017 13:32

## 2017-09-27 NOTE — HHI.PR
Subjective


Remarks


The pt said she was feeling crummy. She said she had pain in her right foot, 

which was to be operated on soon. She was seen alongside with infectious 

disease.  She did complain of some abdominal pain.  She mentioned food would 

make the pain worse.





Objective


Vitals





Vital Signs








  Date Time  Temp Pulse Resp B/P (MAP) Pulse Ox O2 Delivery O2 Flow Rate FiO2


 


9/27/17 11:18 98.3 78 16 165/73 (103) 100   


 


9/27/17 07:30     97   21


 


9/27/17 07:29 98.4 71 20 157/74 (101) 97   


 


9/27/17 03:30 98.1 83 18 171/70 (103) 98   


 


9/27/17 03:00   18     


 


9/27/17 00:01 98.1 83 18 153/67 (95) 99   


 


9/26/17 20:12 98.0 93 18 154/67 (96) 100   


 


9/26/17 20:00     94   


 


9/26/17 15:50 98.1 75 18 162/72 (102) 99   














I/O      


 


 9/26/17 9/26/17 9/26/17 9/27/17 9/27/17 9/27/17





 07:00 15:00 23:00 07:00 15:00 23:00


 


Intake Total 240 ml 1350 ml   100 ml 


 


Balance 240 ml 1350 ml   100 ml 


 


      


 


Intake Oral 240 ml     


 


IV Total  1350 ml   100 ml 


 


# Voids 5 4 1   


 


# Bowel Movements  1 1   








Result Diagram:  


9/27/17 0703                                                                   

             9/27/17 0703





Imaging





Last Impressions








Shoulder X-Ray 9/26/17 0000 Signed





Impressions: 





 Service Date/Time:  Tuesday, September 26, 2017 14:45 - CONCLUSION: No acute 





 disease.       Alejandro Salinas MD 


 


Head CT 9/25/17 0526 Signed





Impressions: 





 Service Date/Time:  Monday, September 25, 2017 05:41 - CONCLUSION:  Normal 





 examination.       Serjio Lutz MD 


 


Chest X-Ray 9/25/17 0526 Signed





Impressions: 





 Service Date/Time:  Monday, September 25, 2017 05:46 - CONCLUSION:  1. No 

active 





 disease.     Serjio Lutz MD 


 


Elbow X-Ray 9/25/17 0000 Signed





Impressions: 





 Service Date/Time:  Monday, September 25, 2017 16:56 - CONCLUSION: No acute 





 disease.        Alejandro Salinas MD 


 


Abdomen/Pelvis CT 9/25/17 0000 Signed





Impressions: 





 Service Date/Time:  Monday, September 25, 2017 17:16 - CONCLUSION:  1. Small 





 pericardial effusion. 2. Atherosclerosis. 3. No inflammatory changes are 





 identified.     Alejandro Salinas MD 








Objective Remarks


GENERAL: Well-nourished, well-developed patient.


SKIN: Focused skin assessment warm/dry.


HEAD: Normocephalic.


EYES: No scleral icterus. No injection or drainage. 


NECK: Supple, trachea midline. No JVD or lymphadenopathy.


CARDIOVASCULAR: Regular rate and rhythm without murmurs, gallops, or rubs. 


RESPIRATORY: Breath sounds equal bilaterally. No accessory muscle use.


GASTROINTESTINAL: Abdomen soft, slightly tender in the epigastric area. 


MUSCULOSKELETAL: No cyanosis, or edema.  Right  to palpation.


BACK: Nontender without obvious deformity. No CVA tenderness. 


NEURO: Patient follows commands. Awake and alert.  Confusion has improved.  


PSYCH: Mood and affect appropriate.


Procedures


None


Medications and IVs





Current Medications








 Medications


  (Trade)  Dose


 Ordered  Sig/Aaron


 Route  Start Time


 Stop Time Status Last Admin


 


 Sodium Chloride  1,000 ml @ 


 100 mls/hr  Q10H


 IV  9/25/17 09:02


    9/27/17 09:13


 


 


  (NS Flush)  2 ml  UNSCH  PRN


 IV FLUSH  9/25/17 09:15


     


 


 


  (NS Flush)  2 ml  BID


 IV FLUSH  9/25/17 21:00


    9/27/17 09:12


 


 


  (Tylenol)  650 mg  Q4H  PRN


 PO  9/25/17 09:15


    9/27/17 09:10


 


 


  (Tylenol)  650 mg  Q6H  PRN


 PO  9/25/17 09:15


    9/27/17 01:24


 


 


  (Narcan Inj)  0.4 mg  UNSCH  PRN


 IV PUSH  9/25/17 09:15


     


 


 


  (Ruby-Colace)  1 tab  BID


 PO  9/25/17 21:00


    9/26/17 09:28


 


 


  (Eliquis)  5 mg  BID


 PO  9/25/17 10:00


    9/27/17 09:09


 


 


  (Plavix)  75 mg  DAILY


 PO  9/25/17 10:00


    9/27/17 09:10


 


 


  (Toprol Xl)  50 mg  BID


 PO  9/25/17 10:00


    9/27/17 09:11


 


 


  (Protonix)  40 mg  DAILY


 PO  9/26/17 09:00


    9/27/17 09:10


 


 


  (Pravachol)  40 mg  HS


 PO  9/25/17 21:00


    9/26/17 21:00


 


 


  (Zoloft)  50 mg  DAILY


 PO  9/26/17 09:00


    9/27/17 09:10


 


 


  (Duoneb Neb)  1 ampule  Q2HR NEB  PRN


 NEB  9/25/17 09:15


     


 


 


  (Ativan)  0.5 mg  Q6H  PRN


 PO  9/25/17 09:15


     


 


 


  (Vimpat)  50 mg  DAILY


 PO  9/26/17 09:00


    9/27/17 09:11


 


 


  (NovoLOG


 SUPPLEMENTAL


 SCALE)  1  ACHS SLIDING  SCALE


 SQ  9/25/17 17:00


     


 


 


  (D50w (Syr) Inj)  50 ml  UNSCH  PRN


 IV PUSH  9/25/17 13:45


     


 


 


  (Glucagon Inj)  1 mg  UNSCH  PRN


 OTHER  9/25/17 13:45


     


 


 


 Pharmacy Profile


 Note  0 ml @ 0


 mls/hr  UNSCH


 OTHER  9/26/17 10:15


     


 


 


 Vancomycin HCl


 1250 mg/Sodium


 Chloride  262.5 ml @ 


 250 mls/hr  Q24H


 IV  9/26/17 12:00


    9/27/17 13:16


 


 


 Miscellaneous


 Information  SPECIFIC LAB TO BE


 DRAWN:VANCOMYCIN


 TROUGH DATE TO...  ONCE  ONCE


 .XX  9/29/17 11:45


 9/29/17 11:46   


 


 


  (Norvasc)  5 mg  DAILY


 PO  9/27/17 13:30


   UNV  


 











A/P


Assessment and Plan


Acute metabolic encephalopathy


Seems to be secondary to doubling up on the patient's medications.  She is on 

high doses of Valium, which her sister states she takes once every few days. 

Ammonia level was 15.  She did have an elevated lactic acid level.  No source 

of infection was identified.  ABG noted.  TSH slightly elevated.  UA indicative 

of possible infection.  Status post vancomycin and Zosyn in the emergency 

department. Mental status improving. 2 sets of blood cultures positive for GPC. 

Mental status seems to have returned to baseline.


- S/p IV fluids. 


- Neuro checks.


- Avoid sedating medications.


- PT/ OT.


- Case management consult requested.


- Continue vancomycin. 





Bacteremia/ UTI


Unsure of significance. Urine culture negative. Ceftriaxone was discontinued.


- repeat blood cultures. NGTD.


- continue IV vancomycin.


- ID consult requested.





Abdominal pain


Right lower quadrant pain elicited on examination. CT abdomen unremarkable. Now 

with epigastric tenderness.


- check lipase.


- continue PPI.





Renal insufficiency


Acute on chronic.


- Continue with IV fluids. Improving.


- Avoid nephrotoxic agents.





GERD


The patient has a long history of GERD and once required a feeding tube.


- Continue PPI.





Diabetes


On metformin as an outpatient. Glucose well controlled.


- Hold metformin.


- Insulin sliding scale and diabetic diet.





Anemia


The pt appears to be anemic at baseline.


- follow CBC as needed.





PPx: Heparin


Discharge Planning


Awaiting ID eval. D/c to SNF vs. home with HHC in 1-2 days.











Teo Mcdermott DO Sep 27, 2017 13:30

## 2017-09-28 VITALS
RESPIRATION RATE: 18 BRPM | SYSTOLIC BLOOD PRESSURE: 144 MMHG | TEMPERATURE: 98.3 F | DIASTOLIC BLOOD PRESSURE: 68 MMHG | HEART RATE: 85 BPM | OXYGEN SATURATION: 99 %

## 2017-09-28 VITALS
DIASTOLIC BLOOD PRESSURE: 75 MMHG | HEART RATE: 78 BPM | SYSTOLIC BLOOD PRESSURE: 164 MMHG | RESPIRATION RATE: 17 BRPM | TEMPERATURE: 98.2 F | OXYGEN SATURATION: 100 %

## 2017-09-28 VITALS
RESPIRATION RATE: 18 BRPM | DIASTOLIC BLOOD PRESSURE: 74 MMHG | TEMPERATURE: 98.1 F | SYSTOLIC BLOOD PRESSURE: 170 MMHG | OXYGEN SATURATION: 98 % | HEART RATE: 88 BPM

## 2017-09-28 VITALS
RESPIRATION RATE: 16 BRPM | HEART RATE: 77 BPM | TEMPERATURE: 98.1 F | SYSTOLIC BLOOD PRESSURE: 164 MMHG | OXYGEN SATURATION: 100 % | DIASTOLIC BLOOD PRESSURE: 77 MMHG

## 2017-09-28 VITALS
OXYGEN SATURATION: 99 % | HEART RATE: 79 BPM | SYSTOLIC BLOOD PRESSURE: 156 MMHG | RESPIRATION RATE: 16 BRPM | TEMPERATURE: 98 F | DIASTOLIC BLOOD PRESSURE: 74 MMHG

## 2017-09-28 VITALS
RESPIRATION RATE: 18 BRPM | HEART RATE: 95 BPM | DIASTOLIC BLOOD PRESSURE: 76 MMHG | OXYGEN SATURATION: 100 % | SYSTOLIC BLOOD PRESSURE: 137 MMHG | TEMPERATURE: 98.2 F

## 2017-09-28 LAB
C. DIFF EPI 027: (no result)
COLOR UR: COLORLESS
COMMENT (UR): (no result)
CULTURE IF INDICATED: (no result)
GLUCOSE UR STRIP-MCNC: (no result) MG/DL
HGB UR QL STRIP: (no result)
KETONES UR STRIP-MCNC: (no result) MG/DL
NITRITE UR QL STRIP: (no result)
SP GR UR STRIP: 1 (ref 1–1.03)

## 2017-09-28 RX ADMIN — Medication SCH ML: at 08:45

## 2017-09-28 RX ADMIN — STANDARDIZED SENNA CONCENTRATE AND DOCUSATE SODIUM SCH TAB: 8.6; 5 TABLET, FILM COATED ORAL at 08:43

## 2017-09-28 RX ADMIN — STANDARDIZED SENNA CONCENTRATE AND DOCUSATE SODIUM SCH TAB: 8.6; 5 TABLET, FILM COATED ORAL at 20:18

## 2017-09-28 RX ADMIN — SERTRALINE HYDROCHLORIDE SCH MG: 50 TABLET, FILM COATED ORAL at 08:45

## 2017-09-28 RX ADMIN — PRAVASTATIN SODIUM SCH MG: 40 TABLET ORAL at 20:20

## 2017-09-28 RX ADMIN — LACOSAMIDE SCH MG: 50 TABLET, FILM COATED ORAL at 08:44

## 2017-09-28 RX ADMIN — Medication SCH TAB: at 08:44

## 2017-09-28 RX ADMIN — APIXABAN SCH MG: 5 TABLET, FILM COATED ORAL at 08:44

## 2017-09-28 RX ADMIN — ACETAMINOPHEN PRN MG: 325 TABLET ORAL at 02:24

## 2017-09-28 RX ADMIN — METOPROLOL SUCCINATE SCH MG: 50 TABLET, FILM COATED, EXTENDED RELEASE ORAL at 20:20

## 2017-09-28 RX ADMIN — CLOPIDOGREL BISULFATE SCH MG: 75 TABLET, FILM COATED ORAL at 08:44

## 2017-09-28 RX ADMIN — INSULIN ASPART SCH: 100 INJECTION, SOLUTION INTRAVENOUS; SUBCUTANEOUS at 08:00

## 2017-09-28 RX ADMIN — INSULIN ASPART SCH: 100 INJECTION, SOLUTION INTRAVENOUS; SUBCUTANEOUS at 17:00

## 2017-09-28 RX ADMIN — Medication SCH TAB: at 18:00

## 2017-09-28 RX ADMIN — APIXABAN SCH MG: 5 TABLET, FILM COATED ORAL at 20:20

## 2017-09-28 RX ADMIN — Medication SCH ML: at 20:19

## 2017-09-28 RX ADMIN — ACETAMINOPHEN PRN MG: 325 TABLET ORAL at 22:39

## 2017-09-28 RX ADMIN — Medication SCH TAB: at 13:22

## 2017-09-28 RX ADMIN — INSULIN ASPART SCH: 100 INJECTION, SOLUTION INTRAVENOUS; SUBCUTANEOUS at 21:00

## 2017-09-28 RX ADMIN — PANTOPRAZOLE SCH MG: 40 TABLET, DELAYED RELEASE ORAL at 08:44

## 2017-09-28 RX ADMIN — INSULIN ASPART SCH: 100 INJECTION, SOLUTION INTRAVENOUS; SUBCUTANEOUS at 12:00

## 2017-09-28 RX ADMIN — METOPROLOL SUCCINATE SCH MG: 50 TABLET, FILM COATED, EXTENDED RELEASE ORAL at 08:44

## 2017-09-28 NOTE — PD.CONS
HPI


History of Present Illness


This is a 77 year old female with hx CVA, polio, dementia who per EMR presented 

with abd pain.  She missed her medications and subsquently doubled up.  She is c

/o of abd pain in the epigastric area. She says she has had the pain for years.

  She says she has acid reflux.  She admits nausea and vomiting today.  No 

blood in emesis.  ADmits diarrhea yesterday but not today.  She says she never 

had colonoscopy but had upper endoscopy for epigastric pain and says dilatation 

was done, not sure when this was done.  SHe admits intermittent dysphagia but 

is having no difficulties at this time.  No blood in stool, black tarry stool. 

Just had BM, formed. Pt is poor historian. Currently on plavix and eliquis.


 (Aye Gonzalez)





PFSH


Past Medical History


Anemia 


OA


Anxiety/depression


Cervical cancer


DM


Hyperlipidemia


CVA


Diabetes mellitus


Esophageal stricture


GERD


Incontinence


Seizures


Chronic bronchitis


Hx of polio


Past Surgical History


Appendectomy


Cholecystectomy


Hysterectomy with cervical cancer.


C4-C6 plate placed


Laparoscopic Nissen procedure


Oral surgery


R knee replacement


 (Aye Gonzalez)


Coded Allergies:  


     Influenza Virus Vaccines (Unverified  Allergy, Intermediate, 8/15/17)


 "ALLERGIC TO EGGS SO I CAN NOT HAVE THE FLU SHOT"


     egg (Unverified  Allergy, Intermediate, 8/15/17)


     codeine (Unverified  Allergy, Mild, 8/15/17)


     meperidine (Unverified  Allergy, Mild, 8/15/17)


     morphine (Unverified  Allergy, Mild, 8/15/17)


     acetaminophen (Unverified  Adverse Reaction, Mild, HEADACHE, 8/15/17)


     hydrocodone (Unverified  Adverse Reaction, Mild, HEADACHE, 8/15/17)


Family History


The pt was adopted


Social History


The patient does not smoke or drink


 (Aye Gonzalez)





Review of Systems


Constitutional:  COMPLAINS OF: Fever


Eyes:  DENIES: Blurred vision


Ears, nose, mouth, throat:  DENIES: Hearing loss


Respiratory:  DENIES: Hemoptysis


Cardiovascular:  DENIES: Chest pain


Gastrointestinal:  COMPLAINS OF: Abdominal pain, Nausea, Vomiting, DENIES: 

Black stools, Bloody stools, Constipation, Diarrhea, Hematemesis


Genitourinary:  DENIES: Hematuria


Musculoskeletal:  DENIES: Joint Swelling


Integumentary:  DENIES: Jaundice


Neurologic:  DENIES: Headache


Psychiatric:  DENIES: Anxiety (Aye Gonzalez)





GI Exam


Vitals I&O





Vital Signs








  Date Time  Temp Pulse Resp B/P (MAP) Pulse Ox O2 Delivery O2 Flow Rate FiO2


 


9/28/17 11:33 98.0 79 16 156/74 (101) 99   


 


9/28/17 08:09 98.1 77 16 164/77 (106) 100   


 


9/28/17 03:55   18     


 


9/28/17 03:30 98.3 85 18 144/68 (93) 99   


 


9/28/17 00:12 98.2 78 17 164/75 (104) 100   


 


9/27/17 20:02     99   21


 


9/27/17 19:52 98.2 97 18 169/80 (109) 98   


 


9/27/17 15:12 98.4 92 18 169/97 (121) 100   














I/O      


 


 9/27/17 9/27/17 9/27/17 9/28/17 9/28/17 9/28/17





 07:00 15:00 23:00 07:00 15:00 23:00


 


Intake Total  1150 ml    


 


Balance  1150 ml    


 


      


 


IV Total  1150 ml    


 


# Bowel Movements  4    








Imaging





Last Impressions








Shoulder X-Ray 9/26/17 0000 Signed





Impressions: 





 Service Date/Time:  Tuesday, September 26, 2017 14:45 - CONCLUSION: No acute 





 disease.       Alejandro Salinas MD 


 


Head CT 9/25/17 0526 Signed





Impressions: 





 Service Date/Time:  Monday, September 25, 2017 05:41 - CONCLUSION:  Normal 





 examination.       Serjio Lutz MD 


 


Chest X-Ray 9/25/17 0526 Signed





Impressions: 





 Service Date/Time:  Monday, September 25, 2017 05:46 - CONCLUSION:  1. No 

active 





 disease.     Serjio Lutz MD 


 


Elbow X-Ray 9/25/17 0000 Signed





Impressions: 





 Service Date/Time:  Monday, September 25, 2017 16:56 - CONCLUSION: No acute 





 disease.        Alejandro Salinas MD 


 


Abdomen/Pelvis CT 9/25/17 0000 Signed





Impressions: 





 Service Date/Time:  Monday, September 25, 2017 17:16 - CONCLUSION:  1. Small 





 pericardial effusion. 2. Atherosclerosis. 3. No inflammatory changes are 





 identified.     Alejandro Salinas MD 








Laboratory











 Date/Time


Source Procedure


Growth Status


 


 


 9/26/17 12:48


Blood Peripheral Aerobic Blood Culture - Preliminary


NO GROWTH IN 2 DAYS Resulted


 


 9/26/17 12:48


Blood Peripheral Anaerobic Blood Culture - Preliminary


NO GROWTH IN 2 DAYS Resulted


 


 9/28/17 13:23


Stool Stool 


Pending Received


 


 9/25/17 05:29


Urine Catheterized Urine Urine Culture - Final


<10,000 CFU/ML GRAM POSITIVE SAVANNAH Complete








Physical Examination


HEENT: PERRL; normocephalic; atraumatic; no jaundice.   


CHEST:  CTA


CARDIAC:  RRR


ABDOMEN:  Soft, nondistended, epigastric TTP; no hepatosplenomegaly; bowel 

sounds are present in all four quadrants.


EXTREMITIES: No clubbing, cyanosis, or edema.


SKIN:  Normal; no rash; no jaundice.


CNS:  alert, oriented to self and place, thinks its 1917.


 (Aye Gonzalez)





Assessment and Plan


Plan


ASSESSMENT


- n/v, epigastric pain - unclear etiology, pt cites chronic epigastric pain, 

GERD, need for EGD with dilatation in past.  


   CT no acute GI abnormalities.  At this time not clear who gives consent for 

this pt, she thinks it's 1918.


   on plavix and ASA


- diarrhea - improving.    pt had formed BM today.  stool cx pending.





PLAN


- recommend EGD and colonoscopy


- obtain consent


- continue protonix


- continue probiotics


- await stool cx


- supportive care


- GI will s/o, please reconsult when consent obtained


This pt seen by myself and Dr Carrera and this note is written on his behalf 


 (Aye Gonzalez)


Physician Comments


Patient was seen and examined


Agree with above


Monitor labs


We do recommend an EGD and a colonoscopy that apparently there is a problem 

with obtaining consent the power of  at this point seems to want more 

Dr. lepe we did attempt to call and that was the response we obtained


Not much to add otherwise he's continue with current supportive management and 

reconsult once consent has been obtained


 (Jesu Carrera MD)











Aye Gonzalez Sep 28, 2017 13:59


Jesu Carrera MD Sep 28, 2017 17:31

## 2017-09-28 NOTE — HHI.PR
Subjective


Remarks


Follow-up for altered mental status, abdominal pain.  The patient states she 

was up all night urinating because of all the IV fluids.  She is eating full 

breakfast currently and has been tolerating oral intake.  The patient states 

that yesterday on day she had diarrhea, no bowel movements yet today.  She's 

been having upper abdominal pain for months.  She previously had a feeding 

tube.  Her gastroenterologist is Dr. Howard.  She still needing assistance use 

the bedside commode because she feels shaky on her feet.  She lives at home 

with her sister normally.





Objective


Vitals





Vital Signs








  Date Time  Temp Pulse Resp B/P (MAP) Pulse Ox O2 Delivery O2 Flow Rate FiO2


 


9/28/17 08:09 98.1 77 16 164/77 (106) 100   


 


9/28/17 03:55   18     


 


9/28/17 03:30 98.3 85 18 144/68 (93) 99   


 


9/28/17 00:12 98.2 78 17 164/75 (104) 100   


 


9/27/17 20:02     99   21


 


9/27/17 19:52 98.2 97 18 169/80 (109) 98   


 


9/27/17 15:12 98.4 92 18 169/97 (121) 100   


 


9/27/17 11:18 98.3 78 16 165/73 (103) 100   














I/O      


 


 9/27/17 9/27/17 9/27/17 9/28/17 9/28/17 9/28/17





 07:00 15:00 23:00 07:00 15:00 23:00


 


Intake Total  1150 ml    


 


Balance  1150 ml    


 


      


 


IV Total  1150 ml    


 


# Bowel Movements  4    








Result Diagram:  


9/27/17 0703 9/27/17 0703





Imaging





Last Impressions








Shoulder X-Ray 9/26/17 0000 Signed





Impressions: 





 Service Date/Time:  Tuesday, September 26, 2017 14:45 - CONCLUSION: No acute 





 disease.       Alejandro Salinas MD 


 


Head CT 9/25/17 0526 Signed





Impressions: 





 Service Date/Time:  Monday, September 25, 2017 05:41 - CONCLUSION:  Normal 





 examination.       Serjio Lutz MD 


 


Chest X-Ray 9/25/17 0526 Signed





Impressions: 





 Service Date/Time:  Monday, September 25, 2017 05:46 - CONCLUSION:  1. No 

active 





 disease.     Serjio Lutz MD 


 


Elbow X-Ray 9/25/17 0000 Signed





Impressions: 





 Service Date/Time:  Monday, September 25, 2017 16:56 - CONCLUSION: No acute 





 disease.        Alejandro Salinas MD 


 


Abdomen/Pelvis CT 9/25/17 0000 Signed





Impressions: 





 Service Date/Time:  Monday, September 25, 2017 17:16 - CONCLUSION:  1. Small 





 pericardial effusion. 2. Atherosclerosis. 3. No inflammatory changes are 





 identified.     Alejandro Salinas MD 








Objective Remarks


GENERAL: Well-developed well-nourished.  In no acute distress.


SKIN: Warm and dry.  Right forearm abrasions with clean dressings.


HEENT: Normocephalic. Pupils equal and round. Mucous membranes pink and moist. 


CARDIOVASCULAR: Regular rate and rhythm.  No murmur appreciated.


RESPIRATORY: No accessory muscle use. Clear to auscultation. Breath sounds 

equal bilaterally. 


GASTROINTESTINAL: Abdomen soft, mild epigastric TTP, nondistended. Bowel sounds 

x4.


MUSCULOSKELETAL: No obvious deformities. No clubbing or cyanosis. No edema. 


NEUROLOGICAL: Awake and alert. No focal neurological deficits.  Moves upper and 

lower extremities spontaneously. Normal speech.


PSYCHIATRIC: Appropriate mood and affect; insight and judgment fair to normal.


Procedures


None





A/P


Assessment and Plan


77-year-old female with a past medical history of CVA and polio who presented 

with altered mental status





Acute metabolic/toxic encephalopathy


Thought to be secondary to the patient doubling up on her medications.  

Possible mild underlying dementia.  She is on high doses of Valium, which her 

sister states she takes once every few days. Ammonia level was 15.  She did 

have an elevated lactic acid level.  No source of infection was identified.  

ABG noted.  TSH slightly elevated. Status post vancomycin and Zosyn in the 

emergency department. Mental status improving. 2 sets of blood cultures 

positive for GPC. Mental status seems to have returned to baseline.


- Neuro checks.


- Avoid sedating medications.


- PT/ OT.


- Case management consult requested.





Bacteremia/ UTI


2/4 blood cultures positive for coagulase-negative staph.  Unsure of 

significance. Urine culture negative.


- repeat blood cultures pending. NGTD.


- ID consulted, appreciate input, recommended monitoring off antibiotics and 

monitoring repeat blood cultures 72 hours.





Abdominal pain: Epigastric tenderness to palpation.  CT abdomen was 

unremarkable.  Lipase within normal limits.  Patient also with diarrhea.


-Stool studies including C. difficile


-Continue PPI


-Consult patient's gastroenterologist





Acute injury on chronic kidney disease.


- Improved with IVF


- Avoid nephrotoxic agents.





Diabetes


On metformin as an outpatient. Glucose well controlled.


- Hold metformin.


- Insulin sliding scale and diabetic diet.





Anemia, normocytic


The pt appears to be anemic at baseline upon past lab review.


- Hemoglobin currently stable, follow CBC as needed.





PPx: Heparin


Discharge Planning


Continue PT while admitted.  Follow-up blood cultures.  Follow up GI 

recommendations.  HHC vs SNF at [place].











Jean-Claude Dominguez Sep 28, 2017 09:12

## 2017-09-29 VITALS
OXYGEN SATURATION: 98 % | DIASTOLIC BLOOD PRESSURE: 80 MMHG | TEMPERATURE: 97.6 F | RESPIRATION RATE: 16 BRPM | HEART RATE: 84 BPM | SYSTOLIC BLOOD PRESSURE: 177 MMHG

## 2017-09-29 VITALS
SYSTOLIC BLOOD PRESSURE: 173 MMHG | RESPIRATION RATE: 16 BRPM | HEART RATE: 87 BPM | DIASTOLIC BLOOD PRESSURE: 77 MMHG | OXYGEN SATURATION: 95 % | TEMPERATURE: 98.4 F

## 2017-09-29 VITALS — SYSTOLIC BLOOD PRESSURE: 155 MMHG | DIASTOLIC BLOOD PRESSURE: 74 MMHG

## 2017-09-29 VITALS
DIASTOLIC BLOOD PRESSURE: 77 MMHG | SYSTOLIC BLOOD PRESSURE: 168 MMHG | TEMPERATURE: 98 F | OXYGEN SATURATION: 97 % | RESPIRATION RATE: 18 BRPM | HEART RATE: 85 BPM

## 2017-09-29 VITALS
DIASTOLIC BLOOD PRESSURE: 78 MMHG | HEART RATE: 87 BPM | TEMPERATURE: 98.1 F | OXYGEN SATURATION: 100 % | RESPIRATION RATE: 20 BRPM | SYSTOLIC BLOOD PRESSURE: 173 MMHG

## 2017-09-29 VITALS — SYSTOLIC BLOOD PRESSURE: 149 MMHG | DIASTOLIC BLOOD PRESSURE: 59 MMHG | HEART RATE: 75 BPM

## 2017-09-29 VITALS
OXYGEN SATURATION: 100 % | DIASTOLIC BLOOD PRESSURE: 73 MMHG | HEART RATE: 82 BPM | TEMPERATURE: 97.7 F | RESPIRATION RATE: 16 BRPM | SYSTOLIC BLOOD PRESSURE: 148 MMHG

## 2017-09-29 VITALS
TEMPERATURE: 97.9 F | RESPIRATION RATE: 18 BRPM | DIASTOLIC BLOOD PRESSURE: 78 MMHG | OXYGEN SATURATION: 99 % | SYSTOLIC BLOOD PRESSURE: 167 MMHG | HEART RATE: 84 BPM

## 2017-09-29 VITALS
RESPIRATION RATE: 18 BRPM | DIASTOLIC BLOOD PRESSURE: 67 MMHG | HEART RATE: 79 BPM | OXYGEN SATURATION: 98 % | TEMPERATURE: 98.6 F | SYSTOLIC BLOOD PRESSURE: 156 MMHG

## 2017-09-29 VITALS — SYSTOLIC BLOOD PRESSURE: 166 MMHG | DIASTOLIC BLOOD PRESSURE: 76 MMHG | HEART RATE: 75 BPM

## 2017-09-29 LAB
ANION GAP SERPL CALC-SCNC: 9 MEQ/L (ref 5–15)
BASOPHILS # BLD AUTO: 0.1 TH/MM3 (ref 0–0.2)
BASOPHILS NFR BLD: 0.8 % (ref 0–2)
BUN SERPL-MCNC: 15 MG/DL (ref 7–18)
CHLORIDE SERPL-SCNC: 106 MEQ/L (ref 98–107)
EOSINOPHIL # BLD: 0 TH/MM3 (ref 0–0.4)
EOSINOPHIL NFR BLD: 0.6 % (ref 0–4)
ERYTHROCYTE [DISTWIDTH] IN BLOOD BY AUTOMATED COUNT: 13.9 % (ref 11.6–17.2)
GFR SERPLBLD BASED ON 1.73 SQ M-ARVRAT: 46 ML/MIN (ref 89–?)
HCO3 BLD-SCNC: 21.3 MEQ/L (ref 21–32)
HCT VFR BLD CALC: 29.7 % (ref 35–46)
HEMO FLAGS: (no result)
LYMPHOCYTES # BLD AUTO: 1.7 TH/MM3 (ref 1–4.8)
LYMPHOCYTES NFR BLD AUTO: 22.6 % (ref 9–44)
MCH RBC QN AUTO: 29.6 PG (ref 27–34)
MCHC RBC AUTO-ENTMCNC: 32.5 % (ref 32–36)
MCV RBC AUTO: 91.1 FL (ref 80–100)
MONOCYTES NFR BLD: 10.5 % (ref 0–8)
NEUTROPHILS # BLD AUTO: 4.9 TH/MM3 (ref 1.8–7.7)
NEUTROPHILS NFR BLD AUTO: 65.5 % (ref 16–70)
PLATELET # BLD: 338 TH/MM3 (ref 150–450)
POTASSIUM SERPL-SCNC: 3.9 MEQ/L (ref 3.5–5.1)
RBC # BLD AUTO: 3.26 MIL/MM3 (ref 4–5.3)
SODIUM SERPL-SCNC: 136 MEQ/L (ref 136–145)
WBC # BLD AUTO: 7.5 TH/MM3 (ref 4–11)

## 2017-09-29 RX ADMIN — Medication SCH TAB: at 17:45

## 2017-09-29 RX ADMIN — PHENYTOIN SODIUM SCH MLS/HR: 50 INJECTION INTRAMUSCULAR; INTRAVENOUS at 22:35

## 2017-09-29 RX ADMIN — METOPROLOL SUCCINATE SCH MG: 50 TABLET, FILM COATED, EXTENDED RELEASE ORAL at 20:22

## 2017-09-29 RX ADMIN — APIXABAN SCH MG: 5 TABLET, FILM COATED ORAL at 08:10

## 2017-09-29 RX ADMIN — ONDANSETRON PRN MG: 2 INJECTION, SOLUTION INTRAMUSCULAR; INTRAVENOUS at 08:24

## 2017-09-29 RX ADMIN — Medication SCH TAB: at 08:09

## 2017-09-29 RX ADMIN — Medication SCH ML: at 08:10

## 2017-09-29 RX ADMIN — INSULIN ASPART SCH: 100 INJECTION, SOLUTION INTRAVENOUS; SUBCUTANEOUS at 12:00

## 2017-09-29 RX ADMIN — LACOSAMIDE SCH MG: 50 TABLET, FILM COATED ORAL at 08:09

## 2017-09-29 RX ADMIN — PRAVASTATIN SODIUM SCH MG: 40 TABLET ORAL at 20:22

## 2017-09-29 RX ADMIN — INSULIN ASPART SCH: 100 INJECTION, SOLUTION INTRAVENOUS; SUBCUTANEOUS at 21:00

## 2017-09-29 RX ADMIN — APIXABAN SCH MG: 5 TABLET, FILM COATED ORAL at 20:22

## 2017-09-29 RX ADMIN — ACETAMINOPHEN PRN MG: 325 TABLET ORAL at 13:37

## 2017-09-29 RX ADMIN — Medication SCH ML: at 20:23

## 2017-09-29 RX ADMIN — ONDANSETRON PRN MG: 2 INJECTION, SOLUTION INTRAMUSCULAR; INTRAVENOUS at 15:01

## 2017-09-29 RX ADMIN — ACETAMINOPHEN PRN MG: 325 TABLET ORAL at 05:52

## 2017-09-29 RX ADMIN — Medication SCH TAB: at 13:05

## 2017-09-29 RX ADMIN — METOPROLOL SUCCINATE SCH MG: 50 TABLET, FILM COATED, EXTENDED RELEASE ORAL at 08:09

## 2017-09-29 RX ADMIN — CLOPIDOGREL BISULFATE SCH MG: 75 TABLET, FILM COATED ORAL at 08:09

## 2017-09-29 RX ADMIN — INSULIN ASPART SCH: 100 INJECTION, SOLUTION INTRAVENOUS; SUBCUTANEOUS at 17:00

## 2017-09-29 RX ADMIN — INSULIN ASPART SCH: 100 INJECTION, SOLUTION INTRAVENOUS; SUBCUTANEOUS at 08:00

## 2017-09-29 RX ADMIN — PHENYTOIN SODIUM SCH MLS/HR: 50 INJECTION INTRAMUSCULAR; INTRAVENOUS at 11:16

## 2017-09-29 RX ADMIN — SERTRALINE HYDROCHLORIDE SCH MG: 50 TABLET, FILM COATED ORAL at 08:09

## 2017-09-29 RX ADMIN — PANTOPRAZOLE SCH MG: 40 TABLET, DELAYED RELEASE ORAL at 08:09

## 2017-09-29 NOTE — HHI.PR
Subjective


Remarks


Follow-up for diarrhea and abdominal pain.  The patient continues to complain 

of multiple episodes of diarrhea overnight.  She has upper abdominal pain and 

lower abdominal cramping.  She reports nausea and has not had any appetite so 

she has not been eating.  She refused GI workup yesterday because she would 

rather have endoscopies done with her gastroenterologist, Dr. Howard.  She 

states the date is September 29 of 1917.  She knows she is in [place] and is 

oriented to self.  She lives with her sister who helps take care of her and 

does assist with medical decisions.  She reports subjective chills yesterday.  

She denies any chronic memory problems, states her main chronic problem is 

strokes/TIAs.





Objective


Vitals





Vital Signs








  Date Time  Temp Pulse Resp B/P (MAP) Pulse Ox O2 Delivery O2 Flow Rate FiO2


 


9/29/17 10:03    155/74 (101)    


 


9/29/17 09:00 97.6 84 16 177/80 (112) 98   


 


9/29/17 07:23   16     


 


9/29/17 04:15 98.0 85 18 168/77 (107) 97   


 


9/29/17 01:18 98.1 87 20 173/78 (109) 100   


 


9/28/17 19:36 98.1 88 18 170/74 (106) 98   


 


9/28/17 16:12 98.2 95 18 137/76 (96) 100   


 


9/28/17 11:33 98.0 79 16 156/74 (101) 99   














I/O      


 


 9/28/17 9/28/17 9/28/17 9/29/17 9/29/17 9/29/17





 07:00 15:00 23:00 07:00 15:00 23:00


 


Intake Total    1440 ml  


 


Balance    1440 ml  


 


      


 


Intake Oral    1440 ml  


 


# Voids    3  


 


# Bowel Movements    3  








Result Diagram:  


9/27/17 0703                                                                   

             9/27/17 0703





Imaging





Last Impressions








Shoulder X-Ray 9/26/17 0000 Signed





Impressions: 





 Service Date/Time:  Tuesday, September 26, 2017 14:45 - CONCLUSION: No acute 





 disease.       Alejandro Salinas MD 


 


Head CT 9/25/17 0526 Signed





Impressions: 





 Service Date/Time:  Monday, September 25, 2017 05:41 - CONCLUSION:  Normal 





 examination.       Serjio Ltuz MD 


 


Chest X-Ray 9/25/17 0526 Signed





Impressions: 





 Service Date/Time:  Monday, September 25, 2017 05:46 - CONCLUSION:  1. No 

active 





 disease.     Serjio Lutz MD 


 


Elbow X-Ray 9/25/17 0000 Signed





Impressions: 





 Service Date/Time:  Monday, September 25, 2017 16:56 - CONCLUSION: No acute 





 disease.        Alejandro Salinas MD 


 


Abdomen/Pelvis CT 9/25/17 0000 Signed





Impressions: 





 Service Date/Time:  Monday, September 25, 2017 17:16 - CONCLUSION:  1. Small 





 pericardial effusion. 2. Atherosclerosis. 3. No inflammatory changes are 





 identified.     Alejandro Salinas MD 








Objective Remarks


GENERAL: Well-developed well-nourished.  In no acute distress.  Oriented to 

self and place and mostly to time.


SKIN: Warm and dry.  Right forearm abrasions with clean dressings.


CARDIOVASCULAR: Regular rate and rhythm.  No murmur appreciated.


RESPIRATORY: No accessory muscle use. Clear to auscultation. Breath sounds 

equal bilaterally. 


GASTROINTESTINAL: Abdomen soft, mild epigastric TTP, nondistended. Bowel sounds 

x4.


MUSCULOSKELETAL: No obvious deformities. No clubbing or cyanosis. No edema. 


NEUROLOGICAL: Awake and alert. Moves upper and lower extremities spontaneously. 

Normal speech.


PSYCHIATRIC: Appropriate mood and affect; insight and judgment fair to normal.


Procedures


None





A/P


Assessment and Plan


77-year-old female with a past medical history of CVA and polio who presented 

with altered mental status





Acute metabolic/toxic encephalopathy


Thought to be exacerbated by the patient doubling up on her medications prior 

to admission.  Also suspect mild underlying dementia.  She is on high doses of 

Valium, which her sister states she takes once every few days. Ammonia level 

was 15.  She did have an elevated lactic acid level.  2 sets of blood cultures 

positive for GPC.  Urine culture with no growth.


- Neuro checks.


- Caution with sedating medications.


- PT/ OT.


- Case management consult requested.


- Patient did have an episode of confusion 9/28, possibly acutely worsened 

secondary to C. difficile infection


- Check repeat labs today


- Cognitive eval





Bacteremia


2/4 blood cultures positive for coagulase-negative staph.  Unsure of 

significance. Urine culture negative.


- repeat blood cultures pending. NGTD.


- ID consulted, appreciate input, recommended monitoring off antibiotics and 

monitoring repeat blood cultures 72 hours.





C. difficile infection: Patient complained of diarrhea and epigastric and lower 

abdominal discomfort.  CT abdomen was unremarkable.  Lipase within normal 

limits.  C diff assay positive.


-Stool studies pending


-Continue PPI


-Consulted gastroenterology, patient refuses workup at this time wants to 

follow up with her gastroenterologist as outpatient


-Resume IVF with poor oral intake


-Antiemetics as needed





Acute injury on chronic kidney disease.


- Improved with IVF


- Avoid nephrotoxic agents.





Diabetes


On metformin as an outpatient. Glucose well controlled.


- Hold metformin.


- Insulin sliding scale and diabetic diet.





Anemia, normocytic


The pt appears to be anemic at baseline upon past lab review.


- Hemoglobin currently stable, follow CBC as needed.





PPx: Heparin


Discharge Planning


Continue PT while admitted.  Follow-up blood cultures.  C vs SNF at [place].  

Monitor for clinical improvement with new C. difficile infection and associated 

symptoms.











Jean-Claude Dominguez Sep 29, 2017 10:40

## 2017-09-30 VITALS
RESPIRATION RATE: 18 BRPM | OXYGEN SATURATION: 100 % | TEMPERATURE: 98.1 F | HEART RATE: 83 BPM | SYSTOLIC BLOOD PRESSURE: 130 MMHG | DIASTOLIC BLOOD PRESSURE: 61 MMHG

## 2017-09-30 VITALS
DIASTOLIC BLOOD PRESSURE: 69 MMHG | RESPIRATION RATE: 18 BRPM | SYSTOLIC BLOOD PRESSURE: 150 MMHG | HEART RATE: 83 BPM | TEMPERATURE: 98.3 F | OXYGEN SATURATION: 96 %

## 2017-09-30 VITALS
OXYGEN SATURATION: 99 % | HEART RATE: 80 BPM | SYSTOLIC BLOOD PRESSURE: 145 MMHG | TEMPERATURE: 98.1 F | DIASTOLIC BLOOD PRESSURE: 71 MMHG | RESPIRATION RATE: 18 BRPM

## 2017-09-30 VITALS
SYSTOLIC BLOOD PRESSURE: 146 MMHG | RESPIRATION RATE: 18 BRPM | OXYGEN SATURATION: 98 % | HEART RATE: 82 BPM | TEMPERATURE: 97.8 F | DIASTOLIC BLOOD PRESSURE: 70 MMHG

## 2017-09-30 VITALS
SYSTOLIC BLOOD PRESSURE: 160 MMHG | RESPIRATION RATE: 20 BRPM | TEMPERATURE: 98.4 F | DIASTOLIC BLOOD PRESSURE: 76 MMHG | HEART RATE: 82 BPM | OXYGEN SATURATION: 100 %

## 2017-09-30 VITALS
TEMPERATURE: 98.1 F | DIASTOLIC BLOOD PRESSURE: 72 MMHG | RESPIRATION RATE: 20 BRPM | SYSTOLIC BLOOD PRESSURE: 152 MMHG | OXYGEN SATURATION: 99 % | HEART RATE: 91 BPM

## 2017-09-30 RX ADMIN — PHENYTOIN SODIUM SCH MLS/HR: 50 INJECTION INTRAMUSCULAR; INTRAVENOUS at 21:16

## 2017-09-30 RX ADMIN — PHENYTOIN SODIUM SCH MLS/HR: 50 INJECTION INTRAMUSCULAR; INTRAVENOUS at 09:41

## 2017-09-30 RX ADMIN — INSULIN ASPART SCH: 100 INJECTION, SOLUTION INTRAVENOUS; SUBCUTANEOUS at 12:00

## 2017-09-30 RX ADMIN — METOPROLOL SUCCINATE SCH MG: 50 TABLET, FILM COATED, EXTENDED RELEASE ORAL at 21:16

## 2017-09-30 RX ADMIN — INSULIN ASPART SCH: 100 INJECTION, SOLUTION INTRAVENOUS; SUBCUTANEOUS at 08:00

## 2017-09-30 RX ADMIN — METOPROLOL SUCCINATE SCH MG: 50 TABLET, FILM COATED, EXTENDED RELEASE ORAL at 08:21

## 2017-09-30 RX ADMIN — ONDANSETRON PRN MG: 2 INJECTION, SOLUTION INTRAMUSCULAR; INTRAVENOUS at 12:22

## 2017-09-30 RX ADMIN — Medication SCH ML: at 21:00

## 2017-09-30 RX ADMIN — APIXABAN SCH MG: 5 TABLET, FILM COATED ORAL at 08:21

## 2017-09-30 RX ADMIN — APIXABAN SCH MG: 5 TABLET, FILM COATED ORAL at 21:16

## 2017-09-30 RX ADMIN — ACETAMINOPHEN PRN MG: 325 TABLET ORAL at 00:23

## 2017-09-30 RX ADMIN — INSULIN ASPART SCH: 100 INJECTION, SOLUTION INTRAVENOUS; SUBCUTANEOUS at 21:00

## 2017-09-30 RX ADMIN — INSULIN ASPART SCH: 100 INJECTION, SOLUTION INTRAVENOUS; SUBCUTANEOUS at 17:00

## 2017-09-30 RX ADMIN — Medication SCH TAB: at 08:20

## 2017-09-30 RX ADMIN — PANTOPRAZOLE SCH MG: 40 TABLET, DELAYED RELEASE ORAL at 08:21

## 2017-09-30 RX ADMIN — SERTRALINE HYDROCHLORIDE SCH MG: 50 TABLET, FILM COATED ORAL at 08:21

## 2017-09-30 RX ADMIN — CLOPIDOGREL BISULFATE SCH MG: 75 TABLET, FILM COATED ORAL at 08:21

## 2017-09-30 RX ADMIN — Medication SCH TAB: at 12:21

## 2017-09-30 RX ADMIN — ACETAMINOPHEN PRN MG: 325 TABLET ORAL at 18:40

## 2017-09-30 RX ADMIN — Medication SCH TAB: at 18:00

## 2017-09-30 RX ADMIN — PRAVASTATIN SODIUM SCH MG: 40 TABLET ORAL at 21:16

## 2017-09-30 RX ADMIN — ACETAMINOPHEN PRN MG: 325 TABLET ORAL at 08:22

## 2017-09-30 RX ADMIN — Medication SCH ML: at 08:23

## 2017-09-30 RX ADMIN — LACOSAMIDE SCH MG: 50 TABLET, FILM COATED ORAL at 09:39

## 2017-09-30 NOTE — HHI.PR
Subjective


Remarks


Patient states abdominal pain has resolved


Denies diarrhea - states had a BM but stool was soft and more formed


deies fevers/chills


denies nausea or vomiting





Objective


Vitals





Vital Signs








  Date Time  Temp Pulse Resp B/P (MAP) Pulse Ox O2 Delivery O2 Flow Rate FiO2


 


9/30/17 16:00 98.1 91 20 152/72 (98) 99   


 


9/30/17 12:00 98.1 83 18 130/61 (84) 100   


 


9/30/17 11:12        21


 


9/30/17 08:00      Room Air  


 


9/30/17 08:00 98.4 82 20 160/76 (104) 100   


 


9/30/17 04:00 98.3 83 18 150/69 (96) 96   


 


9/30/17 04:00      Room Air  


 


9/30/17 00:00 98.1 80 18 145/71 (95) 99   


 


9/30/17 00:00      Room Air  


 


9/29/17 23:00      Room Air  


 


9/29/17 23:00 98.6 79 18 156/67 (96) 98   


 


9/29/17 21:36  75  166/76 (106)    


 


9/29/17 19:25 98.4 87 16 173/77 (109) 95   














I/O      


 


 9/29/17 9/29/17 9/29/17 9/30/17 9/30/17 9/30/17





 07:00 15:00 23:00 07:00 15:00 23:00


 


Intake Total 1440 ml  480 ml 240 ml  960 ml


 


Output Total    500 ml  


 


Balance 1440 ml  480 ml -260 ml  960 ml


 


      


 


Intake Oral 1440 ml  480 ml 240 ml  960 ml


 


Output Urine Total    500 ml  


 


# Voids 3  5   4


 


# Bowel Movements 3  1   1








Result Diagram:  


9/29/17 2054 9/29/17 1452





Imaging





Last Impressions








Shoulder X-Ray 9/26/17 0000 Signed





Impressions: 





 Service Date/Time:  Tuesday, September 26, 2017 14:45 - CONCLUSION: No acute 





 disease.       Alejandro Salinas MD 


 


Head CT 9/25/17 0526 Signed





Impressions: 





 Service Date/Time:  Monday, September 25, 2017 05:41 - CONCLUSION:  Normal 





 examination.       Serjio Lutz MD 


 


Chest X-Ray 9/25/17 0526 Signed





Impressions: 





 Service Date/Time:  Monday, September 25, 2017 05:46 - CONCLUSION:  1. No 

active 





 disease.     Serjio Lutz MD 


 


Elbow X-Ray 9/25/17 0000 Signed





Impressions: 





 Service Date/Time:  Monday, September 25, 2017 16:56 - CONCLUSION: No acute 





 disease.        Alejandro Salinas MD 


 


Abdomen/Pelvis CT 9/25/17 0000 Signed





Impressions: 





 Service Date/Time:  Monday, September 25, 2017 17:16 - CONCLUSION:  1. Small 





 pericardial effusion. 2. Atherosclerosis. 3. No inflammatory changes are 





 identified.     Alejandro Salinas MD 








Objective Remarks


AAOx3


NAD


Clear lungs BL


Abdomen soft, NT, mildly distended


Procedures


None


Medications and IVs





Current Medications








 Medications


  (Trade)  Dose


 Ordered  Sig/Aaron


 Route  Start Time


 Stop Time Status Last Admin


 


  (NS Flush)  2 ml  UNSCH  PRN


 IV FLUSH  9/25/17 09:15


     


 


 


  (NS Flush)  2 ml  BID


 IV FLUSH  9/25/17 21:00


    9/29/17 08:10


 


 


  (Tylenol)  650 mg  Q4H  PRN


 PO  9/25/17 09:15


    9/30/17 18:40


 


 


  (Tylenol)  650 mg  Q6H  PRN


 PO  9/25/17 09:15


    9/30/17 08:22


 


 


  (Narcan Inj)  0.4 mg  UNSCH  PRN


 IV PUSH  9/25/17 09:15


     


 


 


  (Eliquis)  5 mg  BID


 PO  9/25/17 10:00


    9/30/17 08:21


 


 


  (Plavix)  75 mg  DAILY


 PO  9/25/17 10:00


    9/30/17 08:21


 


 


  (Toprol Xl)  50 mg  BID


 PO  9/25/17 10:00


    9/30/17 08:21


 


 


  (Protonix)  40 mg  DAILY


 PO  9/26/17 09:00


    9/30/17 08:21


 


 


  (Pravachol)  40 mg  HS


 PO  9/25/17 21:00


    9/29/17 20:22


 


 


  (Zoloft)  50 mg  DAILY


 PO  9/26/17 09:00


    9/30/17 08:21


 


 


  (Duoneb Neb)  1 ampule  Q2HR NEB  PRN


 NEB  9/25/17 09:15


     


 


 


  (Ativan)  0.5 mg  Q6H  PRN


 PO  9/25/17 09:15


    9/30/17 00:23


 


 


  (Vimpat)  50 mg  DAILY


 PO  9/26/17 09:00


    9/30/17 09:39


 


 


  (NovoLOG


 SUPPLEMENTAL


 SCALE)  1  ACHS SLIDING  SCALE


 SQ  9/25/17 17:00


     


 


 


  (D50w (Syr) Inj)  50 ml  UNSCH  PRN


 IV PUSH  9/25/17 13:45


     


 


 


  (Glucagon Inj)  1 mg  UNSCH  PRN


 OTHER  9/25/17 13:45


     


 


 


  (Norvasc)  5 mg  DAILY


 PO  9/27/17 13:30


    9/30/17 08:21


 


 


  (Vasotec Inj)  1.25 mg  Q6H  PRN


 IV PUSH  9/27/17 22:00


     


 


 


  (Lactinex)  1 tab  TID


 PO  9/28/17 09:00


    9/30/17 12:21


 


 


  (Flagyl)  500 mg  Q8HR


 PO  9/29/17 07:30


    9/30/17 12:21


 


 


  (Zofran Inj)  4 mg  Q6HR  PRN


 IV PUSH  9/29/17 08:15


    9/30/17 12:22


 


 


 Sodium Chloride  1,000 ml @ 


 84 mls/hr  [id number]


 IV  9/29/17 11:00


    9/30/17 09:41


 








Urinary Catheter:  No


Vascular Central Line Catheter:  No





A/P


Problem List:  


(1) Encephalopathy acute


ICD Code:  G93.40 - Encephalopathy, unspecified


Status:  Resolved


Plan:  Encephalopathy likely secondary to C. difficile diarrhea infection and 

possibly toxic secondary to open up on her medications prior to admission.  

Patient is taking high doses of Valium which her sister states she was taking 

once every few days.  Patient had an elevated lactic acid level and 2 sets of 

positive blood cultures.  Urine culture no growth.  The patient was admitted to 

the medical floor, neuro checks obtained.


Encephalopathy now seems to have completely resolved.





(2) JD (acute kidney injury)


ICD Code:  N17.9 - Acute kidney failure, unspecified


Status:  Resolved


Plan:  Likely due to prerenal azotemia secondary to diarrhea and dehydration.  

Resolved after IV fluid administration.  Continue to monitor BMP.





(3) Bacteremia


ICD Code:  R78.81 - Bacteremia


Plan:  2 out of 4 blood culture positive for quite of his negative staph.  

Unsure of significance.


ID consulted, recommended monitoring of antibiotics and monitor repeat blood 

cultures 72 hours.





(4) Clostridium difficile diarrhea


ICD Code:  A04.7 - Enterocolitis due to Clostridium difficile


Plan:  Patient started on oral metronidazole.  Diarrhea seems to be resolving.


Discharge was consulted, GI recommended EGD/colonoscopy, however the patient 

refuses stating that he wants to follow-up with his outpatient gastroenterology 

physician.


Continue antiemetics, probiotics and metronidazole.





(5) CKD (chronic kidney disease) stage 3, GFR 30-59 ml/min


ICD Code:  N18.3 - Chronic kidney disease, stage 3 (moderate)


Status:  Acute


Plan:  Baseline creatinine around 1.2.  Continue to monitor BUN/creatinine.





(6) Diabetes mellitus


ICD Code:  E11.9 - Type 2 diabetes mellitus without complications


Status:  Acute


Plan:  The patient takes metformin as an outpatient.


Metformin held on admission.


Continue SSI with insulin NovoLog and continue diabetic diet.





(7) Anemia


ICD Code:  D64.9 - Anemia, unspecified


Status:  Chronic


Plan:  Patient has normocytic anemia based on postural views.


Hemoglobin currently stable, monitor CBC as needed.





(8) HTN (hypertension)


ICD Code:  I10 - Essential (primary) hypertension


Status:  Chronic


Plan:  Blood pressure seems to be stable.  Continue amlodipine 5 mg by mouth 

daily.  Continue to monitor vital signs.





Assessment and Plan


GI prophylaxis: PPI


DVT prophylaxis: Eliquis for CVA.


Discharge Planning


Possible discharge in a.m.





Problem Qualifiers





(1) Diabetes mellitus:  


Qualified Codes:  E11.8 - Type 2 diabetes mellitus with unspecified 

complications


(2) Anemia:  


Qualified Codes:  D64.9 - Anemia, unspecified


(3) HTN (hypertension):  


Qualified Codes:  I10 - Essential (primary) hypertension








Fabián Vargas MD Sep 30, 2017 19:02

## 2017-10-01 VITALS
SYSTOLIC BLOOD PRESSURE: 159 MMHG | RESPIRATION RATE: 18 BRPM | DIASTOLIC BLOOD PRESSURE: 71 MMHG | HEART RATE: 74 BPM | TEMPERATURE: 98.3 F | OXYGEN SATURATION: 97 %

## 2017-10-01 VITALS
HEART RATE: 81 BPM | DIASTOLIC BLOOD PRESSURE: 74 MMHG | SYSTOLIC BLOOD PRESSURE: 160 MMHG | OXYGEN SATURATION: 97 % | RESPIRATION RATE: 18 BRPM | TEMPERATURE: 98.8 F

## 2017-10-01 VITALS
SYSTOLIC BLOOD PRESSURE: 127 MMHG | HEART RATE: 70 BPM | DIASTOLIC BLOOD PRESSURE: 69 MMHG | TEMPERATURE: 98.7 F | RESPIRATION RATE: 16 BRPM | OXYGEN SATURATION: 97 %

## 2017-10-01 VITALS
DIASTOLIC BLOOD PRESSURE: 70 MMHG | RESPIRATION RATE: 18 BRPM | TEMPERATURE: 98.4 F | OXYGEN SATURATION: 96 % | SYSTOLIC BLOOD PRESSURE: 140 MMHG | HEART RATE: 77 BPM

## 2017-10-01 VITALS
HEART RATE: 83 BPM | RESPIRATION RATE: 18 BRPM | OXYGEN SATURATION: 98 % | SYSTOLIC BLOOD PRESSURE: 156 MMHG | TEMPERATURE: 99 F | DIASTOLIC BLOOD PRESSURE: 72 MMHG

## 2017-10-01 VITALS
DIASTOLIC BLOOD PRESSURE: 72 MMHG | SYSTOLIC BLOOD PRESSURE: 159 MMHG | OXYGEN SATURATION: 97 % | RESPIRATION RATE: 18 BRPM | HEART RATE: 81 BPM | TEMPERATURE: 99.3 F

## 2017-10-01 RX ADMIN — ACETAMINOPHEN PRN MG: 325 TABLET ORAL at 05:00

## 2017-10-01 RX ADMIN — Medication SCH ML: at 07:47

## 2017-10-01 RX ADMIN — SERTRALINE HYDROCHLORIDE SCH MG: 50 TABLET, FILM COATED ORAL at 07:49

## 2017-10-01 RX ADMIN — PRAVASTATIN SODIUM SCH MG: 40 TABLET ORAL at 20:22

## 2017-10-01 RX ADMIN — PHENYTOIN SODIUM SCH MLS/HR: 50 INJECTION INTRAMUSCULAR; INTRAVENOUS at 11:00

## 2017-10-01 RX ADMIN — INSULIN ASPART SCH: 100 INJECTION, SOLUTION INTRAVENOUS; SUBCUTANEOUS at 07:47

## 2017-10-01 RX ADMIN — INSULIN ASPART SCH: 100 INJECTION, SOLUTION INTRAVENOUS; SUBCUTANEOUS at 12:00

## 2017-10-01 RX ADMIN — Medication SCH TAB: at 12:28

## 2017-10-01 RX ADMIN — PANTOPRAZOLE SCH MG: 40 TABLET, DELAYED RELEASE ORAL at 07:49

## 2017-10-01 RX ADMIN — LACOSAMIDE SCH MG: 50 TABLET, FILM COATED ORAL at 07:49

## 2017-10-01 RX ADMIN — Medication SCH TAB: at 07:49

## 2017-10-01 RX ADMIN — METOPROLOL SUCCINATE SCH MG: 50 TABLET, FILM COATED, EXTENDED RELEASE ORAL at 20:22

## 2017-10-01 RX ADMIN — APIXABAN SCH MG: 5 TABLET, FILM COATED ORAL at 07:49

## 2017-10-01 RX ADMIN — ONDANSETRON PRN MG: 2 INJECTION, SOLUTION INTRAMUSCULAR; INTRAVENOUS at 17:00

## 2017-10-01 RX ADMIN — Medication SCH ML: at 20:22

## 2017-10-01 RX ADMIN — APIXABAN SCH MG: 5 TABLET, FILM COATED ORAL at 20:22

## 2017-10-01 RX ADMIN — PHENYTOIN SODIUM SCH MLS/HR: 50 INJECTION INTRAMUSCULAR; INTRAVENOUS at 20:24

## 2017-10-01 RX ADMIN — ONDANSETRON PRN MG: 2 INJECTION, SOLUTION INTRAMUSCULAR; INTRAVENOUS at 07:48

## 2017-10-01 RX ADMIN — INSULIN ASPART SCH: 100 INJECTION, SOLUTION INTRAVENOUS; SUBCUTANEOUS at 20:20

## 2017-10-01 RX ADMIN — CLOPIDOGREL BISULFATE SCH MG: 75 TABLET, FILM COATED ORAL at 07:49

## 2017-10-01 RX ADMIN — INSULIN ASPART SCH: 100 INJECTION, SOLUTION INTRAVENOUS; SUBCUTANEOUS at 16:58

## 2017-10-01 RX ADMIN — METOPROLOL SUCCINATE SCH MG: 50 TABLET, FILM COATED, EXTENDED RELEASE ORAL at 07:49

## 2017-10-01 RX ADMIN — Medication SCH TAB: at 16:58

## 2017-10-01 NOTE — HHI.DS
__________________________________________________





Discharge Summary


Admission Date


Sep 29, 2017 at 09:14


Discharge Date:  Oct 1, 2017


Admitting Diagnosis





altered mental status.  Acute kidney injury





(1) Encephalopathy acute


ICD Code:  G93.40 - Encephalopathy, unspecified


Diagnosis:  Principal


Status:  Resolved


(2) JD (acute kidney injury)


ICD Code:  N17.9 - Acute kidney failure, unspecified


Diagnosis:  Principal


Status:  Resolved


(3) Bacteremia


ICD Code:  R78.81 - Bacteremia


Diagnosis:  Principal





(4) Clostridium difficile diarrhea


ICD Code:  A04.7 - Enterocolitis due to Clostridium difficile


Diagnosis:  Principal





(5) CKD (chronic kidney disease) stage 3, GFR 30-59 ml/min


ICD Code:  N18.3 - Chronic kidney disease, stage 3 (moderate)


Diagnosis:  Secondary


Status:  Acute


(6) Diabetes mellitus


ICD Code:  E11.9 - Type 2 diabetes mellitus without complications


Diagnosis:  Secondary


Status:  Acute


(7) Anemia


ICD Code:  D64.9 - Anemia, unspecified


Diagnosis:  Principal


Status:  Chronic


(8) HTN (hypertension)


ICD Code:  I10 - Essential (primary) hypertension


Diagnosis:  Secondary


Status:  Chronic


Procedures


None


Brief History - From Admission


The patient is a 77-year-old female with a past medical history of CVA and 

polio who is presenting to the hospital with altered mental status.  The 

patient currently lives with her sister, who happens to be her caretaker.  

Apparently the patient missed her morning medications yesterday and doubled up 

on her medications in the evening.  After that the patient was found to be 

lethargic and her sister tried to track her out of bed and the patient fell on 

top of her sister.  They both sustained some abrasions from the fall.  The 

patient's sister does believe that doubling up on the medications caused her 

sisters confusion.  The patient is complaining of urinating a lot but otherwise 

is unable to describe the circumstances leading to her hospitalization.  She 

does endorse some vague aches and pains.  She denies any diarrhea or fever.  

She did endorse some abdominal pain in her right lower abdomen.  She was unable 

to be more specific.  The patient's sister states that the patient Discussed 

with her sister over the phone, who helped with the history.


CBC/BMP:  


9/29/17 2054 9/29/17 1452





Significant Findings





Laboratory Tests








Test


  9/28/17


13:23 9/28/17


18:50 9/29/17


14:52 9/29/17


20:54


 


Stool C. difficile Toxin (PCR)


  POSITIVE


(NEGATIVE) 


  


  


 


 


Urine Leukocyte Esterase  TRACE (NEG)   


 


Creatinine


  


  


  1.15 MG/DL


(0.50-1.00) 


 


 


Random Glucose


  


  


  124 MG/DL


() 


 


 


Calcium Level


  


  


  8.4 MG/DL


(8.5-10.1) 


 


 


Estimat Glomerular Filtration


Rate 


  


  46 ML/MIN


(>89) 


 


 


Red Blood Count


  


  


  


  3.26 MIL/MM3


(4.00-5.30)


 


Hemoglobin


  


  


  


  9.7 GM/DL


(11.6-15.3)


 


Hematocrit


  


  


  


  29.7 %


(35.0-46.0)


 


Mean Platelet Volume


  


  


  


  6.9 FL


(7.0-11.0)


 


Monocytes (%) (Auto)


  


  


  


  10.5 %


(0.0-8.0)








Imaging





Last Impressions








Shoulder X-Ray 9/26/17 0000 Signed





Impressions: 





 Service Date/Time:  Tuesday, September 26, 2017 14:45 - CONCLUSION: No acute 





 disease.       Alejandro Salinas MD 


 


Head CT 9/25/17 0526 Signed





Impressions: 





 Service Date/Time:  Monday, September 25, 2017 05:41 - CONCLUSION:  Normal 





 examination.       Serjio Lutz MD 


 


Chest X-Ray 9/25/17 0526 Signed





Impressions: 





 Service Date/Time:  Monday, September 25, 2017 05:46 - CONCLUSION:  1. No 

active 





 disease.     Serjio Lutz MD 


 


Elbow X-Ray 9/25/17 0000 Signed





Impressions: 





 Service Date/Time:  Monday, September 25, 2017 16:56 - CONCLUSION: No acute 





 disease.        Alejandro Salinas MD 


 


Abdomen/Pelvis CT 9/25/17 0000 Signed





Impressions: 





 Service Date/Time:  Monday, September 25, 2017 17:16 - CONCLUSION:  1. Small 





 pericardial effusion. 2. Atherosclerosis. 3. No inflammatory changes are 





 identified.     Alejandro Salinas MD 








PE at Discharge


AAOx3


NAD


Clear lungs BL


Abdomen soft, NT, mildly distended


Pt update on day of discharge


The patient denies any further diarrhea, abdominal pain, nausea or vomiting.  

Denies fevers or chills.  Appetite is improving.


Hospital Course


(1) Encephalopathy acute


 Encephalopathy likely secondary to C. difficile diarrhea infection and 

possibly toxic secondary to open up on her medications prior to admission.  

Patient was taking high doses of Valium which her sister states she was taking 

once every few days.  Patient had an elevated lactic acid level and 2 sets of 

positive blood cultures.  Urine culture no growth.  The patient was admitted to 

the medical floor, neuro checks were followed.  Encephalopathy completely 

resolved prior to discharge.





(2) JD (acute kidney injury)


Likely due to prerenal azotemia secondary to diarrhea and dehydration.  

Resolved after IV fluid administration.  Continue to monitor BMP.





(3) Bacteremia


2 out of 4 blood culture positive for Staph Capitis-ureolyticus and Staph 

Hominis-Hominis.  Unsure of significance.


ID consulted, recommended monitoring of antibiotics and monitor repeat blood 

cultures 72 hours.


Repeat Blood cultures negative 5 days.





(4) Clostridium difficile diarrhea


 Patient started on oral metronidazole.  Diarrhea seems to be resolving.


Discharge was consulted, GI recommended EGD/colonoscopy, however the patient 

refuses stating that he wants to follow-up with his outpatient gastroenterology 

physician.


Continue antiemetics, probiotics and metronidazole for a total of 14 days upon 

discharge.





(5) CKD (chronic kidney disease) stage 3, GFR 30-59 ml/min


 Baseline creatinine around 1.2.  BUN/creatinine monitored throughout hospital 

stay, nephrotoxins avoided and strict I's and O's were monitored.


Creatinine down to baseline at 1.15.








(6) Diabetes mellitus


The patient takes metformin as an outpatient.


Metformin held on admission.


Continue SSI with insulin NovoLog and continue diabetic diet.


Blood sugars remained stable during hospital stay.





(7) Anemia


 Patient has normocytic anemia based on past records.


Hemoglobin was monitored and remained stable.


As stated above GI was consulted and patient refused EGD/colonoscopy.





(8) HTN (hypertension)


\Blood pressure seems to be stable.  Continue amlodipine 5 mg by mouth daily.  

Continue to monitor vital signs.








GI prophylaxis: PPI


DVT prophylaxis: Eliquis for CVA.


Pt Condition on Discharge:  Stable


Discharge Disposition:  Discharge to SNF


Discharge Time:  > 30 minutes


Discharge Instructions


DIET: Follow Instructions for:  Heart Healthy Diet


Activities you can perform:  See Additionl Instruction


Other Activity Instructions:  


out of bed with assistance


Follow up Referrals:  


Gastroenterology recommeneded to have EGD/colonoscopy but patient and POA 

refused


PCP Follow-up - 1 Week





New Medications:  


Apixaban (Eliquis) 5 Mg Tab


5 MG PO BID for Blood Clot Prevention, #62 TAB





Metronidazole (Flagyl) 500 Mg Tab


500 MG PO Q8HR for Infection, #36 TAB





 


Continued Medications:  


Albuterol 8.5 GM Inh (Proair Hfa 8.5 GM Inh) 90 Mcg/Act Aer


2 PUFF INH Q6H PRN for SHORTNESS OF BREATH, #1 INHALER 0 Refills


108 mcg/actuation


Diazepam (Valium) 10 Mg Tab


10 MG PO TID PRN for ANXIETY, TAB 0 Refills





Lacosamide (Vimpat) 100 Mg Tab


100 MG PO BID for Control Seizures, #60 TAB 0 Refills





Metformin ER (Metformin ER) 1,000 Mg Jass


1000 MG PO BID for Blood Sugar Management, #30 TAB 0 Refills


With evening meal


Metoprolol Succinate ER 24 HR (Toprol XL) 50 Mg Tab


50 MG PO BID, #30 TAB 0 Refills





Pantoprazole (Pantoprazole) 40 Mg Tab


40 MG PO DAILY for Reflux, #30 TAB 0 Refills





Pravastatin (Pravachol) 40 Mg Tab


40 MG PO HS for Cholesterol Management, #30 TAB 0 Refills





Sertraline (Sertraline) 25 Mg Tab


50 MG PO DAILY, #30 TAB 0 Refills





 


Discontinued Medications:  


Apixaban (Eliquis) 5 Mg Tab


5 MG PO BID for Blood Clot Prevention, #180 TAB 1 Refill


Please take Eliquis 10mg (2 tablets) twice a day for the first 6 days


 upon discharge. Then continue Eliquis with 5mg (1 tablet) twice a day


 for up to 3 months.


Apixaban (Eliquis) 5 Mg Tab


10 MG PO BID, #10 TAB


Please take Eliquis 10mg (2 tablets) twice a day for the first 6 days


 upon discharge. Then continue Eliquis with 5mg (1 tablet) twice a day


 for up to 3 months.


Baclofen (Baclofen) 10 Mg Tab


10 MG PO TID for Muscle Spasm, TAB 0 Refills





Clopidogrel (Plavix) 75 Mg Tab


75 MG PO DAILY for Blood Clot Prevention, #30 TAB 0 Refills





Dexlansoprazole (Dexilant) 60 Mg Cap.bp


1 CAP PO DAILY





Pravastatin (Pravastatin) 40 Mg Tab


40 MG PO DAILY for Cholesterol Management, #30 TAB 0 Refills

















Fabián Vargas MD Oct 1, 2017 12:04

## 2017-10-01 NOTE — HHI.DCPOC
Discharge Care Plan


Diagnosis:  


(1) Encephalopathy


(2) JD (acute kidney injury)


(3) Clostridium difficile diarrhea


(4) CKD (chronic kidney disease) stage 3, GFR 30-59 ml/min


(5) HTN (hypertension)


(6) Bacteremia


(7) Anemia


Goals to Promote Your Health


* To prevent worsening of your condition and complications


* To maintain your health at the optimal level


Directions to Meet Your Goals


*** Take your medications as prescribed


*** Follow your dietary instruction


*** Follow activity as directed








*** Keep your appointments as scheduled


*** Take your immunizations and boosters as scheduled


*** If your symptoms worsen call your PCP, if no PCP go to Urgent Care Center 

or Emergency Room***


*** Smoking is Dangerous to Your Health. Avoid second hand smoke***


***Call the 24-hour hour crisis hotline for domestic abuse at 1-917.375.2242***











Fabián Vargas MD Oct 1, 2017 11:48

## 2017-10-02 VITALS
SYSTOLIC BLOOD PRESSURE: 137 MMHG | TEMPERATURE: 99.5 F | OXYGEN SATURATION: 96 % | HEART RATE: 71 BPM | DIASTOLIC BLOOD PRESSURE: 68 MMHG | RESPIRATION RATE: 16 BRPM

## 2017-10-02 VITALS
SYSTOLIC BLOOD PRESSURE: 130 MMHG | RESPIRATION RATE: 16 BRPM | TEMPERATURE: 99.1 F | HEART RATE: 74 BPM | DIASTOLIC BLOOD PRESSURE: 64 MMHG | OXYGEN SATURATION: 95 %

## 2017-10-02 VITALS
HEART RATE: 68 BPM | OXYGEN SATURATION: 98 % | SYSTOLIC BLOOD PRESSURE: 130 MMHG | DIASTOLIC BLOOD PRESSURE: 60 MMHG | RESPIRATION RATE: 16 BRPM | TEMPERATURE: 98.2 F

## 2017-10-02 VITALS
RESPIRATION RATE: 16 BRPM | SYSTOLIC BLOOD PRESSURE: 124 MMHG | OXYGEN SATURATION: 95 % | HEART RATE: 77 BPM | TEMPERATURE: 98 F | DIASTOLIC BLOOD PRESSURE: 63 MMHG

## 2017-10-02 RX ADMIN — Medication SCH ML: at 09:42

## 2017-10-02 RX ADMIN — APIXABAN SCH MG: 5 TABLET, FILM COATED ORAL at 09:42

## 2017-10-02 RX ADMIN — INSULIN ASPART SCH: 100 INJECTION, SOLUTION INTRAVENOUS; SUBCUTANEOUS at 12:00

## 2017-10-02 RX ADMIN — CLOPIDOGREL BISULFATE SCH MG: 75 TABLET, FILM COATED ORAL at 09:42

## 2017-10-02 RX ADMIN — LACOSAMIDE SCH MG: 50 TABLET, FILM COATED ORAL at 09:42

## 2017-10-02 RX ADMIN — INSULIN ASPART SCH: 100 INJECTION, SOLUTION INTRAVENOUS; SUBCUTANEOUS at 08:00

## 2017-10-02 RX ADMIN — PHENYTOIN SODIUM SCH MLS/HR: 50 INJECTION INTRAMUSCULAR; INTRAVENOUS at 10:30

## 2017-10-02 RX ADMIN — PANTOPRAZOLE SCH MG: 40 TABLET, DELAYED RELEASE ORAL at 09:42

## 2017-10-02 RX ADMIN — SERTRALINE HYDROCHLORIDE SCH MG: 50 TABLET, FILM COATED ORAL at 09:42

## 2017-10-02 RX ADMIN — Medication SCH TAB: at 13:39

## 2017-10-02 RX ADMIN — ACETAMINOPHEN PRN MG: 325 TABLET ORAL at 09:44

## 2017-10-02 RX ADMIN — Medication SCH TAB: at 09:42

## 2017-10-02 RX ADMIN — METOPROLOL SUCCINATE SCH MG: 50 TABLET, FILM COATED, EXTENDED RELEASE ORAL at 09:43

## 2017-10-27 ENCOUNTER — HOSPITAL ENCOUNTER (EMERGENCY)
Dept: HOSPITAL 17 - NEPC | Age: 77
LOS: 1 days | Discharge: HOME | End: 2017-10-28
Payer: MEDICARE

## 2017-10-27 VITALS — WEIGHT: 145.31 LBS | BODY MASS INDEX: 22.02 KG/M2 | HEIGHT: 68 IN

## 2017-10-27 VITALS
OXYGEN SATURATION: 100 % | SYSTOLIC BLOOD PRESSURE: 143 MMHG | DIASTOLIC BLOOD PRESSURE: 65 MMHG | TEMPERATURE: 99.1 F | RESPIRATION RATE: 18 BRPM | HEART RATE: 71 BPM

## 2017-10-27 VITALS
RESPIRATION RATE: 20 BRPM | DIASTOLIC BLOOD PRESSURE: 58 MMHG | SYSTOLIC BLOOD PRESSURE: 121 MMHG | HEART RATE: 71 BPM | OXYGEN SATURATION: 100 %

## 2017-10-27 DIAGNOSIS — R53.1: Primary | ICD-10-CM

## 2017-10-27 DIAGNOSIS — M19.90: ICD-10-CM

## 2017-10-27 DIAGNOSIS — Z86.73: ICD-10-CM

## 2017-10-27 DIAGNOSIS — I10: ICD-10-CM

## 2017-10-27 DIAGNOSIS — F41.9: ICD-10-CM

## 2017-10-27 DIAGNOSIS — D64.9: ICD-10-CM

## 2017-10-27 DIAGNOSIS — R06.02: ICD-10-CM

## 2017-10-27 DIAGNOSIS — G40.909: ICD-10-CM

## 2017-10-27 DIAGNOSIS — E11.9: ICD-10-CM

## 2017-10-27 LAB
ALBUMIN SERPL-MCNC: 3.5 GM/DL (ref 3.4–5)
ALP SERPL-CCNC: 94 U/L (ref 45–117)
ALT SERPL-CCNC: 21 U/L (ref 10–53)
AST SERPL-CCNC: 15 U/L (ref 15–37)
BASOPHILS # BLD AUTO: 0 TH/MM3 (ref 0–0.2)
BASOPHILS NFR BLD: 0.6 % (ref 0–2)
BILIRUB SERPL-MCNC: 0.1 MG/DL (ref 0.2–1)
BUN SERPL-MCNC: 21 MG/DL (ref 7–18)
CALCIUM SERPL-MCNC: 9 MG/DL (ref 8.5–10.1)
CHLORIDE SERPL-SCNC: 105 MEQ/L (ref 98–107)
COLOR UR: YELLOW
CREAT SERPL-MCNC: 1.48 MG/DL (ref 0.5–1)
EOSINOPHIL # BLD: 0.1 TH/MM3 (ref 0–0.4)
EOSINOPHIL NFR BLD: 1.7 % (ref 0–4)
ERYTHROCYTE [DISTWIDTH] IN BLOOD BY AUTOMATED COUNT: 14.5 % (ref 11.6–17.2)
GFR SERPLBLD BASED ON 1.73 SQ M-ARVRAT: 34 ML/MIN (ref 89–?)
GLUCOSE SERPL-MCNC: 112 MG/DL (ref 74–106)
GLUCOSE UR STRIP-MCNC: (no result) MG/DL
HCO3 BLD-SCNC: 27 MEQ/L (ref 21–32)
HCT VFR BLD CALC: 29.7 % (ref 35–46)
HGB BLD-MCNC: 9.7 GM/DL (ref 11.6–15.3)
HGB UR QL STRIP: (no result)
HYALINE CASTS #/AREA URNS LPF: 1 /LPF
INR PPP: 1 RATIO
KETONES UR STRIP-MCNC: (no result) MG/DL
LYMPHOCYTES # BLD AUTO: 1.7 TH/MM3 (ref 1–4.8)
LYMPHOCYTES NFR BLD AUTO: 24.3 % (ref 9–44)
MAGNESIUM SERPL-MCNC: 2.3 MG/DL (ref 1.5–2.5)
MCH RBC QN AUTO: 29.3 PG (ref 27–34)
MCHC RBC AUTO-ENTMCNC: 32.7 % (ref 32–36)
MCV RBC AUTO: 89.7 FL (ref 80–100)
MONOCYTE #: 0.6 TH/MM3 (ref 0–0.9)
MONOCYTES NFR BLD: 8.9 % (ref 0–8)
MUCOUS THREADS #/AREA URNS LPF: (no result) /LPF
NEUTROPHILS # BLD AUTO: 4.6 TH/MM3 (ref 1.8–7.7)
NEUTROPHILS NFR BLD AUTO: 64.5 % (ref 16–70)
NITRITE UR QL STRIP: (no result)
PLATELET # BLD: 335 TH/MM3 (ref 150–450)
PMV BLD AUTO: 7.8 FL (ref 7–11)
PROT SERPL-MCNC: 7.3 GM/DL (ref 6.4–8.2)
PROTHROMBIN TIME: 10.5 SEC (ref 9.8–11.6)
RBC # BLD AUTO: 3.31 MIL/MM3 (ref 4–5.3)
SODIUM SERPL-SCNC: 140 MEQ/L (ref 136–145)
SP GR UR STRIP: 1.02 (ref 1–1.03)
TROPONIN I SERPL-MCNC: (no result) NG/ML (ref 0.02–0.05)
URINE LEUKOCYTE ESTERASE: (no result)
WBC # BLD AUTO: 7.2 TH/MM3 (ref 4–11)

## 2017-10-27 PROCEDURE — 85730 THROMBOPLASTIN TIME PARTIAL: CPT

## 2017-10-27 PROCEDURE — 71010: CPT

## 2017-10-27 PROCEDURE — P9612 CATHETERIZE FOR URINE SPEC: HCPCS

## 2017-10-27 PROCEDURE — 80053 COMPREHEN METABOLIC PANEL: CPT

## 2017-10-27 PROCEDURE — 84484 ASSAY OF TROPONIN QUANT: CPT

## 2017-10-27 PROCEDURE — 83605 ASSAY OF LACTIC ACID: CPT

## 2017-10-27 PROCEDURE — 82550 ASSAY OF CK (CPK): CPT

## 2017-10-27 PROCEDURE — 93005 ELECTROCARDIOGRAM TRACING: CPT

## 2017-10-27 PROCEDURE — 85610 PROTHROMBIN TIME: CPT

## 2017-10-27 PROCEDURE — 85025 COMPLETE CBC W/AUTO DIFF WBC: CPT

## 2017-10-27 PROCEDURE — 83735 ASSAY OF MAGNESIUM: CPT

## 2017-10-27 PROCEDURE — 81001 URINALYSIS AUTO W/SCOPE: CPT

## 2017-10-27 PROCEDURE — 99285 EMERGENCY DEPT VISIT HI MDM: CPT

## 2017-10-27 NOTE — PD
HPI


Chief Complaint:  General Weakness


Time Seen by Provider:  20:21


Travel History


International Travel<30 days:  No


Contact w/Intl Traveler<30days:  No


Traveled to known affect area:  No





History of Present Illness


HPI


Patient comes from home via EMS complaining of shortness of breath and feeling 

terrible.  Patient's states that her sister helps take care of her but did not 

put her to bed last night just let her sleep in a chair.  Patient states that 

she has got up and ambulated today but just does not feel well overall and is 

feeling short of breath.  Patient denies any chest pain with this, headache, 

nausea, vomiting, abdominal pain, loss or change in bowel or bladder, numbness 

or tingling anywhere.  Patient denies anything making this better or worse.





PFSH


Past Medical History


Hx Anticoagulant Therapy:  Yes (plavix)


Anemia:  Yes


Arthritis:  Yes


Anxiety:  Yes


Depression:  Yes


Cancer:  Yes (CERVICAL CA)


Cardiovascular Problems:  Yes


High Cholesterol:  Yes


Chest Pain:  No


Congestive Heart Failure:  No


Cerebrovascular Accident:  Yes


Diabetes:  Yes


Patient Takes Glucophage:  No


Diminished Hearing:  No


Gastrointestinal Disorders:  Yes (esophageal strictures)


GERD:  Yes


Genitourinary:  Yes (incontinent)


Headaches:  No


Hypertension:  Yes


Musculoskeletal:  Yes (fracture to right ankle )


Neurologic:  Yes (SEIZURE DISORDER, HX CVA)


Reproductive:  Yes (CERVICAL CANCER)


Immunizations Current:  No


Migraines:  No


Seizures:  Yes


PNEUMOCCOCAL Vaccine (Year):  2


Menopausal:  Yes





Past Surgical History


Appendectomy:  Yes


Cardiac Surgery:  No


Cholecystectomy:  Yes


Ear Surgery:  No


Endocrine Surgery:  Yes


Eye Surgery:  No


Genitourinary Surgery:  Yes


Gynecologic Surgery:  Yes (HYSTERECTOMY,CERVICAL CA REMOVED.)


Hysterectomy:  No


Neurologic Surgery:  Yes (C4,C5,C6 PLATE PLACED)


Oral Surgery:  Yes


Thoracic Surgery:  No


Other Surgery:  Yes (LAPAROSCOPIC GREGG(WRAP STOMACH AROUND ESOPHAGUS TO HELP 

WITH GERD))





Social History


Alcohol Use:  No (DENIES)


Tobacco Use:  No (DENIES)


Substance Use:  No





Allergies-Medications


(Allergen,Severity, Reaction):  


Coded Allergies:  


     Influenza Virus Vaccines (Unverified  Allergy, Intermediate, 10/27/17)


 "ALLERGIC TO EGGS SO I CAN NOT HAVE THE FLU SHOT"


     egg (Unverified  Allergy, Intermediate, 10/27/17)


     codeine (Unverified  Allergy, Mild, 10/27/17)


     meperidine (Unverified  Allergy, Mild, 10/27/17)


     morphine (Unverified  Allergy, Mild, 10/27/17)


     acetaminophen (Unverified  Adverse Reaction, Mild, HEADACHE, 10/27/17)


     hydrocodone (Unverified  Adverse Reaction, Mild, HEADACHE, 10/27/17)


Reported Meds & Prescriptions





Reported Meds & Active Scripts


Active


Eliquis (Apixaban) 5 Mg Tab 5 Mg PO BID


Reported


Baclofen 10 Mg Tab 10 Mg PO TID


Clopidogrel (Clopidogrel Bisulfate) 75 Mg Tab 75 Mg PO DAILY


Dexilant (Dexlansoprazole) 60 Mg Cap.dr.bp   


Trazodone (Trazodone HCl) 150 Mg Tablet 150 Mg PO HS


Vimpat (Lacosamide) 100 Mg Tab 100 Mg PO BID


Proair Hfa 8.5 GM Inh (Albuterol Sulfate) 90 Mcg/Act Aer 2 Puff INH Q6H PRN


     108 mcg/actuation


Metformin ER (Metformin HCl) 1,000 Mg Jass 1,000 Mg PO BID


     With evening meal


Toprol XL (Metoprolol Succinate) 50 Mg Tab 50 Mg PO BID


Pravachol (Pravastatin) 40 Mg Tab 40 Mg PO HS


Sertraline (Sertraline HCl) 25 Mg Tab 50 Mg PO DAILY








Review of Systems


Except as stated in HPI:  all other systems reviewed are Neg





Physical Exam


Narrative


GENERAL: Well-developed, well nourished, in no acute distress, and non-ill 

appearing.


SKIN: Focused skin assessment warm and dry.


HEAD: Atraumatic. Normocephalic. 


EYES: Pupils equal and round. EOMI. No scleral icterus. No injection or 

drainage. 


ENT: No nasal bleeding or discharge.  Mucous membranes pink and moist.


NECK: Trachea midline. Supple.  No nuclear rigidity.


CARDIOVASCULAR: Regular rate and rhythm.  No murmur appreciated.


RESPIRATORY: No accessory muscle use.  No respiratory distress. Clear to 

auscultation. Breath sounds equal bilaterally. 


GASTROINTESTINAL: Abdomen soft, non-tender, nondistended, and no guarding. 

Hepatic and splenic margins not palpable.  Normal bowel sounds 4.  No 

pulsatile mass.


MUSCULOSKELETAL: No obvious deformities. No clubbing.  No cyanosis.  No edema.  

Full range of motion.  Strength 5/5 equal with bilateral upper extremity  

and with bilateral lower extremity plantar and dorsiflexion.


NEUROLOGICAL: Awake and alert. No obvious cranial nerve deficits.  Motor 

grossly within normal limits. Normal speech.


PSYCHIATRIC: Appropriate mood and affect; insight and judgment normal.





Data


Data


Last Documented VS





Vital Signs








  Date Time  Temp Pulse Resp B/P (MAP) Pulse Ox O2 Delivery O2 Flow Rate FiO2


 


10/28/17 10:57        


 


10/27/17 23:06  71 20  100 Nasal Cannula 2.00 


 


10/27/17 20:19 99.1       








Orders





 Orders


Electrocardiogram (10/27/17 20:23)


Complete Blood Count With Diff (10/27/17 20:23)


Comprehensive Metabolic Panel (10/27/17 20:23)


Prothrombin Time / Inr (Pt) (10/27/17 20:23)


Act Partial Throm Time (Ptt) (10/27/17 20:23)


Lactic Acid Sepsis Protocol (10/27/17 20:23)


Magnesium (Mg) (10/27/17 20:23)


Ckmb (Isoenzyme) Profile (10/27/17 20:23)


Troponin I (10/27/17 20:23)


Urinalysis - C+S If Indicated (10/27/17 20:23)


Chest, Single Ap (10/27/17 20:23)


Ecg Monitoring (10/27/17 20:23)


Iv Access Insert/Monitor (10/27/17 20:23)


Oximetry (10/27/17 20:23)


Oxygen Administration (10/27/17 20:23)


Cath For Specimen (10/27/17 20:46)


Ed Discharge Order (10/28/17 00:03)





Labs





Laboratory Tests








Test


  10/27/17


20:30 10/27/17


23:04


 


White Blood Count 7.2 TH/MM3  


 


Red Blood Count 3.31 MIL/MM3  


 


Hemoglobin 9.7 GM/DL  


 


Hematocrit 29.7 %  


 


Mean Corpuscular Volume 89.7 FL  


 


Mean Corpuscular Hemoglobin 29.3 PG  


 


Mean Corpuscular Hemoglobin


Concent 32.7 % 


  


 


 


Red Cell Distribution Width 14.5 %  


 


Platelet Count 335 TH/MM3  


 


Mean Platelet Volume 7.8 FL  


 


Neutrophils (%) (Auto) 64.5 %  


 


Lymphocytes (%) (Auto) 24.3 %  


 


Monocytes (%) (Auto) 8.9 %  


 


Eosinophils (%) (Auto) 1.7 %  


 


Basophils (%) (Auto) 0.6 %  


 


Neutrophils # (Auto) 4.6 TH/MM3  


 


Lymphocytes # (Auto) 1.7 TH/MM3  


 


Monocytes # (Auto) 0.6 TH/MM3  


 


Eosinophils # (Auto) 0.1 TH/MM3  


 


Basophils # (Auto) 0.0 TH/MM3  


 


CBC Comment DIFF FINAL  


 


Differential Comment   


 


Prothrombin Time 10.5 SEC  


 


Prothromb Time International


Ratio 1.0 RATIO 


  


 


 


Activated Partial


Thromboplast Time 20.0 SEC 


  


 


 


Blood Urea Nitrogen 21 MG/DL  


 


Creatinine 1.48 MG/DL  


 


Random Glucose 112 MG/DL  


 


Total Protein 7.3 GM/DL  


 


Albumin 3.5 GM/DL  


 


Calcium Level 9.0 MG/DL  


 


Magnesium Level 2.3 MG/DL  


 


Alkaline Phosphatase 94 U/L  


 


Aspartate Amino Transf


(AST/SGOT) 15 U/L 


  


 


 


Alanine Aminotransferase


(ALT/SGPT) 21 U/L 


  


 


 


Total Bilirubin 0.1 MG/DL  


 


Sodium Level 140 MEQ/L  


 


Potassium Level 4.5 MEQ/L  


 


Chloride Level 105 MEQ/L  


 


Carbon Dioxide Level 27.0 MEQ/L  


 


Anion Gap 8 MEQ/L  


 


Estimat Glomerular Filtration


Rate 34 ML/MIN 


  


 


 


Lactic Acid Level 1.5 mmol/L  


 


Total Creatine Kinase 31 U/L  


 


Troponin I


  LESS THAN 0.02


NG/ML 


 


 


Urine Color  YELLOW 


 


Urine Turbidity  CLEAR 


 


Urine pH  7.0 


 


Urine Specific Gravity  1.016 


 


Urine Protein  TRACE mg/dL 


 


Urine Glucose (UA)  NEG mg/dL 


 


Urine Ketones  NEG mg/dL 


 


Urine Occult Blood  NEG 


 


Urine Nitrite  NEG 


 


Urine Bilirubin  NEG 


 


Urine Urobilinogen


  


  LESS THAN 2.0


MG/DL


 


Urine Leukocyte Esterase  NEG 


 


Urine WBC


  


  LESS THAN 1


/hpf


 


Urine Hyaline Casts  1 /lpf 


 


Urine Mucus  FEW /lpf 


 


Microscopic Urinalysis Comment


  


  CATH-CULT NOT


IND











MDM


Medical Decision Making


Medical Screen Exam Complete:  Yes


Emergency Medical Condition:  Yes


Differential Diagnosis


UTI, pneumonia, COPD, generalized weakness, metabolic disturbance, anemia, other


Narrative Course


Patient was seen and examined.  Initial laboratory and radiological studies 

were were ordered and reviewed with the exception of UA and EKG that are 

pending.  Patient was signed out to Dr. Taylor at the end of my shift.  Please 

see her documentation for final diagnosis and disposition.











Mike Mistry Oct 27, 2017 20:48

## 2017-10-27 NOTE — RADRPT
EXAM DATE/TIME:  10/27/2017 20:47 

 

[facility] COMPARISON:     

CHEST SINGLE AP, September 25, 2017, 5:46.

 

                     

INDICATIONS :     

Shortness of breath.

                     

 

MEDICAL HISTORY :            

Diabetes mellitus type I. Hypertension Cerebrovascular disease.   

 

SURGICAL HISTORY :     

Appendectomy. Cholecystectomy.  

 

ENCOUNTER:     

Initial                                        

 

ACUITY:     

1 day      

 

PAIN SCORE:     

0/10

 

LOCATION:     

Bilateral chest 

 

FINDINGS:     

A single view of the chest demonstrates the lungs to be symmetrically aerated without evidence of mas
s, infiltrate or effusion.  The cardiomediastinal contours are unremarkable.  Osseous structures are 
intact.

 

CONCLUSION:     

1. No active disease. Mild scoliosis. Previous fusion cervical spine.

 

 

 

 Serjio Lutz MD on October 27, 2017 at 21:00           

Board Certified Radiologist.

 This report was verified electronically.

## 2017-10-28 NOTE — PD
Data


Data


Last Documented VS





Vital Signs








  Date Time  Temp Pulse Resp B/P (MAP) Pulse Ox O2 Delivery O2 Flow Rate FiO2


 


10/27/17 23:06  71 20 121/58 (79) 100 Nasal Cannula 2.00 


 


10/27/17 20:19 99.1       








Orders





 Orders


Electrocardiogram (10/27/17 20:23)


Complete Blood Count With Diff (10/27/17 20:23)


Comprehensive Metabolic Panel (10/27/17 20:23)


Prothrombin Time / Inr (Pt) (10/27/17 20:23)


Act Partial Throm Time (Ptt) (10/27/17 20:23)


Lactic Acid Sepsis Protocol (10/27/17 20:23)


Magnesium (Mg) (10/27/17 20:23)


Ckmb (Isoenzyme) Profile (10/27/17 20:23)


Troponin I (10/27/17 20:23)


Urinalysis - C+S If Indicated (10/27/17 20:23)


Chest, Single Ap (10/27/17 20:23)


Ecg Monitoring (10/27/17 20:23)


Iv Access Insert/Monitor (10/27/17 20:23)


Oximetry (10/27/17 20:23)


Oxygen Administration (10/27/17 20:23)


Cath For Specimen (10/27/17 20:46)





Labs





Laboratory Tests








Test


  10/27/17


20:30 10/27/17


23:04


 


White Blood Count 7.2 TH/MM3  


 


Red Blood Count 3.31 MIL/MM3  


 


Hemoglobin 9.7 GM/DL  


 


Hematocrit 29.7 %  


 


Mean Corpuscular Volume 89.7 FL  


 


Mean Corpuscular Hemoglobin 29.3 PG  


 


Mean Corpuscular Hemoglobin


Concent 32.7 % 


  


 


 


Red Cell Distribution Width 14.5 %  


 


Platelet Count 335 TH/MM3  


 


Mean Platelet Volume 7.8 FL  


 


Neutrophils (%) (Auto) 64.5 %  


 


Lymphocytes (%) (Auto) 24.3 %  


 


Monocytes (%) (Auto) 8.9 %  


 


Eosinophils (%) (Auto) 1.7 %  


 


Basophils (%) (Auto) 0.6 %  


 


Neutrophils # (Auto) 4.6 TH/MM3  


 


Lymphocytes # (Auto) 1.7 TH/MM3  


 


Monocytes # (Auto) 0.6 TH/MM3  


 


Eosinophils # (Auto) 0.1 TH/MM3  


 


Basophils # (Auto) 0.0 TH/MM3  


 


CBC Comment DIFF FINAL  


 


Differential Comment   


 


Prothrombin Time 10.5 SEC  


 


Prothromb Time International


Ratio 1.0 RATIO 


  


 


 


Activated Partial


Thromboplast Time 20.0 SEC 


  


 


 


Blood Urea Nitrogen 21 MG/DL  


 


Creatinine 1.48 MG/DL  


 


Random Glucose 112 MG/DL  


 


Total Protein 7.3 GM/DL  


 


Albumin 3.5 GM/DL  


 


Calcium Level 9.0 MG/DL  


 


Magnesium Level 2.3 MG/DL  


 


Alkaline Phosphatase 94 U/L  


 


Aspartate Amino Transf


(AST/SGOT) 15 U/L 


  


 


 


Alanine Aminotransferase


(ALT/SGPT) 21 U/L 


  


 


 


Total Bilirubin 0.1 MG/DL  


 


Sodium Level 140 MEQ/L  


 


Potassium Level 4.5 MEQ/L  


 


Chloride Level 105 MEQ/L  


 


Carbon Dioxide Level 27.0 MEQ/L  


 


Anion Gap 8 MEQ/L  


 


Estimat Glomerular Filtration


Rate 34 ML/MIN 


  


 


 


Lactic Acid Level 1.5 mmol/L  


 


Total Creatine Kinase 31 U/L  


 


Troponin I


  LESS THAN 0.02


NG/ML 


 


 


Urine Color  YELLOW 


 


Urine Turbidity  CLEAR 


 


Urine pH  7.0 


 


Urine Specific Gravity  1.016 


 


Urine Protein  TRACE mg/dL 


 


Urine Glucose (UA)  NEG mg/dL 


 


Urine Ketones  NEG mg/dL 


 


Urine Occult Blood  NEG 


 


Urine Nitrite  NEG 


 


Urine Bilirubin  NEG 


 


Urine Urobilinogen


  


  LESS THAN 2.0


MG/DL


 


Urine Leukocyte Esterase  NEG 


 


Urine WBC


  


  LESS THAN 1


/hpf


 


Urine Hyaline Casts  1 /lpf 


 


Urine Mucus  FEW /lpf 


 


Microscopic Urinalysis Comment


  


  CATH-CULT NOT


IND











MDM


Supervised Visit with CORNELIUS:  Yes


Narrative Course


The history, exam, and medical decision-making in the associated midlevel 

provider note were completed with my assistance. I reviewed and agree with the 

findings presented.  I attest that I had a face-to-face encounter with the 

patient on the same day, and personally performed and documented my assessment 

and findings in the medical record. 





*My assessment and Findings: This is a 77-year-old female who presents to the 

emergency department with multiple nonspecific complaints.  She's not a great 

historian.  I called the patient's sister who is her caregiver and power of 

.  Her sister says she thought she was choking and that's why she 

called the ambulance.  The patient has a normal chest x-ray and looks well 

here.  Her labs are all reassuring and she has a normal urinalysis and no 

infection.  I think she can be discharged home.  I get the sense the patient 

may be overmedicated.  She is quite sleepy and difficult to arouse at times.  I 

advised the sister to look into taking the patient off of trazodone.  She 

expressed understanding.  Patient will be discharged home.


Diagnosis





 Primary Impression:  


 Weakness


Patient Instructions:  General Instructions





***Additional Instruction:  


If you develop severe chest pain, shortness of breath, sweating, lightheadedness

, dizziness or difficulty breathing return to the emergency department 

immediately.


Followup with your primary care physician in 2-3 days if your symptoms are not 

resolved.





Consider stopping trazodone sedating side effects may be affecting you.


***Med/Other Pt SpecificInfo:  No Change to Meds


Disposition:  01 DISCHARGE HOME


Condition:  Stable











Liudmila Taylor MD Oct 28, 2017 00:03

## 2017-12-24 ENCOUNTER — HOSPITAL ENCOUNTER (INPATIENT)
Dept: HOSPITAL 17 - NEPE | Age: 77
LOS: 17 days | Discharge: NURSING, REHAB OR LONG-TERM CARE | DRG: 870 | End: 2018-01-10
Attending: ANESTHESIOLOGY | Admitting: ANESTHESIOLOGY
Payer: MEDICARE

## 2017-12-24 VITALS
DIASTOLIC BLOOD PRESSURE: 88 MMHG | HEART RATE: 68 BPM | SYSTOLIC BLOOD PRESSURE: 196 MMHG | OXYGEN SATURATION: 100 % | RESPIRATION RATE: 14 BRPM

## 2017-12-24 VITALS
DIASTOLIC BLOOD PRESSURE: 54 MMHG | SYSTOLIC BLOOD PRESSURE: 113 MMHG | HEART RATE: 84 BPM | OXYGEN SATURATION: 100 % | TEMPERATURE: 97.9 F | RESPIRATION RATE: 23 BRPM

## 2017-12-24 VITALS — HEART RATE: 94 BPM

## 2017-12-24 VITALS
SYSTOLIC BLOOD PRESSURE: 178 MMHG | HEART RATE: 77 BPM | DIASTOLIC BLOOD PRESSURE: 79 MMHG | RESPIRATION RATE: 16 BRPM | TEMPERATURE: 98.6 F

## 2017-12-24 VITALS
TEMPERATURE: 97.9 F | OXYGEN SATURATION: 100 % | DIASTOLIC BLOOD PRESSURE: 54 MMHG | RESPIRATION RATE: 14 BRPM | HEART RATE: 85 BPM | SYSTOLIC BLOOD PRESSURE: 107 MMHG

## 2017-12-24 VITALS — OXYGEN SATURATION: 100 %

## 2017-12-24 VITALS — WEIGHT: 163.14 LBS | BODY MASS INDEX: 26.22 KG/M2 | HEIGHT: 66 IN

## 2017-12-24 VITALS
RESPIRATION RATE: 18 BRPM | DIASTOLIC BLOOD PRESSURE: 77 MMHG | HEART RATE: 75 BPM | OXYGEN SATURATION: 100 % | SYSTOLIC BLOOD PRESSURE: 191 MMHG

## 2017-12-24 VITALS — OXYGEN SATURATION: 99 %

## 2017-12-24 VITALS — HEART RATE: 77 BPM

## 2017-12-24 VITALS — HEART RATE: 86 BPM

## 2017-12-24 VITALS — OXYGEN SATURATION: 95 %

## 2017-12-24 VITALS — TEMPERATURE: 97.6 F

## 2017-12-24 DIAGNOSIS — I50.9: ICD-10-CM
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
## 2017-12-24 DIAGNOSIS — B96.20: ICD-10-CM

## 2017-12-24 DIAGNOSIS — K21.9: ICD-10-CM

## 2017-12-24 DIAGNOSIS — E11.22: ICD-10-CM

## 2017-12-24 DIAGNOSIS — G72.81: ICD-10-CM

## 2017-12-24 DIAGNOSIS — N39.0: ICD-10-CM

## 2017-12-24 DIAGNOSIS — Z87.440: ICD-10-CM

## 2017-12-24 DIAGNOSIS — N18.3: ICD-10-CM

## 2017-12-24 DIAGNOSIS — E87.4: ICD-10-CM
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
## 2017-12-24 DIAGNOSIS — N17.9: ICD-10-CM

## 2017-12-24 DIAGNOSIS — J44.0: ICD-10-CM

## 2017-12-24 DIAGNOSIS — J18.9: ICD-10-CM

## 2017-12-24 DIAGNOSIS — G62.81: ICD-10-CM

## 2017-12-24 DIAGNOSIS — Z86.73: ICD-10-CM

## 2017-12-24 DIAGNOSIS — R00.0: ICD-10-CM

## 2017-12-24 DIAGNOSIS — G89.29: ICD-10-CM

## 2017-12-24 DIAGNOSIS — Z79.01: ICD-10-CM

## 2017-12-24 DIAGNOSIS — E87.6: ICD-10-CM

## 2017-12-24 DIAGNOSIS — G14: ICD-10-CM

## 2017-12-24 DIAGNOSIS — D64.9: ICD-10-CM

## 2017-12-24 DIAGNOSIS — I16.1: ICD-10-CM

## 2017-12-24 DIAGNOSIS — G40.901: ICD-10-CM

## 2017-12-24 DIAGNOSIS — K56.7: ICD-10-CM

## 2017-12-24 DIAGNOSIS — A41.9: Primary | ICD-10-CM

## 2017-12-24 DIAGNOSIS — Z99.11: ICD-10-CM

## 2017-12-24 DIAGNOSIS — M62.82: ICD-10-CM

## 2017-12-24 DIAGNOSIS — F32.9: ICD-10-CM

## 2017-12-24 DIAGNOSIS — S00.83XA: ICD-10-CM

## 2017-12-24 DIAGNOSIS — Z86.718: ICD-10-CM

## 2017-12-24 DIAGNOSIS — E78.5: ICD-10-CM

## 2017-12-24 DIAGNOSIS — E88.09: ICD-10-CM

## 2017-12-24 DIAGNOSIS — Z79.02: ICD-10-CM

## 2017-12-24 DIAGNOSIS — Z90.710: ICD-10-CM

## 2017-12-24 DIAGNOSIS — F41.9: ICD-10-CM

## 2017-12-24 DIAGNOSIS — J96.01: ICD-10-CM

## 2017-12-24 DIAGNOSIS — M19.90: ICD-10-CM

## 2017-12-24 DIAGNOSIS — I13.0: ICD-10-CM

## 2017-12-24 DIAGNOSIS — Z85.41: ICD-10-CM

## 2017-12-24 LAB
ALBUMIN SERPL-MCNC: 4.4 GM/DL (ref 3.4–5)
ALP SERPL-CCNC: 136 U/L (ref 45–117)
ALT SERPL-CCNC: 15 U/L (ref 10–53)
APAP SERPL-MCNC: (no result) MCG/ML (ref 10–30)
AST SERPL-CCNC: 17 U/L (ref 15–37)
BACTERIA #/AREA URNS HPF: (no result) /HPF
BASOPHILS # BLD AUTO: 0.1 TH/MM3 (ref 0–0.2)
BASOPHILS NFR BLD: 0.5 % (ref 0–2)
BILIRUB SERPL-MCNC: 0.1 MG/DL (ref 0.2–1)
BUN SERPL-MCNC: 29 MG/DL (ref 7–18)
CALCIUM SERPL-MCNC: 9.3 MG/DL (ref 8.5–10.1)
CHLORIDE SERPL-SCNC: 110 MEQ/L (ref 98–107)
COLOR UR: YELLOW
CREAT SERPL-MCNC: 2.76 MG/DL (ref 0.5–1)
EOSINOPHIL # BLD: 0.1 TH/MM3 (ref 0–0.4)
EOSINOPHIL NFR BLD: 0.7 % (ref 0–4)
ERYTHROCYTE [DISTWIDTH] IN BLOOD BY AUTOMATED COUNT: 15.7 % (ref 11.6–17.2)
GFR SERPLBLD BASED ON 1.73 SQ M-ARVRAT: 17 ML/MIN (ref 89–?)
GLUCOSE SERPL-MCNC: 131 MG/DL (ref 74–106)
GLUCOSE UR STRIP-MCNC: (no result) MG/DL
HCO3 BLD-SCNC: 16.9 MEQ/L (ref 21–32)
HCT VFR BLD CALC: 32.7 % (ref 35–46)
HGB BLD-MCNC: 10.8 GM/DL (ref 11.6–15.3)
HGB UR QL STRIP: (no result)
HYALINE CASTS #/AREA URNS LPF: 149 /LPF
INR PPP: 1.1 RATIO
KETONES UR STRIP-MCNC: (no result) MG/DL
LACTIC ACID SEPSIS PROTOCOL: 2.2 MMOL/L (ref 0.4–2)
LYMPHOCYTES # BLD AUTO: 1.4 TH/MM3 (ref 1–4.8)
LYMPHOCYTES NFR BLD AUTO: 14.3 % (ref 9–44)
MCH RBC QN AUTO: 29.4 PG (ref 27–34)
MCHC RBC AUTO-ENTMCNC: 32.9 % (ref 32–36)
MCV RBC AUTO: 89.6 FL (ref 80–100)
MONOCYTE #: 1.1 TH/MM3 (ref 0–0.9)
MONOCYTES NFR BLD: 10.7 % (ref 0–8)
MUCOUS THREADS #/AREA URNS LPF: (no result) /LPF
NEUTROPHILS # BLD AUTO: 7.2 TH/MM3 (ref 1.8–7.7)
NEUTROPHILS NFR BLD AUTO: 73.8 % (ref 16–70)
NITRITE UR QL STRIP: (no result)
PLATELET # BLD: 401 TH/MM3 (ref 150–450)
PMV BLD AUTO: 7.4 FL (ref 7–11)
PROT SERPL-MCNC: 9.1 GM/DL (ref 6.4–8.2)
PROTHROMBIN TIME: 11.3 SEC (ref 9.8–11.6)
RBC # BLD AUTO: 3.65 MIL/MM3 (ref 4–5.3)
SODIUM SERPL-SCNC: 138 MEQ/L (ref 136–145)
SP GR UR STRIP: 1.02 (ref 1–1.03)
SQUAMOUS #/AREA URNS HPF: 3 /HPF (ref 0–5)
TROPONIN I SERPL-MCNC: (no result) NG/ML (ref 0.02–0.05)
TROPONIN I SERPL-MCNC: 0.03 NG/ML (ref 0.02–0.05)
URINE LEUKOCYTE ESTERASE: (no result)
WBC # BLD AUTO: 9.8 TH/MM3 (ref 4–11)
WHITE BLOOD CELL CLUMPS: (no result)

## 2017-12-24 PROCEDURE — 36430 TRANSFUSION BLD/BLD COMPNT: CPT

## 2017-12-24 PROCEDURE — 94150 VITAL CAPACITY TEST: CPT

## 2017-12-24 PROCEDURE — 87206 SMEAR FLUORESCENT/ACID STAI: CPT

## 2017-12-24 PROCEDURE — 82150 ASSAY OF AMYLASE: CPT

## 2017-12-24 PROCEDURE — 70544 MR ANGIOGRAPHY HEAD W/O DYE: CPT

## 2017-12-24 PROCEDURE — 87116 MYCOBACTERIA CULTURE: CPT

## 2017-12-24 PROCEDURE — 70551 MRI BRAIN STEM W/O DYE: CPT

## 2017-12-24 PROCEDURE — 82533 TOTAL CORTISOL: CPT

## 2017-12-24 PROCEDURE — 80185 ASSAY OF PHENYTOIN TOTAL: CPT

## 2017-12-24 PROCEDURE — 84100 ASSAY OF PHOSPHORUS: CPT

## 2017-12-24 PROCEDURE — 5A1955Z RESPIRATORY VENTILATION, GREATER THAN 96 CONSECUTIVE HOURS: ICD-10-PCS | Performed by: EMERGENCY MEDICINE

## 2017-12-24 PROCEDURE — 80202 ASSAY OF VANCOMYCIN: CPT

## 2017-12-24 PROCEDURE — 85730 THROMBOPLASTIN TIME PARTIAL: CPT

## 2017-12-24 PROCEDURE — 96365 THER/PROPH/DIAG IV INF INIT: CPT

## 2017-12-24 PROCEDURE — 77003 FLUOROGUIDE FOR SPINE INJECT: CPT

## 2017-12-24 PROCEDURE — 87186 SC STD MICRODIL/AGAR DIL: CPT

## 2017-12-24 PROCEDURE — 86654 ENCEPHALTIS WEST EQNE ANTBDY: CPT

## 2017-12-24 PROCEDURE — 94640 AIRWAY INHALATION TREATMENT: CPT

## 2017-12-24 PROCEDURE — 86403 PARTICLE AGGLUT ANTBDY SCRN: CPT

## 2017-12-24 PROCEDURE — 36569 INSJ PICC 5 YR+ W/O IMAGING: CPT

## 2017-12-24 PROCEDURE — 85018 HEMOGLOBIN: CPT

## 2017-12-24 PROCEDURE — 82140 ASSAY OF AMMONIA: CPT

## 2017-12-24 PROCEDURE — 94667 MNPJ CHEST WALL 1ST: CPT

## 2017-12-24 PROCEDURE — 82948 REAGENT STRIP/BLOOD GLUCOSE: CPT

## 2017-12-24 PROCEDURE — 72125 CT NECK SPINE W/O DYE: CPT

## 2017-12-24 PROCEDURE — 82272 OCCULT BLD FECES 1-3 TESTS: CPT

## 2017-12-24 PROCEDURE — 82805 BLOOD GASES W/O2 SATURATION: CPT

## 2017-12-24 PROCEDURE — 95819 EEG AWAKE AND ASLEEP: CPT

## 2017-12-24 PROCEDURE — 94668 MNPJ CHEST WALL SBSQ: CPT

## 2017-12-24 PROCEDURE — 80048 BASIC METABOLIC PNL TOTAL CA: CPT

## 2017-12-24 PROCEDURE — 71045 X-RAY EXAM CHEST 1 VIEW: CPT

## 2017-12-24 PROCEDURE — 87040 BLOOD CULTURE FOR BACTERIA: CPT

## 2017-12-24 PROCEDURE — 80307 DRUG TEST PRSMV CHEM ANLYZR: CPT

## 2017-12-24 PROCEDURE — 82945 GLUCOSE OTHER FLUID: CPT

## 2017-12-24 PROCEDURE — 74000: CPT

## 2017-12-24 PROCEDURE — 86652 ENCEPHALTIS EAST EQNE ANBDY: CPT

## 2017-12-24 PROCEDURE — 36600 WITHDRAWAL OF ARTERIAL BLOOD: CPT

## 2017-12-24 PROCEDURE — 89051 BODY FLUID CELL COUNT: CPT

## 2017-12-24 PROCEDURE — 87493 C DIFF AMPLIFIED PROBE: CPT

## 2017-12-24 PROCEDURE — 83010 ASSAY OF HAPTOGLOBIN QUANT: CPT

## 2017-12-24 PROCEDURE — 94664 DEMO&/EVAL PT USE INHALER: CPT

## 2017-12-24 PROCEDURE — 94002 VENT MGMT INPAT INIT DAY: CPT

## 2017-12-24 PROCEDURE — 93971 EXTREMITY STUDY: CPT

## 2017-12-24 PROCEDURE — 86651 ENCEPHALITIS CALIFORN ANTBDY: CPT

## 2017-12-24 PROCEDURE — 87205 SMEAR GRAM STAIN: CPT

## 2017-12-24 PROCEDURE — 87449 NOS EACH ORGANISM AG IA: CPT

## 2017-12-24 PROCEDURE — 80053 COMPREHEN METABOLIC PANEL: CPT

## 2017-12-24 PROCEDURE — 86900 BLOOD TYPING SEROLOGIC ABO: CPT

## 2017-12-24 PROCEDURE — 74176 CT ABD & PELVIS W/O CONTRAST: CPT

## 2017-12-24 PROCEDURE — 76937 US GUIDE VASCULAR ACCESS: CPT

## 2017-12-24 PROCEDURE — 87070 CULTURE OTHR SPECIMN AEROBIC: CPT

## 2017-12-24 PROCEDURE — 81001 URINALYSIS AUTO W/SCOPE: CPT

## 2017-12-24 PROCEDURE — 82552 ASSAY OF CPK IN BLOOD: CPT

## 2017-12-24 PROCEDURE — 86592 SYPHILIS TEST NON-TREP QUAL: CPT

## 2017-12-24 PROCEDURE — 009U3ZX DRAINAGE OF SPINAL CANAL, PERCUTANEOUS APPROACH, DIAGNOSTIC: ICD-10-PCS | Performed by: RADIOLOGY

## 2017-12-24 PROCEDURE — 70553 MRI BRAIN STEM W/O & W/DYE: CPT

## 2017-12-24 PROCEDURE — 84132 ASSAY OF SERUM POTASSIUM: CPT

## 2017-12-24 PROCEDURE — 83735 ASSAY OF MAGNESIUM: CPT

## 2017-12-24 PROCEDURE — 85027 COMPLETE CBC AUTOMATED: CPT

## 2017-12-24 PROCEDURE — 86653 ENCEPHALTIS ST LOUIS ANTBODY: CPT

## 2017-12-24 PROCEDURE — 0T9B70Z DRAINAGE OF BLADDER WITH DRAINAGE DEVICE, VIA NATURAL OR ARTIFICIAL OPENING: ICD-10-PCS | Performed by: EMERGENCY MEDICINE

## 2017-12-24 PROCEDURE — 87641 MR-STAPH DNA AMP PROBE: CPT

## 2017-12-24 PROCEDURE — 86850 RBC ANTIBODY SCREEN: CPT

## 2017-12-24 PROCEDURE — 93005 ELECTROCARDIOGRAM TRACING: CPT

## 2017-12-24 PROCEDURE — 87077 CULTURE AEROBIC IDENTIFY: CPT

## 2017-12-24 PROCEDURE — 85025 COMPLETE CBC W/AUTO DIFF WBC: CPT

## 2017-12-24 PROCEDURE — 82248 BILIRUBIN DIRECT: CPT

## 2017-12-24 PROCEDURE — 93970 EXTREMITY STUDY: CPT

## 2017-12-24 PROCEDURE — 70450 CT HEAD/BRAIN W/O DYE: CPT

## 2017-12-24 PROCEDURE — 83615 LACTATE (LD) (LDH) ENZYME: CPT

## 2017-12-24 PROCEDURE — 96375 TX/PRO/DX INJ NEW DRUG ADDON: CPT

## 2017-12-24 PROCEDURE — 71010: CPT

## 2017-12-24 PROCEDURE — P9016 RBC LEUKOCYTES REDUCED: HCPCS

## 2017-12-24 PROCEDURE — 83690 ASSAY OF LIPASE: CPT

## 2017-12-24 PROCEDURE — 87102 FUNGUS ISOLATION CULTURE: CPT

## 2017-12-24 PROCEDURE — 87015 SPECIMEN INFECT AGNT CONCNTJ: CPT

## 2017-12-24 PROCEDURE — 87086 URINE CULTURE/COLONY COUNT: CPT

## 2017-12-24 PROCEDURE — 94003 VENT MGMT INPAT SUBQ DAY: CPT

## 2017-12-24 PROCEDURE — 70547 MR ANGIOGRAPHY NECK W/O DYE: CPT

## 2017-12-24 PROCEDURE — 85007 BL SMEAR W/DIFF WBC COUNT: CPT

## 2017-12-24 PROCEDURE — 86901 BLOOD TYPING SEROLOGIC RH(D): CPT

## 2017-12-24 PROCEDURE — 83605 ASSAY OF LACTIC ACID: CPT

## 2017-12-24 PROCEDURE — 82550 ASSAY OF CK (CPK): CPT

## 2017-12-24 PROCEDURE — 0BH17EZ INSERTION OF ENDOTRACHEAL AIRWAY INTO TRACHEA, VIA NATURAL OR ARTIFICIAL OPENING: ICD-10-PCS | Performed by: EMERGENCY MEDICINE

## 2017-12-24 PROCEDURE — 84145 PROCALCITONIN (PCT): CPT

## 2017-12-24 PROCEDURE — 84157 ASSAY OF PROTEIN OTHER: CPT

## 2017-12-24 PROCEDURE — 62270 DX LMBR SPI PNXR: CPT

## 2017-12-24 PROCEDURE — 84443 ASSAY THYROID STIM HORMONE: CPT

## 2017-12-24 PROCEDURE — 85014 HEMATOCRIT: CPT

## 2017-12-24 PROCEDURE — 85060 BLOOD SMEAR INTERPRETATION: CPT

## 2017-12-24 PROCEDURE — 86618 LYME DISEASE ANTIBODY: CPT

## 2017-12-24 PROCEDURE — 84484 ASSAY OF TROPONIN QUANT: CPT

## 2017-12-24 PROCEDURE — 87529 HSV DNA AMP PROBE: CPT

## 2017-12-24 PROCEDURE — A9579 GAD-BASE MR CONTRAST NOS,1ML: HCPCS

## 2017-12-24 PROCEDURE — 31500 INSERT EMERGENCY AIRWAY: CPT

## 2017-12-24 PROCEDURE — 85610 PROTHROMBIN TIME: CPT

## 2017-12-24 PROCEDURE — 86920 COMPATIBILITY TEST SPIN: CPT

## 2017-12-24 RX ADMIN — POLYVINYL ALCOHOL SCH DROP: 14 SOLUTION/ DROPS OPHTHALMIC at 17:42

## 2017-12-24 RX ADMIN — CEFEPIME SCH MLS/HR: 2 INJECTION, POWDER, FOR SOLUTION INTRAVENOUS at 23:55

## 2017-12-24 RX ADMIN — INSULIN ASPART SCH: 100 INJECTION, SOLUTION INTRAVENOUS; SUBCUTANEOUS at 17:00

## 2017-12-24 RX ADMIN — CEFEPIME SCH MLS/HR: 2 INJECTION, POWDER, FOR SOLUTION INTRAVENOUS at 15:01

## 2017-12-24 RX ADMIN — METOPROLOL TARTRATE SCH MG: 25 TABLET, FILM COATED ORAL at 23:55

## 2017-12-24 RX ADMIN — INSULIN ASPART SCH: 100 INJECTION, SOLUTION INTRAVENOUS; SUBCUTANEOUS at 12:00

## 2017-12-24 RX ADMIN — STANDARDIZED SENNA CONCENTRATE AND DOCUSATE SODIUM SCH TAB: 8.6; 5 TABLET, FILM COATED ORAL at 21:03

## 2017-12-24 RX ADMIN — INSULIN ASPART SCH: 100 INJECTION, SOLUTION INTRAVENOUS; SUBCUTANEOUS at 21:00

## 2017-12-24 RX ADMIN — CHLORHEXIDINE GLUCONATE 0.12% ORAL RINSE SCH ML: 1.2 LIQUID ORAL at 21:03

## 2017-12-24 RX ADMIN — IPRATROPIUM BROMIDE AND ALBUTEROL SULFATE SCH AMPULE: .5; 3 SOLUTION RESPIRATORY (INHALATION) at 15:37

## 2017-12-24 RX ADMIN — PRAVASTATIN SODIUM SCH MG: 40 TABLET ORAL at 21:03

## 2017-12-24 RX ADMIN — POLYVINYL ALCOHOL SCH DROP: 14 SOLUTION/ DROPS OPHTHALMIC at 17:35

## 2017-12-24 RX ADMIN — FAMOTIDINE SCH MG: 10 INJECTION, SOLUTION INTRAVENOUS at 21:04

## 2017-12-24 RX ADMIN — Medication PRN MLS/HR: at 10:22

## 2017-12-24 RX ADMIN — NICARDIPINE HYDROCHLORIDE PRN MLS/HR: 2.5 INJECTION INTRAVENOUS at 22:30

## 2017-12-24 RX ADMIN — LACOSAMIDE SCH MG: 100 TABLET, FILM COATED ORAL at 17:35

## 2017-12-24 RX ADMIN — METOPROLOL TARTRATE SCH MG: 25 TABLET, FILM COATED ORAL at 21:00

## 2017-12-24 RX ADMIN — Medication SCH ML: at 21:06

## 2017-12-24 RX ADMIN — LACOSAMIDE SCH MG: 100 TABLET, FILM COATED ORAL at 21:03

## 2017-12-24 RX ADMIN — IPRATROPIUM BROMIDE AND ALBUTEROL SULFATE SCH AMPULE: .5; 3 SOLUTION RESPIRATORY (INHALATION) at 19:51

## 2017-12-24 NOTE — RADRPT
EXAM DATE/TIME:  12/24/2017 13:11 

 

[facility] COMPARISON:     

CT CERVICAL SPINE W/O CONTRAST, December 24, 2017, 9:07.

       

 

 

INDICATIONS :     

Altered mental status.

                     

 

MEDICAL HISTORY :     

Diabetes mellitus type 2. Hypertension.   CVA.

 

SURGICAL HISTORY :     

Fusion, cervical. Discectomy, lumbar. Cholecystectomy. 

 

ENCOUNTER:     

Initial

 

ACUITY:     

1 day

 

PAIN SCORE:     

0/10

 

LOCATION:       

cranial 

 

TECHNIQUE:     

Multiplanar, multisequence MRI of the brain was performed without contrast.

 

FINDINGS:     

Moderate periventricular white matter changes are evident.  There is no restricted diffusion.  There 
is moderate atrophy with dilatation of ventricular sulcal spaces.  There are no extra-axial fluid col
lections appreciated.  Posterior fossa is unremarkable.  Calcification is seen just behind the clivus
.  Artery patent.

 

CONCLUSION:     Marked periventricular white matter changes

Central and cortical atrophy

Negative for ischemia.  

 

 

 Pb López MD FACR on December 24, 2017 at 13:57           

Board Certified Radiologist.

 This report was verified electronically.

## 2017-12-24 NOTE — RADRPT
EXAM DATE/TIME:  12/24/2017 09:07 

 

[facility] COMPARISON:     

CT CERVICAL SPINE W/O CONTRAST, April 18, 2016, 20:50.

 

 

INDICATIONS :     

Trauma, fall.

                      

 

RADIATION DOSE:     

21.01 CTDIvol (mGy) 

 

 

 

MEDICAL HISTORY :     

Non-responsive.  

 

SURGICAL HISTORY :      

Fusion, cervical. 

 

ENCOUNTER:      

Initial

 

ACUITY:      

1 day

 

PAIN SCALE:      

Non-responsive

 

LOCATION:        

neck 

 

TECHNIQUE:     

Volumetric scanning of the cervical spine was performed. Multiplanar reconstructions in the sagittal,
 coronal and oblique axial planes were performed.   Using automated exposure control and adjustment o
f the mA and/or kV according to patient size, radiation dose was kept as low as reasonably achievable
 to obtain optimal diagnostic quality images.   DICOM format image data is available electronically f
or review and comparison.  

 

FINDINGS:     

 

VERTEBRAE:     

Status post anterior cervical fusion C2-C3.  Fusion at C6-C7 with plate.  Fusion from C4-C6 without p
late.

Alignment is anatomic

 

ALIGNMENT:     

No evidence of subluxation.

 

C2-C3: 

Deformity of the C2 lamina is evident.

 

C3-C4: 

Fused

 

C4-C5: 

Fused without plate.  Minimal bilateral neural foramina encroachment.

 

C5-C6: 

Fused without plate.

 

C6-C7: 

Fused without spinal stenosis or neural foramen encroachment.

 

C7-T1:  

The bony spinal canal is normal in size.  No evidence of disc bulge or herniation.  The neural forami
na are bilaterally patent.

 

CONCLUSION:     

Fusion as above, negative for fracture.

 

 

 

 Pb López MD FACR on December 24, 2017 at 9:35           

Board Certified Radiologist.

 This report was verified electronically.

## 2017-12-24 NOTE — RADRPT
EXAM DATE/TIME:  12/24/2017 09:02 

 

[facility] COMPARISON:     

CT BRAIN W/O CONTRAST, September 25, 2017, 5:41.

 

 

INDICATIONS :     

Trauma, fall. Altered mental status.

                      

 

RADIATION DOSE:     

28.03 CTDIvol (mGy) 

 

 

 

MEDICAL HISTORY :     

Non-responsive.  

 

SURGICAL HISTORY :      

Non-responsive. 

 

ENCOUNTER:      

Initial

 

ACUITY:      

1 day

 

PAIN SCALE:      

Non-responsive

 

LOCATION:        

cranial 

 

TECHNIQUE:     

Multiple contiguous axial images were obtained of the head.  Using automated exposure control and adj
ustment of the mA and/or kV according to patient size, radiation dose was kept as low as reasonably a
chievable to obtain optimal diagnostic quality images.   DICOM format image data is available electro
nically for review and comparison.  

 

FINDINGS:     

 

CEREBRUM:     

The ventricles are normal for age.  No evidence of midline shift, mass lesion, hemorrhage or acute in
farction.  No extra-axial fluid collections are seen.

 

POSTERIOR FOSSA:     

The cerebellum and brainstem are intact.  The 4th ventricle is midline.  The cerebellopontine angle i
s unremarkable.

 

EXTRACRANIAL:     

The visualized portion of the orbits is intact.

 

SKULL:     

The calvaria is intact.  No evidence of skull fracture.

 

CONCLUSION:     

Negative for acute process..

 

 

 

 Pb López MD FACR on December 24, 2017 at 9:08           

Board Certified Radiologist.

 This report was verified electronically.

## 2017-12-24 NOTE — RADRPT
EXAM DATE/TIME:  12/24/2017 09:38 

 

[facility] COMPARISON:     

CHEST SINGLE AP, October 27, 2017, 20:47.

 

                     

INDICATIONS :     

Post intubation. 

                     

 

MEDICAL HISTORY :            

Diabetes mellitus type I. Hypertension Cerebrovascular disease.    

 

SURGICAL HISTORY :        

Appendectomy. Cholecystectomy.

 

ENCOUNTER:     

Initial                                        

 

ACUITY:     

1 day      

 

PAIN SCORE:     

0/10

 

LOCATION:     

Bilateral chest 

 

FINDINGS:     

ET in good position.  Nasogastric tube should be advanced 6 inches

Lungs are clear.

 

CONCLUSION:     

ET good position.

Nasogastric tube needs advanced.

 

 

 

 Pb López MD FACR on December 24, 2017 at 10:12           

Board Certified Radiologist.

 This report was verified electronically.

## 2017-12-24 NOTE — RADRPT
EXAM DATE/TIME:  12/24/2017 13:11 

 

[facility] COMPARISON:     

No previous studies available for comparison.

       

 

 

INDICATIONS :     

***Altered mental status.

                     

 

MEDICAL HISTORY :     

Diabetes mellitus type 2. Hypertension.   CVA

 

SURGICAL HISTORY :     

Discectomy, lumbar. Fusion, cervical. Cholecystectomy. 

 

ENCOUNTER:     

Initial

 

ACUITY:     

1 day

 

PAIN SCORE:     

0/10

 

LOCATION:       

neck 

 

Percent stenosis is calculated using the diameter of the stenotic region over the diameter of the nor
mal distal 

internal carotid artery.

 

TECHNIQUE:     

3D time of flight MRA of the extracranial circulation was performed using a neurovascular coil.  Post
 processing was performed including rotating subvolume maximum intensity projections of each carotid 
artery, rotating full-volume maximum intensity projections of both carotid arteries, sagittal and cor
onal sliding thin-slab reformations of each carotid artery, and left oblique sliding thin slab reform
ation through the aortic arch to include the origin of the arch branch vessels.

 

FINDINGS:     

 

AORTIC ARCH:     

There is a three vessel origin of the great vessels from the aorta.  No evidence of ostial narrowing.


 

RIGHT CAROTID:     

The common carotid artery is intact.  The carotid bulb has a normal configuration without ulceration 
or narrowing.  The internal carotid artery lumen is smooth without stenosis.  The external carotid ar
jaron is intact.

 

LEFT CAROTID:     

The common carotid artery is intact.  The carotid bulb has a normal configuration without ulceration 
or narrowing.  The internal carotid artery lumen is smooth without stenosis.  The external carotid ar
jaron is intact.

 

VERTEBRALS:     

The vertebral arteries have a symmetric diameter.  No stenotic lesions are seen.

 

CONCLUSION:     

Negative for hemodynamically significant carotid stenosis

 

 

 

 Pb López MD FACR on December 24, 2017 at 13:59           

Board Certified Radiologist.

 This report was verified electronically.

## 2017-12-24 NOTE — PD
HPI


Chief Complaint:  Altered Mental Status


Time Seen by Provider:  08:41


Travel History


International Travel<30 days:  No


Contact w/Intl Traveler<30days:  No


Traveled to known affect area:  No





History of Present Illness


HPI


Patient is a 77-year-old female presents emergency department for evaluation of 

altered mental status.  History is extremely limited by the patient's altered 

mental status, according to EMS the patient normally is more alert and active 

does have a history of stroke, he was able to speak with the patient's sister 

who states that she is also power of , the patient apparently normally 

is ambulatory and can carry on a conversation without any difficulty.  

Apparently about 1:00 in the morning she sat down in her lazy chair and this 

was the last time she was seen normal she was sitting in her chair, her sister 

also noted that she started beating her head against a piece of furniture.  The 

patient has had recent urinary tract infection complicated by C. difficile and 

the patient's sister does not want antibiotics given.  She remains a full code.

  She is a GCS of 7-8 on arrival.





PFSH


Past Medical History


Hx Anticoagulant Therapy:  Yes (plavix)


Anemia:  Yes


Arthritis:  Yes


Anxiety:  Yes


Depression:  Yes


Cancer:  Yes (CERVICAL CA)


Cardiovascular Problems:  Yes


High Cholesterol:  Yes


Chest Pain:  No


Congestive Heart Failure:  No


Cerebrovascular Accident:  Yes


Diabetes:  Yes


Diminished Hearing:  No


Gastrointestinal Disorders:  Yes (esophageal strictures)


GERD:  Yes


Genitourinary:  Yes (incontinent)


Headaches:  No


Hypertension:  Yes


Musculoskeletal:  Yes (fracture to right ankle )


Neurologic:  Yes (SEIZURE DISORDER, HX CVA)


Reproductive:  Yes (CERVICAL CANCER)


Immunizations Current:  No


Migraines:  No


Seizures:  Yes


PNEUMOCCOCAL Vaccine (Year):  2


Pregnant?:  Not Pregnant


Menopausal:  Yes





Past Surgical History


Appendectomy:  Yes


Cardiac Surgery:  No


Cholecystectomy:  Yes


Ear Surgery:  No


Endocrine Surgery:  Yes


Eye Surgery:  No


Genitourinary Surgery:  Yes


Gynecologic Surgery:  Yes (HYSTERECTOMY,CERVICAL CA REMOVED.)


Hysterectomy:  No


Neurologic Surgery:  Yes (C4,C5,C6 PLATE PLACED)


Oral Surgery:  Yes


Thoracic Surgery:  No


Other Surgery:  Yes (LAPAROSCOPIC GREGG(WRAP STOMACH AROUND ESOPHAGUS TO HELP 

WITH GERD))





Social History


Alcohol Use:  No (DENIES)


Tobacco Use:  No (DENIES)


Substance Use:  No





Allergies-Medications


(Allergen,Severity, Reaction):  


Coded Allergies:  


     Influenza Virus Vaccines (Unverified  Allergy, Intermediate, 10/27/17)


 "ALLERGIC TO EGGS SO I CAN NOT HAVE THE FLU SHOT"


     egg (Unverified  Allergy, Intermediate, 10/27/17)


     codeine (Unverified  Allergy, Mild, 10/27/17)


     meperidine (Unverified  Allergy, Mild, 10/27/17)


     morphine (Unverified  Allergy, Mild, 10/27/17)


     acetaminophen (Unverified  Adverse Reaction, Mild, HEADACHE, 10/27/17)


     hydrocodone (Unverified  Adverse Reaction, Mild, HEADACHE, 10/27/17)


Reported Meds & Prescriptions





Reported Meds & Active Scripts


Active


Reported


Dexilant (Dexlansoprazole) 60 Mg Cap.dr.bp   


Sertraline (Sertraline HCl) 50 Mg Tab 50 Mg PO DAILY


Tramadol (Tramadol HCl) 50 Mg Tab 50 Mg PO Q4H PRN


Vimpat (Lacosamide) 100 Mg Tab 100 Mg PO BID


Baclofen 10 Mg Tab 10 Mg PO TID


Clopidogrel (Clopidogrel Bisulfate) 75 Mg Tab 75 Mg PO DAILY


Proair Hfa 8.5 GM Inh (Albuterol Sulfate) 90 Mcg/Act Aer 2 Puff INH Q6H PRN


     108 mcg/actuation


Metformin ER (Metformin HCl) 1,000 Mg Jass 1,000 Mg PO BID


     With evening meal


Toprol XL (Metoprolol Succinate) 50 Mg Tab 50 Mg PO BID


Pravachol (Pravastatin) 40 Mg Tab 40 Mg PO HS








Review of Systems


ROS Limitations:  Unresponsive





Physical Exam


Narrative


GENERAL: Well-developed well-nourished, nearly comatose, displays and 

occasional cough.


SKIN: Focused skin assessment warm/dry.


HEAD: Patient has contusion to the right frontal area, no bloom signs no 

raccoons eyes.


EYES: Pupils equal and round. No scleral icterus. No injection or drainage. 


ENT: No nasal bleeding or discharge.  Mucous membranes pink and moist.


NECK: Trachea midline. No JVD. 


CARDIOVASCULAR: Regular rate and rhythm.  No murmur appreciated.


RESPIRATORY: No accessory muscle use. Clear to auscultation. Breath sounds 

equal bilaterally. 


GASTROINTESTINAL: Abdomen soft, non-tender, nondistended. Hepatic and splenic 

margins not palpable. 


MUSCULOSKELETAL: No obvious deformities. No clubbing.  No cyanosis.  No edema. 


NEUROLOGICAL: GCS of 7-8 (E1-2,V1,M5).


PSYCHIATRIC: Appropriate mood and affect; insight and judgment normal.





Data


Data


Last Documented VS





Vital Signs








  Date Time  Temp Pulse Resp B/P (MAP) Pulse Ox O2 Delivery O2 Flow Rate FiO2


 


12/24/17 10:10     100   40


 


12/24/17 09:15  74 16   Room Air  


 


12/24/17 08:37 98.6   178/79 (112)    








Orders





 Orders


Complete Blood Count With Diff (12/24/17 08:51)


Comprehensive Metabolic Panel (12/24/17 08:51)


Creatine Kinase (Cpk) (12/24/17 08:51)


Prothrombin Time / Inr (Pt) (12/24/17 08:51)


Act Partial Throm Time (Ptt) (12/24/17 08:51)


Troponin I (12/24/17 08:51)


Thyroid Stimulating Hormone (12/24/17 08:51)


Urinalysis - C+S If Indicated (12/24/17 08:51)


Blood Culture (12/24/17 08:51)


Chest, Single Ap (12/24/17 08:51)


Ct Brain W/O Iv Contrast(Rout) (12/24/17 08:51)


Blood Glucose (12/24/17 08:51)


Ecg Monitoring (12/24/17 08:51)


Iv Access Insert/Monitor (12/24/17 08:51)


Oximetry (12/24/17 08:51)


Naloxone Inj (Narcan Inj) (12/24/17 09:00)


Sodium Chloride 0.9% Flush (Ns Flush) (12/24/17 09:00)


Drug Screen, Random Urine (12/24/17 08:51)


Alcohol (Ethanol) (12/24/17 08:51)


Tylenol (Acetaminophen) (12/24/17 08:51)


Salicylates (Aspirin) (12/24/17 08:51)


Lactic Acid (12/24/17 08:51)


Urinary Catheter Management REY.Q8H (12/24/17 08:51)


Arterial Blood Gas (Abg) (12/24/17 )


Naloxone Inj (Narcan Inj) (12/24/17 08:55)


Ct Cerv Spine W/O Contrast (12/24/17 )


Succinylcholine Inj (Quelicin Inj) (12/24/17 09:15)


Etomidate Inj (Amidate Inj) (12/24/17 09:15)


Succinylcholine Inj (Quelicin Inj) (12/24/17 09:16)


Midazolam 100 Mg/100 Ml Inj (Versed Inj) (12/24/17 09:30)


Neurological Rass Scale Q30MX2,Q2HX4,Q4H (12/24/17 09:26)


Midazolam 100 Mg/100 Ml Inj (Versed Inj) (12/24/17 09:31)


Neurological Rass Scale Q30MX2,Q2HX4,Q4H (12/24/17 09:43)


Fentanyl Drip (Fentanyl Drip) (12/24/17 09:45)


Resp Ventilation- Volume (12/24/17 09:38)


Fentanyl Drip (Fentanyl Drip) (12/24/17 09:45)


Resp Ventilation- Volume (12/24/17 10:03)


Sodium Chlor 0.9% 1000 Ml Inj (Ns 1000 M (12/24/17 10:15)


Urine Culture (12/24/17 09:54)


Piperacil-Tazo 2.25 Gm Premix (Zosyn 2.2 (12/24/17 10:30)


Admit Order (Ed Use Only) (12/24/17 )





Labs





Laboratory Tests








Test


  12/24/17


08:55 12/24/17


09:54 12/24/17


09:59


 


White Blood Count 9.8 TH/MM3   


 


Red Blood Count 3.65 MIL/MM3   


 


Hemoglobin 10.8 GM/DL   


 


Hematocrit 32.7 %   


 


Mean Corpuscular Volume 89.6 FL   


 


Mean Corpuscular Hemoglobin 29.4 PG   


 


Mean Corpuscular Hemoglobin


Concent 32.9 % 


  


  


 


 


Red Cell Distribution Width 15.7 %   


 


Platelet Count 401 TH/MM3   


 


Mean Platelet Volume 7.4 FL   


 


Neutrophils (%) (Auto) 73.8 %   


 


Lymphocytes (%) (Auto) 14.3 %   


 


Monocytes (%) (Auto) 10.7 %   


 


Eosinophils (%) (Auto) 0.7 %   


 


Basophils (%) (Auto) 0.5 %   


 


Neutrophils # (Auto) 7.2 TH/MM3   


 


Lymphocytes # (Auto) 1.4 TH/MM3   


 


Monocytes # (Auto) 1.1 TH/MM3   


 


Eosinophils # (Auto) 0.1 TH/MM3   


 


Basophils # (Auto) 0.1 TH/MM3   


 


CBC Comment DIFF FINAL   


 


Differential Comment    


 


Prothrombin Time 11.3 SEC   


 


Prothromb Time International


Ratio 1.1 RATIO 


  


  


 


 


Activated Partial


Thromboplast Time 20.8 SEC 


  


  


 


 


Blood Urea Nitrogen 29 MG/DL   


 


Creatinine 2.76 MG/DL   


 


Random Glucose 131 MG/DL   


 


Total Protein 9.1 GM/DL   


 


Albumin 4.4 GM/DL   


 


Calcium Level 9.3 MG/DL   


 


Alkaline Phosphatase 136 U/L   


 


Aspartate Amino Transf


(AST/SGOT) 17 U/L 


  


  


 


 


Alanine Aminotransferase


(ALT/SGPT) 15 U/L 


  


  


 


 


Total Bilirubin 0.1 MG/DL   


 


Sodium Level 138 MEQ/L   


 


Potassium Level 4.2 MEQ/L   


 


Chloride Level 110 MEQ/L   


 


Carbon Dioxide Level 16.9 MEQ/L   


 


Anion Gap 11 MEQ/L   


 


Estimat Glomerular Filtration


Rate 17 ML/MIN 


  


  


 


 


Lactic Acid Level 2.8 mmol/L   


 


Total Creatine Kinase 85 U/L   


 


Troponin I


  LESS THAN 0.02


NG/ML 


  


 


 


Thyroid Stimulating Hormone


3rd Gen 3.000 uIU/ML 


  


  


 


 


Salicylates Level


  LESS THAN 1.7


MG/DL 


  


 


 


Acetaminophen Level


  LESS THAN 2.0


MCG/ML 


  


 


 


Ethyl Alcohol Level


  LESS THAN 3


MG/DL 


  


 


 


Urine Color  YELLOW  


 


Urine Turbidity  HAZY  


 


Urine pH  5.5  


 


Urine Specific Gravity  1.019  


 


Urine Protein  100 mg/dL  


 


Urine Glucose (UA)  NEG mg/dL  


 


Urine Ketones  NEG mg/dL  


 


Urine Occult Blood  SMALL  


 


Urine Nitrite  NEG  


 


Urine Bilirubin  NEG  


 


Urine Urobilinogen  2.0 MG/DL  


 


Urine Leukocyte Esterase  LARGE  


 


Urine RBC  40 /hpf  


 


Urine WBC   /hpf  


 


Urine WBC Clumps  MANY  


 


Urine Squamous Epithelial


Cells 


  3 /hpf 


  


 


 


Urine Bacteria  MANY /hpf  


 


Urine Hyaline Casts  149 /lpf  


 


Urine Mucus  MANY /lpf  


 


Microscopic Urinalysis Comment


  


  CATH-CULTURE


IND 


 


 


Urine Opiates Screen  NEG  


 


Urine Barbiturates Screen  NEG  


 


Urine Amphetamines Screen  NEG  


 


Urine Benzodiazepines Screen  POS  


 


Urine Cocaine Screen  NEG  


 


Urine Cannabinoids Screen  NEG  


 


Blood Gas Puncture Site   LT RADIAL 


 


Blood Gas Patient Temperature   98.6 


 


Blood Gas HCO3   14 mmol/L 


 


Blood Gas Base Excess   -11.4 mmol/L 


 


Blood Gas Oxygen Saturation   99 % 


 


Arterial Blood pH   7.31 


 


Arterial Blood Partial


Pressure CO2 


  


  28 mmHg 


 


 


Arterial Blood Partial


Pressure O2 


  


  442 mmHG 


 


 


Arterial Blood Oxygen Content   14.7 Vol % 


 


Arterial Blood


Carboxyhemoglobin 


  


  0.7 % 


 


 


Arterial Blood Methemoglobin   0.4 % 


 


Blood Gas Hemoglobin   9.8 G/DL 


 


Oxygen Delivery Device   VENT 


 


Blood Gas Ventilator Setting   AC14/500/PEEP5 


 


Blood Gas Inspired Oxygen   100 % 











[facility]


Medical Decision Making


Medical Screen Exam Complete:  Yes


Emergency Medical Condition:  Yes


Differential Diagnosis


Acute CVA, altered mental status, urinary tract infection, seizures, acidosis, 

electrolyte normality, pneumonia.


Narrative Course


Patient questionable airway status on arrival, was taken a CAT scan for high 

suspicion of intracranial hematoma, I come into her to CAT scan and noted that 

she was having more somnolence in the CAT scan, she was taken back to her room 

and echo pod where was found that she was foaming at the mouth and was 

intubated for airway protection.





CAT scan of the head and neck were negative, patient does have some metabolic 

acidosis, fluid resuscitation initiated, does have urinary tract infection 

started on Zosyn, acute kidney injury is apparent on UA.  Erazo catheter 

started for prolonged immobilization.  I think given her level of altered 

mental status and acute stroke needs to be considered and MRIs and MRAs of her 

brain were ordered after discussion with Dr. Aguayo who will admit.  Stabilized 

the moment patient will go to the ICU.  Discussed with her sister who 

apparently is the power of  and the patient does remain a full code, 

she did wish for intubation and aggressive measures at this time.


Critical Care Narrative


Aggregate critical care time was 35 minutes. Time to perform other separately 

billable procedures was not  


included in the critical care time. My time did not include minutes spent 

treating any other patients simultaneously or on  


activities that did not directly contribute to the patient's treatment.  





The services I provided to this patient were to treat and/or prevent clinically 

significant deterioration that could result  


in: Death, disability, organ failure





I provided critical care services requiring my management, as noted below:


Chart data review, documentation time, medication orders and management, vital 

sign assessments/reviewing monitor data,  


ordering and reviewing lab tests, ordering and interpreting/reviewing x-rays 

and diagnostic studies, care of the patient  


and discussion of the patient with the admitting physicians.





Procedures


**Procedure Narrative**


INTUBATION: The patient was put in optimal position for the procedure.  Rapid 

sequence intubation was initiated by me using  


20 milligrams of etomidate IV and 100 milligrams of succinylcholine IV.  The 

patient was intubated with a 7-5 cuffed endotracheal  


tube.  Tube placement was confirmed by visualization of the tube and balloon 

passing through the cords, capnometry and  


subsequent chest x-ray.  Breath sounds were equal and well aerated bilaterally 

postintubation.  No breath sounds over  


stomach.  Patient tolerated procedure well.





Diagnosis





 Primary Impression:  


 Altered mental status


 Qualified Codes:  R40.2432 - Mario coma scale score 3-8, at arrival to 

emergency department


 Additional Impressions:  


 JD (acute kidney injury)


 Urinary tract infection


 Acidosis





Admitting Information


Admitting Physician Requests:  Admit


Condition:  Serious











Hernando Barrett MD Dec 24, 2017 09:01

## 2017-12-24 NOTE — EKG
Date Performed: 12/24/2017       Time Performed: 08:39:49

 

PTAGE:      77 years

 

EKG:      Sinus rhythm 

 

 NORMAL ECG 

 

NO PREVIOUS TRACING            

 

DOCTOR:   Ellen Palm  Interpretating Date/Time  12/24/2017 18:21:56

## 2017-12-24 NOTE — RADRPT
EXAM DATE/TIME:  12/24/2017 13:11 

 

[facility] COMPARISON:     

No previous studies available for comparison.

       

 

 

INDICATIONS :     

Altered mental status.

                     

 

MEDICAL HISTORY :     

Diabetes mellitus type 2. Hypertension.   CVA.

 

SURGICAL HISTORY :     

Discectomy, lumbar. Fusion, cervical. Cholecystectomy. 

 

ENCOUNTER:     

Initial

 

ACUITY:     

1 day

 

PAIN SCORE:     

0/10

 

LOCATION:       

cranial 

 

Please note a normal MRA of the brain does not entirely exclude the possibility of a small aneurysm, 


nor the possibility of distal intracranial vessel disease.

 

TECHNIQUE:     

3D time of flight MRA was performed.  Source images, multiplanar STS MIP, and 3D volume MIP reconstru
ctions were reviewed.

 

FINDINGS:     

There is excellent visualization of the major intracranial arteries out to the second-order branch ve
ssels.  There is no evidence for aneurysm, vessel truncation or stenosis, and no evidence for vascula
r malformation.

 

CONCLUSION:     Negative for major branch vessel occlusion.  

 

 

 Pb López MD FACR on December 24, 2017 at 13:31           

Board Certified Radiologist.

 This report was verified electronically.

## 2017-12-25 VITALS
DIASTOLIC BLOOD PRESSURE: 62 MMHG | SYSTOLIC BLOOD PRESSURE: 134 MMHG | RESPIRATION RATE: 20 BRPM | OXYGEN SATURATION: 100 % | HEART RATE: 91 BPM

## 2017-12-25 VITALS
OXYGEN SATURATION: 100 % | SYSTOLIC BLOOD PRESSURE: 127 MMHG | HEART RATE: 81 BPM | DIASTOLIC BLOOD PRESSURE: 61 MMHG | RESPIRATION RATE: 18 BRPM

## 2017-12-25 VITALS
TEMPERATURE: 99 F | DIASTOLIC BLOOD PRESSURE: 57 MMHG | OXYGEN SATURATION: 100 % | HEART RATE: 73 BPM | RESPIRATION RATE: 18 BRPM | SYSTOLIC BLOOD PRESSURE: 120 MMHG

## 2017-12-25 VITALS
RESPIRATION RATE: 17 BRPM | DIASTOLIC BLOOD PRESSURE: 63 MMHG | OXYGEN SATURATION: 100 % | HEART RATE: 84 BPM | SYSTOLIC BLOOD PRESSURE: 127 MMHG

## 2017-12-25 VITALS — OXYGEN SATURATION: 100 %

## 2017-12-25 VITALS
DIASTOLIC BLOOD PRESSURE: 51 MMHG | RESPIRATION RATE: 16 BRPM | TEMPERATURE: 98.1 F | OXYGEN SATURATION: 100 % | SYSTOLIC BLOOD PRESSURE: 102 MMHG | HEART RATE: 96 BPM

## 2017-12-25 VITALS
RESPIRATION RATE: 20 BRPM | SYSTOLIC BLOOD PRESSURE: 137 MMHG | OXYGEN SATURATION: 100 % | HEART RATE: 119 BPM | DIASTOLIC BLOOD PRESSURE: 60 MMHG | TEMPERATURE: 97.6 F

## 2017-12-25 VITALS
OXYGEN SATURATION: 100 % | HEART RATE: 86 BPM | SYSTOLIC BLOOD PRESSURE: 128 MMHG | DIASTOLIC BLOOD PRESSURE: 60 MMHG | RESPIRATION RATE: 19 BRPM

## 2017-12-25 VITALS
HEART RATE: 91 BPM | DIASTOLIC BLOOD PRESSURE: 58 MMHG | SYSTOLIC BLOOD PRESSURE: 126 MMHG | RESPIRATION RATE: 20 BRPM | OXYGEN SATURATION: 100 %

## 2017-12-25 VITALS
RESPIRATION RATE: 20 BRPM | DIASTOLIC BLOOD PRESSURE: 65 MMHG | OXYGEN SATURATION: 100 % | HEART RATE: 90 BPM | SYSTOLIC BLOOD PRESSURE: 136 MMHG

## 2017-12-25 VITALS
TEMPERATURE: 98.4 F | HEART RATE: 87 BPM | SYSTOLIC BLOOD PRESSURE: 100 MMHG | RESPIRATION RATE: 15 BRPM | DIASTOLIC BLOOD PRESSURE: 58 MMHG | OXYGEN SATURATION: 100 %

## 2017-12-25 VITALS — HEART RATE: 81 BPM

## 2017-12-25 VITALS
DIASTOLIC BLOOD PRESSURE: 67 MMHG | SYSTOLIC BLOOD PRESSURE: 147 MMHG | HEART RATE: 96 BPM | OXYGEN SATURATION: 100 % | RESPIRATION RATE: 22 BRPM

## 2017-12-25 VITALS — SYSTOLIC BLOOD PRESSURE: 137 MMHG | RESPIRATION RATE: 29 BRPM | DIASTOLIC BLOOD PRESSURE: 63 MMHG | HEART RATE: 128 BPM

## 2017-12-25 VITALS
OXYGEN SATURATION: 100 % | SYSTOLIC BLOOD PRESSURE: 123 MMHG | RESPIRATION RATE: 16 BRPM | DIASTOLIC BLOOD PRESSURE: 58 MMHG | HEART RATE: 80 BPM

## 2017-12-25 VITALS — RESPIRATION RATE: 18 BRPM | HEART RATE: 102 BPM | OXYGEN SATURATION: 100 %

## 2017-12-25 VITALS
HEART RATE: 92 BPM | SYSTOLIC BLOOD PRESSURE: 132 MMHG | TEMPERATURE: 100 F | RESPIRATION RATE: 19 BRPM | OXYGEN SATURATION: 100 % | DIASTOLIC BLOOD PRESSURE: 63 MMHG

## 2017-12-25 VITALS
RESPIRATION RATE: 23 BRPM | HEART RATE: 100 BPM | OXYGEN SATURATION: 100 % | DIASTOLIC BLOOD PRESSURE: 60 MMHG | SYSTOLIC BLOOD PRESSURE: 128 MMHG

## 2017-12-25 VITALS — HEART RATE: 108 BPM

## 2017-12-25 VITALS — HEART RATE: 106 BPM

## 2017-12-25 VITALS
HEART RATE: 95 BPM | DIASTOLIC BLOOD PRESSURE: 54 MMHG | SYSTOLIC BLOOD PRESSURE: 119 MMHG | RESPIRATION RATE: 19 BRPM | OXYGEN SATURATION: 100 %

## 2017-12-25 VITALS
DIASTOLIC BLOOD PRESSURE: 65 MMHG | TEMPERATURE: 99.8 F | HEART RATE: 95 BPM | RESPIRATION RATE: 35 BRPM | SYSTOLIC BLOOD PRESSURE: 141 MMHG

## 2017-12-25 VITALS — HEART RATE: 103 BPM

## 2017-12-25 VITALS
SYSTOLIC BLOOD PRESSURE: 134 MMHG | RESPIRATION RATE: 19 BRPM | OXYGEN SATURATION: 100 % | HEART RATE: 81 BPM | DIASTOLIC BLOOD PRESSURE: 64 MMHG

## 2017-12-25 LAB
ALBUMIN SERPL-MCNC: 3.3 GM/DL (ref 3.4–5)
ALP SERPL-CCNC: 109 U/L (ref 45–117)
ALT SERPL-CCNC: 15 U/L (ref 10–53)
AMYLASE SERPL-CCNC: 342 U/L (ref 25–115)
AST SERPL-CCNC: 31 U/L (ref 15–37)
BASOPHILS # BLD AUTO: 0 TH/MM3 (ref 0–0.2)
BASOPHILS NFR BLD: 0.4 % (ref 0–2)
BILIRUB SERPL-MCNC: 0.2 MG/DL (ref 0.2–1)
BUN SERPL-MCNC: 24 MG/DL (ref 7–18)
CALCIUM SERPL-MCNC: 8.4 MG/DL (ref 8.5–10.1)
CHLORIDE SERPL-SCNC: 117 MEQ/L (ref 98–107)
CREAT SERPL-MCNC: 1.64 MG/DL (ref 0.5–1)
DIRECT BILIRUBIN ADULT: 0.1 MG/DL (ref 0–0.2)
EOSINOPHIL # BLD: 0 TH/MM3 (ref 0–0.4)
EOSINOPHIL NFR BLD: 0.5 % (ref 0–4)
ERYTHROCYTE [DISTWIDTH] IN BLOOD BY AUTOMATED COUNT: 15.7 % (ref 11.6–17.2)
GFR SERPLBLD BASED ON 1.73 SQ M-ARVRAT: 31 ML/MIN (ref 89–?)
GLUCOSE SERPL-MCNC: 152 MG/DL (ref 74–106)
HCO3 BLD-SCNC: 14.7 MEQ/L (ref 21–32)
HCT VFR BLD CALC: 30.2 % (ref 35–46)
HGB BLD-MCNC: 9.8 GM/DL (ref 11.6–15.3)
LIPASE: 90 U/L (ref 73–393)
LYMPHOCYTES # BLD AUTO: 0.9 TH/MM3 (ref 1–4.8)
LYMPHOCYTES NFR BLD AUTO: 10.1 % (ref 9–44)
MAGNESIUM SERPL-MCNC: 1.5 MG/DL (ref 1.5–2.5)
MCH RBC QN AUTO: 29.4 PG (ref 27–34)
MCHC RBC AUTO-ENTMCNC: 32.5 % (ref 32–36)
MCV RBC AUTO: 90.3 FL (ref 80–100)
MONOCYTE #: 1.3 TH/MM3 (ref 0–0.9)
MONOCYTES NFR BLD: 14.1 % (ref 0–8)
NEUTROPHILS # BLD AUTO: 7.1 TH/MM3 (ref 1.8–7.7)
NEUTROPHILS NFR BLD AUTO: 74.9 % (ref 16–70)
PHOSPHATE SERPL-MCNC: 3.5 MG/DL (ref 2.5–4.9)
PLATELET # BLD: 306 TH/MM3 (ref 150–450)
PMV BLD AUTO: 7.4 FL (ref 7–11)
PROT SERPL-MCNC: 7.2 GM/DL (ref 6.4–8.2)
RBC # BLD AUTO: 3.35 MIL/MM3 (ref 4–5.3)
SODIUM SERPL-SCNC: 143 MEQ/L (ref 136–145)
TROPONIN I SERPL-MCNC: 0.03 NG/ML (ref 0.02–0.05)
WBC # BLD AUTO: 9.4 TH/MM3 (ref 4–11)

## 2017-12-25 RX ADMIN — FAMOTIDINE SCH MG: 10 INJECTION, SOLUTION INTRAVENOUS at 08:12

## 2017-12-25 RX ADMIN — LACOSAMIDE SCH MG: 100 TABLET, FILM COATED ORAL at 08:12

## 2017-12-25 RX ADMIN — METOPROLOL TARTRATE SCH MG: 25 TABLET, FILM COATED ORAL at 12:19

## 2017-12-25 RX ADMIN — CEFEPIME SCH MLS/HR: 2 INJECTION, POWDER, FOR SOLUTION INTRAVENOUS at 12:19

## 2017-12-25 RX ADMIN — NICARDIPINE HYDROCHLORIDE PRN MLS/HR: 2.5 INJECTION INTRAVENOUS at 06:00

## 2017-12-25 RX ADMIN — IPRATROPIUM BROMIDE AND ALBUTEROL SULFATE SCH AMPULE: .5; 3 SOLUTION RESPIRATORY (INHALATION) at 15:36

## 2017-12-25 RX ADMIN — SODIUM CHLORIDE SCH MLS/HR: 234 INJECTION INTRAMUSCULAR; INTRAVENOUS; SUBCUTANEOUS at 15:09

## 2017-12-25 RX ADMIN — CHLORHEXIDINE GLUCONATE 0.12% ORAL RINSE SCH ML: 1.2 LIQUID ORAL at 22:58

## 2017-12-25 RX ADMIN — Medication PRN MLS/HR: at 02:05

## 2017-12-25 RX ADMIN — FAMOTIDINE SCH MG: 10 INJECTION, SOLUTION INTRAVENOUS at 22:55

## 2017-12-25 RX ADMIN — CHLORHEXIDINE GLUCONATE 0.12% ORAL RINSE SCH ML: 1.2 LIQUID ORAL at 08:14

## 2017-12-25 RX ADMIN — INSULIN ASPART SCH: 100 INJECTION, SOLUTION INTRAVENOUS; SUBCUTANEOUS at 17:37

## 2017-12-25 RX ADMIN — STANDARDIZED SENNA CONCENTRATE AND DOCUSATE SODIUM SCH TAB: 8.6; 5 TABLET, FILM COATED ORAL at 08:12

## 2017-12-25 RX ADMIN — PRAVASTATIN SODIUM SCH MG: 40 TABLET ORAL at 22:55

## 2017-12-25 RX ADMIN — Medication SCH ML: at 22:57

## 2017-12-25 RX ADMIN — POLYVINYL ALCOHOL SCH DROP: 14 SOLUTION/ DROPS OPHTHALMIC at 08:12

## 2017-12-25 RX ADMIN — INSULIN ASPART SCH: 100 INJECTION, SOLUTION INTRAVENOUS; SUBCUTANEOUS at 12:00

## 2017-12-25 RX ADMIN — POLYVINYL ALCOHOL SCH DROP: 14 SOLUTION/ DROPS OPHTHALMIC at 17:33

## 2017-12-25 RX ADMIN — POLYVINYL ALCOHOL SCH DROP: 14 SOLUTION/ DROPS OPHTHALMIC at 12:20

## 2017-12-25 RX ADMIN — INSULIN ASPART SCH: 100 INJECTION, SOLUTION INTRAVENOUS; SUBCUTANEOUS at 21:00

## 2017-12-25 RX ADMIN — SODIUM CHLORIDE SCH MLS/HR: 234 INJECTION INTRAMUSCULAR; INTRAVENOUS; SUBCUTANEOUS at 22:54

## 2017-12-25 RX ADMIN — STANDARDIZED SENNA CONCENTRATE AND DOCUSATE SODIUM SCH TAB: 8.6; 5 TABLET, FILM COATED ORAL at 22:55

## 2017-12-25 RX ADMIN — IPRATROPIUM BROMIDE AND ALBUTEROL SULFATE SCH AMPULE: .5; 3 SOLUTION RESPIRATORY (INHALATION) at 03:48

## 2017-12-25 RX ADMIN — IPRATROPIUM BROMIDE AND ALBUTEROL SULFATE SCH AMPULE: .5; 3 SOLUTION RESPIRATORY (INHALATION) at 10:16

## 2017-12-25 RX ADMIN — CHLORHEXIDINE GLUCONATE SCH PACK: 500 CLOTH TOPICAL at 04:00

## 2017-12-25 RX ADMIN — INSULIN ASPART SCH: 100 INJECTION, SOLUTION INTRAVENOUS; SUBCUTANEOUS at 08:00

## 2017-12-25 RX ADMIN — Medication SCH ML: at 08:12

## 2017-12-25 RX ADMIN — IPRATROPIUM BROMIDE AND ALBUTEROL SULFATE SCH AMPULE: .5; 3 SOLUTION RESPIRATORY (INHALATION) at 20:20

## 2017-12-25 RX ADMIN — METOPROLOL TARTRATE SCH MG: 25 TABLET, FILM COATED ORAL at 22:55

## 2017-12-25 RX ADMIN — METOPROLOL TARTRATE SCH MG: 25 TABLET, FILM COATED ORAL at 06:20

## 2017-12-25 RX ADMIN — LACOSAMIDE SCH MG: 100 TABLET, FILM COATED ORAL at 22:55

## 2017-12-25 RX ADMIN — METOPROLOL TARTRATE SCH MG: 25 TABLET, FILM COATED ORAL at 17:33

## 2017-12-25 NOTE — HHI.CCPN
Subjective


Remarks/Hospital Course


This is a 77-year-old female that presented to the ED for evaluation of altered 

mental status. Per report, according to EMS the patient normally is more alert 

and  has a history of stroke, he was able to speak with the patient's sister 

who states that she is also power of , the patient apparently normally 

is ambulatory and can carry on a conversation without any difficulty. Per 

records reviewed, the patient was last seen normal and at her baseline at 1 am. 

The patient then sat down in her lazy chair, her sister also noted that she 

started beating her head against a piece of furniture.  The patient was noted 

to have a right frontal contusion upon presentation to the ED .She had a GCS of 

7-8 on arrival. The patient's medical history is significant for a recent 

admission 09/2017 for altered mental status, medication induced.  Her past 

medical history is also significant for a recent history of UTI and C. 

difficile in addition to her history of having a CVA and reportedly residual 

effects in the right upper and lower extremity.  Laboratory and imaging studies 

revealed that most likely the patient has a UTI, CT of the brain revealed no 

abnormality and the patient was noted to have an elevated lactate level.  The 

patient was emergently intubated in the ED, and the patient was noted to be 

significantly hypertensive and a nicardipine infusion was instituted.  Upon 

entering the ED the patient's blood pressure was noted to be 219/92, Cardizem 

infusion had been ordered.  The patient was noted to have spontaneous movement 

of the right upper and lower extremity with a gag reflex. Critical care 

medicine was consulted.


Subjective:


12/25: The patient was weaned off of nicardipine infusion.  Normotensive the 

patient continues on labetalol twice a day scheduled home dosing.  EEG 

attempted last evening however due to patient's movement bilaterally to 

complete artifact EEG reordered.  Fentanyl infusion discontinued for daily 

sedation vacation approximately 7 hours ago the patient remains nonresponsive.  

Opens eyes spontaneously, moves limbs spontaneously but does not follow any 

commands.  Brain MRI/MRA, and CT all negative findings.  EEG scheduled for 

a.m..  The patient continues in severe metabolic acidosis, 2 Amps of sodium 

bicarbonate IV push given this a.m., sodium bicarbonate infusion increased.  

Lactate is cleared, creatinine is improving, CT of the abdomen and pelvis is 

pending this afternoon.





Objective





Vital Signs








  Date Time  Temp Pulse Resp B/P (MAP) Pulse Ox O2 Delivery O2 Flow Rate FiO2


 


12/25/17 11:26     100   40


 


12/25/17 10:00  103      


 


12/25/17 08:00 98.4  15 100/58 (72)    


 


12/24/17 11:24      Ventilator  














Intake and Output   


 


 12/25/17 12/25/17 12/25/17





 07:59 15:59 23:59


 


Intake Total 1660 ml 332 ml 


 


Output Total 275 ml  


 


Balance 1385 ml 332 ml 








Result Diagram:  


12/25/17 0545                                                                  

              12/25/17 0545





Other Results





Laboratory Tests








Test


  12/25/17


04:33


 


Blood Gas Puncture Site LT RADIAL 


 


Blood Gas Patient Temperature 98.6 


 


Blood Gas HCO3


  13 mmol/L


(22-26)


 


Blood Gas Base Excess


  -12.4 mmol/L


(-2-2)


 


Blood Gas Oxygen Saturation 97 % () 


 


Arterial Blood pH


  7.28


(7.380-7.420)


 


Arterial Blood Partial


Pressure CO2 29 mmHg


(38-42)


 


Arterial Blood Partial


Pressure O2 172 mmHg


()


 


Arterial Blood Oxygen Content


  13.5 Vol %


(12.0-20.0)


 


Arterial Blood


Carboxyhemoglobin 0.5 % (0-4) 


 


 


Arterial Blood Methemoglobin 1.2 % (0-2) 


 


Blood Gas Hemoglobin


  9.6 G/DL


(12.0-16.0)


 


Oxygen Delivery Device VENTILATOR 


 


Blood Gas Ventilator Setting AC/500/PEEP5 


 


Blood Gas Inspired Oxygen 40 % 








Imaging





Last Impressions








Head Magnetic Resonance Angiography 12/24/17 1119 Signed





Impressions: 





 Service Date/Time:  Sunday, December 24, 2017 13:11 - CONCLUSION: Negative for 





 major branch vessel occlusion.      Pb López MD  FACR


 


Head CT 12/24/17 0851 Signed





Impressions: 





 Service Date/Time:  Sunday, December 24, 2017 09:02 - CONCLUSION:  Negative 

for 





 acute process..     Pb López MD  FACR


 


Chest X-Ray 12/24/17 0851 Signed





Impressions: 





 Service Date/Time:  Sunday, December 24, 2017 09:38 - CONCLUSION:  ET good 





 position. Nasogastric tube needs advanced.     Pb López MD  FACR


 


Cervical Spine CT 12/24/17 0000 Signed





Impressions: 





 Service Date/Time:  Sunday, December 24, 2017 09:07 - CONCLUSION:  Fusion as 





 above, negative for fracture.     Pb López MD  FACR








Objective Remarks


GENERAL: Well-developed well-nourished elderly female of stated age.  Currently 

intubated on no sedation, nonresponsive


SKIN: Warm and dry.


HEAD: Atraumatic. Normocephalic. 


EYES: Pupils equal and round. No scleral icterus. No injection or drainage. 


ENT: No nasal bleeding or discharge. Mucous membranes pink and moist.


NECK: Trachea midline. No JVD. 


CARDIOVASCULAR: Normal rate, regular rhythm. 


RESPIRATORY: No accessory muscle use. Clear to auscultation. Breath sounds 

equal bilaterally.  Currently on CPAP trials


GASTROINTESTINAL: Abdomen soft, non-tender, nondistended. No guarding. 


MUSCULOSKELETAL: Extremities without clubbing, cyanosis, or edema. No obvious 

deformities. 


NEUROLOGICAL: Awake and alert. RASS -3.  No sedation, spontaneous movement of 

extremities.  Spontaneous eye opening  Does not follow commands. Per report the 

patient does have deficits status post CVA of right upper and lower extremity


Urinary Catheter:  Yes


Assessment to:  Continue


Erazo insert reason:  Measure Accurate Output


Date of Insertion:  Dec 24, 2017





A/P


Assessment and Plan


This is a 77-year-old female with a history of a previous stroke presenting 

with altered mental status for unknown origin possibly bleed,CVA, toxic 

encephalopathy versus metabolic encephalopathy.  Intubated for airway 

protection. Plan admit to ICU.


Plan by systems: 


Neurologic:


Toxicity versus metabolic encephalopathy


History of CVA


H/O seizures





Neurochecks per ICU protocol


DC Versed infusion


Patient reportedly is allergic to propofol, fentanyl infusion for ventilator 

synchrony


Daily sedation vacation


TSH normal-3.0


Random cortisol level- WNL


 Ammonia level


12/24 MRI brain-negative for ischemia


12/24 MRA brain-negative for major branch occlusion 


12/24 CT brain-no acute intracranial process


12/26 Obtain EEG- F/U results


Patient normally takes Vimpat 100 mg BID, will continue


Will hold patient's home meds baclofen 10 mg 3 times a day, and sertraline 50 

mg twice a day, confirmed concern for serotonin syndrome and rhabdomyolysis.  

Patient has history of altered mental status secondary to being medication 

induced.  Reported by family member -the patient had discontinued baclofen for 

approximately 3 months and took her initial dose of baclofen on 12/23 with a 

TID schedule dosing.








Respiratory:


Acute hypoxemic respiratory failure


Maintain O2 sat greater than 92%


DuoNeb nebs every 6 hours scheduled, every 2 hours when necessary


Daily CPAP trials


Obtain sputum culture


12/25-Chest x-ray lungs clear


 ABGs when clinically indicated- 7.28/29/172/13/-12.4





Cardiovascular:


Hypertensive emergency-resolved


History of hypertension


Nicardipine infusion maintain SBP < 180, wean as tolerated , discontinued 12/25


Labetalol, hydralazine IV , PRN to maintain SBP <160


Initial troponin <0.02, continue to trend


Begin metoprolol XL 50 mg BID, (home med)- Continue metoprolol 25 mg every 6 

hours





Renal:





UTI


Rhabdomyolysis


Maintain Erazo catheter


-- Strict I/Os 





FEN/GI:


Chronic kidney disease stage III


JD


Acute on chronic kidney disease


Severe metabolic acidosis


Monitor BMP


Replete electrolytes as needed


Previous Creatinine 09/2017- 1.15, upon admission 2.76, now downtrending 

creatinine 1.6


Trend Creatinine kinase 885, continue to monitor


Maintain OGT


Continue pravastatin 40 mg daily at bedtime


Give 2 Amps sodium bicarbonate IV now, increase sodium bicarbonate infusion to 

150 cc/hour


Zofran for nausea


Famotidine for GI prophylaxis


Bowel regimen





Heme/ID:


Sepsis


Lactic acidemia-clear


Monitor CBC


Follow-up urine blood and sputum cultures


Follow-up influenza and pneumococcal urine antigen


Obtain C. difficile antigen, patient  has recent history of C. difficile colitis


Patient normally on Plavix hold for now (await CT abd and pelvis)


Lactate level 1.8





Endocrine:


Diabetes mellitus


Glucose monitoring per ICU protocol, low dose regimen


Hold metformin-patient has lactic acidosis, which can be caused by metformin


-- SSI 


Prophylaxis:


GI Prophylaxis


Famotidine twice a day


DVT Prophylaxis


-- SCDs


Lines:


Peripheral IVs providing adequate access.  Central line if indicated


Dispo:


my billing statement 


This patient remains critically ill with one or more organ systems which are or 

may become a threat to life. I have spent in excess of 37 minutes 

discontinuously in the care and management of this patient. This time is 

exclusive of procedures, and includes, but is not limited to, evaluation of the 

patient, review of the medical record, discussions with family, consultants, 

nursing staff, or respiratory therapy, and documentation in the medical record.


Physician


Fely Cifuentes MD Dec 25, 2017 13:07

## 2017-12-25 NOTE — RADRPT
EXAM DATE/TIME:  12/25/2017 16:05 

 

[facility] COMPARISON:     

CT ABDOMEN & PELVIS W/O CONTRAST, September 25, 2017, 17:16.

 

 

INDICATIONS :     

Diffuse abdomen pain with vomiting.

                  

 

ORAL CONTRAST:      

No oral contrast ingested.

                  

 

RADIATION DOSE:     

15.16 CTDIvol (mGy) 

 

 

MEDICAL HISTORY :     

Seizures. Hypertension. Diabetes mellitus type 2.Cervical cancer.

 

SURGICAL HISTORY :      

Appendectomy. Cholecystectomy.

 

ENCOUNTER:      

Initial

 

ACUITY:      

1 day

 

PAIN SCALE:      

Non-responsive

 

LOCATION:       

Bilateral lower quadrant 

 

TECHNIQUE:     

Volumetric scanning of the abdomen and pelvis was performed.  Using automated exposure control and ad
justment of the mA and/or kV according to patient size, radiation dose was kept as low as reasonably 
achievable to obtain optimal diagnostic quality images.  DICOM format image data is available electro
nically for review and comparison.  

 

FINDINGS:     

 

LOWER LUNGS:     

Atelectasis in the dependent portion of the left lung base.

 

LIVER:     

Cholecystectomy clips are noted in the gallbladder fossa. Liver is within normal limits.

 

SPLEEN:     

Normal size without lesion.

 

PANCREAS:     

Within normal limits. 

 

KIDNEYS:     

Normal in size and shape.  There is no mass, stone, or hydronephrosis.

 

ADRENAL GLANDS:     

Within normal limits.

 

VASCULAR:     

Diffuse arterial calcification. Aortic diameter within normal limits.

 

BOWEL/MESENTERY:     

Nasogastric tube tip at the gastroesophageal junction. No evidence of bowel dilatation. No free air o
r free fluid. Appendix not identified.

 

ABDOMINAL WALL:     

Within normal limits.

 

RETROPERITONEUM:     

There is no lymphadenopathy.

 

BLADDER:     

Mild edema surrounding the collapsed urinary bladder. Erazo catheter in place.

 

REPRODUCTIVE:     

Within normal limits.

 

INGUINAL:     

There is no lymphadenopathy or hernia.

 

MUSCULOSKELETAL:     

Degenerative findings of the lumbar spine.

 

CONCLUSION:     

1. Mild edema surrounding the urinary bladder, question cystitis. Erazo catheter in place.

2. Status post cholecystectomy.

 

3. Left lower lobe atelectasis.

4. Nasogastric tube tip at the gastroesophageal junction. 

 

 Christian Mendiola MD on December 25, 2017 at 16:28           

Board Certified Radiologist.

 This report was verified electronically.

## 2017-12-25 NOTE — RADRPT
EXAM DATE/TIME:  12/25/2017 03:01 

 

[facility] COMPARISON:     

CHEST SINGLE AP, December 24, 2017, 9:38.

 

                     

INDICATIONS :     

Shortness of breath, possible pulmonary disease.

                     

 

MEDICAL HISTORY :     

Diabetes mellitus type II.  Hypertension     CVA   

 

SURGICAL HISTORY :     

Fusion, cervical. Cholecystectomy.  

 

ENCOUNTER:     

Subsequent                                        

 

ACUITY:     

2 days      

 

PAIN SCORE:     

Non-responsive.

 

LOCATION:     

Bilateral chest 

 

FINDINGS:     

The lungs are clear without infiltrate, nodule, or mass.  There is no appreciable pleural effusion fo
r technique.  Heart and mediastinum are unremarkable. ET tube, and NG tube have not changed.

 

CONCLUSION:         No acute cardiopulmonary disease.

 

 

 BRI Rodriguez MD on December 25, 2017 at 4:49           

Board Certified Radiologist.

 This report was verified electronically.

## 2017-12-26 VITALS — HEART RATE: 86 BPM | RESPIRATION RATE: 20 BRPM | TEMPERATURE: 99.8 F | OXYGEN SATURATION: 100 %

## 2017-12-26 VITALS
HEART RATE: 96 BPM | DIASTOLIC BLOOD PRESSURE: 57 MMHG | SYSTOLIC BLOOD PRESSURE: 122 MMHG | TEMPERATURE: 99.1 F | RESPIRATION RATE: 18 BRPM | OXYGEN SATURATION: 99 %

## 2017-12-26 VITALS — HEART RATE: 79 BPM

## 2017-12-26 VITALS — OXYGEN SATURATION: 100 %

## 2017-12-26 VITALS
TEMPERATURE: 99.4 F | HEART RATE: 84 BPM | SYSTOLIC BLOOD PRESSURE: 124 MMHG | DIASTOLIC BLOOD PRESSURE: 58 MMHG | RESPIRATION RATE: 17 BRPM | OXYGEN SATURATION: 100 %

## 2017-12-26 VITALS
HEART RATE: 75 BPM | SYSTOLIC BLOOD PRESSURE: 124 MMHG | RESPIRATION RATE: 19 BRPM | TEMPERATURE: 99.2 F | OXYGEN SATURATION: 100 % | DIASTOLIC BLOOD PRESSURE: 60 MMHG

## 2017-12-26 VITALS
TEMPERATURE: 99.3 F | SYSTOLIC BLOOD PRESSURE: 125 MMHG | OXYGEN SATURATION: 100 % | RESPIRATION RATE: 23 BRPM | DIASTOLIC BLOOD PRESSURE: 69 MMHG | HEART RATE: 94 BPM

## 2017-12-26 VITALS
TEMPERATURE: 99.5 F | HEART RATE: 86 BPM | DIASTOLIC BLOOD PRESSURE: 60 MMHG | OXYGEN SATURATION: 100 % | RESPIRATION RATE: 19 BRPM | SYSTOLIC BLOOD PRESSURE: 128 MMHG

## 2017-12-26 VITALS — HEART RATE: 84 BPM

## 2017-12-26 VITALS — HEART RATE: 91 BPM

## 2017-12-26 VITALS — HEART RATE: 90 BPM

## 2017-12-26 VITALS — HEART RATE: 87 BPM

## 2017-12-26 VITALS — HEART RATE: 116 BPM

## 2017-12-26 LAB
ALBUMIN SERPL-MCNC: 2.7 GM/DL (ref 3.4–5)
ALP SERPL-CCNC: 99 U/L (ref 45–117)
ALT SERPL-CCNC: 17 U/L (ref 10–53)
AST SERPL-CCNC: 30 U/L (ref 15–37)
BASOPHILS # BLD AUTO: 0 TH/MM3 (ref 0–0.2)
BASOPHILS NFR BLD: 0.4 % (ref 0–2)
BILIRUB SERPL-MCNC: 0.3 MG/DL (ref 0.2–1)
BUN SERPL-MCNC: 19 MG/DL (ref 7–18)
CALCIUM SERPL-MCNC: 7.8 MG/DL (ref 8.5–10.1)
CHLORIDE SERPL-SCNC: 100 MEQ/L (ref 98–107)
CREAT SERPL-MCNC: 1.3 MG/DL (ref 0.5–1)
EOSINOPHIL # BLD: 0 TH/MM3 (ref 0–0.4)
EOSINOPHIL NFR BLD: 0.3 % (ref 0–4)
ERYTHROCYTE [DISTWIDTH] IN BLOOD BY AUTOMATED COUNT: 15.4 % (ref 11.6–17.2)
GFR SERPLBLD BASED ON 1.73 SQ M-ARVRAT: 40 ML/MIN (ref 89–?)
GLUCOSE SERPL-MCNC: 141 MG/DL (ref 74–106)
HCO3 BLD-SCNC: 32.4 MEQ/L (ref 21–32)
HCT VFR BLD CALC: 25.8 % (ref 35–46)
HGB BLD-MCNC: 9 GM/DL (ref 11.6–15.3)
LYMPHOCYTES # BLD AUTO: 1.7 TH/MM3 (ref 1–4.8)
LYMPHOCYTES NFR BLD AUTO: 17.8 % (ref 9–44)
MCH RBC QN AUTO: 30.1 PG (ref 27–34)
MCHC RBC AUTO-ENTMCNC: 34.8 % (ref 32–36)
MCV RBC AUTO: 86.6 FL (ref 80–100)
MONOCYTE #: 1 TH/MM3 (ref 0–0.9)
MONOCYTES NFR BLD: 10 % (ref 0–8)
NEUTROPHILS # BLD AUTO: 6.9 TH/MM3 (ref 1.8–7.7)
NEUTROPHILS NFR BLD AUTO: 71.5 % (ref 16–70)
PLATELET # BLD: 294 TH/MM3 (ref 150–450)
PMV BLD AUTO: 7.6 FL (ref 7–11)
PROT SERPL-MCNC: 6.4 GM/DL (ref 6.4–8.2)
RBC # BLD AUTO: 2.98 MIL/MM3 (ref 4–5.3)
SODIUM SERPL-SCNC: 141 MEQ/L (ref 136–145)
WBC # BLD AUTO: 9.7 TH/MM3 (ref 4–11)

## 2017-12-26 RX ADMIN — Medication PRN MLS/HR: at 02:24

## 2017-12-26 RX ADMIN — OXYTOCIN SCH MLS/HR: 10 INJECTION, SOLUTION INTRAMUSCULAR; INTRAVENOUS at 11:17

## 2017-12-26 RX ADMIN — Medication SCH ML: at 20:37

## 2017-12-26 RX ADMIN — CHLORHEXIDINE GLUCONATE SCH PACK: 500 CLOTH TOPICAL at 04:00

## 2017-12-26 RX ADMIN — ACYCLOVIR SODIUM SCH MLS/HR: 50 INJECTION, SOLUTION INTRAVENOUS at 14:11

## 2017-12-26 RX ADMIN — STANDARDIZED SENNA CONCENTRATE AND DOCUSATE SODIUM SCH TAB: 8.6; 5 TABLET, FILM COATED ORAL at 08:34

## 2017-12-26 RX ADMIN — FAMOTIDINE SCH MG: 10 INJECTION, SOLUTION INTRAVENOUS at 08:34

## 2017-12-26 RX ADMIN — POTASSIUM CHLORIDE PRN MLS/HR: 200 INJECTION, SOLUTION INTRAVENOUS at 11:42

## 2017-12-26 RX ADMIN — METOPROLOL TARTRATE SCH MG: 25 TABLET, FILM COATED ORAL at 10:27

## 2017-12-26 RX ADMIN — METOPROLOL TARTRATE SCH MG: 25 TABLET, FILM COATED ORAL at 23:47

## 2017-12-26 RX ADMIN — CEFEPIME SCH MLS/HR: 2 INJECTION, POWDER, FOR SOLUTION INTRAVENOUS at 10:27

## 2017-12-26 RX ADMIN — Medication SCH ML: at 08:35

## 2017-12-26 RX ADMIN — CHLORHEXIDINE GLUCONATE 0.12% ORAL RINSE SCH ML: 1.2 LIQUID ORAL at 08:36

## 2017-12-26 RX ADMIN — SODIUM CHLORIDE SCH MLS/HR: 234 INJECTION INTRAMUSCULAR; INTRAVENOUS; SUBCUTANEOUS at 04:11

## 2017-12-26 RX ADMIN — FAMOTIDINE SCH MG: 10 INJECTION, SOLUTION INTRAVENOUS at 20:37

## 2017-12-26 RX ADMIN — INSULIN ASPART SCH: 100 INJECTION, SOLUTION INTRAVENOUS; SUBCUTANEOUS at 08:00

## 2017-12-26 RX ADMIN — METOPROLOL TARTRATE SCH MG: 25 TABLET, FILM COATED ORAL at 05:25

## 2017-12-26 RX ADMIN — IPRATROPIUM BROMIDE AND ALBUTEROL SULFATE SCH AMPULE: .5; 3 SOLUTION RESPIRATORY (INHALATION) at 04:23

## 2017-12-26 RX ADMIN — LACOSAMIDE SCH MG: 100 TABLET, FILM COATED ORAL at 08:35

## 2017-12-26 RX ADMIN — METOPROLOL TARTRATE SCH MG: 25 TABLET, FILM COATED ORAL at 17:20

## 2017-12-26 RX ADMIN — CHLORHEXIDINE GLUCONATE 0.12% ORAL RINSE SCH ML: 1.2 LIQUID ORAL at 20:36

## 2017-12-26 RX ADMIN — INSULIN ASPART SCH: 100 INJECTION, SOLUTION INTRAVENOUS; SUBCUTANEOUS at 20:37

## 2017-12-26 RX ADMIN — ACYCLOVIR SODIUM SCH MLS/HR: 50 INJECTION, SOLUTION INTRAVENOUS at 22:16

## 2017-12-26 RX ADMIN — FOSPHENYTOIN SODIUM SCH MGPE: 50 INJECTION, SOLUTION INTRAMUSCULAR; INTRAVENOUS at 17:20

## 2017-12-26 RX ADMIN — INSULIN ASPART SCH: 100 INJECTION, SOLUTION INTRAVENOUS; SUBCUTANEOUS at 17:00

## 2017-12-26 RX ADMIN — LACTOBACILLUS ACIDOPHILUS / LACTOBACILLUS BULGARICUS SCH GM: 100 MILLION CFU STRENGTH GRANULES at 14:11

## 2017-12-26 RX ADMIN — CEFTRIAXONE SODIUM SCH MLS/HR: 2 INJECTION, POWDER, FOR SOLUTION INTRAMUSCULAR; INTRAVENOUS at 11:17

## 2017-12-26 RX ADMIN — POTASSIUM CHLORIDE PRN MLS/HR: 200 INJECTION, SOLUTION INTRAVENOUS at 14:09

## 2017-12-26 RX ADMIN — Medication PRN MLS/HR: at 20:39

## 2017-12-26 RX ADMIN — OXYTOCIN SCH MLS/HR: 10 INJECTION, SOLUTION INTRAMUSCULAR; INTRAVENOUS at 20:37

## 2017-12-26 RX ADMIN — IPRATROPIUM BROMIDE AND ALBUTEROL SULFATE SCH AMPULE: .5; 3 SOLUTION RESPIRATORY (INHALATION) at 08:40

## 2017-12-26 RX ADMIN — LACTOBACILLUS ACIDOPHILUS / LACTOBACILLUS BULGARICUS SCH GM: 100 MILLION CFU STRENGTH GRANULES at 17:20

## 2017-12-26 RX ADMIN — LACOSAMIDE SCH MG: 100 TABLET, FILM COATED ORAL at 20:37

## 2017-12-26 RX ADMIN — POTASSIUM CHLORIDE PRN MLS/HR: 200 INJECTION, SOLUTION INTRAVENOUS at 15:57

## 2017-12-26 RX ADMIN — POLYVINYL ALCOHOL SCH DROP: 14 SOLUTION/ DROPS OPHTHALMIC at 14:10

## 2017-12-26 RX ADMIN — CEFTRIAXONE SODIUM SCH MLS/HR: 2 INJECTION, POWDER, FOR SOLUTION INTRAMUSCULAR; INTRAVENOUS at 22:16

## 2017-12-26 RX ADMIN — STANDARDIZED SENNA CONCENTRATE AND DOCUSATE SODIUM SCH TAB: 8.6; 5 TABLET, FILM COATED ORAL at 20:37

## 2017-12-26 RX ADMIN — POLYVINYL ALCOHOL SCH DROP: 14 SOLUTION/ DROPS OPHTHALMIC at 08:35

## 2017-12-26 RX ADMIN — PRAVASTATIN SODIUM SCH MG: 40 TABLET ORAL at 20:37

## 2017-12-26 RX ADMIN — IPRATROPIUM BROMIDE AND ALBUTEROL SULFATE SCH AMPULE: .5; 3 SOLUTION RESPIRATORY (INHALATION) at 21:13

## 2017-12-26 RX ADMIN — INSULIN ASPART SCH: 100 INJECTION, SOLUTION INTRAVENOUS; SUBCUTANEOUS at 12:00

## 2017-12-26 RX ADMIN — IPRATROPIUM BROMIDE AND ALBUTEROL SULFATE SCH AMPULE: .5; 3 SOLUTION RESPIRATORY (INHALATION) at 16:04

## 2017-12-26 RX ADMIN — POLYVINYL ALCOHOL SCH DROP: 14 SOLUTION/ DROPS OPHTHALMIC at 17:42

## 2017-12-26 NOTE — MB
cc:

EMA LEE M.D.

****

 

 

DATE OF CONSULTATION

12/26/2017

 

REASON FOR CONSULTATION

Mental status change.

 

HISTORY OF PRESENT ILLNESS

This is a 77-year-old woman who came to the emergency room on 12/24 of

alteration mental status change.  She has a history of stroke in the past.  She

suddenly developed mental status change where she was confused, started hitting

her head against a piece of furniture. GCS on arrival was 78. She has a recent

UTI and C.  Difficile infection.  The patient has been intubated.

 

PAST MEDICAL HISTORY

1.  History of cervical cancer.

2.  Anemia

3.  Congestive heart failure

4.  Stroke in the past

5.  Esophageal stricture

6.  History of seizures in the past.

7.  Cholecystectomy

8.  Appendectomy

9.  Hysterectomy

10. Cervical cancer removal

11. Cervical spine surgery

12. Nissen fundoplication for gastroesophageal reflux disease.

 

ALLERGIES

INFLUENZA VIRUS, EGG, CODEINE, MEPERIDINE, MORPHINE, TYLENOL, HYDROCODONE.

 

MEDICATIONS

At home:

1.  Eliquis

2.  Baclofen

3.  Plavix

4.  Dexilant

5.  Trazodone

6.  Vimpat

7.  ProAir

8.  Metformin

9.  Toprol XL

10. Pravachol

11. Sertraline

 

NEUROLOGIC EXAMINATION

Pupils are equal and reactive.  The patient is nonresponsive does not follow

commands.  There is no focal motor deficit noted.  Reflexes are 2+ symmetric.

 

MRI of the brain chronic ischemic change.  No acute change present.

 

MRA of the brain is negative.

 

MRA of the neck negative.

 

CT brain negative.

 

Cervical spine CT, fusion no acute change present.

 

LABORATORY DATA

White count 9007, hemoglobin 9, hematocrit 25% platelet count 294,000.  The PT

11.3, INR 1.1, APTT 20.8.

 

Sodium is 141, potassium 2.7, chloride 100, CO2 32, the BUN is 19, creatinine

1.3, GFR is 40, glucose 141, AST 30, ALT 17.  Tox screen positive for

benzodiazepines otherwise negative.

 

Urinalysis, the pH is 5.5, specific gravity 1.019, protein 100, small occult

blood is identified, 40 RBCs are seen, innumerable WBCs are seen in the urine.

 

IMPRESSION

Possible metabolic encephalopathy possibly related to UTI.

 

RECOMMENDATIONS

We will check EEG to rule out any type of seizure activity.

 

 

ADDENDUM

Since the original dictation, an EEG has been accomplished showing generalized

epileptiform activity.  Therefore recommend starting the patient on Cerebyx

with a loading dose of 1000 mg and a maintenance dose of 100 mg IV q.8 h.  We

will recheck an EEG later this afternoon.  Also recommend a lumbar puncture.

Apparently the patient was on Plavix which was stopped three days ago.  We will

have to wait two days to perform LP.

 

 

 

                              _________________________________

                              MD JOAO Belle/LESLEY

D:  12/26/2017/9:18 AM

T:  12/26/2017/10:22 AM

Visit #:  [id number]

Job #:  [id number]

## 2017-12-26 NOTE — HHI.CCPN
Subjective


Remarks/Hospital Course


This is a 77-year-old female that presented to the ED for evaluation of altered 

mental status. Per report, according to EMS the patient normally is more alert 

and  has a history of stroke, he was able to speak with the patient's sister 

who states that she is also power of , the patient apparently normally 

is ambulatory and can carry on a conversation without any difficulty. Per 

records reviewed, the patient was last seen normal and at her baseline at 1 am. 

The patient then sat down in her lazy chair, her sister also noted that she 

started beating her head against a piece of furniture.  The patient was noted 

to have a right frontal contusion upon presentation to the ED .She had a GCS of 

7-8 on arrival. The patient's medical history is significant for a recent 

admission 09/2017 for altered mental status, medication induced.  Her past 

medical history is also significant for a recent history of UTI and C. 

difficile in addition to her history of having a CVA and reportedly residual 

effects in the right upper and lower extremity.  Laboratory and imaging studies 

revealed that most likely the patient has a UTI, CT of the brain revealed no 

abnormality and the patient was noted to have an elevated lactate level.  The 

patient was emergently intubated in the ED, and the patient was noted to be 

significantly hypertensive and a nicardipine infusion was instituted.  Upon 

entering the ED the patient's blood pressure was noted to be 219/92, Cardizem 

infusion had been ordered.  The patient was noted to have spontaneous movement 

of the right upper and lower extremity with a gag reflex. Critical care 

medicine was consulted.








12/25: The patient was weaned off of nicardipine infusion.  Normotensive the 

patient continues on labetalol twice a day scheduled home dosing.  EEG 

attempted last evening however due to patient's movement bilaterally to 

complete artifact EEG reordered.  Fentanyl infusion discontinued for daily 

sedation vacation approximately 7 hours ago the patient remains nonresponsive.  

Opens eyes spontaneously, moves limbs spontaneously but does not follow any 

commands.  Brain MRI/MRA, and CT all negative findings.  EEG scheduled for 

a.m..  The patient continues in severe metabolic acidosis, 2 Amps of sodium 

bicarbonate IV push given this a.m., sodium bicarbonate infusion increased.  

Lactate is cleared, creatinine is improving, CT of the abdomen and pelvis is 

pending this afternoon.





Subjective:


12/26 CT report from yesterday shows bladder edema consistent with cystitis.  

URine culture with GNR  Blood cultures NGTD. . Getting EEG now.





Objective





Vital Signs








  Date Time  Temp Pulse Resp B/P (MAP) Pulse Ox O2 Delivery O2 Flow Rate FiO2


 


12/26/17 06:00  84      


 


12/26/17 04:20     100   40


 


12/26/17 04:00 99.8  20     


 


12/26/17 00:00    124/60 (81)    


 


12/24/17 11:24      Ventilator  














Intake and Output   


 


 12/26/17 12/26/17 12/26/17





 07:59 15:59 23:59


 


Intake Total 1250 ml  


 


Output Total 350 ml  


 


Balance 900 ml  








Result Diagram:  


12/26/17 0530                                                                  

              12/26/17 0520





Other Results





Microbiology








 Date/Time


Source Procedure


Growth Status


 


 


 12/24/17 09:54


Urine Catheterized Urine Streptococcus pneumoniae Antigen (M - Final


PRESUMPTIVE NEGATIVE FOR STREPTOCOCCU... Complete








Imaging





Last Impressions








Head Magnetic Resonance Angiography 12/24/17 1119 Signed





Impressions: 





 Service Date/Time:  Sunday, December 24, 2017 13:11 - CONCLUSION: Negative for 





 major branch vessel occlusion.      Pb López MD  FACR


 


Head CT 12/24/17 0851 Signed





Impressions: 





 Service Date/Time:  Sunday, December 24, 2017 09:02 - CONCLUSION:  Negative 

for 





 acute process..     Pb López MD  FACR


 


Chest X-Ray 12/24/17 0851 Signed





Impressions: 





 Service Date/Time:  Sunday, December 24, 2017 09:38 - CONCLUSION:  ET good 





 position. Nasogastric tube needs advanced.     Pb López MD  FACR


 


Cervical Spine CT 12/24/17 0000 Signed





Impressions: 





 Service Date/Time:  Sunday, December 24, 2017 09:07 - CONCLUSION:  Fusion as 





 above, negative for fracture.     Pb López MD  FACR








Objective Remarks


GENERAL: Well-developed well-nourished elderly female.  Currently intubated on 

fentanyl 


SKIN: Warm and dry.


HEAD: Atraumatic. Normocephalic. 


EYES: R periorbital ecchmosis. Pupils equal and round. No scleral icterus. No 

injection or drainage. 


ENT: No nasal bleeding or discharge. Mucous membranes pink and moist.


NECK: Trachea midline. No JVD. 


CARDIOVASCULAR: Normal rate, regular rhythm. 


RESPIRATORY: No accessory muscle use. Clear to auscultation. Breath sounds 

equal bilaterally.  


GASTROINTESTINAL: Abdomen soft, non-tender, nondistended. No guarding. 


MUSCULOSKELETAL: Extremities without clubbing, cyanosis, or edema. No obvious 

deformities. 


NEUROLOGICAL: Eyes flutter open, does not make eye contact or track. All 

extremities rigid with tonic clonic activity.  Per report the patient does have 

deficits status post CVA of right upper and lower extremity


Date of Insertion:  Dec 24, 2017





A/P


Assessment and Plan








This is a 77-year-old female with a history of a previous stroke presenting 

with altered mental status for unknown origin possibly bleed,CVA, toxic 

encephalopathy versus metabolic encephalopathy.  Intubated for airway 

protection. 





Plan by systems: 


Neurologic:


H/o Polio


Toxicity versus metabolic encephalopathy


History of CVA with R sided deficits


H/O seizures


Anxiety/depression


Neurochecks per ICU protocol


EEG this morning with evidence of seizures


D/W Dr Martin.  On Vimpat 100 twice a day.  Loaded with fosphenytoin. Placed on 

continuous EEG which still demonstrated spikes so Versed drip was restarted and 

then titrated up to 6 mg/hr which resulted in resolution of epileptiform 

activity.


Fentanyl as needed for now the sedation.  Patient does have history of chronic 

pain


TSH normal-3.0


Random cortisol level only 3.5. Unclear significance as patient had lactic 

acidemia but also hypertensive at the time. 


 Check Ammonia level


12/24 MRI brain-negative for ischemia


12/24 MRA brain-negative for major branch occlusion 


12/24 CT brain-no acute intracranial process


patient's home meds baclofen 10 mg 3 times a day, and sertraline 50 mg twice a 

day have been on hol due to  concern for serotonin syndrome and rhabdomyolysis.

  Patient has history of altered mental status secondary to being medication 

induced.  Reported by family member -the patient had discontinued baclofen for 

approximately 3 months and took her initial dose of baclofen on 12/23 with a 

TID schedule dosing.


Neurology seeing, Dr. Martin. 


Lumbar puncture ordered.  Plavix on hold since admission.  LP can be performed 

on 12/28.  Empirically on vancomycin, Rocephin, acyclovir. 








Respiratory:


Acute hypoxemic respiratory failure


Maintain O2 sat greater than 92%


DuoNeb nebs every 6 hours scheduled, every 2 hours when necessary


Hold on CPAP trials today due to neurologic condition


12/25-Chest x-ray lungs clear





Cardiovascular:


Hypertensive emergency-resolved


History of hypertension


Hyperlipidemia


Nicardipine infusion maintain SBP < 180, wean as tolerated , discontinued 12/25


Labetalol, hydralazine IV , PRN to maintain SBP <160


Serial troponins were negative.


Continue metoprolol 25 mg every 6 hours


Holding metoprolol XL 50 mg BID, (home med)- 





Renal:





UTI


Rhabdomyolysis


Maintain Erazo catheter


-- Strict I/Os 





FEN/


Chronic kidney disease stage III


JD


Acute on chronic kidney disease


Metabolic alkalosis


Monitor BMP


Replete electrolytes as needed


Previous Creatinine 09/2017- 1.15, upon admission 2.76, now downtrending 

creatinine 1.6


Creatinine kinase 885 on 12/24, downtrendng. 


Maintain OGT - Start Glucerna with goal rate 45 ml/hr and followup nutrition 

recs. 


Continue pravastatin 40 mg daily at bedtime


Now alkalosis and hypokalemia on bicarb drip. Will d/c. LR 84 ml/hr. 





GI:


Esophageal stricture


GERD


Zofran for nausea


Famotidine for GI prophylaxis


Bowel regimen





ID:


Sepsis


Lactic acidemia-clear


UTI


Monitor CBC


Blood cultures 12/24 - negative


Urine culture 12/24 -  Escherichia coli, pansensitive


Strep pneumococcal antigen 12/24  - negative 


Recent history of C. difficile colitis. Lactinex tid. 


Will d/c cefepime today based on urine culture data.  Acyclovir initiated by 

Dr. Martin. Use rocephin/vancomycin for meningitis coverage pending LP results.  





HEME:


Patient normally on Plavix hold for now (await CT abd and pelvis)


Lactate level 1.8





Endocrine:


Diabetes mellitus


Glucose monitoring per ICU protocol, low dose regimen


Hold metformin-patient has lactic acidosis, which can be caused by metformin


-


- SSI 


Prophylaxis:


GI Prophylaxis


Famotidine twice a day


DVT Prophylaxis


-- SCDs





Lines:


Peripheral IVs providing adequate access.  





Dispo:


Patient is critically ill with acute clinical seizure activity that could 

result in secondary neurologic injury.  She required emergent therapy.  

Multiple discussions with EEG technician and with Dr. Martin throughout the day 

to manage sedation and anticonvulsants appropriately and ensure suppression of 

seizure activity.  Discussed with bedside RN.





Critical care time 55 minutes exclusive of separately billable procedures.











Jacque Fuentes MD Dec 26, 2017 09:18

## 2017-12-26 NOTE — MG
cc:

JACKELYN LO MD

****

 

 

Lab No:  Date:  17 Age: 77  Sex:  F Race:

 

CONTINUOUS ELECTROENCEPHALOGRAM

 

 1940

 

There is no EEG number.

 

REFERRING PHYSICIAN

Dr. Martin

 

This is  a continuous monitor for seizure activity.  The patient already has

been on anti-seizure medicine.  The overall description of record this first

portion shows some spike and wave discharges.  Bilaterally only short lived

just only  for two minutes then again it restarts again at 1:56 p.m.   There is

again some issues with impedance.  It is only  for a few epochs. Again, there

were some spike and wave discharges in that as well. In  the last portion,

restarts  at 5:19 p.m.  Again there is portions that are for some reason

eliminated from the recording. They may be just trying to show the spike and

waves, but again there were lower amplitude spike and waves seen in the

recording. However, there may have been some mild improvement in the background

with interspersed background ictal slowing.  However, this is  prolonged.

There are episodes where they are higher amplitude spike and waves.  Does not

state  in the documentation if there had been any change in medication,

however, as the EEG ends  there is still some spike and wave discharges noted

in a continuous fashion.

 

IMPRESSION

Abnormal EEG due to persistent epileptic activity bilaterally in the recording.

Clinical  correlation, medication adjustment as per the attending neurologist.

 

 

                              _________________________________

                              Jackelyn Lo MD

 

 

 

DF/

D:  2017/7:12 PM

T:  2017/8:36 PM

Visit #:  [id number]

Job #:  [id number]

## 2017-12-26 NOTE — RADRPT
EXAM DATE/TIME:  12/26/2017 03:36 

 

[facility] COMPARISON:     

CHEST SINGLE AP, December 25, 2017, 3:01.

 

                     

INDICATIONS :     

Short of breath.

                     

 

MEDICAL HISTORY :            

Diabetes mellitus type II. Hypertension CVA   

 

SURGICAL HISTORY :        

Fusion, cervical. Cholecystectomy.

 

ENCOUNTER:     

Subsequent                                        

 

ACUITY:     

3 days      

 

PAIN SCORE:     

0/10

 

LOCATION:     

Bilateral  chest

 

FINDINGS:     

The lungs are clear without infiltrate, nodule, or mass.  There is no appreciable pleural effusion fo
r technique.  Heart and mediastinum are unremarkable. ET tube, and NG tube have not changed.

 

CONCLUSION:         No acute cardiopulmonary disease.

 

 

 BRI Rodriguez MD on December 26, 2017 at 4:58           

Board Certified Radiologist.

 This report was verified electronically.

## 2017-12-26 NOTE — MG
cc:

GAVI FAIR M.D.

****

 

Sex:  F

 

YOB: 1940, 77

 

EEG 

 

REFERRING PHYSICIAN:

Dr. Aguayo.

 

Room 525.

 

Photic stimulation, 200 mcg fentanyl.

 

Dr. Martin came into see the patient.  He ordered Cerebyx, intubated.  CT is

negative.

 

EEG 06/09/2011 was normal.  Admitted for change in mental status.

 

History of anemia.

Hyperlipidemia.

Seizures.

 

Currently on sodium bicarb, cefepime, Lopressor, Vimpep, fentanyl 200 mcg.

 

DESCRIPTION OF RECORD

Patient had spike and slow wave activity seen bilaterally, seen from the

initial portion of the recording, seen in a rhythmic pattern.  EKG some

artifact, unable to determine the rhythm.  The patient had continuous ___ and

wave discharges.

 

IMPRESSION:

Abnormal EEG due to epileptic activity in this recording.  Clinical

correlation.

 

 

                              _________________________________

                              MD YOVANI Irene/KENDAL

D:  12/26/2017/6:54 PM

T:  12/26/2017/7:39 PM

Visit #:  [id number]

Job #:  [id number]

## 2017-12-27 VITALS
OXYGEN SATURATION: 98 % | TEMPERATURE: 99.6 F | DIASTOLIC BLOOD PRESSURE: 71 MMHG | SYSTOLIC BLOOD PRESSURE: 159 MMHG | HEART RATE: 94 BPM | RESPIRATION RATE: 14 BRPM

## 2017-12-27 VITALS
HEART RATE: 105 BPM | TEMPERATURE: 99.5 F | RESPIRATION RATE: 19 BRPM | DIASTOLIC BLOOD PRESSURE: 63 MMHG | SYSTOLIC BLOOD PRESSURE: 138 MMHG | OXYGEN SATURATION: 98 %

## 2017-12-27 VITALS — OXYGEN SATURATION: 100 %

## 2017-12-27 VITALS
OXYGEN SATURATION: 100 % | SYSTOLIC BLOOD PRESSURE: 150 MMHG | DIASTOLIC BLOOD PRESSURE: 69 MMHG | TEMPERATURE: 99.8 F | RESPIRATION RATE: 21 BRPM | HEART RATE: 96 BPM

## 2017-12-27 VITALS
SYSTOLIC BLOOD PRESSURE: 145 MMHG | HEART RATE: 95 BPM | OXYGEN SATURATION: 100 % | DIASTOLIC BLOOD PRESSURE: 64 MMHG | RESPIRATION RATE: 19 BRPM | TEMPERATURE: 99.9 F

## 2017-12-27 VITALS
SYSTOLIC BLOOD PRESSURE: 150 MMHG | OXYGEN SATURATION: 99 % | HEART RATE: 93 BPM | DIASTOLIC BLOOD PRESSURE: 68 MMHG | RESPIRATION RATE: 20 BRPM | TEMPERATURE: 99.2 F

## 2017-12-27 VITALS
OXYGEN SATURATION: 100 % | DIASTOLIC BLOOD PRESSURE: 62 MMHG | RESPIRATION RATE: 20 BRPM | SYSTOLIC BLOOD PRESSURE: 132 MMHG | HEART RATE: 96 BPM | TEMPERATURE: 100 F

## 2017-12-27 VITALS — HEART RATE: 107 BPM

## 2017-12-27 VITALS — HEART RATE: 93 BPM

## 2017-12-27 VITALS — HEART RATE: 98 BPM

## 2017-12-27 VITALS — HEART RATE: 91 BPM

## 2017-12-27 VITALS — HEART RATE: 84 BPM

## 2017-12-27 LAB
BUN SERPL-MCNC: 14 MG/DL (ref 7–18)
CALCIUM SERPL-MCNC: 7.5 MG/DL (ref 8.5–10.1)
CHLORIDE SERPL-SCNC: 104 MEQ/L (ref 98–107)
CREAT SERPL-MCNC: 1.28 MG/DL (ref 0.5–1)
ERYTHROCYTE [DISTWIDTH] IN BLOOD BY AUTOMATED COUNT: 15.6 % (ref 11.6–17.2)
GFR SERPLBLD BASED ON 1.73 SQ M-ARVRAT: 41 ML/MIN (ref 89–?)
GLUCOSE SERPL-MCNC: 144 MG/DL (ref 74–106)
HCO3 BLD-SCNC: 23.3 MEQ/L (ref 21–32)
HCT VFR BLD CALC: 24.8 % (ref 35–46)
HGB BLD-MCNC: 8.3 GM/DL (ref 11.6–15.3)
MCH RBC QN AUTO: 30 PG (ref 27–34)
MCHC RBC AUTO-ENTMCNC: 33.6 % (ref 32–36)
MCV RBC AUTO: 89.2 FL (ref 80–100)
PHENYTOIN (DILANTIN): 11.3 MCG/ML (ref 10–20)
PLATELET # BLD: 247 TH/MM3 (ref 150–450)
PMV BLD AUTO: 7.6 FL (ref 7–11)
RBC # BLD AUTO: 2.79 MIL/MM3 (ref 4–5.3)
SODIUM SERPL-SCNC: 137 MEQ/L (ref 136–145)
WBC # BLD AUTO: 8.4 TH/MM3 (ref 4–11)

## 2017-12-27 RX ADMIN — CEFTRIAXONE SODIUM SCH MLS/HR: 2 INJECTION, POWDER, FOR SOLUTION INTRAMUSCULAR; INTRAVENOUS at 22:09

## 2017-12-27 RX ADMIN — LACOSAMIDE SCH MG: 100 TABLET, FILM COATED ORAL at 08:34

## 2017-12-27 RX ADMIN — FOSPHENYTOIN SODIUM SCH MGPE: 50 INJECTION, SOLUTION INTRAMUSCULAR; INTRAVENOUS at 15:00

## 2017-12-27 RX ADMIN — IPRATROPIUM BROMIDE AND ALBUTEROL SULFATE SCH AMPULE: .5; 3 SOLUTION RESPIRATORY (INHALATION) at 20:17

## 2017-12-27 RX ADMIN — IPRATROPIUM BROMIDE AND ALBUTEROL SULFATE SCH AMPULE: .5; 3 SOLUTION RESPIRATORY (INHALATION) at 15:40

## 2017-12-27 RX ADMIN — IPRATROPIUM BROMIDE AND ALBUTEROL SULFATE SCH AMPULE: .5; 3 SOLUTION RESPIRATORY (INHALATION) at 08:06

## 2017-12-27 RX ADMIN — METOPROLOL TARTRATE SCH MG: 25 TABLET, FILM COATED ORAL at 05:26

## 2017-12-27 RX ADMIN — STANDARDIZED SENNA CONCENTRATE AND DOCUSATE SODIUM SCH TAB: 8.6; 5 TABLET, FILM COATED ORAL at 08:34

## 2017-12-27 RX ADMIN — OXYTOCIN SCH MLS/HR: 10 INJECTION, SOLUTION INTRAMUSCULAR; INTRAVENOUS at 05:27

## 2017-12-27 RX ADMIN — FOSPHENYTOIN SODIUM SCH MGPE: 50 INJECTION, SOLUTION INTRAMUSCULAR; INTRAVENOUS at 01:11

## 2017-12-27 RX ADMIN — LACTOBACILLUS ACIDOPHILUS / LACTOBACILLUS BULGARICUS SCH GM: 100 MILLION CFU STRENGTH GRANULES at 08:34

## 2017-12-27 RX ADMIN — STANDARDIZED SENNA CONCENTRATE AND DOCUSATE SODIUM SCH TAB: 8.6; 5 TABLET, FILM COATED ORAL at 20:01

## 2017-12-27 RX ADMIN — POLYVINYL ALCOHOL SCH DROP: 14 SOLUTION/ DROPS OPHTHALMIC at 16:49

## 2017-12-27 RX ADMIN — CHLORHEXIDINE GLUCONATE 0.12% ORAL RINSE SCH ML: 1.2 LIQUID ORAL at 20:01

## 2017-12-27 RX ADMIN — CHLORHEXIDINE GLUCONATE SCH PACK: 500 CLOTH TOPICAL at 04:00

## 2017-12-27 RX ADMIN — ACYCLOVIR SODIUM SCH MLS/HR: 50 INJECTION, SOLUTION INTRAVENOUS at 22:43

## 2017-12-27 RX ADMIN — POLYVINYL ALCOHOL SCH DROP: 14 SOLUTION/ DROPS OPHTHALMIC at 08:34

## 2017-12-27 RX ADMIN — POLYVINYL ALCOHOL SCH DROP: 14 SOLUTION/ DROPS OPHTHALMIC at 13:26

## 2017-12-27 RX ADMIN — FOSPHENYTOIN SODIUM SCH MGPE: 50 INJECTION, SOLUTION INTRAMUSCULAR; INTRAVENOUS at 20:01

## 2017-12-27 RX ADMIN — Medication PRN MLS/HR: at 22:02

## 2017-12-27 RX ADMIN — FAMOTIDINE SCH MG: 10 INJECTION, SOLUTION INTRAVENOUS at 08:34

## 2017-12-27 RX ADMIN — CHLORHEXIDINE GLUCONATE 0.12% ORAL RINSE SCH ML: 1.2 LIQUID ORAL at 08:35

## 2017-12-27 RX ADMIN — METOPROLOL TARTRATE SCH MG: 25 TABLET, FILM COATED ORAL at 13:26

## 2017-12-27 RX ADMIN — INSULIN ASPART SCH: 100 INJECTION, SOLUTION INTRAVENOUS; SUBCUTANEOUS at 08:00

## 2017-12-27 RX ADMIN — LACTOBACILLUS ACIDOPHILUS / LACTOBACILLUS BULGARICUS SCH GM: 100 MILLION CFU STRENGTH GRANULES at 13:26

## 2017-12-27 RX ADMIN — MIDAZOLAM HYDROCHLORIDE PRN MLS/HR: 5 INJECTION, SOLUTION INTRAMUSCULAR; INTRAVENOUS at 02:14

## 2017-12-27 RX ADMIN — CEFTRIAXONE SODIUM SCH MLS/HR: 2 INJECTION, POWDER, FOR SOLUTION INTRAMUSCULAR; INTRAVENOUS at 10:31

## 2017-12-27 RX ADMIN — FOSPHENYTOIN SODIUM SCH MGPE: 50 INJECTION, SOLUTION INTRAMUSCULAR; INTRAVENOUS at 08:34

## 2017-12-27 RX ADMIN — IPRATROPIUM BROMIDE AND ALBUTEROL SULFATE SCH AMPULE: .5; 3 SOLUTION RESPIRATORY (INHALATION) at 03:59

## 2017-12-27 RX ADMIN — ACYCLOVIR SODIUM SCH MLS/HR: 50 INJECTION, SOLUTION INTRAVENOUS at 10:29

## 2017-12-27 RX ADMIN — METOPROLOL TARTRATE SCH MG: 25 TABLET, FILM COATED ORAL at 16:49

## 2017-12-27 RX ADMIN — MIDAZOLAM HYDROCHLORIDE PRN MLS/HR: 5 INJECTION, SOLUTION INTRAMUSCULAR; INTRAVENOUS at 16:49

## 2017-12-27 RX ADMIN — INSULIN ASPART SCH: 100 INJECTION, SOLUTION INTRAVENOUS; SUBCUTANEOUS at 17:22

## 2017-12-27 RX ADMIN — INSULIN ASPART SCH: 100 INJECTION, SOLUTION INTRAVENOUS; SUBCUTANEOUS at 23:50

## 2017-12-27 RX ADMIN — Medication SCH ML: at 20:01

## 2017-12-27 RX ADMIN — LACTOBACILLUS ACIDOPHILUS / LACTOBACILLUS BULGARICUS SCH GM: 100 MILLION CFU STRENGTH GRANULES at 16:49

## 2017-12-27 RX ADMIN — LACOSAMIDE SCH MG: 100 TABLET, FILM COATED ORAL at 20:01

## 2017-12-27 RX ADMIN — METOPROLOL TARTRATE SCH MG: 25 TABLET, FILM COATED ORAL at 23:50

## 2017-12-27 RX ADMIN — Medication SCH ML: at 08:34

## 2017-12-27 RX ADMIN — PRAVASTATIN SODIUM SCH MG: 40 TABLET ORAL at 20:01

## 2017-12-27 RX ADMIN — OXYTOCIN SCH MLS/HR: 10 INJECTION, SOLUTION INTRAMUSCULAR; INTRAVENOUS at 16:54

## 2017-12-27 RX ADMIN — INSULIN ASPART SCH: 100 INJECTION, SOLUTION INTRAVENOUS; SUBCUTANEOUS at 13:30

## 2017-12-27 NOTE — RADRPT
EXAM DATE/TIME:  12/27/2017 21:12 

 

[facility] COMPARISON:     

No previous studies available for comparison.

        

 

 

INDICATIONS :                

Bilateral leg swelling.

            

 

MEDICAL HISTORY :     

Gastroesophageal reflux disease. Hypercholesterolemia. Hypertension. Cataracts. Cerebrovascular accid
ent. Seizures. Head trauma. Anticoagulant therapy. Arthritis. Osteoporosis. Cervical cancer. Esophage
al strictures. Incontinence. Diabetes. Depression. Anxiety. Anemia. Cdiff.

 

SURGICAL HISTORY :     

Appendectomy.Cholecystectomy. Hysterectomy.Cervical spine surgery. Cervical conization. Right knee solano
rgery. Right foot surgery. Laproscopic nissen. Right ankle fracture.

 

ENCOUNTER:     

Initial

 

ACUITY:     

1 day

 

PAIN SCORE:      

Non-responsive

 

LOCATION:      

Bilateral  legs.

                       

 

TECHNIQUE:     

Venous ultrasound of the left and right leg was performed from the inguinal ligament to the proximal 
calf.  Real-time, color Doppler and spectral tracing, compression and augmentation techniques were us
ed.  

 

FINDINGS:     

 

RIGHT LEG:     

There is normal compressibility of the deep venous system from the inguinal region to the proximal ca
lf.  No echogenic clot is seen in the lumen of the common femoral, femoral, popliteal, and posterior 
tibial veins.  There is a normal response of the venous system to proximal and distal augmentation an
d respiration.  

 

LEFT LEG:     

There is normal compressibility of the deep venous system from the inguinal region to the proximal ca
lf.  No echogenic clot is seen in the lumen of the common femoral, femoral, popliteal, and posterior 
tibial veins.  There is a normal response of the venous system to proximal and distal augmentation an
d respiration.  

 

CONCLUSION:     

1. No sonographic evidence for lower extremity DVT.

 

 

 

 Reji Townsend MD on December 27, 2017 at 22:27           

Board Certified Radiologist.

 This report was verified electronically.

## 2017-12-27 NOTE — HHI.CCPN
Subjective


Remarks/Hospital Course


This is a 77-year-old female that presented to the ED for evaluation of altered 

mental status. Per report, according to EMS the patient normally is more alert 

and  has a history of stroke, he was able to speak with the patient's sister 

who states that she is also power of , the patient apparently normally 

is ambulatory and can carry on a conversation without any difficulty. Per 

records reviewed, the patient was last seen normal and at her baseline at 1 am. 

The patient then sat down in her lazy chair, her sister also noted that she 

started beating her head against a piece of furniture.  The patient was noted 

to have a right frontal contusion upon presentation to the ED .She had a GCS of 

7-8 on arrival. The patient's medical history is significant for a recent 

admission 09/2017 for altered mental status, medication induced.  Her past 

medical history is also significant for a recent history of UTI and C. 

difficile in addition to her history of having a CVA and reportedly residual 

effects in the right upper and lower extremity.  Laboratory and imaging studies 

revealed that most likely the patient has a UTI, CT of the brain revealed no 

abnormality and the patient was noted to have an elevated lactate level.  The 

patient was emergently intubated in the ED, and the patient was noted to be 

significantly hypertensive and a nicardipine infusion was instituted.  Upon 

entering the ED the patient's blood pressure was noted to be 219/92, Cardizem 

infusion had been ordered.  The patient was noted to have spontaneous movement 

of the right upper and lower extremity with a gag reflex. Critical care 

medicine was consulted.





12/25: The patient was weaned off of nicardipine infusion.  Normotensive the 

patient continues on labetalol twice a day scheduled home dosing.  EEG 

attempted last evening however due to patient's movement bilaterally to 

complete artifact EEG reordered.  Fentanyl infusion discontinued for daily 

sedation vacation approximately 7 hours ago the patient remains nonresponsive.  

Opens eyes spontaneously, moves limbs spontaneously but does not follow any 

commands.  Brain MRI/MRA, and CT all negative findings.  EEG scheduled for 

a.m..  The patient continues in severe metabolic acidosis, 2 Amps of sodium 

bicarbonate IV push given this a.m., sodium bicarbonate infusion increased.  

Lactate is cleared, creatinine is improving, CT of the abdomen and pelvis is 

pending this afternoon.


12/26 CT report from yesterday shows bladder edema consistent with cystitis.  

URine culture with GNR  Blood cultures NGTD. . Getting EEG now. 





Subjective





12/27:





Objective





Vital Signs








  Date Time  Temp Pulse Resp B/P (MAP) Pulse Ox O2 Delivery O2 Flow Rate FiO2


 


12/27/17 16:00        35


 


12/27/17 16:00  105      


 


12/27/17 16:00 99.5  19 138/63 (88) 98   


 


12/24/17 11:24      Ventilator  














Intake and Output   


 


 12/27/17 12/27/17 12/28/17





 08:00 16:00 00:00


 


Intake Total 1496 ml  


 


Output Total 1000 ml  


 


Balance 496 ml  








Result Diagram:  


12/27/17 0739                                                                  

              12/27/17 0739





Other Results





Microbiology








 Date/Time


Source Procedure


Growth Status


 


 


 12/24/17 08:55


Blood Peripheral Aerobic Blood Culture - Preliminary


NO GROWTH IN 3 DAYS Resulted


 


 12/24/17 08:55


Blood Peripheral Anaerobic Blood Culture - Preliminary


NO GROWTH IN 3 DAYS Resulted


 


 12/24/17 09:54


Urine Catheterized Urine Streptococcus pneumoniae Antigen (M - Final


PRESUMPTIVE NEGATIVE FOR STREPTOCOCCU... Complete








Imaging





Last Impressions








Chest X-Ray 12/26/17 0600 Signed





Impressions: 





 Service Date/Time:  Tuesday, December 26, 2017 03:36 - CONCLUSION: No acute 





 cardiopulmonary disease.    K. Arias Rodriguez MD 


 


Abdomen/Pelvis CT 12/25/17 0000 Signed





Impressions: 





 Service Date/Time:  Monday, December 25, 2017 16:05 - CONCLUSION:  1. Mild 

edema 





 surrounding the urinary bladder, question cystitis. Erazo catheter in place. 

2. 





 Status post cholecystectomy.  3. Left lower lobe atelectasis. 4. Nasogastric 





 tube tip at the gastroesophageal junction.    Christian Mendiola MD 


 


Neck Magnetic Resonance Angiography 12/24/17 1119 Signed





Impressions: 





 Service Date/Time:  Sunday, December 24, 2017 13:11 - CONCLUSION:  Negative 

for 





 hemodynamically significant carotid stenosis     Pb López MD  FACR


 


Head Magnetic Resonance Angiography 12/24/17 1119 Signed





Impressions: 





 Service Date/Time:  Sunday, December 24, 2017 13:11 - CONCLUSION: Negative for 





 major branch vessel occlusion.      Pb López MD  FACR


 


Brain MRI 12/24/17 1119 Signed





Impressions: 





 Service Date/Time:  Sunday, December 24, 2017 13:11 - CONCLUSION: Marked 





 periventricular white matter changes Central and cortical atrophy Negative for 





 ischemia.      Pb López MD  FACR


 


Head CT 12/24/17 0851 Signed





Impressions: 





 Service Date/Time:  Sunday, December 24, 2017 09:02 - CONCLUSION:  Negative 

for 





 acute process..     Pb López MD  FACR


 


Cervical Spine CT 12/24/17 0000 Signed





Impressions: 





 Service Date/Time:  Sunday, December 24, 2017 09:07 - CONCLUSION:  Fusion as 





 above, negative for fracture.     Pb López MD  FACR








Objective Remarks


GENERAL: 77-year-old  female currently orotracheally intubated


SKIN: Warm and dry.


HEAD: Atraumatic. Normocephalic.  Currently wrapped in Kerlix with EEG probes 

in place


EYES: R periorbital ecchmosis. Pupils equal and round 1 mm bilaterally. No 

scleral icterus. No injection or drainage. 


ENT: No nasal bleeding or discharge. Mucous membranes pink and moist.


NECK: Trachea midline. No JVD. 


CARDIOVASCULAR: RRR.  S1, S2 withoutmurmur


RESPIRATORY: No accessory muscle use. Clear to auscultation. Breath sounds 

equal bilaterally.  


GASTROINTESTINAL: Abdomen soft, non-tender, nondistended. No guarding. 


MUSCULOSKELETAL: Right upper extremity with 2+ edema.  


NEUROLOGICAL: Eyes flutter open, does not make eye contact or track.  Eyes 

upward and rollback All extremities rigid with tonic clonic activity.  Per 

report the patient does have deficits status post CVA of right upper and lower 

extremity patient with post polio syndrome with wasting bilateral lower 

extremities.


Date of Insertion:  Dec 24, 2017





A/P


Assessment and Plan


Neuro/Psych





Post polio syndrome bilateral lower extremity weakness


Status epilepticus


History of CVATIA with R sided deficits


H/O seizures


Anxiety/depression





EEG with ongoing seizure activity


[id number] twice a day will be continued.  Loaded with fosphenytoin - 

currently in the 100 mg IV every 6 hours. 


Placed on continuous EEG titrating midazolam drip currently at 7 mg an hour 

will spikes


Fentanyl as needed for now the sedation.  Patient does have history of chronic 

pain


TSH normal-3.0


Random cortisol level only 3.5. Unclear significance as patient had lactic 

acidemia but also hypertensive at the time.  Recheck 12/28 


Ammonia level - 41


12/24 MRI brain-Moderate periventricular white matter changes are evident.  

There is no restricted diffusion.  There is moderate atrophy with dilatation of 

ventricular sulcal spaces.  There are no extra-axial fluid collections 

appreciated.  Posterior fossa is unremarkable.  


12/24 MRA brain-negative for major branch occlusion 


12/24 CT brain-no acute intracranial process


patient's home meds baclofen 10 mg 3 times a day, tramadol and sertraline 50 mg 

twice a day have been on hold due to  concern for serotonin syndrome and 

rhabdomyolysis.  Patient has history of altered mental status secondary to 

being medication induced.  Reported by family member -the patient had 

discontinued baclofen for approximately 3 months and took her initial dose of 

baclofen on 12/23 with a TID schedule dosing.


Neurology seeing, Dr. Martin. 


Lumbar puncture ordered 12/28.  Clopidogrel on hold since admission. Received 

last 12/24 empirically on vancomycin, ceftriaxone 2 g every 12 hours, acyclovir 

700 mg every 12 hours. 





Respiratory:





Acute hypoxemic respiratory failure





[facility] 16/500/1/5/35


Ventilator bundle


Maintain O2 sat greater than 92%


Albuterol/ipratropium aerosols nebs every 6 hours scheduled, albuterol aerosols 

every 2 hours when necessary


Spontaneous breathing trials when clinically indicated


Chest x-ray in a.m. 12/28





Cardiovascular:





Hypertensive emergency-resolved


History of hypertension


Hyperlipidemia


Congestive heart failure - ejection fraction 5560% 2010





Labetalol, hydralazine IV , PRN to maintain SBP <160


Serial troponins were negative.


Continue metoprolol 25 mg every 6 hours and 50 mg sustained release daily at 

home


Holding clopidogrel 75 mg by mouth daily/home medication


Continue pravastatin 40 mg by mouth daily/home medication for dyslipidemia





Renal/:





Chronic kidney disease stage IIIB





Maintain Erazo catheter


-- Strict I/Os 


Monitor urine output


Monitor BMP


Replete electrolytes as needed


Previous Creatinine 09/2017- 1.15, upon admission 2.76, now downtrending 

creatinine 1.3


Creatinine kinase 885 on 12/24, downtrendng. 


Maintain OGT - Start Glucerna with goal rate 45 ml/hr and followup nutrition 

recs. 


Continue pravastatin 40 mg daily at bedtime


Now alkalosis and hypokalemia on bicarb drip. Will d/c. LR 84 ml/hr. 





GI:





Esophageal stricture -history of esophageal dilatation


GERD


Elevated ammonia





Zofran for nausea currently on Glucerna 1.5 goal 50 cc per hour per nutrition's 

recommendations 


Lansoprazole 30 mg daily GI prophylaxis. On Dexlansoprazole 60 mg times daily 

at home


Docusate sodium/senna 1 tablet twice a day for bowel regimen


Lactulose 30 cc daily.  Recheck in a.m.





ID:





Escherichia coli UTI





Monitor CBC


Blood cultures 12/24 - negative


Urine culture 12/24 -  Escherichia coli, pansensitive


Strep pneumococcal antigen 12/24  - negative 


Recent history of C. difficile colitis. Lactinex tid. 


Acyclovir initiated by Dr. Martin. Use ceftriaxone/vancomycin for meningitis 

coverage pending LP results.  





HEME/ONC:





History of cervical cancer


Normocytic anemia


History of right lower extremity DVT - posterior tibial and femoral vein 

previously on Apixaban





Patient normally on clopidogrel hold since 12/24 for now (await CT abd and 

pelvis)


Lactate level 1.8


Recheck Dopplers bilateral lower extremities today





Endocrine:





Diabetes mellitus


Low cortisol





Glucose monitoring per ICU protocol, low dose regimen Novulog every 6 hours


Hold metformin thousand milligrams twice a day


Check cortisol AM.  Possible cosyntropin test in a.m. it's remains low





Prophylaxis:


GI Prophylaxis


Lansoprazole 30 mg daily


DVT Prophylaxis


-- SCDs holding pharmacological prophylaxis with likely lumbar puncture in a.m.





Lines:


Peripheral IVs providing adequate access.  





Level II follow-up











Vishal Moreno MD Dec 27, 2017 16:43

## 2017-12-27 NOTE — RADRPT
EXAM DATE/TIME:  12/27/2017 21:32 

 

[facility] COMPARISON:     

No previous studies available for comparison.

        

 

 

INDICATIONS :                

Right arm swelling.

            

 

MEDICAL HISTORY :     

Gastroesophageal reflux disease. Hypercholesterolemia. Hypertension. Cataracts. Cerebrovascular accid
ent. Seizures. Head trauma. Anticoagulant therapy. Arthritis. Osteoporosis. Cervical cancer. Esophage
al strictures. Incontinence. Diabetes. Depression. Anxiety. Anemia. Cdiff.

 

SURGICAL HISTORY :     

Cholecystectomy. Appendectomy.  Hysterectomy. Cervical spine surgery. Cervical conization. Right knee
 surgery. Right foot surgery. Laproscopic nissen. Right ankle fracture.

 

ENCOUNTER:     

Initial

 

ACUITY:     

1 day

 

PAIN SCORE:      

Non-responsive

 

LOCATION:      

Right  arm. 

                       

 

FINDINGS:     

There is spontaneous flow documented in the brachial, basilic, cephalic, axillary, and subclavian vei
ns.  The vessels are compressible and augmentation response is documented.  No filling defects are se
en.  The flow is phasic with respiration.  Direction of flow in the jugular vein is caudal.  

 

CONCLUSION:     

1. No sonographic evidence for right upper extremity DVT.

 

 

 

 Reji Townsend MD on December 27, 2017 at 22:27           

Board Certified Radiologist.

 This report was verified electronically.

## 2017-12-27 NOTE — HHI.PR
Review/Management


Diagnosis


Sz on EEG---subclinical--improved


Plan


Increase versed to 7 and increase cerebyx and follow continuous EEG


LP when off plavix 5 days


Diagnosis/Plan:  





Subjective


Subjective Comments


No acute events reported


On iv versed at 6 and cerebyx. EEG improved with reduced sharp waves, but still 

present


Active Medications





Current Medications








 Medications


  (Trade)  Dose


 Ordered  Sig/Aaron


 Route  Start Time


 Stop Time Status Last Admin


 


 Fentanyl Citrate  250 ml @ 5


 mls/hr  TITRATE  PRN


 IV  12/24/17 09:45


    12/26/17 20:39


 


 


  (NS Flush)  2 ml  UNSCH  PRN


 IV FLUSH  12/24/17 11:15


     


 


 


  (NS Flush)  2 ml  BID


 IV FLUSH  12/24/17 21:00


    12/27/17 08:34


 


 


  (Tylenol)  650 mg  Q6H  PRN


 PO  12/24/17 11:15


     


 


 


  (Tears Naturale


 Opth Soln)  1 drop  TID


 EACH EYE  12/24/17 13:00


    12/27/17 08:34


 


 


  (Duoneb Neb)  1 ampule  Q6HR  NEB


 INH  12/24/17 16:00


    12/27/17 08:06


 


 


  (Duoneb Neb)  1 ampule  Q2HR NEB  PRN


 INH  12/24/17 11:15


     


 


 


 Miscellaneous


 Information  1  Q361D


 XX  12/24/17 11:15


    12/24/17 11:15


 


 


  (Chlorhexidine


 2% Cloth)  3 pack


 Taper  DAILY@04


 TOP  12/25/17 04:00


 12/21/18 03:59  12/27/17 04:00


 


 


  (Chlorhexidine


 2% Cloth)  3 pack  UNSCH  PRN


 TOP  12/24/17 11:15


     


 


 


  (Ruby-Colace)  1 tab  BID


 PO  12/24/17 21:00


    12/27/17 08:34


 


 


  (Milk Of


 Magnesia Liq)  30 ml  Q12H  PRN


 PO  12/24/17 11:15


     


 


 


  (Senokot)  17.2 mg  Q12H  PRN


 PO  12/24/17 11:15


     


 


 


  (Dulcolax Supp)  10 mg  DAILY  PRN


 RECTAL  12/24/17 11:15


     


 


 


  (Lactulose Liq)  30 ml  DAILY  PRN


 PO  12/24/17 11:15


     


 


 


  (Peridex 0.12%


 Liq)  15 ml  BID@08,20


 MT  12/24/17 20:00


    12/27/17 08:35


 


 


  (NovoLOG


 SUPPLEMENTAL


 SCALE)  1  ACHS SLIDING  SCALE


 SQ  12/24/17 12:00


    12/25/17 17:37


 


 


  (D50w (Vial) Inj)  50 ml  UNSCH  PRN


 IV PUSH  12/24/17 11:30


     


 


 


  (Glucagon Inj)  1 mg  UNSCH  PRN


 OTHER  12/24/17 11:30


     


 


 


  (Vimpat)  100 mg  BID


 PO  12/24/17 14:15


    12/27/17 08:34


 


 


  (Pravachol)  40 mg  HS


 PO  12/24/17 21:00


    12/26/17 20:37


 


 


  (Trandate Inj)  10 mg  Q4H  PRN


 IV PUSH  12/24/17 14:30


     


 


 


  (Apresoline Inj)  20 mg  Q4H  PRN


 IV PUSH  12/24/17 14:30


     


 


 


  (Lopressor)  25 mg  Q6HR


 PO  12/24/17 21:00


    12/27/17 05:26


 


 


  (Pepcid Inj)  10 mg  Q12HR


 IV PUSH  12/25/17 21:00


    12/27/17 08:34


 


 


  (Pepcid)  20 mg  Q12HR  PRN


 PO  12/25/17 09:30


     


 


 


 Sodium


 Bicarbonate 150


 meq/Dextrose  1,000 ml @ 


 150 mls/hr  Q6H40M


 IV  12/25/17 15:00


   Future Hold 12/26/17 04:11


 


 


 Potassium Chloride  100 ml @ 


 50 mls/hr  Q2H  PRN


 IV  12/26/17 09:15


     


 


 


 Potassium Chloride  100 ml @ 


 50 mls/hr  Q2H  PRN


 IV  12/26/17 09:15


    12/26/17 15:57


 


 


  (K-Lyte Cl  Eff)  50 meq  UNSCH  PRN


 PO  12/26/17 09:15


     


 


 


 Potassium Chloride  100 ml @ 


 25 mls/hr  UNSCH  PRN


 IV  12/26/17 09:15


     


 


 


 Potassium Chloride  100 ml @ 


 50 mls/hr  Q2H  PRN


 IV  12/26/17 09:15


     


 


 


 Magnesium Sulfate


 4 gm/Sodium


 Chloride  100 ml @ 


 50 mls/hr  UNSCH  PRN


 IV  12/26/17 09:15


     


 


 


  (Mag-Ox)  800 mg  UNSCH  PRN


 PO  12/26/17 09:15


     


 


 


 Magnesium Sulfate


 2 gm/Sodium


 Chloride  100 ml @ 


 50 mls/hr  UNSCH  PRN


 IV  12/26/17 09:15


     


 


 


  (K-Phos)  2,000 mg  Q4H  PRN


 PO  12/26/17 09:15


     


 


 


 Sodium Phosphate


 30 mmol/Sodium


 Chloride  250 ml @ 


 42 mls/hr  UNSCH  PRN


 IV  12/26/17 09:15


     


 


 


  (K-Phos)  2,000 mg  UNSCH  PRN


 PO/TUBE  12/26/17 09:15


     


 


 


 Potassium


 Phosphate 30 mmol/


 Sodium Chloride  260 ml @ 


 42 mls/hr  UNSCH  PRN


 IV  12/26/17 09:15


     


 


 


  (Cerebyx Inj)  100 mgpe  Q8H


 IV  12/26/17 18:00


    12/27/17 08:34


 


 


 Acyclovir Sodium


 700 mg/Sodium


 Chloride  100 ml @ 


 100 mls/hr  Q12H


 IV  12/26/17 11:00


    12/26/17 22:16


 


 


 Ceftriaxone


 Sodium 2000 mg/


 Sodium Chloride  100 ml @ 


 200 mls/hr  Q12H


 IV  12/26/17 11:00


    12/26/17 22:16


 


 


  (Lactinex Pkt)  1 gm  TID


 OG-TUBE  12/26/17 13:00


    12/27/17 08:34


 


 


  (Ativan Inj)  2 mg  Q5M  PRN


 IV PUSH  12/26/17 10:45


    12/26/17 14:18


 


 


 Lactated Ringer's  1,000 ml @ 


 100 mls/hr  Q10H


 IV  12/26/17 10:45


    12/27/17 05:27


 


 


 Midazolam HCl  100 ml @ 2


 mls/hr  TITRATE  PRN


 IV  12/26/17 11:15


    12/27/17 02:14


 








Allergies





Allergies


Coded Allergies


  Influenza Virus Vaccines (Unverified Allergy, Intermediate, 10/27/17)


  egg (Unverified Allergy, Intermediate, 10/27/17)


  codeine (Unverified Allergy, Mild, 10/27/17)


  meperidine (Unverified Allergy, Mild, 10/27/17)


  morphine (Unverified Allergy, Mild, 10/27/17)


  acetaminophen (Unverified Adverse Reaction, Mild, HEADACHE, 10/27/17)


  hydrocodone (Unverified Adverse Reaction, Mild, HEADACHE, 10/27/17)





Exam


I&O / VS





Vital Signs








  Date Time  Temp Pulse Resp B/P (MAP) Pulse Ox O2 Delivery O2 Flow Rate FiO2


 


12/27/17 08:07     100   35


 


12/27/17 06:00  91      


 


12/27/17 04:17     100   40


 


12/27/17 04:00  96      


 


12/27/17 04:00        40


 


12/27/17 04:00 99.8 96 21 150/69 (96) 100   


 


12/27/17 02:00  84      


 


12/27/17 01:06     100   40


 


12/27/17 00:00 99.9 95 19 145/64 (91) 100   


 


12/27/17 00:00  95      


 


12/27/17 00:00        40


 


12/26/17 22:08     100   40


 


12/26/17 22:00  87      


 


12/26/17 20:00        40


 


12/26/17 20:00 99.5 86 19 128/60 (82) 100   


 


12/26/17 20:00  86      


 


12/26/17 19:48     100   40


 


12/26/17 18:00  91      


 


12/26/17 16:04     100   40


 


12/26/17 16:00  96      


 


12/26/17 16:00 99.1 96 18 122/57 (78) 99   


 


12/26/17 16:00        40


 


12/26/17 14:00  90      


 


12/26/17 12:55     100   40


 


12/26/17 12:00 99.3 94 23 125/69 (87) 100   


 


12/26/17 12:00  94      


 


12/26/17 12:00        40


 


12/26/17 10:00  116      


 


12/26/17 09:06     100   40








Exam Comments


sedated, nonresponsive


CN--Pupils 1 mm symmetric and reactive. EOM intact to Dolls maneuver


 MOTOR--no focal sx. No clinical tonic clonic activity





Objective


Micro and Labs





Laboratory Tests








Test


  12/26/17


13:20 12/26/17


23:38 12/27/17


07:39


 


Ammonia 41   


 


Potassium Level  3.7  4.0 


 


Phenytoin (Dilantin) Level  11.3  


 


White Blood Count   8.4 


 


Red Blood Count   2.79 


 


Hemoglobin   8.3 


 


Hematocrit   24.8 


 


Mean Corpuscular Volume   89.2 


 


Mean Corpuscular Hemoglobin   30.0 


 


Mean Corpuscular Hemoglobin


Concent 


  


  33.6 


 


 


Red Cell Distribution Width   15.6 


 


Platelet Count   247 


 


Mean Platelet Volume   7.6 


 


Blood Urea Nitrogen   14 


 


Creatinine   1.28 


 


Random Glucose   144 


 


Calcium Level   7.5 


 


Sodium Level   137 


 


Chloride Level   104 


 


Carbon Dioxide Level   23.3 


 


Anion Gap   10 


 


Estimat Glomerular Filtration


Rate 


  


  41 


 














 Date/Time


Source Procedure


Growth Status


 


 


 12/24/17 08:55


Blood Peripheral Aerobic Blood Culture - Preliminary


NO GROWTH IN 2 DAYS Resulted


 


 12/24/17 08:55


Blood Peripheral Anaerobic Blood Culture - Preliminary


NO GROWTH IN 2 DAYS Resulted


 


 12/24/17 09:54


Urine Catheterized Urine Streptococcus pneumoniae Antigen (M - Final


PRESUMPTIVE NEGATIVE FOR STREPTOCOCCU... Complete

















Colton Martin PhD MD Dec 27, 2017 08:47

## 2017-12-27 NOTE — MG
cc:

GAVI FAIR M.D.

****

 

 

Lab No:   Date:  12/27/2017  Age: 77  Sex:  F Race:  ___

 

DATE OF BIRTH

1940

 

REFERRING PHYSICIAN

Dr. Martin

 

TECHNIQUE

In room 525 with photic stimulation on 7 mg per hour of Versed, 100 mcg of

Fentanyl intubated.   CT negative.  Last EEG showed persistent epileptic

bilateral events together with mental status changes.

 

MEDICATIONS

1.  Cerebyx

2.  Acyclovir

3.  Vimpat

4.  Lopressor

5.  Ceftriaxone

 

DESCRIPTION OF RECORD

There is overall diffuse background from predominately of delta frequency.

There is still some occasional spike and slow waves seen bilaterally with some

occasional sharps, not as frequent as in previous studies, but still

persistently seen.

 

IMPRESSION

Abnormal EEG due to bilateral spike and wave discharges still seen in a

periodic fashion suggestive of possible ongoing epileptic events.  Clinical

correlation.

 

 

 

                              _________________________________

                              MD YOVANI Irene/LESLEY

D:  12/27/2017/1:12 PM

T:  12/27/2017/1:21 PM

Visit #:  [id number]

Job #:  [id number]

## 2017-12-28 VITALS
HEART RATE: 105 BPM | DIASTOLIC BLOOD PRESSURE: 68 MMHG | SYSTOLIC BLOOD PRESSURE: 143 MMHG | RESPIRATION RATE: 21 BRPM | OXYGEN SATURATION: 100 % | TEMPERATURE: 99.9 F

## 2017-12-28 VITALS
SYSTOLIC BLOOD PRESSURE: 132 MMHG | OXYGEN SATURATION: 100 % | RESPIRATION RATE: 18 BRPM | HEART RATE: 108 BPM | DIASTOLIC BLOOD PRESSURE: 60 MMHG | TEMPERATURE: 100.3 F

## 2017-12-28 VITALS
HEART RATE: 101 BPM | RESPIRATION RATE: 23 BRPM | DIASTOLIC BLOOD PRESSURE: 67 MMHG | SYSTOLIC BLOOD PRESSURE: 145 MMHG | TEMPERATURE: 99.7 F | OXYGEN SATURATION: 100 %

## 2017-12-28 VITALS
TEMPERATURE: 99 F | SYSTOLIC BLOOD PRESSURE: 109 MMHG | OXYGEN SATURATION: 100 % | HEART RATE: 101 BPM | DIASTOLIC BLOOD PRESSURE: 55 MMHG | RESPIRATION RATE: 16 BRPM

## 2017-12-28 VITALS — OXYGEN SATURATION: 100 %

## 2017-12-28 VITALS — HEART RATE: 112 BPM

## 2017-12-28 VITALS — HEART RATE: 130 BPM

## 2017-12-28 VITALS
OXYGEN SATURATION: 100 % | TEMPERATURE: 99.9 F | RESPIRATION RATE: 22 BRPM | SYSTOLIC BLOOD PRESSURE: 127 MMHG | DIASTOLIC BLOOD PRESSURE: 61 MMHG | HEART RATE: 109 BPM

## 2017-12-28 VITALS — HEART RATE: 100 BPM

## 2017-12-28 VITALS
HEART RATE: 109 BPM | SYSTOLIC BLOOD PRESSURE: 146 MMHG | RESPIRATION RATE: 23 BRPM | TEMPERATURE: 99.8 F | OXYGEN SATURATION: 96 % | DIASTOLIC BLOOD PRESSURE: 62 MMHG

## 2017-12-28 VITALS — HEART RATE: 98 BPM

## 2017-12-28 VITALS — OXYGEN SATURATION: 96 %

## 2017-12-28 LAB
ALBUMIN SERPL-MCNC: 2 GM/DL (ref 3.4–5)
ALP SERPL-CCNC: 85 U/L (ref 45–117)
ALT SERPL-CCNC: 10 U/L (ref 10–53)
AMYLASE SERPL-CCNC: 50 U/L (ref 25–115)
AST SERPL-CCNC: 17 U/L (ref 15–37)
BASOPHILS # BLD AUTO: 0 TH/MM3 (ref 0–0.2)
BASOPHILS NFR BLD: 0.5 % (ref 0–2)
BILIRUB SERPL-MCNC: 0.2 MG/DL (ref 0.2–1)
BUN SERPL-MCNC: 15 MG/DL (ref 7–18)
CALCIUM SERPL-MCNC: 7.7 MG/DL (ref 8.5–10.1)
CHLORIDE SERPL-SCNC: 107 MEQ/L (ref 98–107)
COLOR CSF: CLEAR
CREAT SERPL-MCNC: 1.1 MG/DL (ref 0.5–1)
EOSINOPHIL # BLD: 0.3 TH/MM3 (ref 0–0.4)
EOSINOPHIL NFR BLD: 2.7 % (ref 0–4)
ERYTHROCYTE [DISTWIDTH] IN BLOOD BY AUTOMATED COUNT: 15.6 % (ref 11.6–17.2)
GFR SERPLBLD BASED ON 1.73 SQ M-ARVRAT: 48 ML/MIN (ref 89–?)
GLUCOSE CSF-MCNC: 65 MG/DL (ref 40–80)
GLUCOSE SERPL-MCNC: 110 MG/DL (ref 74–106)
HCO3 BLD-SCNC: 19.5 MEQ/L (ref 21–32)
HCT VFR BLD CALC: 26.2 % (ref 35–46)
HGB BLD-MCNC: 8.4 GM/DL (ref 11.6–15.3)
LIPASE: 110 U/L (ref 73–393)
LYMPHOCYTES # BLD AUTO: 1.4 TH/MM3 (ref 1–4.8)
LYMPHOCYTES NFR BLD AUTO: 14.6 % (ref 9–44)
LYMPHOCYTES NFR CSF MANUAL: 0 %
MAGNESIUM SERPL-MCNC: 1.5 MG/DL (ref 1.5–2.5)
MCH RBC QN AUTO: 28.7 PG (ref 27–34)
MCHC RBC AUTO-ENTMCNC: 32.1 % (ref 32–36)
MCV RBC AUTO: 89.2 FL (ref 80–100)
MONOCYTE #: 0.9 TH/MM3 (ref 0–0.9)
MONOCYTES NFR BLD: 9.5 % (ref 0–8)
NEUTROPHILS # BLD AUTO: 6.8 TH/MM3 (ref 1.8–7.7)
NEUTROPHILS NFR BLD AUTO: 72.7 % (ref 16–70)
NEUTROPHILS NFR CSF: 0 %
PHOSPHATE SERPL-MCNC: 2.6 MG/DL (ref 2.5–4.9)
PLATELET # BLD: 246 TH/MM3 (ref 150–450)
PMV BLD AUTO: 7.4 FL (ref 7–11)
PROT SERPL-MCNC: 6 GM/DL (ref 6.4–8.2)
RBC # BLD AUTO: 2.93 MIL/MM3 (ref 4–5.3)
RBC TUBE #4: 13 /MM3
SODIUM SERPL-SCNC: 137 MEQ/L (ref 136–145)
SPECIMEN VOL CSF: 5 ML
TOTAL PROTEIN,CSF: 43.1 MG/DL (ref 15–45)
WBC # BLD AUTO: 9.3 TH/MM3 (ref 4–11)
WBC TUBE4 # CSF: 0 /MM3 (ref 0–10)

## 2017-12-28 RX ADMIN — WATER SCH ML: 1 IRRIGANT IRRIGATION at 21:19

## 2017-12-28 RX ADMIN — IPRATROPIUM BROMIDE AND ALBUTEROL SULFATE SCH AMPULE: .5; 3 SOLUTION RESPIRATORY (INHALATION) at 17:11

## 2017-12-28 RX ADMIN — MAGNESIUM SULFATE IN DEXTROSE SCH MLS/HR: 10 INJECTION, SOLUTION INTRAVENOUS at 12:18

## 2017-12-28 RX ADMIN — IPRATROPIUM BROMIDE AND ALBUTEROL SULFATE SCH AMPULE: .5; 3 SOLUTION RESPIRATORY (INHALATION) at 20:20

## 2017-12-28 RX ADMIN — INSULIN ASPART SCH: 100 INJECTION, SOLUTION INTRAVENOUS; SUBCUTANEOUS at 12:00

## 2017-12-28 RX ADMIN — LACTOBACILLUS ACIDOPHILUS / LACTOBACILLUS BULGARICUS SCH GM: 100 MILLION CFU STRENGTH GRANULES at 17:19

## 2017-12-28 RX ADMIN — FOSPHENYTOIN SODIUM SCH MGPE: 50 INJECTION, SOLUTION INTRAMUSCULAR; INTRAVENOUS at 21:20

## 2017-12-28 RX ADMIN — LANSOPRAZOLE SCH MG: 30 TABLET, ORALLY DISINTEGRATING, DELAYED RELEASE ORAL at 08:57

## 2017-12-28 RX ADMIN — Medication SCH ML: at 09:00

## 2017-12-28 RX ADMIN — ACYCLOVIR SODIUM SCH MLS/HR: 50 INJECTION, SOLUTION INTRAVENOUS at 11:52

## 2017-12-28 RX ADMIN — LEVETIRACETAM SCH MLS/HR: 100 INJECTION, SOLUTION, CONCENTRATE INTRAVENOUS at 23:41

## 2017-12-28 RX ADMIN — LEVETIRACETAM SCH MLS/HR: 100 INJECTION, SOLUTION, CONCENTRATE INTRAVENOUS at 17:19

## 2017-12-28 RX ADMIN — MAGNESIUM OXIDE TAB 400 MG (241.3 MG ELEMENTAL MG) SCH MG: 400 (241.3 MG) TAB at 21:20

## 2017-12-28 RX ADMIN — POLYETHYLENE GLYCOL 3350 SCH GM: 17 POWDER, FOR SOLUTION ORAL at 21:20

## 2017-12-28 RX ADMIN — FOSPHENYTOIN SODIUM SCH MGPE: 50 INJECTION, SOLUTION INTRAMUSCULAR; INTRAVENOUS at 03:30

## 2017-12-28 RX ADMIN — INSULIN ASPART SCH: 100 INJECTION, SOLUTION INTRAVENOUS; SUBCUTANEOUS at 05:24

## 2017-12-28 RX ADMIN — OXYTOCIN SCH MLS/HR: 10 INJECTION, SOLUTION INTRAMUSCULAR; INTRAVENOUS at 03:30

## 2017-12-28 RX ADMIN — ACYCLOVIR SODIUM SCH MLS/HR: 50 INJECTION, SOLUTION INTRAVENOUS at 22:02

## 2017-12-28 RX ADMIN — PRAVASTATIN SODIUM SCH MG: 40 TABLET ORAL at 21:19

## 2017-12-28 RX ADMIN — CEFTRIAXONE SODIUM SCH MLS/HR: 2 INJECTION, POWDER, FOR SOLUTION INTRAMUSCULAR; INTRAVENOUS at 23:16

## 2017-12-28 RX ADMIN — OXYTOCIN SCH MLS/HR: 10 INJECTION, SOLUTION INTRAMUSCULAR; INTRAVENOUS at 21:19

## 2017-12-28 RX ADMIN — Medication PRN MLS/HR: at 11:32

## 2017-12-28 RX ADMIN — METOPROLOL TARTRATE SCH MG: 25 TABLET, FILM COATED ORAL at 23:41

## 2017-12-28 RX ADMIN — LEVETIRACETAM SCH MLS/HR: 100 INJECTION, SOLUTION, CONCENTRATE INTRAVENOUS at 11:52

## 2017-12-28 RX ADMIN — Medication SCH ML: at 21:20

## 2017-12-28 RX ADMIN — MIDAZOLAM HYDROCHLORIDE PRN MLS/HR: 5 INJECTION, SOLUTION INTRAMUSCULAR; INTRAVENOUS at 09:12

## 2017-12-28 RX ADMIN — LACTOBACILLUS ACIDOPHILUS / LACTOBACILLUS BULGARICUS SCH GM: 100 MILLION CFU STRENGTH GRANULES at 14:31

## 2017-12-28 RX ADMIN — LACOSAMIDE SCH MG: 100 TABLET, FILM COATED ORAL at 08:56

## 2017-12-28 RX ADMIN — STANDARDIZED SENNA CONCENTRATE AND DOCUSATE SODIUM SCH TAB: 8.6; 5 TABLET, FILM COATED ORAL at 08:56

## 2017-12-28 RX ADMIN — FOSPHENYTOIN SODIUM SCH MGPE: 50 INJECTION, SOLUTION INTRAMUSCULAR; INTRAVENOUS at 08:52

## 2017-12-28 RX ADMIN — OXYTOCIN SCH MLS/HR: 10 INJECTION, SOLUTION INTRAMUSCULAR; INTRAVENOUS at 12:45

## 2017-12-28 RX ADMIN — CEFTRIAXONE SODIUM SCH MLS/HR: 2 INJECTION, POWDER, FOR SOLUTION INTRAMUSCULAR; INTRAVENOUS at 11:32

## 2017-12-28 RX ADMIN — METOPROLOL TARTRATE SCH MG: 25 TABLET, FILM COATED ORAL at 11:53

## 2017-12-28 RX ADMIN — CHLORHEXIDINE GLUCONATE 0.12% ORAL RINSE SCH ML: 1.2 LIQUID ORAL at 21:21

## 2017-12-28 RX ADMIN — LACTOBACILLUS ACIDOPHILUS / LACTOBACILLUS BULGARICUS SCH GM: 100 MILLION CFU STRENGTH GRANULES at 08:57

## 2017-12-28 RX ADMIN — MIDAZOLAM HYDROCHLORIDE PRN MLS/HR: 5 INJECTION, SOLUTION INTRAMUSCULAR; INTRAVENOUS at 23:41

## 2017-12-28 RX ADMIN — LACOSAMIDE SCH MG: 100 TABLET, FILM COATED ORAL at 21:20

## 2017-12-28 RX ADMIN — CHLORHEXIDINE GLUCONATE 0.12% ORAL RINSE SCH ML: 1.2 LIQUID ORAL at 08:00

## 2017-12-28 RX ADMIN — METOPROLOL TARTRATE SCH MG: 25 TABLET, FILM COATED ORAL at 17:19

## 2017-12-28 RX ADMIN — METOPROLOL TARTRATE SCH MG: 25 TABLET, FILM COATED ORAL at 05:24

## 2017-12-28 RX ADMIN — IPRATROPIUM BROMIDE AND ALBUTEROL SULFATE SCH AMPULE: .5; 3 SOLUTION RESPIRATORY (INHALATION) at 02:55

## 2017-12-28 RX ADMIN — STANDARDIZED SENNA CONCENTRATE AND DOCUSATE SODIUM SCH TAB: 8.6; 5 TABLET, FILM COATED ORAL at 21:19

## 2017-12-28 RX ADMIN — INSULIN ASPART SCH: 100 INJECTION, SOLUTION INTRAVENOUS; SUBCUTANEOUS at 17:19

## 2017-12-28 RX ADMIN — Medication PRN MLS/HR: at 21:22

## 2017-12-28 RX ADMIN — CHLORHEXIDINE GLUCONATE SCH PACK: 500 CLOTH TOPICAL at 03:30

## 2017-12-28 RX ADMIN — INSULIN ASPART SCH: 100 INJECTION, SOLUTION INTRAVENOUS; SUBCUTANEOUS at 23:42

## 2017-12-28 RX ADMIN — IPRATROPIUM BROMIDE AND ALBUTEROL SULFATE SCH AMPULE: .5; 3 SOLUTION RESPIRATORY (INHALATION) at 08:24

## 2017-12-28 RX ADMIN — POLYVINYL ALCOHOL SCH DROP: 14 SOLUTION/ DROPS OPHTHALMIC at 17:19

## 2017-12-28 RX ADMIN — FOSPHENYTOIN SODIUM SCH MGPE: 50 INJECTION, SOLUTION INTRAMUSCULAR; INTRAVENOUS at 14:30

## 2017-12-28 RX ADMIN — MAGNESIUM SULFATE IN DEXTROSE SCH MLS/HR: 10 INJECTION, SOLUTION INTRAVENOUS at 11:52

## 2017-12-28 RX ADMIN — POLYVINYL ALCOHOL SCH DROP: 14 SOLUTION/ DROPS OPHTHALMIC at 08:57

## 2017-12-28 RX ADMIN — MAGNESIUM OXIDE TAB 400 MG (241.3 MG ELEMENTAL MG) SCH MG: 400 (241.3 MG) TAB at 12:17

## 2017-12-28 RX ADMIN — POLYVINYL ALCOHOL SCH DROP: 14 SOLUTION/ DROPS OPHTHALMIC at 13:00

## 2017-12-28 NOTE — HHI.CCPN
Subjective


Remarks/Hospital Course


This is a 77-year-old female that presented to the ED for evaluation of altered 

mental status. Per report, according to EMS the patient normally is more alert 

and  has a history of stroke, he was able to speak with the patient's sister 

who states that she is also power of , the patient apparently normally 

is ambulatory and can carry on a conversation without any difficulty. Per 

records reviewed, the patient was last seen normal and at her baseline at 1 am. 

The patient then sat down in her lazy chair, her sister also noted that she 

started beating her head against a piece of furniture.  The patient was noted 

to have a right frontal contusion upon presentation to the ED .She had a GCS of 

7-8 on arrival. The patient's medical history is significant for a recent 

admission 09/2017 for altered mental status, medication induced.  Her past 

medical history is also significant for a recent history of UTI and C. 

difficile in addition to her history of having a CVA and reportedly residual 

effects in the right upper and lower extremity.  Laboratory and imaging studies 

revealed that most likely the patient has a UTI, CT of the brain revealed no 

abnormality and the patient was noted to have an elevated lactate level.  The 

patient was emergently intubated in the ED, and the patient was noted to be 

significantly hypertensive and a nicardipine infusion was instituted.  Upon 

entering the ED the patient's blood pressure was noted to be 219/92, Cardizem 

infusion had been ordered.  The patient was noted to have spontaneous movement 

of the right upper and lower extremity with a gag reflex. Critical care 

medicine was consulted.





12/25: The patient was weaned off of nicardipine infusion.  Normotensive the 

patient continues on labetalol twice a day scheduled home dosing.  EEG 

attempted last evening however due to patient's movement bilaterally to 

complete artifact EEG reordered.  Fentanyl infusion discontinued for daily 

sedation vacation approximately 7 hours ago the patient remains nonresponsive.  

Opens eyes spontaneously, moves limbs spontaneously but does not follow any 

commands.  Brain MRI/MRA, and CT all negative findings.  EEG scheduled for 

a.m..  The patient continues in severe metabolic acidosis, 2 Amps of sodium 

bicarbonate IV push given this a.m., sodium bicarbonate infusion increased.  

Lactate is cleared, creatinine is improving, CT of the abdomen and pelvis is 

pending this afternoon.


12/26 CT report from yesterday shows bladder edema consistent with cystitis.  

URine culture with GNR  Blood cultures NGTD. . Getting EEG now. 


12/27:  Resting comfortable in bed in no acute distress.  No obvious seizure 

activity.  Plan for lumbar puncture in AM.  Low-grade temperatures overnight.  

Tolerating tube feeding at goal.  No bowel movement





Subjective





12/28:  Continues EEG has been discontinued.  Tmax 100.  Currently 99.8.  No 

bowel movement since admission.  Tolerating tube feeds at goal.  No active 

seizure activity overnight.





Objective





Vital Signs








  Date Time  Temp Pulse Resp B/P (MAP) Pulse Ox O2 Delivery O2 Flow Rate FiO2


 


12/28/17 08:20     100   30


 


12/28/17 06:00  112      


 


12/28/17 04:00 99.8  23 146/62 (90)    


 


12/24/17 11:24      Ventilator  














Intake and Output   


 


 12/28/17 12/28/17 12/29/17





 08:00 16:00 00:00


 


Intake Total 1518 ml  


 


Output Total 1200 ml  


 


Balance 318 ml  








Result Diagram:  


12/28/17 0825                                                                  

              12/28/17 0825





Other Results





Microbiology








 Date/Time


Source Procedure


Growth Status


 


 


 12/24/17 08:55


Blood Peripheral Aerobic Blood Culture - Preliminary


NO GROWTH IN 3 DAYS Resulted


 


 12/24/17 08:55


Blood Peripheral Anaerobic Blood Culture - Preliminary


NO GROWTH IN 3 DAYS Resulted


 


 12/24/17 09:54


Urine Catheterized Urine Streptococcus pneumoniae Antigen (M - Final


PRESUMPTIVE NEGATIVE FOR STREPTOCOCCU... Complete








Imaging





Last Impressions








Chest X-Ray 12/28/17 0600 Signed





Impressions: 





 Service Date/Time:  Thursday, December 28, 2017 04:55 - CONCLUSION:  Slight 

left 





 lung base atelectasis.     K. Arias Rodriguez MD 


 


Upper Extremity Ultrasound 12/27/17 0000 Signed





Impressions: 





 Service Date/Time:  Wednesday, December 27, 2017 21:32 - CONCLUSION:  1. No 





 sonographic evidence for right upper extremity DVT.     Reji Townsend MD 


 


Lower Extremity Ultrasound 12/27/17 0000 Signed





Impressions: 





 Service Date/Time:  Wednesday, December 27, 2017 21:12 - CONCLUSION:  1. No 





 sonographic evidence for lower extremity DVT.     Reji Townsend MD 


 


Abdomen/Pelvis CT 12/25/17 0000 Signed





Impressions: 





 Service Date/Time:  Monday, December 25, 2017 16:05 - CONCLUSION:  1. Mild 

edema 





 surrounding the urinary bladder, question cystitis. Erazo catheter in place. 

2. 





 Status post cholecystectomy.  3. Left lower lobe atelectasis. 4. Nasogastric 





 tube tip at the gastroesophageal junction.    Christian Mendiola MD 


 


Neck Magnetic Resonance Angiography 12/24/17 1119 Signed





Impressions: 





 Service Date/Time:  Sunday, December 24, 2017 13:11 - CONCLUSION:  Negative 

for 





 hemodynamically significant carotid stenosis     Pb López MD  FACR


 


Head Magnetic Resonance Angiography 12/24/17 1119 Signed





Impressions: 





 Service Date/Time:  Sunday, December 24, 2017 13:11 - CONCLUSION: Negative for 





 major branch vessel occlusion.      Pb López MD  FACR


 


Brain MRI 12/24/17 1119 Signed





Impressions: 





 Service Date/Time:  Sunday, December 24, 2017 13:11 - CONCLUSION: Marked 





 periventricular white matter changes Central and cortical atrophy Negative for 





 ischemia.      Pb López MD  FACR


 


Head CT 12/24/17 0851 Signed





Impressions: 





 Service Date/Time:  Sunday, December 24, 2017 09:02 - CONCLUSION:  Negative 

for 





 acute process..     Pb López MD  FACR


 


Cervical Spine CT 12/24/17 0000 Signed





Impressions: 





 Service Date/Time:  Sunday, December 24, 2017 09:07 - CONCLUSION:  Fusion as 





 above, negative for fracture.     Pb López MD  FACR








Objective Remarks


GENERAL: 77-year-old  female currently orotracheally intubated


SKIN: Warm and dry.


HEAD: Atraumatic. Normocephalic.  Currently wrapped in Kerlix with EEG probes 

in place


EYES: R periorbital ecchmosis. Pupils equal and round 1 mm bilaterally. No 

scleral icterus. No injection or drainage. 


ENT: No nasal bleeding or discharge. Mucous membranes pink and moist.


NECK: Trachea midline. No JVD. 


CARDIOVASCULAR: RRR.  S1, S2 withoutmurmur


RESPIRATORY: No accessory muscle use. Clear to auscultation. Breath sounds 

equal bilaterally.  


GASTROINTESTINAL: Abdomen soft, non-tender, nondistended. No guarding. 


MUSCULOSKELETAL: Right upper extremity with 2+ edema.  


NEUROLOGICAL: Eyes flutter open, does not make eye contact or track.  Eyes 

upward and rollback All extremities rigid with tonic clonic activity.  Per 

report the patient does have deficits status post CVA of right upper and lower 

extremity patient with post polio syndrome with wasting bilateral lower 

extremities.


Urinary Catheter:  Yes


Assessment to:  Continue


Erazo insert reason:  Prolonged Immobilization


Date of Insertion:  Dec 24, 2017


Vascular Central Line Catheter:  No


Assessment to:  Continue





A/P


Assessment and Plan


Neuro/Psych





Post polio syndrome bilateral lower extremity weakness


Status epilepticus


History of CVATIA with R sided deficits


H/O seizures


Anxiety/depression





12/28 interpretation EEG - Abnormal EEG due to some mild to moderate slowing, 

but also some occasionalsharps and spike and slow waves seen, but improved from 

prior EEG's.  Clinical correlation.


[id number] twice a day will be continued.  


Loaded with fosphenytoin - currently fosphenytoin  100 mg IV every 6 hours.  


Levetiracetam 500 mg IV every 6 hours


Continue midazolam drip currently at 7 mg an hour will spikes


Fentanyl as needed for when necessary and she'll sedation.  Patient does have 

history of chronic pain


TSH normal-3.0


Random cortisol level only 3.5. Unclear significance as patient had lactic 

acidemia but also hypertensive at the time.  Recheck 12/28 currently pending 


Ammonia level - 41


12/24 MRI brain-Moderate periventricular white matter changes are evident.  

There is no restricted diffusion.  There is moderate atrophy with dilatation of 

ventricular sulcal spaces.  There are no extra-axial fluid collections 

appreciated.  Posterior fossa is unremarkable.  


12/24 MRA brain-negative for major branch occlusion 


12/24 CT brain-no acute intracranial process


patient's home meds baclofen 10 mg 3 times a day, tramadol and sertraline 50 mg 

twice a day have been on hold due to  concern for serotonin syndrome and 

rhabdomyolysis.  Patient has history of altered mental status secondary to 

being medication induced.  Reported by family member -the patient had 

discontinued baclofen for approximately 3 months and took her initial dose of 

baclofen on 12/23 with a TID schedule dosing.


Neurology seeing, Dr. Martin. 


Lumbar puncture ordered 12/28.  Clopidogrel on hold since admission. Received 

last 12/24 empirically on vancomycin, ceftriaxone 2 g every 12 hours, acyclovir 

700 mg every 12 hours. 


Repeat MRI brain ordered 12/28





Respiratory:





Acute hypoxemic respiratory failure





PRVC 16/500/1/5/35


Ventilator bundle


Maintain O2 sat greater than 92%


Albuterol/ipratropium aerosols nebs every 6 hours scheduled, albuterol aerosols 

every 2 hours when necessary


Spontaneous breathing trials when clinically indicated


Follow-up chest x-ray 12/29





Cardiovascular:





Hypertensive emergency-resolved


History of hypertension


Hyperlipidemia


Congestive heart failure - ejection fraction 55-60% 2010





Labetalol, hydralazine IV , PRN to maintain SBP <160


Serial troponins were negative.


Continue metoprolol 25 mg every 6 hours and 50 mg sustained release daily at 

home


Holding clopidogrel 75 mg by mouth daily/home medication for lumbar puncture 

today.


Continue pravastatin 40 mg by mouth daily/home medication for dyslipidemia





Renal//FEN:





Chronic kidney disease stage IIIB


Hypo-magnesium





Maintain Erazo catheter


-- Strict I/Os 


Monitor urine output


Monitor BMP


Replete electrolytes as needed


Previous Creatinine 09/2017- 1.15, upon admission 2.76, now downtrending 

creatinine 1.3


Creatinine kinase 885 on 12/24, downtrending. 


Maintain OGT -continue Glucerna with goal rate 45 ml/hr and followup nutrition 

recs. 


Continue pravastatin 40 mg daily at bedtime for dyslipidemia





2 g IV mag sulfate IV times.  Mag-Ox 400 twice a day.  Recheck magnesium in AM.


1 dose of furosemide 20 mg IV 1 now.





GI:





Esophageal stricture -history of esophageal dilatation


GERD


Elevated ammonia





Zofran for nausea currently on Glucerna 1.5 goal 50 cc per hour per nutrition's 

recommendations 


Lansoprazole 30 mg daily GI prophylaxis. On Dexlansoprazole 60 mg times daily 

at home


Docusate sodium/senna 1 tablet twice a day for bowel regimen.  Add polyethylene 

glycol 17 g twice a day and lactulose 30 cc twice a day


Ammonia level Recheck in a.m. 12/28





ID:





Escherichia coli UTI





Monitor CBC


Blood cultures 12/24 - negative


Urine culture 12/24 -  Escherichia coli, pansensitive


Strep pneumococcal antigen 12/24  - negative 


Recent history of C. difficile colitis. Lactinex tid. 


Acyclovir initiated by Dr. Martin. Use ceftriaxone/vancomycin for meningitis 

coverage pending LP results.  





HEME/ONC:





History of cervical cancer


Normocytic anemia


History of right lower extremity DVT - posterior tibial and femoral vein 

previously on Apixaban





Patient normally on clopidogrel hold since 12/24 for now (await CT abd and 

pelvis)


Lactate level 1.8


Recheck Dopplers bilateral and right upper lower extremities 12/28 negative for 

DVT





Endocrine:





Diabetes mellitus


Low cortisol





Glucose monitoring per ICU protocol, low dose regimen Novulog every 6 hours


Hold metformin thousand milligrams twice a day


Check cortisol AM.  Possible cosyntropin test in a.m. it's remains low





Prophylaxis:


GI Prophylaxis


Lansoprazole 30 mg daily


DVT Prophylaxis


-- SCDs holding pharmacological prophylaxis with likely lumbar puncture in a.m.





Lines:


Peripheral IVs providing adequate access.  





Level II follow-up











Vishal Moreno MD Dec 28, 2017 09:31

## 2017-12-28 NOTE — RADRPT
EXAM DATE/TIME:  12/28/2017 09:39 

 

[facility] COMPARISON:  

No previous studies available for comparison.

                         

 

INDICATIONS :     

Patient presents with altered mental status in need of a lumbar puncture. 

                         

 

MEDICAL HISTORY :     

Anemia

Anxiety

Depression

Cervical CA

Hypertension

High Cholesterol

Diabetes

Seizure Disorder HX CVA

 

SURGICAL HISTORY :     

Appendectomy

Cholecystectomy

Hysterectomy Cervical CA removed

C4 C5 C6 plate placed

Laparoscopic Eyad

 

ENCOUNTER:     

Initial

 

ACUITY:     

4 -6 days

 

PAIN SCORE:     

                         

 

LUMBAR PUNCTURE TIME:      

0947 hours

 

FLUORO TIME:                               

0.2 minutes

 

IMAGE SERIES:      

2

                        

                         

 

ACCESS LEVEL:                                 

L4 

 

FLUID:    

18 cc of clear CSF was collected and sent to the laboratory for analysis.

                           

 

    

 

 

PROCEDURE :     

1.  Fluoroscopic guided lumbar puncture.

 

The risks, benefits and alternatives to the procedure were explained and verbal and written consent w
as obtained.  The site was prepped in sterile fashion.  Full sterile technique was used, including ca
p, mask, sterile gloves and gown and a large sterile sheet.  Hand hygiene and 2% chlorhexidine and/or
 betadine/alcohol prep was utilized per protocol for cutaneous antisepsis.  The skin and subcutaneous
 tissues were infiltrated with local anesthetic solution.

 

With fluoroscopic guidance the lumbar thecal sac was punctured at the level above.  The fluid describ
ed above was removed without difficulty.

 

The patient tolerated the procedure well and there were no complications.

 

CONCLUSION:     Uncomplicated fluoroscopically guided lumbar puncture.

 

 

 

 Reji Townsend MD on December 28, 2017 at 10:09           

Board Certified Radiologist.

 This report was verified electronically.

## 2017-12-28 NOTE — MG
cc:

GAVI FAIR M.D.

****

 

 

Lab No:    Date:  12/27/2017  Age: 77     Sex:  F Race:  ___

 

REFERRING PHYSICIAN

Dr. Martin

 

RESULTS

December 17 is 2036,

 

TECHNIQUE

In room 525 with photic times two. MCV and fentanyl, 7 mg per hour of Versed.

This is a repeat study.

 

INDICATIONS

The patient is intubated.  CT is negative.  Admitted with change in mental

status, history of stroke.

 

MEDICATIONS

Not listed.

 

DESCRIPTION OF RECORD

The overall background has slowing predominately of 3-4 Hz.  Some higher

amplitude waves noted predominantly in the right  frontoparietal central

regions.  EEG does look improved.  There is artifact also.  There are

occasional sharp waves seen more over the right hemisphere now than the left.

Some occasional sharp spike and slow waves again seen in the frontal parietal

FP to F8, FP to F4, FP1, F7 and FP1 at 3 regions not continuous but

paroxysmal.

 

IMPRESSION

Abnormal EEG due to some mild to moderate slowing, but also some occasional

sharps and spike and slow waves seen, but improved from prior EEG's.  Clinical

correlation.

 

                              _________________________________

                              MD YOVANI Irene/LESLEY

D:  12/28/2017/8:04 AM

T:  12/28/2017/8:08 AM

Visit #:  [id number]

Job #:  [id number]

## 2017-12-28 NOTE — RADRPT
EXAM DATE/TIME:  12/28/2017 04:55 

 

[facility] COMPARISON:     

CHEST SINGLE AP, December 26, 2017, 3:36.

 

                     

INDICATIONS :     

Shortness of breath, possible pulmonary disease.

                     

 

MEDICAL HISTORY :     

Diabetes mellitus type II.  Hypertension     CVA   

 

SURGICAL HISTORY :     

Fusion, cervical. Cholecystectomy.  

 

ENCOUNTER:     

Subsequent                                        

 

ACUITY:     

4 - 6 days      

 

PAIN SCORE:     

Non-responsive.

 

LOCATION:     

Bilateral chest 

 

FINDINGS:     

ET tube, and NG tube have not changed. slight left lung base atelectasis is seen. Heart and mediastin
um are unremarkable for technique.

 

CONCLUSION:     

Slight left lung base atelectasis.

 

 

 

 BRI Rodriguez MD on December 28, 2017 at 6:02           

Board Certified Radiologist.

 This report was verified electronically.

## 2017-12-28 NOTE — HHI.PR
Review/Management


Diagnosis


Sz on EEG---subclinical--improved. Mainly right hemisphere sharp activity---

improved on versed and phosphenytoin.


Plan


Although EEG improved, is still some low amplitude sharp activity over right 

hemisphere. Therefore, will add keppra 500 mg iv Q6 hr


follow up LP results today. I would like to repeat MRI to r/o right hemisphere 

lesion given the focality of the eeg.


Diagnosis/Plan:  





Subjective


Subjective Comments


No acute events reported


No sz activity noted over night


Active Medications





Current Medications








 Medications


  (Trade)  Dose


 Ordered  Sig/Aaron


 Route  Start Time


 Stop Time Status Last Admin


 


 Fentanyl Citrate  250 ml @ 5


 mls/hr  TITRATE  PRN


 IV  12/24/17 09:45


    12/27/17 22:02


 


 


  (NS Flush)  2 ml  UNSCH  PRN


 IV FLUSH  12/24/17 11:15


     


 


 


  (NS Flush)  2 ml  BID


 IV FLUSH  12/24/17 21:00


    12/27/17 20:01


 


 


  (Tears Naturale


 Opth Soln)  1 drop  TID


 EACH EYE  12/24/17 13:00


    12/28/17 08:57


 


 


 Miscellaneous


 Information  1  Q361D


 XX  12/24/17 11:15


    12/24/17 11:15


 


 


  (Chlorhexidine


 2% Cloth)  3 pack


 Taper  DAILY@04


 TOP  12/25/17 04:00


 12/21/18 03:59  12/28/17 03:30


 


 


  (Chlorhexidine


 2% Cloth)  3 pack  UNSCH  PRN


 TOP  12/24/17 11:15


     


 


 


  (Ruby-Colace)  1 tab  BID


 PO  12/24/17 21:00


    12/28/17 08:56


 


 


  (Milk Of


 Magnesia Liq)  30 ml  Q12H  PRN


 PO  12/24/17 11:15


     


 


 


  (Senokot)  17.2 mg  Q12H  PRN


 PO  12/24/17 11:15


     


 


 


  (Dulcolax Supp)  10 mg  DAILY  PRN


 RECTAL  12/24/17 11:15


     


 


 


  (Lactulose Liq)  30 ml  DAILY  PRN


 PO  12/24/17 11:15


     


 


 


  (Peridex 0.12%


 Liq)  15 ml  BID@08,20


 MT  12/24/17 20:00


    12/27/17 20:01


 


 


  (D50w (Vial) Inj)  50 ml  UNSCH  PRN


 IV PUSH  12/24/17 11:30


     


 


 


  (Glucagon Inj)  1 mg  UNSCH  PRN


 OTHER  12/24/17 11:30


     


 


 


  (Vimpat)  100 mg  BID


 PO  12/24/17 14:15


    12/28/17 08:56


 


 


  (Pravachol)  40 mg  HS


 PO  12/24/17 21:00


    12/27/17 20:01


 


 


  (Trandate Inj)  10 mg  Q4H  PRN


 IV PUSH  12/24/17 14:30


     


 


 


  (Apresoline Inj)  20 mg  Q4H  PRN


 IV PUSH  12/24/17 14:30


     


 


 


  (Lopressor)  25 mg  Q6HR


 PO  12/24/17 21:00


    12/28/17 05:24


 


 


 Sodium


 Bicarbonate 150


 meq/Dextrose  1,000 ml @ 


 150 mls/hr  Q6H40M


 IV  12/25/17 15:00


   Future Hold 12/26/17 04:11


 


 


 Potassium Chloride  100 ml @ 


 50 mls/hr  Q2H  PRN


 IV  12/26/17 09:15


     


 


 


 Potassium Chloride  100 ml @ 


 50 mls/hr  Q2H  PRN


 IV  12/26/17 09:15


    12/26/17 15:57


 


 


  (K-Lyte Cl  Eff)  50 meq  UNSCH  PRN


 PO  12/26/17 09:15


     


 


 


 Potassium Chloride  100 ml @ 


 25 mls/hr  UNSCH  PRN


 IV  12/26/17 09:15


     


 


 


 Potassium Chloride  100 ml @ 


 50 mls/hr  Q2H  PRN


 IV  12/26/17 09:15


     


 


 


 Magnesium Sulfate


 4 gm/Sodium


 Chloride  100 ml @ 


 50 mls/hr  UNSCH  PRN


 IV  12/26/17 09:15


     


 


 


  (Mag-Ox)  800 mg  UNSCH  PRN


 PO  12/26/17 09:15


     


 


 


 Magnesium Sulfate


 2 gm/Sodium


 Chloride  100 ml @ 


 50 mls/hr  UNSCH  PRN


 IV  12/26/17 09:15


     


 


 


  (K-Phos)  2,000 mg  Q4H  PRN


 PO  12/26/17 09:15


     


 


 


 Sodium Phosphate


 30 mmol/Sodium


 Chloride  250 ml @ 


 42 mls/hr  UNSCH  PRN


 IV  12/26/17 09:15


     


 


 


  (K-Phos)  2,000 mg  UNSCH  PRN


 PO/TUBE  12/26/17 09:15


     


 


 


 Potassium


 Phosphate 30 mmol/


 Sodium Chloride  260 ml @ 


 42 mls/hr  UNSCH  PRN


 IV  12/26/17 09:15


     


 


 


 Acyclovir Sodium


 700 mg/Sodium


 Chloride  100 ml @ 


 100 mls/hr  Q12H


 IV  12/26/17 11:00


    12/27/17 22:43


 


 


 Ceftriaxone


 Sodium 2000 mg/


 Sodium Chloride  100 ml @ 


 200 mls/hr  Q12H


 IV  12/26/17 11:00


    12/27/17 22:09


 


 


  (Lactinex Pkt)  1 gm  TID


 OG-TUBE  12/26/17 13:00


    12/28/17 08:57


 


 


  (Ativan Inj)  2 mg  Q5M  PRN


 IV PUSH  12/26/17 10:45


    12/26/17 14:18


 


 


 Lactated Ringer's  1,000 ml @ 


 100 mls/hr  Q10H


 IV  12/26/17 10:45


    12/28/17 03:30


 


 


 Midazolam HCl  100 ml @ 2


 mls/hr  TITRATE  PRN


 IV  12/26/17 11:15


    12/28/17 09:12


 


 


  (Cerebyx Inj)  100 mgpe  Q6H


 IV  12/27/17 15:00


    12/28/17 08:52


 


 


  (Duoneb Neb)  1 ampule  Q6HR  NEB


 INH  12/27/17 22:00


    12/28/17 08:24


 


 


  (Tylenol 650 Mg/


 20 ml Liq)  650 mg  Q6H  PRN


 NG  12/27/17 17:00


     


 


 


  (NovoLOG


 SUPPLEMENTAL


 SCALE)  1  Q6HR


 SQ  12/27/17 18:00


     


 


 


  (Albuterol Neb)  2.5 mg  Q2HR NEB  PRN


 NEB  12/27/17 17:00


     


 


 


  (Prevacid Odt)  30 mg  DAILY


 NG  12/28/17 09:00


    12/28/17 08:57


 


 


  (Lactulose Liq)  30 ml  DAILY


 PO  12/28/17 09:00


    12/28/17 08:56


 








Allergies





Allergies


Coded Allergies


  Influenza Virus Vaccines (Unverified Allergy, Intermediate, 10/27/17)


  egg (Unverified Allergy, Intermediate, 10/27/17)


  codeine (Unverified Allergy, Mild, 10/27/17)


  meperidine (Unverified Allergy, Mild, 10/27/17)


  morphine (Unverified Allergy, Mild, 10/27/17)


  acetaminophen (Unverified Adverse Reaction, Mild, HEADACHE, 10/27/17)


  hydrocodone (Unverified Adverse Reaction, Mild, HEADACHE, 10/27/17)





Exam


I&O / VS





Vital Signs








  Date Time  Temp Pulse Resp B/P (MAP) Pulse Ox O2 Delivery O2 Flow Rate FiO2


 


12/28/17 08:20     100   30


 


12/28/17 06:00  112      


 


12/28/17 04:20     96   30


 


12/28/17 04:00  109      


 


12/28/17 04:00 99.8 109 23 146/62 (90) 96   


 


12/28/17 04:00        30


 


12/28/17 02:00  100      


 


12/28/17 00:20     100   35


 


12/28/17 00:00  109      


 


12/28/17 00:00        35


 


12/28/17 00:00 99.9 109 22 127/61 (83) 100   


 


12/27/17 22:00  107      


 


12/27/17 20:16     100   35


 


12/27/17 20:00 100.0 96 20 132/62 (85) 100   


 


12/27/17 20:00        35


 


12/27/17 20:00  96      


 


12/27/17 18:00  98      


 


12/27/17 16:00        35


 


12/27/17 16:00  105      


 


12/27/17 16:00 99.5 105 19 138/63 (88) 98   


 


12/27/17 15:40     100   35


 


12/27/17 14:00  93      


 


12/27/17 12:32     100   35


 


12/27/17 12:00 99.6 94 14 159/71 (100) 98   


 


12/27/17 12:00        35


 


12/27/17 10:00  91      








Exam Comments


sedated, nonresponsive


CN--Pupils 1 mm symmetric and reactive. EOM intact to Dolls maneuver


 MOTOR--no focal sx. No clinical tonic clonic activity





Objective


Micro and Labs





Laboratory Tests








Test


  12/27/17


12:55 12/28/17


08:25


 


Phenytoin (Dilantin) Level 14.0  


 


White Blood Count  9.3 


 


Red Blood Count  2.93 


 


Hemoglobin  8.4 


 


Hematocrit  26.2 


 


Mean Corpuscular Volume  89.2 


 


Mean Corpuscular Hemoglobin  28.7 


 


Mean Corpuscular Hemoglobin


Concent 


  32.1 


 


 


Red Cell Distribution Width  15.6 


 


Platelet Count  246 


 


Mean Platelet Volume  7.4 


 


Neutrophils (%) (Auto)  72.7 


 


Lymphocytes (%) (Auto)  14.6 


 


Monocytes (%) (Auto)  9.5 


 


Eosinophils (%) (Auto)  2.7 


 


Basophils (%) (Auto)  0.5 


 


Neutrophils # (Auto)  6.8 


 


Lymphocytes # (Auto)  1.4 


 


Monocytes # (Auto)  0.9 


 


Eosinophils # (Auto)  0.3 


 


Basophils # (Auto)  0.0 


 


CBC Comment  DIFF FINAL 


 


Differential Comment   


 


Blood Urea Nitrogen  15 


 


Creatinine  1.10 


 


Random Glucose  110 


 


Total Protein  6.0 


 


Albumin  2.0 


 


Calcium Level  7.7 


 


Phosphorus Level  2.6 


 


Magnesium Level  1.5 


 


Alkaline Phosphatase  85 


 


Aspartate Amino Transf


(AST/SGOT) 


  17 


 


 


Alanine Aminotransferase


(ALT/SGPT) 


  10 


 


 


Total Bilirubin  0.2 


 


Sodium Level  137 


 


Potassium Level  4.2 


 


Chloride Level  107 


 


Carbon Dioxide Level  19.5 


 


Anion Gap  11 


 


Estimat Glomerular Filtration


Rate 


  48 


 


 


Lactic Acid Level  1.3 


 


Ammonia  12 


 


Total Creatine Kinase  282 


 


Amylase Level  50 


 


Lipase  110 


 


Thyroid Stimulating Hormone


3rd Gen 


  1.040 


 














 Date/Time


Source Procedure


Growth Status


 


 


 12/24/17 08:55


Blood Peripheral Aerobic Blood Culture - Preliminary


NO GROWTH IN 3 DAYS Resulted


 


 12/24/17 08:55


Blood Peripheral Anaerobic Blood Culture - Preliminary


NO GROWTH IN 3 DAYS Resulted


 


 12/24/17 09:54


Urine Catheterized Urine Streptococcus pneumoniae Antigen (M - Final


PRESUMPTIVE NEGATIVE FOR STREPTOCOCCU... Complete








Diagnostic Tests


EEG this am shows diffuse slowing. There is low amplitude periodic sharp 

activity over the right hemisphere. It is significantly improved from prior EEG











Colton Martin MD PhD Dec 28, 2017 09:22

## 2017-12-28 NOTE — RADRPT
EXAM DATE/TIME:  12/28/2017 10:24 

 

[facility] COMPARISON:     

No previous studies available for comparison.

       

 

 

INDICATIONS :     

Altered mental status.

                     

 

CONTRAST:     

16 cc Omniscan (gadodiamide) IV

 

                                                      Lot:

[id number] Exp Date: Mar 2020                                                         Lot   Exp Date:  


                     

 

MEDICAL HISTORY :     

Hypercholesterolemia. Hypertension. Diabetes mellitus type 2. seizures

 

SURGICAL HISTORY :     

Fusion, cervical. Cholecystectomy. Hysterectomy. lumbar discectomy

 

ENCOUNTER:     

Subsequent

 

ACUITY:     

4-6 days

 

PAIN SCORE:     

Nonresponsive.

 

LOCATION:       

cranial 

 

TECHNIQUE:     

Multiplanar, multisequence MRI of the brain was performed both prior to and following the administrat
ion of paramagnetic contrast.

 

FINDINGS:     

There is mild periventricular white matter T2 prolongation. A tiny lacunar infarct in the right lenti
form nucleus appears old. There is no evidence of intracranial mass or hemorrhage. The brainstem and 
posterior fossa structures are unremarkable. There is nothing to suggest acute infarction. There is f
luid in the left maxillary sinus and occasional ethmoid sinuses.

 

CONCLUSION:     

No acute intracranial findings

 

 

 

 Bronson Mar MD on December 28, 2017 at 12:26           

Board Certified Radiologist.

 This report was verified electronically.

## 2017-12-29 VITALS
RESPIRATION RATE: 19 BRPM | HEART RATE: 109 BPM | SYSTOLIC BLOOD PRESSURE: 121 MMHG | DIASTOLIC BLOOD PRESSURE: 56 MMHG | OXYGEN SATURATION: 100 %

## 2017-12-29 VITALS
OXYGEN SATURATION: 100 % | TEMPERATURE: 99.5 F | RESPIRATION RATE: 21 BRPM | DIASTOLIC BLOOD PRESSURE: 61 MMHG | SYSTOLIC BLOOD PRESSURE: 124 MMHG | HEART RATE: 107 BPM

## 2017-12-29 VITALS
TEMPERATURE: 99.3 F | DIASTOLIC BLOOD PRESSURE: 71 MMHG | RESPIRATION RATE: 18 BRPM | HEART RATE: 106 BPM | OXYGEN SATURATION: 100 % | SYSTOLIC BLOOD PRESSURE: 135 MMHG

## 2017-12-29 VITALS — HEART RATE: 95 BPM

## 2017-12-29 VITALS
DIASTOLIC BLOOD PRESSURE: 61 MMHG | RESPIRATION RATE: 19 BRPM | SYSTOLIC BLOOD PRESSURE: 131 MMHG | HEART RATE: 114 BPM | OXYGEN SATURATION: 100 %

## 2017-12-29 VITALS
DIASTOLIC BLOOD PRESSURE: 60 MMHG | HEART RATE: 116 BPM | OXYGEN SATURATION: 100 % | RESPIRATION RATE: 21 BRPM | SYSTOLIC BLOOD PRESSURE: 134 MMHG

## 2017-12-29 VITALS
DIASTOLIC BLOOD PRESSURE: 63 MMHG | RESPIRATION RATE: 19 BRPM | TEMPERATURE: 100 F | OXYGEN SATURATION: 100 % | SYSTOLIC BLOOD PRESSURE: 138 MMHG | HEART RATE: 114 BPM

## 2017-12-29 VITALS
DIASTOLIC BLOOD PRESSURE: 58 MMHG | RESPIRATION RATE: 18 BRPM | HEART RATE: 108 BPM | SYSTOLIC BLOOD PRESSURE: 112 MMHG | OXYGEN SATURATION: 100 %

## 2017-12-29 VITALS
HEART RATE: 116 BPM | DIASTOLIC BLOOD PRESSURE: 60 MMHG | SYSTOLIC BLOOD PRESSURE: 131 MMHG | OXYGEN SATURATION: 100 % | TEMPERATURE: 100.4 F | RESPIRATION RATE: 20 BRPM

## 2017-12-29 VITALS
DIASTOLIC BLOOD PRESSURE: 56 MMHG | TEMPERATURE: 99 F | RESPIRATION RATE: 18 BRPM | SYSTOLIC BLOOD PRESSURE: 115 MMHG | HEART RATE: 104 BPM | OXYGEN SATURATION: 100 %

## 2017-12-29 VITALS
SYSTOLIC BLOOD PRESSURE: 120 MMHG | HEART RATE: 103 BPM | TEMPERATURE: 99.9 F | OXYGEN SATURATION: 100 % | DIASTOLIC BLOOD PRESSURE: 59 MMHG | RESPIRATION RATE: 17 BRPM

## 2017-12-29 VITALS — HEART RATE: 98 BPM

## 2017-12-29 VITALS — HEART RATE: 110 BPM

## 2017-12-29 VITALS
OXYGEN SATURATION: 99 % | DIASTOLIC BLOOD PRESSURE: 59 MMHG | HEART RATE: 111 BPM | RESPIRATION RATE: 17 BRPM | SYSTOLIC BLOOD PRESSURE: 125 MMHG

## 2017-12-29 VITALS — OXYGEN SATURATION: 100 %

## 2017-12-29 VITALS
RESPIRATION RATE: 17 BRPM | SYSTOLIC BLOOD PRESSURE: 125 MMHG | OXYGEN SATURATION: 100 % | DIASTOLIC BLOOD PRESSURE: 59 MMHG | HEART RATE: 101 BPM

## 2017-12-29 VITALS
DIASTOLIC BLOOD PRESSURE: 58 MMHG | SYSTOLIC BLOOD PRESSURE: 114 MMHG | RESPIRATION RATE: 18 BRPM | HEART RATE: 105 BPM | OXYGEN SATURATION: 100 %

## 2017-12-29 VITALS
DIASTOLIC BLOOD PRESSURE: 59 MMHG | OXYGEN SATURATION: 100 % | HEART RATE: 101 BPM | RESPIRATION RATE: 21 BRPM | SYSTOLIC BLOOD PRESSURE: 128 MMHG

## 2017-12-29 VITALS — OXYGEN SATURATION: 99 %

## 2017-12-29 LAB
ALBUMIN SERPL-MCNC: 1.8 GM/DL (ref 3.4–5)
ALP SERPL-CCNC: 66 U/L (ref 45–117)
ALT SERPL-CCNC: 8 U/L (ref 10–53)
AST SERPL-CCNC: 15 U/L (ref 15–37)
BASOPHILS # BLD AUTO: 0 TH/MM3 (ref 0–0.2)
BASOPHILS NFR BLD: 0.4 % (ref 0–2)
BILIRUB SERPL-MCNC: 0.2 MG/DL (ref 0.2–1)
BUN SERPL-MCNC: 14 MG/DL (ref 7–18)
CALCIUM SERPL-MCNC: 7.7 MG/DL (ref 8.5–10.1)
CHLORIDE SERPL-SCNC: 108 MEQ/L (ref 98–107)
CREAT SERPL-MCNC: 0.96 MG/DL (ref 0.5–1)
EOSINOPHIL # BLD: 0.3 TH/MM3 (ref 0–0.4)
EOSINOPHIL NFR BLD: 4.2 % (ref 0–4)
ERYTHROCYTE [DISTWIDTH] IN BLOOD BY AUTOMATED COUNT: 15.3 % (ref 11.6–17.2)
GFR SERPLBLD BASED ON 1.73 SQ M-ARVRAT: 57 ML/MIN (ref 89–?)
GLUCOSE SERPL-MCNC: 105 MG/DL (ref 74–106)
HCO3 BLD-SCNC: 20.9 MEQ/L (ref 21–32)
HCT VFR BLD CALC: 24.2 % (ref 35–46)
HGB BLD-MCNC: 8.1 GM/DL (ref 11.6–15.3)
LYMPHOCYTES # BLD AUTO: 1.4 TH/MM3 (ref 1–4.8)
LYMPHOCYTES NFR BLD AUTO: 16.7 % (ref 9–44)
MAGNESIUM SERPL-MCNC: 1.7 MG/DL (ref 1.5–2.5)
MCH RBC QN AUTO: 29.8 PG (ref 27–34)
MCHC RBC AUTO-ENTMCNC: 33.4 % (ref 32–36)
MCV RBC AUTO: 89.5 FL (ref 80–100)
MONOCYTE #: 0.9 TH/MM3 (ref 0–0.9)
MONOCYTES NFR BLD: 10.8 % (ref 0–8)
NEUTROPHILS # BLD AUTO: 5.7 TH/MM3 (ref 1.8–7.7)
NEUTROPHILS NFR BLD AUTO: 67.9 % (ref 16–70)
PHENYTOIN (DILANTIN): 9 MCG/ML (ref 10–20)
PHOSPHATE SERPL-MCNC: 3.3 MG/DL (ref 2.5–4.9)
PLATELET # BLD: 238 TH/MM3 (ref 150–450)
PMV BLD AUTO: 8 FL (ref 7–11)
PROT SERPL-MCNC: 5.3 GM/DL (ref 6.4–8.2)
RBC # BLD AUTO: 2.7 MIL/MM3 (ref 4–5.3)
SODIUM SERPL-SCNC: 140 MEQ/L (ref 136–145)
WBC # BLD AUTO: 8.3 TH/MM3 (ref 4–11)

## 2017-12-29 RX ADMIN — Medication PRN MLS/HR: at 18:25

## 2017-12-29 RX ADMIN — LEVETIRACETAM SCH MLS/HR: 100 INJECTION, SOLUTION, CONCENTRATE INTRAVENOUS at 13:47

## 2017-12-29 RX ADMIN — LEVETIRACETAM SCH MLS/HR: 100 INJECTION, SOLUTION, CONCENTRATE INTRAVENOUS at 05:04

## 2017-12-29 RX ADMIN — IPRATROPIUM BROMIDE AND ALBUTEROL SULFATE SCH AMPULE: .5; 3 SOLUTION RESPIRATORY (INHALATION) at 20:36

## 2017-12-29 RX ADMIN — FOSPHENYTOIN SODIUM SCH MGPE: 50 INJECTION, SOLUTION INTRAMUSCULAR; INTRAVENOUS at 02:23

## 2017-12-29 RX ADMIN — POLYETHYLENE GLYCOL 3350 SCH GM: 17 POWDER, FOR SOLUTION ORAL at 09:00

## 2017-12-29 RX ADMIN — STANDARDIZED SENNA CONCENTRATE AND DOCUSATE SODIUM SCH TAB: 8.6; 5 TABLET, FILM COATED ORAL at 09:00

## 2017-12-29 RX ADMIN — WATER SCH ML: 1 IRRIGANT IRRIGATION at 18:18

## 2017-12-29 RX ADMIN — LACTOBACILLUS ACIDOPHILUS / LACTOBACILLUS BULGARICUS SCH GM: 100 MILLION CFU STRENGTH GRANULES at 18:18

## 2017-12-29 RX ADMIN — Medication PRN MLS/HR: at 07:44

## 2017-12-29 RX ADMIN — HEPARIN SODIUM SCH UNITS: 10000 INJECTION, SOLUTION INTRAVENOUS; SUBCUTANEOUS at 20:53

## 2017-12-29 RX ADMIN — CHLORHEXIDINE GLUCONATE SCH PACK: 500 CLOTH TOPICAL at 03:07

## 2017-12-29 RX ADMIN — FOSPHENYTOIN SODIUM SCH MGPE: 50 INJECTION, SOLUTION INTRAMUSCULAR; INTRAVENOUS at 08:59

## 2017-12-29 RX ADMIN — LEVETIRACETAM SCH MLS/HR: 100 INJECTION, SOLUTION, CONCENTRATE INTRAVENOUS at 18:33

## 2017-12-29 RX ADMIN — METOPROLOL TARTRATE SCH MG: 25 TABLET, FILM COATED ORAL at 18:18

## 2017-12-29 RX ADMIN — INSULIN ASPART SCH: 100 INJECTION, SOLUTION INTRAVENOUS; SUBCUTANEOUS at 18:00

## 2017-12-29 RX ADMIN — CEFTRIAXONE SODIUM SCH MLS/HR: 2 INJECTION, POWDER, FOR SOLUTION INTRAMUSCULAR; INTRAVENOUS at 11:30

## 2017-12-29 RX ADMIN — MAGNESIUM SULFATE IN DEXTROSE SCH MLS/HR: 10 INJECTION, SOLUTION INTRAVENOUS at 17:35

## 2017-12-29 RX ADMIN — WATER SCH ML: 1 IRRIGANT IRRIGATION at 09:00

## 2017-12-29 RX ADMIN — PRAVASTATIN SODIUM SCH MG: 40 TABLET ORAL at 20:53

## 2017-12-29 RX ADMIN — INSULIN ASPART SCH: 100 INJECTION, SOLUTION INTRAVENOUS; SUBCUTANEOUS at 12:00

## 2017-12-29 RX ADMIN — IPRATROPIUM BROMIDE AND ALBUTEROL SULFATE SCH AMPULE: .5; 3 SOLUTION RESPIRATORY (INHALATION) at 16:32

## 2017-12-29 RX ADMIN — POLYETHYLENE GLYCOL 3350 SCH GM: 17 POWDER, FOR SOLUTION ORAL at 20:53

## 2017-12-29 RX ADMIN — CHLORHEXIDINE GLUCONATE 0.12% ORAL RINSE SCH ML: 1.2 LIQUID ORAL at 08:59

## 2017-12-29 RX ADMIN — LACOSAMIDE SCH MG: 100 TABLET, FILM COATED ORAL at 20:53

## 2017-12-29 RX ADMIN — MAGNESIUM SULFATE IN DEXTROSE SCH MLS/HR: 10 INJECTION, SOLUTION INTRAVENOUS at 18:54

## 2017-12-29 RX ADMIN — LACTOBACILLUS ACIDOPHILUS / LACTOBACILLUS BULGARICUS SCH GM: 100 MILLION CFU STRENGTH GRANULES at 09:00

## 2017-12-29 RX ADMIN — POLYVINYL ALCOHOL SCH DROP: 14 SOLUTION/ DROPS OPHTHALMIC at 18:20

## 2017-12-29 RX ADMIN — STANDARDIZED SENNA CONCENTRATE AND DOCUSATE SODIUM SCH TAB: 8.6; 5 TABLET, FILM COATED ORAL at 20:53

## 2017-12-29 RX ADMIN — ACYCLOVIR SODIUM SCH MLS/HR: 50 INJECTION, SOLUTION INTRAVENOUS at 03:12

## 2017-12-29 RX ADMIN — POLYVINYL ALCOHOL SCH DROP: 14 SOLUTION/ DROPS OPHTHALMIC at 08:59

## 2017-12-29 RX ADMIN — METOPROLOL TARTRATE SCH MG: 25 TABLET, FILM COATED ORAL at 12:27

## 2017-12-29 RX ADMIN — OXYTOCIN SCH MLS/HR: 10 INJECTION, SOLUTION INTRAMUSCULAR; INTRAVENOUS at 11:10

## 2017-12-29 RX ADMIN — CHLORHEXIDINE GLUCONATE 0.12% ORAL RINSE SCH ML: 1.2 LIQUID ORAL at 20:00

## 2017-12-29 RX ADMIN — INSULIN ASPART SCH: 100 INJECTION, SOLUTION INTRAVENOUS; SUBCUTANEOUS at 05:05

## 2017-12-29 RX ADMIN — IPRATROPIUM BROMIDE AND ALBUTEROL SULFATE SCH AMPULE: .5; 3 SOLUTION RESPIRATORY (INHALATION) at 08:23

## 2017-12-29 RX ADMIN — LANSOPRAZOLE SCH MG: 30 TABLET, ORALLY DISINTEGRATING, DELAYED RELEASE ORAL at 09:00

## 2017-12-29 RX ADMIN — LACTOBACILLUS ACIDOPHILUS / LACTOBACILLUS BULGARICUS SCH GM: 100 MILLION CFU STRENGTH GRANULES at 12:27

## 2017-12-29 RX ADMIN — IPRATROPIUM BROMIDE AND ALBUTEROL SULFATE SCH AMPULE: .5; 3 SOLUTION RESPIRATORY (INHALATION) at 04:17

## 2017-12-29 RX ADMIN — FOSPHENYTOIN SODIUM SCH MGPE: 50 INJECTION, SOLUTION INTRAMUSCULAR; INTRAVENOUS at 15:21

## 2017-12-29 RX ADMIN — ACETAMINOPHEN PRN MG: 650 SOLUTION ORAL at 15:50

## 2017-12-29 RX ADMIN — LACOSAMIDE SCH MG: 100 TABLET, FILM COATED ORAL at 09:00

## 2017-12-29 RX ADMIN — Medication SCH ML: at 08:59

## 2017-12-29 RX ADMIN — Medication SCH ML: at 20:52

## 2017-12-29 RX ADMIN — POLYVINYL ALCOHOL SCH DROP: 14 SOLUTION/ DROPS OPHTHALMIC at 13:00

## 2017-12-29 RX ADMIN — MIDAZOLAM HYDROCHLORIDE PRN MLS/HR: 5 INJECTION, SOLUTION INTRAMUSCULAR; INTRAVENOUS at 13:47

## 2017-12-29 RX ADMIN — ACYCLOVIR SODIUM SCH MLS/HR: 50 INJECTION, SOLUTION INTRAVENOUS at 12:27

## 2017-12-29 RX ADMIN — METOPROLOL TARTRATE SCH MG: 25 TABLET, FILM COATED ORAL at 05:04

## 2017-12-29 NOTE — RADRPT
EXAM DATE/TIME:  12/29/2017 20:00 

 

[facility] COMPARISON:     

No previous studies available for comparison.

 

                     

INDICATIONS :     

Abdominal distension

                     

 

MEDICAL HISTORY :            

Cardiovascular disease. Diabetes mellitus type 1. Osteoarthritis.polio, cervical cancer   

 

SURGICAL HISTORY :        

Appendectomy. Hysterectomy.Cholecystectomy

 

ENCOUNTER:     

Initial                                        

 

ACUITY:     

1 day      

 

PAIN SCORE:     

Non-responsive.

 

LOCATION:     

Bilateral chest 

 

FINDINGS:     

2 AP supine views of the abdomen and pelvis were obtained and demonstrate a nasogastric tube in place
 with the tip projected over the distal stomach. There is mild gaseous distention in the right side o
f the colon with gas and stool noted segmentally in the remainder of the colon. There is no evidence 
of free air or mass effect on the supine study. The lung bases appear clear. The bony structures are 
intact.

 

CONCLUSION:     

Nonspecific, nonobstructive bowel gas pattern which to represent a mild ileus. Nasogastric tube is in
 place.

 

 

 

 Teo Carranza MD on December 29, 2017 at 20:32           

Board Certified Radiologist.

 This report was verified electronically.

## 2017-12-29 NOTE — RADRPT
EXAM DATE/TIME:  12/29/2017 04:41 

 

[facility] COMPARISON:     

CHEST SINGLE AP, December 28, 2017, 4:55.

 

                     

INDICATIONS :     

Respiratory failure

                     

 

MEDICAL HISTORY :            

Diabetes mellitus type II. Hypertension CVA   

 

SURGICAL HISTORY :     

Fusion, cervical. Cholecystectomy.  

 

ENCOUNTER:     

Subsequent                                        

 

ACUITY:     

1 week      

 

PAIN SCORE:     

Non-responsive.

 

LOCATION:     

Bilateral chest 

 

FINDINGS:     

Single AP view of the chest. Endotracheal tube and nasogastric tube remain in place. Mild patchy left
 lung base opacity unchanged. No evidence of pleural effusion or pneumothorax. Cardiomediastinal silh
ouette unchanged.

 

CONCLUSION:     

No significant interval change.

 

 

 

 Christian Mendiola MD on December 29, 2017 at 5:49           

Board Certified Radiologist.

 This report was verified electronically.

## 2017-12-29 NOTE — HHI.CCPN
Subjective


Remarks/Hospital Course


This is a 77-year-old female that presented to the ED for evaluation of altered 

mental status. Per report, according to EMS the patient normally is more alert 

and  has a history of stroke, he was able to speak with the patient's sister 

who states that she is also power of , the patient apparently normally 

is ambulatory and can carry on a conversation without any difficulty. Per 

records reviewed, the patient was last seen normal and at her baseline at 1 am. 

The patient then sat down in her lazy chair, her sister also noted that she 

started beating her head against a piece of furniture.  The patient was noted 

to have a right frontal contusion upon presentation to the ED .She had a GCS of 

7-8 on arrival. The patient's medical history is significant for a recent 

admission 09/2017 for altered mental status, medication induced.  Her past 

medical history is also significant for a recent history of UTI and C. 

difficile in addition to her history of having a CVA and reportedly residual 

effects in the right upper and lower extremity.  Laboratory and imaging studies 

revealed that most likely the patient has a UTI, CT of the brain revealed no 

abnormality and the patient was noted to have an elevated lactate level.  The 

patient was emergently intubated in the ED, and the patient was noted to be 

significantly hypertensive and a nicardipine infusion was instituted.  Upon 

entering the ED the patient's blood pressure was noted to be 219/92, Cardizem 

infusion had been ordered.  The patient was noted to have spontaneous movement 

of the right upper and lower extremity with a gag reflex. Critical care 

medicine was consulted.





12/25: The patient was weaned off of nicardipine infusion.  Normotensive the 

patient continues on labetalol twice a day scheduled home dosing.  EEG 

attempted last evening however due to patient's movement bilaterally to 

complete artifact EEG reordered.  Fentanyl infusion discontinued for daily 

sedation vacation approximately 7 hours ago the patient remains nonresponsive.  

Opens eyes spontaneously, moves limbs spontaneously but does not follow any 

commands.  Brain MRI/MRA, and CT all negative findings.  EEG scheduled for 

a.m..  The patient continues in severe metabolic acidosis, 2 Amps of sodium 

bicarbonate IV push given this a.m., sodium bicarbonate infusion increased.  

Lactate is cleared, creatinine is improving, CT of the abdomen and pelvis is 

pending this afternoon.


12/26 CT report from yesterday shows bladder edema consistent with cystitis.  

URine culture with GNR  Blood cultures NGTD. . Getting EEG now. 


12/27:  Resting comfortable in bed in no acute distress.  No obvious seizure 

activity.  Plan for lumbar puncture in AM.  Low-grade temperatures overnight.  

Tolerating tube feeding at goal.  No bowel movement


12/28:  Continues EEG has been discontinued.  Tmax 100.  Currently 99.8.  No 

bowel movement since admission.  Tolerating tube feeds at goal.  No active 

seizure activity overnight.





Subjective





12/29:  Tmax 100.  Currently 99.9.  Became tachycardic with sedation lowered so 

RN increased midazolam to 9 milligrams an hour.  Also fentanyl drip at 250 mcg 

per hour.  No bowel movement since admission.  Tolerating tube feedings with 

only 40 cc residuals.  Receiving methylnaltrexone milligrams subcutaneous 1 

along with mineral oil and suppository.  KUB pending.  Patient improved 

neurological exam.  Blinks with my suddenhand  movements towards face.  

Positive gag.  Withdraws to pain in all 4 extremity's.





Objective





Vital Signs








  Date Time  Temp Pulse Resp B/P (MAP) Pulse Ox O2 Delivery O2 Flow Rate FiO2


 


12/29/17 16:33     99   30


 


12/29/17 14:00  108      


 


12/29/17 12:00 99.9  17 120/59 (79)    














Intake and Output   


 


 12/29/17 12/29/17 12/30/17





 08:00 16:00 00:00


 


Intake Total 1532.5 ml 848 ml 


 


Output Total 1700 ml  


 


Balance -167.5 ml 848 ml 








Result Diagram:  


12/29/17 0628                                                                  

              12/29/17 0629





Other Results





Microbiology








 Date/Time


Source Procedure


Growth Status


 


 


 12/24/17 08:55


Blood Peripheral Aerobic Blood Culture - Final


NO GROWTH IN 5 DAYS Complete


 


 12/24/17 08:55


Blood Peripheral Anaerobic Blood Culture - Final


NO GROWTH IN 5 DAYS Complete





 12/28/17 09:47


Cerebral Spinal Fluid Lumbar Puncture Fungal Smear - Final


NO FUNGAL ELEMENTS SEEN. Resulted


 


 12/28/17 09:47


Cerebral Spinal Fluid Lumbar Puncture Fungal Culture


Pending Resulted


 


 12/24/17 09:54


Urine Catheterized Urine Streptococcus pneumoniae Antigen (M - Final


PRESUMPTIVE NEGATIVE FOR STREPTOCOCCU... Complete








Imaging





Last Impressions








Chest X-Ray 12/29/17 0600 Signed





Impressions: 





 Service Date/Time:  Friday, December 29, 2017 04:41 - CONCLUSION:  No 





 significant interval change.     Christian Mendiola MD 


 


Lumbar Puncture Fluoroscopy 12/28/17 0000 Signed





Impressions: 





 Service Date/Time:  Thursday, December 28, 2017 09:39 - CONCLUSION: 





 Uncomplicated fluoroscopically guided lumbar puncture.     Reji Townsend MD 


 


Brain MRI 12/28/17 0000 Signed





Impressions: 





 Service Date/Time:  Thursday, December 28, 2017 10:24 - CONCLUSION:  No acute 





 intracranial findings     Bronson Mar MD 


 


Upper Extremity Ultrasound 12/27/17 0000 Signed





Impressions: 





 Service Date/Time:  Wednesday, December 27, 2017 21:32 - CONCLUSION:  1. No 





 sonographic evidence for right upper extremity DVT.     Reji Townsend MD 


 


Lower Extremity Ultrasound 12/27/17 0000 Signed





Impressions: 





 Service Date/Time:  Wednesday, December 27, 2017 21:12 - CONCLUSION:  1. No 





 sonographic evidence for lower extremity DVT.     Reji Townsend MD 


 


Abdomen/Pelvis CT 12/25/17 0000 Signed





Impressions: 





 Service Date/Time:  Monday, December 25, 2017 16:05 - CONCLUSION:  1. Mild 

edema 





 surrounding the urinary bladder, question cystitis. Erazo catheter in place. 

2. 





 Status post cholecystectomy.  3. Left lower lobe atelectasis. 4. Nasogastric 





 tube tip at the gastroesophageal junction.    Christian Mendiola MD 


 


Neck Magnetic Resonance Angiography 12/24/17 1119 Signed





Impressions: 





 Service Date/Time:  Sunday, December 24, 2017 13:11 - CONCLUSION:  Negative 

for 





 hemodynamically significant carotid stenosis     Pb López MD  FACR


 


Head Magnetic Resonance Angiography 12/24/17 1119 Signed





Impressions: 





 Service Date/Time:  Sunday, December 24, 2017 13:11 - CONCLUSION: Negative for 





 major branch vessel occlusion.      Pb López MD  FACR


 


Head CT 12/24/17 0851 Signed





Impressions: 





 Service Date/Time:  Sunday, December 24, 2017 09:02 - CONCLUSION:  Negative 

for 





 acute process..     Pb López MD  FACR


 


Cervical Spine CT 12/24/17 0000 Signed





Impressions: 





 Service Date/Time:  Sunday, December 24, 2017 09:07 - CONCLUSION:  Fusion as 





 above, negative for fracture.     Pb López MD  FACR








Objective Remarks


GENERAL: 77-year-old  female currently orotracheally intubated


SKIN: Warm and dry.


HEAD: Atraumatic. Normocephalic.  


EYES: R periorbital ecchmosis. Pupils equal and round 1-2 mm bilaterally. No 

scleral icterus. No injection or drainage. 


ENT: No nasal bleeding or discharge. Mucous membranes pink and moist.


NECK: Trachea midline. No JVD. 


CARDIOVASCULAR: RRR.  S1, S2 withoutmurmur


RESPIRATORY: No accessory muscle use. Clear to auscultation. Breath sounds 

equal bilaterally.  


GASTROINTESTINAL: Abdomen soft, non-tender, nondistended. No guarding. 


MUSCULOSKELETAL: Right upper extremity with 2+ edema and erythema.  Negative 

Doppler ultrasound 12/27 for DVT


NEUROLOGICAL: Cranial nerves II through XII appear grossly intact.  Eyes open 

with voice.  Does not track.  Positive gag.  Positive corneal reflex.  Positive 

response to fright.  Withdraws to pain in all 4 extremities currently.  Per 

report the patient does have deficits status post CVA of right upper and lower 

extremity patient with post polio syndrome with wasting bilateral lower 

extremities.


Date of Insertion:  Dec 24, 2017





A/P


Assessment and Plan


Neuro/Psych





Post polio syndrome bilateral lower extremity weakness


Status epilepticus


History of CVATIA with R sided deficits - right lentiform nucleus


H/O seizures


Anxiety/depression


Left maxillary/ethmoid fluid levels question sinusitis





12/28 interpretation EEG - Abnormal EEG due to some mild to moderate slowing, 

but also some occasional sharps and spike and slow waves seen, but improved 

from prior EEG's.  Clinical correlation.


[id number] milligrams twice a day will be continued.  


Loaded with fosphenytoin - currently fosphenytoin  100 mg IV every 6 hours.  

Given additional 500 mg IV 1 today 


Levetiracetam 500 mg IV every 6 hours


Lorazepam 2 mg IV every 5 minutes as needed.  Seizures


Continue midazolam drip currently at 9 mg an hour will spikes


Fentanyl drip currently at 250 mcg an hour.  Patient does have history of 

chronic pain


TSH normal-3.0


Random cortisol level only 3.5. Unclear significance as patient had lactic 

acidemia but also hypertensive at the time.  Recheck 12/28  12.3.  Cosyntropin 

test ordered 12/29. 





12/24 MRI brain-Moderate periventricular white matter changes are evident.  

There is no restricted diffusion.  There is moderate atrophy with dilatation of 

ventricular sulcal spaces.  There are no extra-axial fluid collections 

appreciated.  Posterior fossa is unremarkable.  


12/24 MRA brain-negative for major branch occlusion 


12/24 CT brain-no acute intracranial process


Patient's home meds baclofen 10 mg 3 times a day, tramadol 50 mg every 4 hours 

as needed and sertraline 50 mg twice a day have been on hold due to  concern 

for serotonin syndrome, seizure activity and rhabdomyolysis.  


   Patient has history of altered mental status secondary to being medication 

induced.  Reported by family member -the patient had discontinued baclofen for 

approximately 3 months and took her initial dose of baclofen on 12/23 with a 

TID schedule dosing.


Neurology actively following Dr. Martin. 


Lumbar puncture ordered 12/28.  Clopidogrel on hold since admission. Received 

last 12/24 empirically on vancomycin, ceftriaxone 2 g every 12 hours, acyclovir 

700 mg every 8 hours. 


MRI brain 12/28 - There is mild periventricular white matter T2 prolongation. A 

tiny lacunar infarct in the right lentiform nucleus appears old. There is no 

evidence of intracranial mass or hemorrhage. The brainstem and posterior fossa 

structures are unremarkable. There is nothing to suggest acute infarction. 

There is fluid in the left maxillary sinus and occasional ethmoid sinuses.





Discussed with Dr. Martin. - See infectious disease








Respiratory:





Acute hypoxemic respiratory failure





PRVC 16/500/1/5/35


Ventilator bundle


Maintain O2 sat greater than 92%


Albuterol/ipratropium aerosols nebs every 6 hours scheduled, albuterol aerosols 

every 2 hours when necessary


Spontaneous breathing trials when clinically indicated


Follow-up chest x-ray 12/29





Cardiovascular:





Hypertensive emergency-resolved


History of hypertension


Hyperlipidemia


Congestive heart failure - ejection fraction 55-60% 2010





Labetalol, hydralazine IV , PRN to maintain SBP <160


Serial troponins were negative.


Continue metoprolol 25 mg every 6 hours 


   On metoprolol 50 mg sustained release daily at home


Holding clopidogrel 75 mg by mouth daily/home medication 


   resume 12/30


Continue pravastatin 40 mg by mouth daily/home medication for dyslipidemia





Renal//FEN:





Chronic kidney disease stage IIIB


Hypo-magnesium





Maintain Erazo catheter


-- Strict I/Os 


Monitor urine output


Monitor BMP


Replete electrolytes as needed


Previous Creatinine 09/2017- 1.15, upon admission 2.76, now at baseline


Maintain OGT -continue Glucerna with goal rate 50 ml/hr per nutrition's 

recommendations


Continue pravastatin 40 mg daily at bedtime for dyslipidemia





1 dose furosemide 20 mg IV 1 now.


2 g mag sulfate IV 1 now.





GI:





Esophageal stricture -history of esophageal dilatation


GERD


Elevated ammonia


Constipation





Patient is currently on Glucerna 1.5 goal 50 cc per hour per nutrition's 

recommendations 


Lansoprazole 30 mg daily GI prophylaxis. On Dexlansoprazole 60 mg times daily 

at home


Docusate sodium/senna 1 tablet twice a day for bowel regimen.  Add polyethylene 

glycol 17 g twice a day and lactulose 30 cc 4x a day


   1 dose of methylnaltrexone 12 MG SUBCUTANEOUS 1 AND 30 CC MINERAL OIL 1.


Check KUB today 12/29


Ammonia level Recheck in a.m. 12/30





ID:





Escherichia coli UTI





Monitor CBC


Blood cultures 12/24 - negative


Urine culture 12/24 -  Escherichia coli, pansensitive


Strep pneumococcal antigen 12/24  - negative 


CSF 12/28 Gram stain, fungal and AFB negative as of 12/29





Recent history of C. difficile colitis. Lactinex tid. 


Acyclovir initiated by Dr. Martin. Use ceftriaxone/vancomycin for meningitis 

coverage pending LP results.  





Discuss with Dr. Martin 12/29.  HSV 1 and 2 negative.  We will discontinue 

acyclovir.  CSF preliminary no growth 24 hours.  Gram stain final.  Dr. Martin 

wishes to wait until final results prior to discontinuing vancomycin and 

ceftriaxone/de-escalating to cefazolin for Escherichia coli UTI





HEME/ONC:





History of cervical cancer


Normocytic anemia


History of right lower extremity DVT - posterior tibial and femoral vein 

previously on Apixaban





Patient normally on clopidogrel 75 mg daily hold since 12/24.  Restart 12/30


Lactate level 1.8


Recheck Dopplers bilateral and right upper lower extremities 12/28 negative for 

DVT





Endocrine:





Diabetes mellitus


Low cortisol





Glucose monitoring per ICU protocol, low dose regimen Novulog every 6 hours


Hold metformin thousand milligrams twice a day


Restart cosyntropin test in 1700.  See orders





Prophylaxis:


GI Prophylaxis


Lansoprazole 30 mg daily


DVT Prophylaxis


-- SCDs okay to resume heparin cutaneous.  Discussed with Dr. Martin.





Lines:


Peripheral IVs providing adequate access.  Central line if indicated  





Level II follow-up











Vishal Moreno MD Dec 29, 2017 17:00

## 2017-12-29 NOTE — MG
cc:

GAVI FAIR M.D.

****

 

 

Lab No:   Date: 1940  Age: 77 Sex:  F Race:

 

 

REFERRING:

Mario.

 

ROOM:

525.

 

With photic done.

 

MEDICATIONS:

7 milligrams Versed.  250 of Fentanyl. Acyclovir.  Keppra. Cerebyx.  Vimpat.

Lopressor.

 

HISTORY:

Intubated.  MRI negative.

 

DESCRIPTION OF THE RECORD:

Attenuated background predominantly 3 to 4 hertz.  EKG looks sinus. Some minor

artifact but overall predominantly between delta and theta frequency.  No

epileptiform features.  Photic stimulation minimal driving response is noted.

 

IMPRESSION:

Abnormal EEG due to a moderate background slowing consistent with

encephalopathic process however no epileptiform features in this recording.

Clinical correlation.

 

 

 

                              _________________________________

                              MD YOVANI Irene/LORRIE

D:  12/29/2017/7:38 PM

T:  12/29/2017/8:53 PM

Visit #:  [id number]

Job #:  [id number]

## 2017-12-29 NOTE — HHI.PR
Review/Management


Diagnosis


Sz on EEG---subclinical--improved. Mainly right hemisphere sharp activity---

improved on versed and phosphenytoin. 


CSF so far normal, HSV is negative in csf


Plan


increase cerebyx and recheck phenytoin level in am


continue keppra


d/c acyclovir


ok from neuro standpoint to stop antibiotics when csf final culture negative. 


continue sedation next several days, then consider weaning and repeat EEG


Diagnosis/Plan:  





Subjective


Subjective Comments


No acute events reported


Active Medications





Current Medications








 Medications


  (Trade)  Dose


 Ordered  Sig/Aaron


 Route  Start Time


 Stop Time Status Last Admin


 


 Fentanyl Citrate  250 ml @ 5


 mls/hr  TITRATE  PRN


 IV  12/24/17 09:45


    12/29/17 18:25


 


 


  (NS Flush)  2 ml  UNSCH  PRN


 IV FLUSH  12/24/17 11:15


    12/29/17 08:59


 


 


  (NS Flush)  2 ml  BID


 IV FLUSH  12/24/17 21:00


    12/29/17 08:59


 


 


  (Tears Naturale


 Opth Soln)  1 drop  TID


 EACH EYE  12/24/17 13:00


    12/29/17 18:20


 


 


 Miscellaneous


 Information  1  Q361D


 XX  12/24/17 11:15


    12/24/17 11:15


 


 


  (Chlorhexidine


 2% Cloth)  3 pack


 Taper  DAILY@04


 TOP  12/25/17 04:00


 12/21/18 03:59  12/29/17 03:07


 


 


  (Chlorhexidine


 2% Cloth)  3 pack  UNSCH  PRN


 TOP  12/24/17 11:15


     


 


 


  (Ruby-Colace)  1 tab  BID


 PO  12/24/17 21:00


    12/29/17 09:00


 


 


  (Milk Of


 Magnesia Liq)  30 ml  Q12H  PRN


 PO  12/24/17 11:15


     


 


 


  (Senokot)  17.2 mg  Q12H  PRN


 PO  12/24/17 11:15


     


 


 


  (Dulcolax Supp)  10 mg  DAILY  PRN


 RECTAL  12/24/17 11:15


    12/29/17 15:19


 


 


  (D50w (Vial) Inj)  50 ml  UNSCH  PRN


 IV PUSH  12/24/17 11:30


     


 


 


  (Glucagon Inj)  1 mg  UNSCH  PRN


 OTHER  12/24/17 11:30


     


 


 


  (Vimpat)  100 mg  BID


 PO  12/24/17 14:15


    12/29/17 09:00


 


 


  (Pravachol)  40 mg  HS


 PO  12/24/17 21:00


    12/28/17 21:19


 


 


  (Trandate Inj)  10 mg  Q4H  PRN


 IV PUSH  12/24/17 14:30


     


 


 


  (Apresoline Inj)  20 mg  Q4H  PRN


 IV PUSH  12/24/17 14:30


     


 


 


  (Lopressor)  25 mg  Q6HR


 PO  12/24/17 21:00


    12/29/17 18:18


 


 


 Potassium Chloride  100 ml @ 


 50 mls/hr  Q2H  PRN


 IV  12/26/17 09:15


     


 


 


 Potassium Chloride  100 ml @ 


 50 mls/hr  Q2H  PRN


 IV  12/26/17 09:15


    12/26/17 15:57


 


 


  (K-Lyte Cl  Eff)  50 meq  UNSCH  PRN


 PO  12/26/17 09:15


     


 


 


 Potassium Chloride  100 ml @ 


 25 mls/hr  UNSCH  PRN


 IV  12/26/17 09:15


     


 


 


 Potassium Chloride  100 ml @ 


 50 mls/hr  Q2H  PRN


 IV  12/26/17 09:15


     


 


 


 Magnesium Sulfate


 4 gm/Sodium


 Chloride  100 ml @ 


 50 mls/hr  UNSCH  PRN


 IV  12/26/17 09:15


     


 


 


  (Mag-Ox)  800 mg  UNSCH  PRN


 PO  12/26/17 09:15


     


 


 


 Magnesium Sulfate


 2 gm/Sodium


 Chloride  100 ml @ 


 50 mls/hr  UNSCH  PRN


 IV  12/26/17 09:15


     


 


 


  (K-Phos)  2,000 mg  Q4H  PRN


 PO  12/26/17 09:15


     


 


 


 Sodium Phosphate


 30 mmol/Sodium


 Chloride  250 ml @ 


 42 mls/hr  UNSCH  PRN


 IV  12/26/17 09:15


     


 


 


  (K-Phos)  2,000 mg  UNSCH  PRN


 PO/TUBE  12/26/17 09:15


     


 


 


 Potassium


 Phosphate 30 mmol/


 Sodium Chloride  260 ml @ 


 42 mls/hr  UNSCH  PRN


 IV  12/26/17 09:15


     


 


 


 Ceftriaxone


 Sodium 2000 mg/


 Sodium Chloride  100 ml @ 


 200 mls/hr  Q12H


 IV  12/26/17 11:00


    12/29/17 11:30


 


 


  (Lactinex Pkt)  1 gm  TID


 OG-TUBE  12/26/17 13:00


    12/29/17 18:18


 


 


  (Ativan Inj)  2 mg  Q5M  PRN


 IV PUSH  12/26/17 10:45


    12/26/17 14:18


 


 


 Midazolam HCl  100 ml @ 2


 mls/hr  TITRATE  PRN


 IV  12/26/17 11:15


    12/29/17 13:47


 


 


  (Cerebyx Inj)  100 mgpe  Q6H


 IV  12/27/17 15:00


    12/29/17 15:21


 


 


  (Duoneb Neb)  1 ampule  Q6HR  NEB


 INH  12/27/17 22:00


    12/29/17 20:36


 


 


  (Tylenol 650 Mg/


 20 ml Liq)  650 mg  Q6H  PRN


 NG  12/27/17 17:00


    12/29/17 15:50


 


 


  (NovoLOG


 SUPPLEMENTAL


 SCALE)  1  Q6HR


 SQ  12/27/17 18:00


     


 


 


  (Albuterol Neb)  2.5 mg  Q2HR NEB  PRN


 NEB  12/27/17 17:00


     


 


 


  (Prevacid Odt)  30 mg  DAILY


 NG  12/28/17 09:00


    12/29/17 09:00


 


 


 Levetriacetam 500


 mg/Sodium Chloride  105 ml @ 


 420 mls/hr  Q6HR


 IV  12/28/17 12:00


    12/29/17 18:33


 


 


  (Miralax)  17 gm  BID


 PO  12/28/17 21:00


    12/29/17 09:00


 


 


  (Plavix)  75 mg  DAILY


 PO  12/30/17 09:00


     


 


 


  (Heparin Inj)  5,000 units  Q8HR


 SQ  12/29/17 22:00


     


 


 


  (Peridex 0.12%


 Liq)  15 ml  BID@08,20


 MT  12/29/17 20:00


     


 


 


  (Lactulose Liq)  30 ml  Q6HR


 PO  12/29/17 18:00


    12/29/17 18:18


 








Allergies





Allergies


Coded Allergies


  Influenza Virus Vaccines (Unverified Allergy, Intermediate, 10/27/17)


  egg (Unverified Allergy, Intermediate, 10/27/17)


  codeine (Unverified Allergy, Mild, 10/27/17)


  meperidine (Unverified Allergy, Mild, 10/27/17)


  morphine (Unverified Allergy, Mild, 10/27/17)


  acetaminophen (Unverified Adverse Reaction, Mild, HEADACHE, 10/27/17)


  hydrocodone (Unverified Adverse Reaction, Mild, HEADACHE, 10/27/17)





Exam


I&O / VS











 12/29/17 12/29/17 12/30/17





 15:00 23:00 07:00


 


Intake Total 779.5 ml 1970.1 ml 


 


Output Total  1650 ml 


 


Balance 779.5 ml 320.1 ml 


 


   


 


Intake IV Total 779.5 ml 1110.1 ml 


 


Tube Feeding  560 ml 


 


Other  300 ml 


 


Output Urine Total  1650 ml 


 


# Bowel Movements  0 








Vital Signs








  Date Time  Temp Pulse Resp B/P (MAP) Pulse Ox O2 Delivery O2 Flow Rate FiO2


 


12/29/17 18:00  101      


 


12/29/17 18:00  101 17 125/59 (81) 100   


 


12/29/17 17:00  111 17 125/59 (81) 99   


 


12/29/17 16:33     99   30


 


12/29/17 16:00 100.4 116 20 131/60 (83) 100   


 


12/29/17 16:00  116      


 


12/29/17 16:00        30


 


12/29/17 15:00  109 19 121/56 (77) 100   


 


12/29/17 14:00  108      


 


12/29/17 14:00  108 18 112/58 (76) 100   


 


12/29/17 13:00  105 18 114/58 (76) 100   


 


12/29/17 12:42     100   30


 


12/29/17 12:00  103      


 


12/29/17 12:00 99.9 103 17 120/59 (79) 100   


 


12/29/17 12:00        30


 


12/29/17 10:00  114 19 131/61 (84) 100   


 


12/29/17 10:00  114      


 


12/29/17 09:00  116 21 134/60 (84) 100   


 


12/29/17 08:23     100   30


 


12/29/17 08:00 100.0 114 19 138/63 (88) 100   


 


12/29/17 08:00        30


 


12/29/17 08:00  114      


 


12/29/17 07:00  101 21 128/59 (82) 100   


 


12/29/17 06:00  110      


 


12/29/17 04:18     100   30


 


12/29/17 04:00  104      


 


12/29/17 04:00        30


 


12/29/17 04:00 99.0 104 18 115/56 (75) 100   


 


12/29/17 02:00  98      


 


12/29/17 01:31     100   30


 


12/29/17 00:00  107      


 


12/29/17 00:00 99.5 107 21 124/61 (82) 100   


 


12/29/17 00:00        30


 


12/28/17 22:15     100   30


 


12/28/17 22:00  100      








Exam Comments


sedated, nonresponsive


CN--Pupils 1 mm symmetric and reactive. EOM intact to Dolls maneuver


 MOTOR--no focal sx. No clinical tonic clonic activity





Objective


Radiology Results


MRI brain repeat--normal


Micro and Labs





Laboratory Tests








Test


  12/29/17


06:28 12/29/17


06:29 12/29/17


17:18 12/29/17


18:58


 


White Blood Count 8.3    


 


Red Blood Count 2.70    


 


Hemoglobin 8.1    


 


Hematocrit 24.2    


 


Mean Corpuscular Volume 89.5    


 


Mean Corpuscular Hemoglobin 29.8    


 


Mean Corpuscular Hemoglobin


Concent 33.4 


  


  


  


 


 


Red Cell Distribution Width 15.3    


 


Platelet Count 238    


 


Mean Platelet Volume 8.0    


 


Neutrophils (%) (Auto) 67.9    


 


Lymphocytes (%) (Auto) 16.7    


 


Monocytes (%) (Auto) 10.8    


 


Eosinophils (%) (Auto) 4.2    


 


Basophils (%) (Auto) 0.4    


 


Neutrophils # (Auto) 5.7    


 


Lymphocytes # (Auto) 1.4    


 


Monocytes # (Auto) 0.9    


 


Eosinophils # (Auto) 0.3    


 


Basophils # (Auto) 0.0    


 


CBC Comment DIFF FINAL    


 


Differential Comment     


 


Ammonia 30    


 


Blood Urea Nitrogen  14   


 


Creatinine  0.96   


 


Random Glucose  105   


 


Total Protein  5.3   


 


Albumin  1.8   


 


Calcium Level  7.7   


 


Phosphorus Level  3.3   


 


Magnesium Level  1.7   


 


Alkaline Phosphatase  66   


 


Aspartate Amino Transf


(AST/SGOT) 


  15 


  


  


 


 


Alanine Aminotransferase


(ALT/SGPT) 


  8 


  


  


 


 


Total Bilirubin  0.2   


 


Sodium Level  140   


 


Potassium Level  4.3   


 


Chloride Level  108   


 


Carbon Dioxide Level  20.9   


 


Anion Gap  11   


 


Estimat Glomerular Filtration


Rate 


  57 


  


  


 


 


Phenytoin (Dilantin) Level  9.0   


 


Random Cortisol   12.6  26.0 














 Date/Time


Source Procedure


Growth Status


 


 


 12/24/17 08:55


Blood Peripheral Aerobic Blood Culture - Final


NO GROWTH IN 5 DAYS Complete


 


 12/24/17 08:55


Blood Peripheral Anaerobic Blood Culture - Final


NO GROWTH IN 5 DAYS Complete





 12/28/17 09:47


Cerebral Spinal Fluid Lumbar Puncture Fungal Smear - Final


NO FUNGAL ELEMENTS SEEN. Resulted


 


 12/28/17 09:47


Cerebral Spinal Fluid Lumbar Puncture Fungal Culture


Pending Resulted


 


 12/24/17 09:54


Urine Catheterized Urine Streptococcus pneumoniae Antigen (M - Final


PRESUMPTIVE NEGATIVE FOR STREPTOCOCCU... Complete








Diagnostic Tests


EEG--occasional sharp acitivity but much improved











Colton Martin MD PhD Dec 29, 2017 20:53

## 2017-12-30 VITALS
HEART RATE: 102 BPM | SYSTOLIC BLOOD PRESSURE: 128 MMHG | TEMPERATURE: 98.4 F | DIASTOLIC BLOOD PRESSURE: 76 MMHG | RESPIRATION RATE: 20 BRPM | OXYGEN SATURATION: 100 %

## 2017-12-30 VITALS
HEART RATE: 111 BPM | SYSTOLIC BLOOD PRESSURE: 124 MMHG | DIASTOLIC BLOOD PRESSURE: 53 MMHG | OXYGEN SATURATION: 100 % | TEMPERATURE: 100.6 F | RESPIRATION RATE: 19 BRPM

## 2017-12-30 VITALS
HEART RATE: 98 BPM | OXYGEN SATURATION: 100 % | SYSTOLIC BLOOD PRESSURE: 122 MMHG | RESPIRATION RATE: 20 BRPM | DIASTOLIC BLOOD PRESSURE: 59 MMHG | TEMPERATURE: 99.1 F

## 2017-12-30 VITALS
SYSTOLIC BLOOD PRESSURE: 118 MMHG | OXYGEN SATURATION: 100 % | DIASTOLIC BLOOD PRESSURE: 72 MMHG | TEMPERATURE: 100.6 F | RESPIRATION RATE: 21 BRPM | HEART RATE: 124 BPM

## 2017-12-30 VITALS — OXYGEN SATURATION: 100 %

## 2017-12-30 VITALS — HEART RATE: 117 BPM

## 2017-12-30 VITALS
OXYGEN SATURATION: 100 % | TEMPERATURE: 98.9 F | HEART RATE: 93 BPM | RESPIRATION RATE: 19 BRPM | DIASTOLIC BLOOD PRESSURE: 63 MMHG | SYSTOLIC BLOOD PRESSURE: 135 MMHG

## 2017-12-30 VITALS
OXYGEN SATURATION: 100 % | DIASTOLIC BLOOD PRESSURE: 66 MMHG | RESPIRATION RATE: 18 BRPM | HEART RATE: 119 BPM | TEMPERATURE: 98.9 F | SYSTOLIC BLOOD PRESSURE: 145 MMHG

## 2017-12-30 VITALS — TEMPERATURE: 99.6 F

## 2017-12-30 VITALS — HEART RATE: 97 BPM

## 2017-12-30 VITALS — HEART RATE: 101 BPM

## 2017-12-30 VITALS — HEART RATE: 100 BPM

## 2017-12-30 VITALS — HEART RATE: 119 BPM

## 2017-12-30 LAB
ALBUMIN SERPL-MCNC: 2.1 GM/DL (ref 3.4–5)
ALP SERPL-CCNC: 81 U/L (ref 45–117)
ALT SERPL-CCNC: 11 U/L (ref 10–53)
AST SERPL-CCNC: 18 U/L (ref 15–37)
BILIRUB SERPL-MCNC: 0.1 MG/DL (ref 0.2–1)
BUN SERPL-MCNC: 19 MG/DL (ref 7–18)
CALCIUM SERPL-MCNC: 8.3 MG/DL (ref 8.5–10.1)
CHLORIDE SERPL-SCNC: 108 MEQ/L (ref 98–107)
CREAT SERPL-MCNC: 1 MG/DL (ref 0.5–1)
CRYPTOC AG SPEC QL LA: NOT DETECTED
ERYTHROCYTE [DISTWIDTH] IN BLOOD BY AUTOMATED COUNT: 15.5 % (ref 11.6–17.2)
GFR SERPLBLD BASED ON 1.73 SQ M-ARVRAT: 54 ML/MIN (ref 89–?)
GLUCOSE SERPL-MCNC: 116 MG/DL (ref 74–106)
HCO3 BLD-SCNC: 23 MEQ/L (ref 21–32)
HCT VFR BLD CALC: 23.2 % (ref 35–46)
HGB BLD-MCNC: 7.7 GM/DL (ref 11.6–15.3)
LYMPHOCYTES: 14 % (ref 9–44)
MAGNESIUM SERPL-MCNC: 2.1 MG/DL (ref 1.5–2.5)
MCH RBC QN AUTO: 29.5 PG (ref 27–34)
MCHC RBC AUTO-ENTMCNC: 33.2 % (ref 32–36)
MCV RBC AUTO: 88.8 FL (ref 80–100)
MONOCYTES: 11 % (ref 0–8)
NEUTS BAND # BLD MANUAL: 5.6 TH/MM3 (ref 1.8–7.7)
NEUTS BAND NFR BLD: 3 % (ref 0–6)
NEUTS SEG NFR BLD MANUAL: 70 % (ref 16–70)
OVALOCYTES BLD QL SMEAR: (no result)
PHENYTOIN (DILANTIN): 13 MCG/ML (ref 10–20)
PHOSPHATE SERPL-MCNC: 3.8 MG/DL (ref 2.5–4.9)
PLATELET # BLD: 262 TH/MM3 (ref 150–450)
PMV BLD AUTO: 7.5 FL (ref 7–11)
PROT SERPL-MCNC: 6.5 GM/DL (ref 6.4–8.2)
RBC # BLD AUTO: 2.62 MIL/MM3 (ref 4–5.3)
REAGIN AB CSF QL: (no result)
SODIUM SERPL-SCNC: 140 MEQ/L (ref 136–145)
WBC # BLD AUTO: 7.7 TH/MM3 (ref 4–11)

## 2017-12-30 RX ADMIN — LANSOPRAZOLE SCH MG: 30 TABLET, ORALLY DISINTEGRATING, DELAYED RELEASE ORAL at 08:05

## 2017-12-30 RX ADMIN — LACTOBACILLUS ACIDOPHILUS / LACTOBACILLUS BULGARICUS SCH GM: 100 MILLION CFU STRENGTH GRANULES at 11:53

## 2017-12-30 RX ADMIN — INSULIN ASPART SCH: 100 INJECTION, SOLUTION INTRAVENOUS; SUBCUTANEOUS at 06:00

## 2017-12-30 RX ADMIN — WATER SCH ML: 1 IRRIGANT IRRIGATION at 05:05

## 2017-12-30 RX ADMIN — IPRATROPIUM BROMIDE AND ALBUTEROL SULFATE SCH AMPULE: .5; 3 SOLUTION RESPIRATORY (INHALATION) at 21:52

## 2017-12-30 RX ADMIN — INSULIN ASPART SCH: 100 INJECTION, SOLUTION INTRAVENOUS; SUBCUTANEOUS at 17:00

## 2017-12-30 RX ADMIN — CLOPIDOGREL BISULFATE SCH MG: 75 TABLET, FILM COATED ORAL at 08:05

## 2017-12-30 RX ADMIN — LACTOBACILLUS ACIDOPHILUS / LACTOBACILLUS BULGARICUS SCH GM: 100 MILLION CFU STRENGTH GRANULES at 17:08

## 2017-12-30 RX ADMIN — POLYETHYLENE GLYCOL 3350 SCH GM: 17 POWDER, FOR SOLUTION ORAL at 09:00

## 2017-12-30 RX ADMIN — HEPARIN SODIUM SCH UNITS: 10000 INJECTION, SOLUTION INTRAVENOUS; SUBCUTANEOUS at 13:44

## 2017-12-30 RX ADMIN — POLYVINYL ALCOHOL SCH DROP: 14 SOLUTION/ DROPS OPHTHALMIC at 13:42

## 2017-12-30 RX ADMIN — POLYETHYLENE GLYCOL 3350 SCH GM: 17 POWDER, FOR SOLUTION ORAL at 20:07

## 2017-12-30 RX ADMIN — CEFTRIAXONE SODIUM SCH MLS/HR: 2 INJECTION, POWDER, FOR SOLUTION INTRAMUSCULAR; INTRAVENOUS at 00:07

## 2017-12-30 RX ADMIN — Medication PRN MLS/HR: at 15:37

## 2017-12-30 RX ADMIN — LACTOBACILLUS ACIDOPHILUS / LACTOBACILLUS BULGARICUS SCH GM: 100 MILLION CFU STRENGTH GRANULES at 08:05

## 2017-12-30 RX ADMIN — HEPARIN SODIUM SCH UNITS: 10000 INJECTION, SOLUTION INTRAVENOUS; SUBCUTANEOUS at 21:36

## 2017-12-30 RX ADMIN — LEVETIRACETAM SCH MLS/HR: 100 INJECTION, SOLUTION, CONCENTRATE INTRAVENOUS at 05:05

## 2017-12-30 RX ADMIN — SODIUM CHLORIDE SCH MG: 900 INJECTION, SOLUTION INTRAVENOUS at 21:35

## 2017-12-30 RX ADMIN — CHLORHEXIDINE GLUCONATE 0.12% ORAL RINSE SCH ML: 1.2 LIQUID ORAL at 20:07

## 2017-12-30 RX ADMIN — STANDARDIZED SENNA CONCENTRATE AND DOCUSATE SODIUM SCH TAB: 8.6; 5 TABLET, FILM COATED ORAL at 09:00

## 2017-12-30 RX ADMIN — CHLORHEXIDINE GLUCONATE SCH PACK: 500 CLOTH TOPICAL at 03:05

## 2017-12-30 RX ADMIN — METOPROLOL TARTRATE SCH MG: 25 TABLET, FILM COATED ORAL at 23:15

## 2017-12-30 RX ADMIN — PRAVASTATIN SODIUM SCH MG: 40 TABLET ORAL at 20:08

## 2017-12-30 RX ADMIN — METOPROLOL TARTRATE SCH MG: 25 TABLET, FILM COATED ORAL at 11:31

## 2017-12-30 RX ADMIN — FOSPHENYTOIN SODIUM SCH MLS/HR: 50 INJECTION, SOLUTION INTRAMUSCULAR; INTRAVENOUS at 21:35

## 2017-12-30 RX ADMIN — FOSPHENYTOIN SODIUM SCH MLS/HR: 50 INJECTION, SOLUTION INTRAMUSCULAR; INTRAVENOUS at 05:04

## 2017-12-30 RX ADMIN — WATER SCH ML: 1 IRRIGANT IRRIGATION at 17:13

## 2017-12-30 RX ADMIN — METOPROLOL TARTRATE SCH MG: 25 TABLET, FILM COATED ORAL at 17:13

## 2017-12-30 RX ADMIN — Medication SCH ML: at 20:07

## 2017-12-30 RX ADMIN — WATER SCH ML: 1 IRRIGANT IRRIGATION at 23:15

## 2017-12-30 RX ADMIN — LEVETIRACETAM SCH MLS/HR: 100 INJECTION, SOLUTION, CONCENTRATE INTRAVENOUS at 23:15

## 2017-12-30 RX ADMIN — IPRATROPIUM BROMIDE AND ALBUTEROL SULFATE SCH AMPULE: .5; 3 SOLUTION RESPIRATORY (INHALATION) at 08:24

## 2017-12-30 RX ADMIN — ACETAMINOPHEN PRN MG: 650 SOLUTION ORAL at 11:53

## 2017-12-30 RX ADMIN — WATER SCH ML: 1 IRRIGANT IRRIGATION at 00:00

## 2017-12-30 RX ADMIN — CEFTRIAXONE SODIUM SCH MLS/HR: 2 INJECTION, POWDER, FOR SOLUTION INTRAMUSCULAR; INTRAVENOUS at 10:15

## 2017-12-30 RX ADMIN — MIDAZOLAM HYDROCHLORIDE PRN MLS/HR: 5 INJECTION, SOLUTION INTRAMUSCULAR; INTRAVENOUS at 03:30

## 2017-12-30 RX ADMIN — METOPROLOL TARTRATE SCH MG: 25 TABLET, FILM COATED ORAL at 05:04

## 2017-12-30 RX ADMIN — HEPARIN SODIUM SCH UNITS: 10000 INJECTION, SOLUTION INTRAVENOUS; SUBCUTANEOUS at 05:04

## 2017-12-30 RX ADMIN — SODIUM CHLORIDE SCH MG: 900 INJECTION, SOLUTION INTRAVENOUS at 05:04

## 2017-12-30 RX ADMIN — LEVETIRACETAM SCH MLS/HR: 100 INJECTION, SOLUTION, CONCENTRATE INTRAVENOUS at 11:31

## 2017-12-30 RX ADMIN — POLYVINYL ALCOHOL SCH DROP: 14 SOLUTION/ DROPS OPHTHALMIC at 08:04

## 2017-12-30 RX ADMIN — POLYVINYL ALCOHOL SCH DROP: 14 SOLUTION/ DROPS OPHTHALMIC at 17:13

## 2017-12-30 RX ADMIN — LEVETIRACETAM SCH MLS/HR: 100 INJECTION, SOLUTION, CONCENTRATE INTRAVENOUS at 00:16

## 2017-12-30 RX ADMIN — STANDARDIZED SENNA CONCENTRATE AND DOCUSATE SODIUM SCH TAB: 8.6; 5 TABLET, FILM COATED ORAL at 20:08

## 2017-12-30 RX ADMIN — INSULIN ASPART SCH: 100 INJECTION, SOLUTION INTRAVENOUS; SUBCUTANEOUS at 23:15

## 2017-12-30 RX ADMIN — MIDAZOLAM HYDROCHLORIDE PRN MLS/HR: 5 INJECTION, SOLUTION INTRAMUSCULAR; INTRAVENOUS at 18:38

## 2017-12-30 RX ADMIN — FOSPHENYTOIN SODIUM SCH MLS/HR: 50 INJECTION, SOLUTION INTRAMUSCULAR; INTRAVENOUS at 13:43

## 2017-12-30 RX ADMIN — INSULIN ASPART SCH: 100 INJECTION, SOLUTION INTRAVENOUS; SUBCUTANEOUS at 00:00

## 2017-12-30 RX ADMIN — CHLORHEXIDINE GLUCONATE 0.12% ORAL RINSE SCH ML: 1.2 LIQUID ORAL at 08:04

## 2017-12-30 RX ADMIN — LACOSAMIDE SCH MG: 100 TABLET, FILM COATED ORAL at 20:08

## 2017-12-30 RX ADMIN — Medication PRN MLS/HR: at 05:17

## 2017-12-30 RX ADMIN — METOPROLOL TARTRATE SCH MG: 25 TABLET, FILM COATED ORAL at 00:16

## 2017-12-30 RX ADMIN — LEVETIRACETAM SCH MLS/HR: 100 INJECTION, SOLUTION, CONCENTRATE INTRAVENOUS at 17:09

## 2017-12-30 RX ADMIN — WATER SCH ML: 1 IRRIGANT IRRIGATION at 10:15

## 2017-12-30 RX ADMIN — SODIUM CHLORIDE SCH MG: 900 INJECTION, SOLUTION INTRAVENOUS at 13:43

## 2017-12-30 RX ADMIN — Medication SCH ML: at 08:04

## 2017-12-30 RX ADMIN — CEFTRIAXONE SODIUM SCH MLS/HR: 2 INJECTION, POWDER, FOR SOLUTION INTRAMUSCULAR; INTRAVENOUS at 21:37

## 2017-12-30 RX ADMIN — INSULIN ASPART SCH: 100 INJECTION, SOLUTION INTRAVENOUS; SUBCUTANEOUS at 11:18

## 2017-12-30 RX ADMIN — IPRATROPIUM BROMIDE AND ALBUTEROL SULFATE SCH AMPULE: .5; 3 SOLUTION RESPIRATORY (INHALATION) at 16:09

## 2017-12-30 RX ADMIN — LACOSAMIDE SCH MG: 100 TABLET, FILM COATED ORAL at 08:05

## 2017-12-30 RX ADMIN — SODIUM CHLORIDE SCH MG: 900 INJECTION, SOLUTION INTRAVENOUS at 00:08

## 2017-12-30 RX ADMIN — IPRATROPIUM BROMIDE AND ALBUTEROL SULFATE SCH AMPULE: .5; 3 SOLUTION RESPIRATORY (INHALATION) at 03:15

## 2017-12-30 NOTE — HHI.CCPN
Subjective


Remarks/Hospital Course


This is a 77-year-old female that presented to the ED for evaluation of altered 

mental status. Per report, according to EMS the patient normally is more alert 

and  has a history of stroke, he was able to speak with the patient's sister 

who states that she is also power of , the patient apparently normally 

is ambulatory and can carry on a conversation without any difficulty. Per 

records reviewed, the patient was last seen normal and at her baseline at 1 am. 

The patient then sat down in her lazy chair, her sister also noted that she 

started beating her head against a piece of furniture.  The patient was noted 

to have a right frontal contusion upon presentation to the ED .She had a GCS of 

7-8 on arrival. The patient's medical history is significant for a recent 

admission 09/2017 for altered mental status, medication induced.  Her past 

medical history is also significant for a recent history of UTI and C. 

difficile in addition to her history of having a CVA and reportedly residual 

effects in the right upper and lower extremity.  Laboratory and imaging studies 

revealed that most likely the patient has a UTI, CT of the brain revealed no 

abnormality and the patient was noted to have an elevated lactate level.  The 

patient was emergently intubated in the ED, and the patient was noted to be 

significantly hypertensive and a nicardipine infusion was instituted.  Upon 

entering the ED the patient's blood pressure was noted to be 219/92, Cardizem 

infusion had been ordered.  The patient was noted to have spontaneous movement 

of the right upper and lower extremity with a gag reflex. Critical care 

medicine was consulted.





12/25: The patient was weaned off of nicardipine infusion.  Normotensive the 

patient continues on labetalol twice a day scheduled home dosing.  EEG 

attempted last evening however due to patient's movement bilaterally to 

complete artifact EEG reordered.  Fentanyl infusion discontinued for daily 

sedation vacation approximately 7 hours ago the patient remains nonresponsive.  

Opens eyes spontaneously, moves limbs spontaneously but does not follow any 

commands.  Brain MRI/MRA, and CT all negative findings.  EEG scheduled for 

a.m..  The patient continues in severe metabolic acidosis, 2 Amps of sodium 

bicarbonate IV push given this a.m., sodium bicarbonate infusion increased.  

Lactate is cleared, creatinine is improving, CT of the abdomen and pelvis is 

pending this afternoon.


12/26 CT report from yesterday shows bladder edema consistent with cystitis.  

URine culture with GNR  Blood cultures NGTD. . Getting EEG now. 


12/27:  Resting comfortable in bed in no acute distress.  No obvious seizure 

activity.  Plan for lumbar puncture in AM.  Low-grade temperatures overnight.  

Tolerating tube feeding at goal.  No bowel movement


12/28:  Continues EEG has been discontinued.  Tmax 100.  Currently 99.8.  No 

bowel movement since admission.  Tolerating tube feeds at goal.  No active 

seizure activity overnight.


12/29:  Tmax 100.  Currently 99.9.  Became tachycardic with sedation lowered so 

RN increased midazolam to 9 milligrams an hour.  Also fentanyl drip at 250 mcg 

per hour.  No bowel movement since admission.  Tolerating tube feedings with 

only 40 cc residuals.  Receiving methylnaltrexone milligrams subcutaneous 1 

along with mineral oil and suppository.  KUB pending.  Patient improved 

neurological exam.  Blinks with my suddenhand  movements towards face.  

Positive gag.  Withdraws to pain in all 4 extremity's.





Subjective


12/30 LP results not yet finalized. On versed 7 mg/hr and fentanyl 250 mcg/hr. 

EEG from 12/29 - no epileptiform features. Okay to start weaning per Dr. Villagran.





Objective





Vital Signs








  Date Time  Temp Pulse Resp B/P (MAP) Pulse Ox O2 Delivery O2 Flow Rate FiO2


 


12/30/17 18:00  100      


 


12/30/17 16:09     100   30


 


12/30/17 16:00 98.4  20 128/76 (93)    














Intake and Output   


 


 12/30/17 12/30/17 12/31/17





 08:00 16:00 00:00


 


Intake Total 1617 ml 509 ml 896.7 ml


 


Output Total 2300 ml  1000 ml


 


Balance -683 ml 509 ml -103.3 ml








Result Diagram:  


12/30/17 0513                                                                  

              12/30/17 0513





Other Results





Laboratory Tests








Test


  12/30/17


06:03


 


Blood Gas Puncture Site RT RADIAL 


 


Blood Gas Patient Temperature 98.6 


 


Blood Gas HCO3


  21 mmol/L


(22-26)


 


Blood Gas Base Excess


  -2.6 mmol/L


(-2-2)


 


Blood Gas Oxygen Saturation 97 % () 


 


Arterial Blood pH


  7.44


(7.380-7.420)


 


Arterial Blood Partial


Pressure CO2 31 mmHg


(38-42)


 


Arterial Blood Partial


Pressure O2 125 mmHg


()


 


Arterial Blood Oxygen Content


  10.6 Vol %


(12.0-20.0)


 


Arterial Blood


Carboxyhemoglobin 0.9 % (0-4) 


 


 


Arterial Blood Methemoglobin 0.9 % (0-2) 


 


Blood Gas Hemoglobin


  7.6 G/DL


(12.0-16.0)


 


Oxygen Delivery Device VENTILATOR 


 


Blood Gas Ventilator Setting PRVC/AC 


 


Blood Gas Inspired Oxygen 30 % 








Imaging





Last Impressions








Chest X-Ray 12/29/17 0600 Signed





Impressions: 





 Service Date/Time:  Friday, December 29, 2017 04:41 - CONCLUSION:  No 





 significant interval change.     Christian Mendiola MD 


 


Lumbar Puncture Fluoroscopy 12/28/17 0000 Signed





Impressions: 





 Service Date/Time:  Thursday, December 28, 2017 09:39 - CONCLUSION: 





 Uncomplicated fluoroscopically guided lumbar puncture.     Reji Townsend MD 


 


Brain MRI 12/28/17 0000 Signed





Impressions: 





 Service Date/Time:  Thursday, December 28, 2017 10:24 - CONCLUSION:  No acute 





 intracranial findings     Bronson Mar MD 


 


Upper Extremity Ultrasound 12/27/17 0000 Signed





Impressions: 





 Service Date/Time:  Wednesday, December 27, 2017 21:32 - CONCLUSION:  1. No 





 sonographic evidence for right upper extremity DVT.     Reji Townsend MD 


 


Lower Extremity Ultrasound 12/27/17 0000 Signed





Impressions: 





 Service Date/Time:  Wednesday, December 27, 2017 21:12 - CONCLUSION:  1. No 





 sonographic evidence for lower extremity DVT.     Reji Townsend MD 


 


Abdomen/Pelvis CT 12/25/17 0000 Signed





Impressions: 





 Service Date/Time:  Monday, December 25, 2017 16:05 - CONCLUSION:  1. Mild 

edema 





 surrounding the urinary bladder, question cystitis. Erazo catheter in place. 

2. 





 Status post cholecystectomy.  3. Left lower lobe atelectasis. 4. Nasogastric 





 tube tip at the gastroesophageal junction.    Christian Mendiola MD 


 


Neck Magnetic Resonance Angiography 12/24/17 1119 Signed





Impressions: 





 Service Date/Time:  Sunday, December 24, 2017 13:11 - CONCLUSION:  Negative 

for 





 hemodynamically significant carotid stenosis     Pb López MD  FACR


 


Head Magnetic Resonance Angiography 12/24/17 1119 Signed





Impressions: 





 Service Date/Time:  Sunday, December 24, 2017 13:11 - CONCLUSION: Negative for 





 major branch vessel occlusion.      Pb López MD  FACR


 


Head CT 12/24/17 0851 Signed





Impressions: 





 Service Date/Time:  Sunday, December 24, 2017 09:02 - CONCLUSION:  Negative 

for 





 acute process..     Pb López MD  FACR


 


Cervical Spine CT 12/24/17 0000 Signed





Impressions: 





 Service Date/Time:  Sunday, December 24, 2017 09:07 - CONCLUSION:  Fusion as 





 above, negative for fracture.     Pb López MD  FACR








Objective Remarks


GENERAL: 77-year-old  female currently orotracheally intubated


SKIN: Warm and dry.


HEAD: Atraumatic. Normocephalic.  


EYES: R periorbital ecchmosis. Pupils equal and round 1-2 mm bilaterally. No 

scleral icterus. No injection or drainage. 


ENT: No nasal bleeding or discharge. Mucous membranes pink and moist.


NECK: Trachea midline. No JVD. 


CARDIOVASCULAR: RRR.  S1, S2 withoutmurmur


RESPIRATORY: No accessory muscle use.Breath sounds equal bilaterally.  Coarse. 


GASTROINTESTINAL: Abdomen soft, non-tender, nondistended. No guarding. 


MUSCULOSKELETAL: Right upper extremity with 2+ edema and erythema.  Negative 

Doppler ultrasound 12/27 for DVT


NEUROLOGICAL: Cranial nerves II through XII appear grossly intact.  Eyes open 

with noxious stimuli..  Does not track.  Positive gag.  Positive corneal 

reflex.    Withdraws to pain in all 4 extremities currently.  Per report the 

patient does have deficits status post CVA of right upper and lower extremity 

patient with post polio syndrome with wasting bilateral lower extremities.


Date of Insertion:  Dec 24, 2017





A/P


Assessment and Plan


Neuro/Psych





Post polio syndrome bilateral lower extremity weakness


Status epilepticus


History of CVATIA with R sided deficits - right lentiform nucleus


H/O seizures


Anxiety/depression


Left maxillary/ethmoid fluid levels question sinusitis





12/28 interpretation EEG - Abnormal EEG due to some mild to moderate slowing, 

but also some occasional sharps and spike and slow waves seen, but improved 

from prior EEG's.  Clinical correlation.


[id number] milligrams twice a day will be continued.  


Loaded with fosphenytoin - currently fosphenytoin  200 mg IV every  8 hours.  

LEvel 13 12/30


Levetiracetam 500 mg IV every 6 hours


Lorazepam 2 mg IV every 5 minutes as needed.  Seizures


Versed drip currently at 7 mg/h.  We'll start weaning


Fentanyl drip currently at 250 mcg an hour.  Patient does have history of 

chronic pain


TSH normal-3.0


Random cortisol level only 3.5. Unclear significance as patient had lactic 

acidemia but also hypertensive at the time.  Recheck 12/28  12.3.  Cosyntropin 

test ordered 12/29, had adequate response 





12/24 MRI brain-Moderate periventricular white matter changes are evident.  

There is no restricted diffusion.  There is moderate atrophy with dilatation of 

ventricular sulcal spaces.  There are no extra-axial fluid collections 

appreciated.  Posterior fossa is unremarkable.  


12/24 MRA brain-negative for major branch occlusion 


12/24 CT brain-no acute intracranial process


Patient's home meds baclofen 10 mg 3 times a day, tramadol 50 mg every 4 hours 

as needed and sertraline 50 mg twice a day have been on hold due to  concern 

for serotonin syndrome, seizure activity and rhabdomyolysis.  


   Patient has history of altered mental status secondary to being medication 

induced.  Reported by family member -the patient had discontinued baclofen for 

approximately 3 months and took her initial dose of baclofen on 12/23 with a 

TID schedule dosing.


Neurology actively following Dr. Martin. 


Lumbar puncture ordered 12/28.   empirically on vancomycin, ceftriaxone 2 g 

every 12 hours, acyclovir 700 mg every 8 hours. 


MRI brain 12/28 - There is mild periventricular white matter T2 prolongation. A 

tiny lacunar infarct in the right lentiform nucleus appears old. There is no 

evidence of intracranial mass or hemorrhage. The brainstem and posterior fossa 

structures are unremarkable. There is nothing to suggest acute infarction. 

There is fluid in the left maxillary sinus and occasional ethmoid sinuses.





Discussed with Dr. Martin. - See infectious disease








Respiratory:





Acute hypoxemic respiratory failure





[facility] 16/500/1/5/35


Ventilator bundle


Maintain O2 sat greater than 92%


Albuterol/ipratropium aerosols nebs every 6 hours scheduled, albuterol aerosols 

every 2 hours when necessary


Spontaneous breathing trials when clinically indicated


Follow-up chest x-ray 12/29





Cardiovascular:





Hypertensive emergency-resolved


History of hypertension


Hyperlipidemia


Congestive heart failure - ejection fraction 55-60% 2010





Labetalol, hydralazine IV , PRN to maintain SBP <160


Serial troponins were negative.


Continue metoprolol 25 mg every 6 hours 


   On metoprolol 50 mg sustained release daily at home


continue clopidogrel 75 mg by mouth daily/home medication Resumed 12/30





Continue pravastatin 40 mg by mouth daily/home medication for dyslipidemia





Renal//FEN:





Chronic kidney disease stage IIIB


Hypo-magnesium





Maintain Erazo catheter


-- Strict I/Os 


Monitor urine output


Monitor BMP


Replete electrolytes as needed


Previous Creatinine 09/2017- 1.15, upon admission 2.76, now at baseline


Maintain OGT -continue Glucerna with goal rate 50 ml/hr per nutrition's 

recommendations


Continue pravastatin 40 mg daily at bedtime for dyslipidemia





Lasix 40 mg IV now





GI:





Esophageal stricture -history of esophageal dilatation


GERD


Elevated ammonia


Constipation





Patient is currently on Glucerna 1.5 goal 50 cc per hour per nutrition's 

recommendations 


Lansoprazole 30 mg daily GI prophylaxis. On Dexlansoprazole 60 mg times daily 

at home


Docusate sodium/senna 1 tablet twice a day for bowel regimen.  Add polyethylene 

glycol 17 g twice a day and lactulose 30 cc 4x a day


   1 dose of methylnaltrexone 12 MG SUBCUTANEOUS 1 AND 30 CC MINERAL OIL 1 12/ 29


Now having BMs. 


KUB  12/29 - mild ileus


Ammonia level Recheck in a.m. 12/30





ID:





Escherichia coli UTI





Monitor CBC


Blood cultures 12/24 - negative


Urine culture 12/24 -  Escherichia coli, pansensitive


Strep pneumococcal antigen 12/24  - negative 


CSF 12/28 Gram stain, fungal and AFB negative as of 12/29





Recent history of C. difficile colitis. Lactinex tid. 


Acyclovir initiated by Dr. Martin, now discontinued with HSV 1 and 2 negative. . 


Use ceftriaxone/vancomycin for meningitis coverage pending LP results.  


 CSF preliminary no growth 48 hours.  Gram stain final.  Dr. Martin wishes to 

wait until final results prior to discontinuing vancomycin and ceftriaxone/de-

escalating to cefazolin for Escherichia coli UTI





HEME/ONC:





History of cervical cancer


Normocytic anemia


History of right lower extremity DVT - posterior tibial and femoral vein 

previously on Apixaban





Clopidogrel 75 mg daily initially held for LP.  Restarted 12/30


Lactate level 1.8


Recheck Dopplers bilateral and right upper lower extremities 12/28 negative for 

DVT





Endocrine:





Diabetes mellitus


Low cortisol





Glucose monitoring per ICU protocol, low dose regimen Novulog every 6 hours


Hold metformin thousand milligrams twice a day





Prophylaxis:


GI Prophylaxis


Lansoprazole 30 mg daily


DVT Prophylaxis


-- SCDs continue heparin 5000 units subcutaneous every 8 hours..  Discussed 

with Dr. Martin.





Lines:


Peripheral IVs providing adequate access.  





Level II follow-up











Jacque Fuentes MD Dec 30, 2017 20:08

## 2017-12-30 NOTE — HHI.PR
Subjective


Remarks


intubated sedated





Objective





Vital Signs








  Date Time  Temp Pulse Resp B/P (MAP) Pulse Ox O2 Delivery O2 Flow Rate FiO2


 


12/30/17 14:00  101      


 


12/30/17 13:36 99.6       


 


12/30/17 12:00 100.6 124 21 118/72 (87) 100   


 


12/30/17 12:00        30


 


12/30/17 12:00  124      


 


12/30/17 11:42     100   30


 


12/30/17 10:00  117      


 


12/30/17 08:25     100   30


 


12/30/17 08:00        30


 


12/30/17 08:00 100.6 111 19 124/53 (76) 100   


 


12/30/17 08:00  111      


 


12/30/17 06:00  119      


 


12/30/17 04:00        30


 


12/30/17 04:00 98.9 119 18 145/66 (92) 100   


 


12/30/17 04:00  119      


 


12/30/17 03:58     100   30


 


12/30/17 02:00  97      


 


12/30/17 01:36     100   30


 


12/30/17 00:00        30


 


12/30/17 00:00  93      


 


12/30/17 00:00 98.9 93 19 135/63 (87) 100   


 


12/29/17 22:30     100   30


 


12/29/17 22:00  95      


 


12/29/17 20:36     100   30


 


12/29/17 20:00 99.3 106 18 135/71 (92) 100   


 


12/29/17 20:00  106      


 


12/29/17 20:00        30


 


12/29/17 18:00  101      


 


12/29/17 18:00  101 17 125/59 (81) 100   


 


12/29/17 17:00  111 17 125/59 (81) 99   


 


12/29/17 16:33     99   30


 


12/29/17 16:00 100.4 116 20 131/60 (83) 100   


 


12/29/17 16:00  116      


 


12/29/17 16:00        30














I/O      


 


 12/29/17 12/29/17 12/29/17 12/30/17 12/30/17 12/30/17





 07:00 15:00 23:00 07:00 15:00 23:00


 


Intake Total 1696 ml 779.5 ml 2026.1 ml 1617 ml  


 


Output Total 1700 ml  1650 ml 2300 ml  


 


Balance -4 ml 779.5 ml 376.1 ml -683 ml  


 


      


 


Intake IV Total 1311 ml 779.5 ml 1166.1 ml 737 ml  


 


Tube Feeding 135 ml  560 ml 580 ml  


 


Tube Irrigant 250 ml   300 ml  


 


Other   300 ml   


 


Output Urine Total 1700 ml  1650 ml 2300 ml  


 


# Bowel Movements   0 3  








Result Diagram:  


12/30/17 0513 12/30/17 0513





Other Results


pht 13


Objective Remarks


sedated some eye opening


not following no wd





Assessment and Plan


Assessment and Plan


s/p status eplipeticus


cont pht,lev


wean as able.


labs still pending.











Jackelyn Lo MD Dec 30, 2017 15:30

## 2017-12-30 NOTE — RADRPT
EXAM DATE/TIME:  12/30/2017 03:52 

 

[facility] COMPARISON:     

CHEST SINGLE AP, December 29, 2017, 4:41.

 

                     

INDICATIONS :     

Shortness of breath, possible pulmonary disease.

                     

 

MEDICAL HISTORY :     

Cardiovascular disease.  Diabetes mellitus type I.  Osteoporosis.   [place] Cervical ca   

 

SURGICAL HISTORY :     

Appendectomy. Hysterectomy. Cholecystectomy. 

 

ENCOUNTER:     

Subsequent                                        

 

ACUITY:     

1 week      

 

PAIN SCORE:     

Non-responsive.

 

LOCATION:     

Bilateral chest 

 

FINDINGS:     

Mild patchy consolidation again seen left lung base, not significantly changed. A tiny left pleural e
ffusion is likely as well. No pneumothorax seen.

 

Heart size stable, normal.

 

Endotracheal tube tip is approximately 3 cm above the patricia. There is a nasogastric tube coiled in t
he stomach.

 

CONCLUSION:     

No significant change mild consolidation and small pleural effusion of the left lung base.

 

 

 

 Bronson Ashton MD on December 30, 2017 at 5:19           

Board Certified Radiologist.

 This report was verified electronically.

## 2017-12-31 VITALS — OXYGEN SATURATION: 100 %

## 2017-12-31 VITALS
RESPIRATION RATE: 19 BRPM | SYSTOLIC BLOOD PRESSURE: 141 MMHG | OXYGEN SATURATION: 100 % | DIASTOLIC BLOOD PRESSURE: 68 MMHG | TEMPERATURE: 98.9 F | HEART RATE: 111 BPM

## 2017-12-31 VITALS
RESPIRATION RATE: 20 BRPM | HEART RATE: 102 BPM | TEMPERATURE: 98.8 F | SYSTOLIC BLOOD PRESSURE: 124 MMHG | DIASTOLIC BLOOD PRESSURE: 79 MMHG | OXYGEN SATURATION: 100 %

## 2017-12-31 VITALS
RESPIRATION RATE: 21 BRPM | OXYGEN SATURATION: 100 % | TEMPERATURE: 98 F | DIASTOLIC BLOOD PRESSURE: 64 MMHG | HEART RATE: 102 BPM | SYSTOLIC BLOOD PRESSURE: 122 MMHG

## 2017-12-31 VITALS — HEART RATE: 109 BPM

## 2017-12-31 VITALS — HEART RATE: 114 BPM | OXYGEN SATURATION: 100 %

## 2017-12-31 VITALS
SYSTOLIC BLOOD PRESSURE: 115 MMHG | TEMPERATURE: 99.6 F | RESPIRATION RATE: 23 BRPM | OXYGEN SATURATION: 100 % | HEART RATE: 104 BPM | DIASTOLIC BLOOD PRESSURE: 69 MMHG

## 2017-12-31 VITALS
HEART RATE: 126 BPM | OXYGEN SATURATION: 98 % | TEMPERATURE: 99 F | SYSTOLIC BLOOD PRESSURE: 160 MMHG | RESPIRATION RATE: 19 BRPM | DIASTOLIC BLOOD PRESSURE: 73 MMHG

## 2017-12-31 VITALS — OXYGEN SATURATION: 97 %

## 2017-12-31 VITALS — HEART RATE: 102 BPM

## 2017-12-31 VITALS — HEART RATE: 104 BPM

## 2017-12-31 VITALS — HEART RATE: 116 BPM

## 2017-12-31 VITALS — HEART RATE: 110 BPM

## 2017-12-31 VITALS — HEART RATE: 105 BPM

## 2017-12-31 LAB
BASOPHILS # BLD AUTO: 0 TH/MM3 (ref 0–0.2)
BASOPHILS NFR BLD: 0.1 % (ref 0–2)
BUN SERPL-MCNC: 23 MG/DL (ref 7–18)
CALCIUM SERPL-MCNC: 8.3 MG/DL (ref 8.5–10.1)
CHLORIDE SERPL-SCNC: 105 MEQ/L (ref 98–107)
CREAT SERPL-MCNC: 1.01 MG/DL (ref 0.5–1)
DEPRECATED C NEOFORM AG CSF QL: (no result)
EOSINOPHIL # BLD: 0.2 TH/MM3 (ref 0–0.4)
EOSINOPHIL NFR BLD: 2.3 % (ref 0–4)
ERYTHROCYTE [DISTWIDTH] IN BLOOD BY AUTOMATED COUNT: 15.5 % (ref 11.6–17.2)
GFR SERPLBLD BASED ON 1.73 SQ M-ARVRAT: 53 ML/MIN (ref 89–?)
GLUCOSE SERPL-MCNC: 111 MG/DL (ref 74–106)
HCO3 BLD-SCNC: 24.3 MEQ/L (ref 21–32)
HCT VFR BLD CALC: 23 % (ref 35–46)
HGB BLD-MCNC: 7.6 GM/DL (ref 11.6–15.3)
LYMPHOCYTES # BLD AUTO: 1.8 TH/MM3 (ref 1–4.8)
LYMPHOCYTES NFR BLD AUTO: 22.7 % (ref 9–44)
MCH RBC QN AUTO: 29.2 PG (ref 27–34)
MCHC RBC AUTO-ENTMCNC: 33 % (ref 32–36)
MCV RBC AUTO: 88.5 FL (ref 80–100)
MONOCYTE #: 1 TH/MM3 (ref 0–0.9)
MONOCYTES NFR BLD: 12.3 % (ref 0–8)
NEUTROPHILS # BLD AUTO: 4.9 TH/MM3 (ref 1.8–7.7)
NEUTROPHILS NFR BLD AUTO: 62.6 % (ref 16–70)
PLATELET # BLD: 304 TH/MM3 (ref 150–450)
PMV BLD AUTO: 7.8 FL (ref 7–11)
RBC # BLD AUTO: 2.6 MIL/MM3 (ref 4–5.3)
SODIUM SERPL-SCNC: 138 MEQ/L (ref 136–145)
WBC # BLD AUTO: 7.9 TH/MM3 (ref 4–11)

## 2017-12-31 RX ADMIN — POLYVINYL ALCOHOL SCH DROP: 14 SOLUTION/ DROPS OPHTHALMIC at 17:12

## 2017-12-31 RX ADMIN — Medication PRN MLS/HR: at 02:18

## 2017-12-31 RX ADMIN — HEPARIN SODIUM SCH UNITS: 10000 INJECTION, SOLUTION INTRAVENOUS; SUBCUTANEOUS at 05:03

## 2017-12-31 RX ADMIN — METOPROLOL TARTRATE SCH MG: 25 TABLET, FILM COATED ORAL at 11:45

## 2017-12-31 RX ADMIN — IPRATROPIUM BROMIDE AND ALBUTEROL SULFATE SCH AMPULE: .5; 3 SOLUTION RESPIRATORY (INHALATION) at 08:38

## 2017-12-31 RX ADMIN — INSULIN ASPART SCH: 100 INJECTION, SOLUTION INTRAVENOUS; SUBCUTANEOUS at 11:57

## 2017-12-31 RX ADMIN — STANDARDIZED SENNA CONCENTRATE AND DOCUSATE SODIUM SCH TAB: 8.6; 5 TABLET, FILM COATED ORAL at 20:34

## 2017-12-31 RX ADMIN — WATER SCH ML: 1 IRRIGANT IRRIGATION at 17:11

## 2017-12-31 RX ADMIN — STANDARDIZED SENNA CONCENTRATE AND DOCUSATE SODIUM SCH TAB: 8.6; 5 TABLET, FILM COATED ORAL at 07:59

## 2017-12-31 RX ADMIN — Medication SCH ML: at 08:00

## 2017-12-31 RX ADMIN — METOPROLOL TARTRATE SCH MG: 25 TABLET, FILM COATED ORAL at 17:09

## 2017-12-31 RX ADMIN — LACOSAMIDE SCH MG: 100 TABLET, FILM COATED ORAL at 20:34

## 2017-12-31 RX ADMIN — POLYVINYL ALCOHOL SCH DROP: 14 SOLUTION/ DROPS OPHTHALMIC at 08:00

## 2017-12-31 RX ADMIN — HEPARIN SODIUM SCH UNITS: 10000 INJECTION, SOLUTION INTRAVENOUS; SUBCUTANEOUS at 14:19

## 2017-12-31 RX ADMIN — LACTOBACILLUS ACIDOPHILUS / LACTOBACILLUS BULGARICUS SCH GM: 100 MILLION CFU STRENGTH GRANULES at 07:59

## 2017-12-31 RX ADMIN — INSULIN ASPART SCH: 100 INJECTION, SOLUTION INTRAVENOUS; SUBCUTANEOUS at 17:11

## 2017-12-31 RX ADMIN — CEFTRIAXONE SODIUM SCH MLS/HR: 2 INJECTION, POWDER, FOR SOLUTION INTRAMUSCULAR; INTRAVENOUS at 11:45

## 2017-12-31 RX ADMIN — WATER SCH ML: 1 IRRIGANT IRRIGATION at 05:03

## 2017-12-31 RX ADMIN — LACTOBACILLUS ACIDOPHILUS / LACTOBACILLUS BULGARICUS SCH GM: 100 MILLION CFU STRENGTH GRANULES at 14:20

## 2017-12-31 RX ADMIN — INSULIN ASPART SCH: 100 INJECTION, SOLUTION INTRAVENOUS; SUBCUTANEOUS at 05:04

## 2017-12-31 RX ADMIN — SODIUM CHLORIDE SCH MG: 900 INJECTION, SOLUTION INTRAVENOUS at 05:03

## 2017-12-31 RX ADMIN — MIDAZOLAM HYDROCHLORIDE PRN MLS/HR: 5 INJECTION, SOLUTION INTRAMUSCULAR; INTRAVENOUS at 11:53

## 2017-12-31 RX ADMIN — HEPARIN SODIUM SCH UNITS: 10000 INJECTION, SOLUTION INTRAVENOUS; SUBCUTANEOUS at 20:35

## 2017-12-31 RX ADMIN — FOSPHENYTOIN SODIUM SCH MLS/HR: 50 INJECTION, SOLUTION INTRAMUSCULAR; INTRAVENOUS at 14:19

## 2017-12-31 RX ADMIN — WATER SCH ML: 1 IRRIGANT IRRIGATION at 23:25

## 2017-12-31 RX ADMIN — Medication SCH ML: at 20:34

## 2017-12-31 RX ADMIN — CHLORHEXIDINE GLUCONATE SCH PACK: 500 CLOTH TOPICAL at 02:18

## 2017-12-31 RX ADMIN — LACOSAMIDE SCH MG: 100 TABLET, FILM COATED ORAL at 07:59

## 2017-12-31 RX ADMIN — SODIUM CHLORIDE SCH MG: 900 INJECTION, SOLUTION INTRAVENOUS at 20:35

## 2017-12-31 RX ADMIN — CHLORHEXIDINE GLUCONATE 0.12% ORAL RINSE SCH ML: 1.2 LIQUID ORAL at 20:34

## 2017-12-31 RX ADMIN — POLYETHYLENE GLYCOL 3350 SCH GM: 17 POWDER, FOR SOLUTION ORAL at 07:59

## 2017-12-31 RX ADMIN — PRAVASTATIN SODIUM SCH MG: 40 TABLET ORAL at 20:34

## 2017-12-31 RX ADMIN — METOPROLOL TARTRATE SCH MG: 25 TABLET, FILM COATED ORAL at 05:03

## 2017-12-31 RX ADMIN — LACTOBACILLUS ACIDOPHILUS / LACTOBACILLUS BULGARICUS SCH GM: 100 MILLION CFU STRENGTH GRANULES at 17:11

## 2017-12-31 RX ADMIN — LEVETIRACETAM SCH MLS/HR: 100 INJECTION, SOLUTION, CONCENTRATE INTRAVENOUS at 12:44

## 2017-12-31 RX ADMIN — POLYETHYLENE GLYCOL 3350 SCH GM: 17 POWDER, FOR SOLUTION ORAL at 20:34

## 2017-12-31 RX ADMIN — LEVETIRACETAM SCH MLS/HR: 100 INJECTION, SOLUTION, CONCENTRATE INTRAVENOUS at 05:03

## 2017-12-31 RX ADMIN — POLYVINYL ALCOHOL SCH DROP: 14 SOLUTION/ DROPS OPHTHALMIC at 12:45

## 2017-12-31 RX ADMIN — LANSOPRAZOLE SCH MG: 30 TABLET, ORALLY DISINTEGRATING, DELAYED RELEASE ORAL at 07:59

## 2017-12-31 RX ADMIN — LEVETIRACETAM SCH MLS/HR: 100 INJECTION, SOLUTION, CONCENTRATE INTRAVENOUS at 23:25

## 2017-12-31 RX ADMIN — METOPROLOL TARTRATE SCH MG: 25 TABLET, FILM COATED ORAL at 23:25

## 2017-12-31 RX ADMIN — LEVETIRACETAM SCH MLS/HR: 100 INJECTION, SOLUTION, CONCENTRATE INTRAVENOUS at 17:11

## 2017-12-31 RX ADMIN — INSULIN ASPART SCH: 100 INJECTION, SOLUTION INTRAVENOUS; SUBCUTANEOUS at 23:27

## 2017-12-31 RX ADMIN — FOSPHENYTOIN SODIUM SCH MLS/HR: 50 INJECTION, SOLUTION INTRAMUSCULAR; INTRAVENOUS at 05:02

## 2017-12-31 RX ADMIN — CLOPIDOGREL BISULFATE SCH MG: 75 TABLET, FILM COATED ORAL at 07:59

## 2017-12-31 RX ADMIN — IPRATROPIUM BROMIDE AND ALBUTEROL SULFATE SCH AMPULE: .5; 3 SOLUTION RESPIRATORY (INHALATION) at 03:30

## 2017-12-31 RX ADMIN — WATER SCH ML: 1 IRRIGANT IRRIGATION at 11:45

## 2017-12-31 RX ADMIN — CHLORHEXIDINE GLUCONATE 0.12% ORAL RINSE SCH ML: 1.2 LIQUID ORAL at 08:00

## 2017-12-31 RX ADMIN — Medication PRN MLS/HR: at 12:46

## 2017-12-31 RX ADMIN — IPRATROPIUM BROMIDE AND ALBUTEROL SULFATE SCH AMPULE: .5; 3 SOLUTION RESPIRATORY (INHALATION) at 14:56

## 2017-12-31 RX ADMIN — IPRATROPIUM BROMIDE AND ALBUTEROL SULFATE SCH AMPULE: .5; 3 SOLUTION RESPIRATORY (INHALATION) at 19:36

## 2017-12-31 RX ADMIN — SODIUM CHLORIDE SCH MG: 900 INJECTION, SOLUTION INTRAVENOUS at 14:19

## 2017-12-31 RX ADMIN — FOSPHENYTOIN SODIUM SCH MLS/HR: 50 INJECTION, SOLUTION INTRAMUSCULAR; INTRAVENOUS at 20:34

## 2018-01-01 VITALS
RESPIRATION RATE: 21 BRPM | OXYGEN SATURATION: 98 % | SYSTOLIC BLOOD PRESSURE: 167 MMHG | DIASTOLIC BLOOD PRESSURE: 72 MMHG | HEART RATE: 108 BPM

## 2018-01-01 VITALS
DIASTOLIC BLOOD PRESSURE: 79 MMHG | SYSTOLIC BLOOD PRESSURE: 166 MMHG | RESPIRATION RATE: 19 BRPM | HEART RATE: 114 BPM | OXYGEN SATURATION: 98 %

## 2018-01-01 VITALS — OXYGEN SATURATION: 100 %

## 2018-01-01 VITALS
RESPIRATION RATE: 28 BRPM | HEART RATE: 130 BPM | SYSTOLIC BLOOD PRESSURE: 188 MMHG | OXYGEN SATURATION: 100 % | DIASTOLIC BLOOD PRESSURE: 86 MMHG

## 2018-01-01 VITALS
OXYGEN SATURATION: 100 % | HEART RATE: 92 BPM | SYSTOLIC BLOOD PRESSURE: 169 MMHG | RESPIRATION RATE: 19 BRPM | DIASTOLIC BLOOD PRESSURE: 83 MMHG

## 2018-01-01 VITALS
RESPIRATION RATE: 20 BRPM | HEART RATE: 94 BPM | OXYGEN SATURATION: 100 % | SYSTOLIC BLOOD PRESSURE: 187 MMHG | TEMPERATURE: 99.6 F | DIASTOLIC BLOOD PRESSURE: 129 MMHG

## 2018-01-01 VITALS
OXYGEN SATURATION: 100 % | DIASTOLIC BLOOD PRESSURE: 79 MMHG | SYSTOLIC BLOOD PRESSURE: 202 MMHG | HEART RATE: 103 BPM | RESPIRATION RATE: 18 BRPM

## 2018-01-01 VITALS
RESPIRATION RATE: 20 BRPM | OXYGEN SATURATION: 98 % | HEART RATE: 117 BPM | SYSTOLIC BLOOD PRESSURE: 169 MMHG | DIASTOLIC BLOOD PRESSURE: 85 MMHG

## 2018-01-01 VITALS
SYSTOLIC BLOOD PRESSURE: 197 MMHG | OXYGEN SATURATION: 100 % | DIASTOLIC BLOOD PRESSURE: 81 MMHG | RESPIRATION RATE: 21 BRPM | HEART RATE: 103 BPM

## 2018-01-01 VITALS
RESPIRATION RATE: 20 BRPM | HEART RATE: 115 BPM | SYSTOLIC BLOOD PRESSURE: 173 MMHG | OXYGEN SATURATION: 100 % | DIASTOLIC BLOOD PRESSURE: 74 MMHG

## 2018-01-01 VITALS
RESPIRATION RATE: 19 BRPM | TEMPERATURE: 101.8 F | SYSTOLIC BLOOD PRESSURE: 177 MMHG | OXYGEN SATURATION: 99 % | DIASTOLIC BLOOD PRESSURE: 77 MMHG | HEART RATE: 115 BPM

## 2018-01-01 VITALS — TEMPERATURE: 100.5 F | OXYGEN SATURATION: 90 % | HEART RATE: 103 BPM | RESPIRATION RATE: 20 BRPM

## 2018-01-01 VITALS
DIASTOLIC BLOOD PRESSURE: 87 MMHG | HEART RATE: 117 BPM | RESPIRATION RATE: 17 BRPM | SYSTOLIC BLOOD PRESSURE: 156 MMHG | OXYGEN SATURATION: 100 % | TEMPERATURE: 100.3 F

## 2018-01-01 VITALS
HEART RATE: 115 BPM | TEMPERATURE: 99.5 F | RESPIRATION RATE: 19 BRPM | OXYGEN SATURATION: 100 % | DIASTOLIC BLOOD PRESSURE: 68 MMHG | SYSTOLIC BLOOD PRESSURE: 165 MMHG

## 2018-01-01 VITALS
RESPIRATION RATE: 25 BRPM | DIASTOLIC BLOOD PRESSURE: 69 MMHG | SYSTOLIC BLOOD PRESSURE: 143 MMHG | OXYGEN SATURATION: 99 % | HEART RATE: 130 BPM

## 2018-01-01 VITALS
RESPIRATION RATE: 18 BRPM | OXYGEN SATURATION: 100 % | HEART RATE: 110 BPM | DIASTOLIC BLOOD PRESSURE: 77 MMHG | TEMPERATURE: 100 F | SYSTOLIC BLOOD PRESSURE: 185 MMHG

## 2018-01-01 VITALS — OXYGEN SATURATION: 99 %

## 2018-01-01 VITALS
SYSTOLIC BLOOD PRESSURE: 144 MMHG | HEART RATE: 130 BPM | DIASTOLIC BLOOD PRESSURE: 79 MMHG | OXYGEN SATURATION: 100 % | RESPIRATION RATE: 28 BRPM

## 2018-01-01 VITALS — HEART RATE: 104 BPM

## 2018-01-01 VITALS — HEART RATE: 105 BPM

## 2018-01-01 VITALS — HEART RATE: 102 BPM

## 2018-01-01 LAB
BASOPHILS # BLD AUTO: 0 TH/MM3 (ref 0–0.2)
BASOPHILS NFR BLD: 0.3 % (ref 0–2)
BUN SERPL-MCNC: 26 MG/DL (ref 7–18)
CALCIUM SERPL-MCNC: 8.6 MG/DL (ref 8.5–10.1)
CHLORIDE SERPL-SCNC: 105 MEQ/L (ref 98–107)
CREAT SERPL-MCNC: 1.01 MG/DL (ref 0.5–1)
EOSINOPHIL # BLD: 0.1 TH/MM3 (ref 0–0.4)
EOSINOPHIL NFR BLD: 1.3 % (ref 0–4)
ERYTHROCYTE [DISTWIDTH] IN BLOOD BY AUTOMATED COUNT: 15.8 % (ref 11.6–17.2)
GFR SERPLBLD BASED ON 1.73 SQ M-ARVRAT: 53 ML/MIN (ref 89–?)
GLUCOSE SERPL-MCNC: 136 MG/DL (ref 74–106)
HCO3 BLD-SCNC: 22.9 MEQ/L (ref 21–32)
HCT VFR BLD CALC: 21.9 % (ref 35–46)
HGB BLD-MCNC: 7.4 GM/DL (ref 11.6–15.3)
LYMPHOCYTES # BLD AUTO: 1.3 TH/MM3 (ref 1–4.8)
LYMPHOCYTES NFR BLD AUTO: 12.1 % (ref 9–44)
MCH RBC QN AUTO: 29.7 PG (ref 27–34)
MCHC RBC AUTO-ENTMCNC: 33.9 % (ref 32–36)
MCV RBC AUTO: 87.6 FL (ref 80–100)
MONOCYTE #: 0.8 TH/MM3 (ref 0–0.9)
MONOCYTES NFR BLD: 7.7 % (ref 0–8)
NEUTROPHILS # BLD AUTO: 8.2 TH/MM3 (ref 1.8–7.7)
NEUTROPHILS NFR BLD AUTO: 78.6 % (ref 16–70)
PLATELET # BLD: 332 TH/MM3 (ref 150–450)
PMV BLD AUTO: 7.4 FL (ref 7–11)
RBC # BLD AUTO: 2.49 MIL/MM3 (ref 4–5.3)
SODIUM SERPL-SCNC: 137 MEQ/L (ref 136–145)
WBC # BLD AUTO: 10.4 TH/MM3 (ref 4–11)

## 2018-01-01 RX ADMIN — LEVETIRACETAM SCH MLS/HR: 100 INJECTION, SOLUTION, CONCENTRATE INTRAVENOUS at 05:50

## 2018-01-01 RX ADMIN — FOSPHENYTOIN SODIUM SCH MLS/HR: 50 INJECTION, SOLUTION INTRAMUSCULAR; INTRAVENOUS at 12:06

## 2018-01-01 RX ADMIN — METOPROLOL TARTRATE SCH MG: 25 TABLET, FILM COATED ORAL at 12:05

## 2018-01-01 RX ADMIN — CHLORHEXIDINE GLUCONATE 0.12% ORAL RINSE SCH ML: 1.2 LIQUID ORAL at 10:40

## 2018-01-01 RX ADMIN — WATER SCH ML: 1 IRRIGANT IRRIGATION at 17:03

## 2018-01-01 RX ADMIN — LACTOBACILLUS ACIDOPHILUS / LACTOBACILLUS BULGARICUS SCH GM: 100 MILLION CFU STRENGTH GRANULES at 12:00

## 2018-01-01 RX ADMIN — PROPRANOLOL HYDROCHLORIDE SCH MG: 20 TABLET ORAL at 13:44

## 2018-01-01 RX ADMIN — METOPROLOL TARTRATE SCH MG: 25 TABLET, FILM COATED ORAL at 05:51

## 2018-01-01 RX ADMIN — POLYVINYL ALCOHOL SCH DROP: 14 SOLUTION/ DROPS OPHTHALMIC at 10:40

## 2018-01-01 RX ADMIN — Medication SCH ML: at 10:40

## 2018-01-01 RX ADMIN — LACTOBACILLUS ACIDOPHILUS / LACTOBACILLUS BULGARICUS SCH GM: 100 MILLION CFU STRENGTH GRANULES at 09:00

## 2018-01-01 RX ADMIN — CHLORHEXIDINE GLUCONATE SCH PACK: 500 CLOTH TOPICAL at 02:33

## 2018-01-01 RX ADMIN — LABETALOL HYDROCHLORIDE PRN MG: 5 INJECTION, SOLUTION INTRAVENOUS at 14:42

## 2018-01-01 RX ADMIN — INSULIN ASPART SCH: 100 INJECTION, SOLUTION INTRAVENOUS; SUBCUTANEOUS at 06:01

## 2018-01-01 RX ADMIN — HEPARIN SODIUM SCH UNITS: 10000 INJECTION, SOLUTION INTRAVENOUS; SUBCUTANEOUS at 12:05

## 2018-01-01 RX ADMIN — LACOSAMIDE SCH MG: 100 TABLET, FILM COATED ORAL at 10:38

## 2018-01-01 RX ADMIN — POLYVINYL ALCOHOL SCH DROP: 14 SOLUTION/ DROPS OPHTHALMIC at 12:01

## 2018-01-01 RX ADMIN — MIDAZOLAM HYDROCHLORIDE PRN MLS/HR: 5 INJECTION, SOLUTION INTRAMUSCULAR; INTRAVENOUS at 14:00

## 2018-01-01 RX ADMIN — CHLORHEXIDINE GLUCONATE 0.12% ORAL RINSE SCH ML: 1.2 LIQUID ORAL at 20:00

## 2018-01-01 RX ADMIN — POLYVINYL ALCOHOL SCH DROP: 14 SOLUTION/ DROPS OPHTHALMIC at 17:03

## 2018-01-01 RX ADMIN — SODIUM CHLORIDE SCH MG: 900 INJECTION, SOLUTION INTRAVENOUS at 05:51

## 2018-01-01 RX ADMIN — LEVETIRACETAM SCH MLS/HR: 100 INJECTION, SOLUTION, CONCENTRATE INTRAVENOUS at 17:04

## 2018-01-01 RX ADMIN — SODIUM CHLORIDE SCH MG: 900 INJECTION, SOLUTION INTRAVENOUS at 12:05

## 2018-01-01 RX ADMIN — INSULIN ASPART SCH: 100 INJECTION, SOLUTION INTRAVENOUS; SUBCUTANEOUS at 17:03

## 2018-01-01 RX ADMIN — ACETAMINOPHEN PRN MG: 650 SOLUTION ORAL at 10:37

## 2018-01-01 RX ADMIN — HEPARIN SODIUM SCH UNITS: 10000 INJECTION, SOLUTION INTRAVENOUS; SUBCUTANEOUS at 05:51

## 2018-01-01 RX ADMIN — WATER SCH ML: 1 IRRIGANT IRRIGATION at 12:00

## 2018-01-01 RX ADMIN — FOSPHENYTOIN SODIUM SCH MLS/HR: 50 INJECTION, SOLUTION INTRAMUSCULAR; INTRAVENOUS at 22:00

## 2018-01-01 RX ADMIN — WATER SCH ML: 1 IRRIGANT IRRIGATION at 05:51

## 2018-01-01 RX ADMIN — LABETALOL HYDROCHLORIDE PRN MG: 5 INJECTION, SOLUTION INTRAVENOUS at 10:51

## 2018-01-01 RX ADMIN — MIDAZOLAM HYDROCHLORIDE PRN MLS/HR: 5 INJECTION, SOLUTION INTRAMUSCULAR; INTRAVENOUS at 15:01

## 2018-01-01 RX ADMIN — CLOPIDOGREL BISULFATE SCH MG: 75 TABLET, FILM COATED ORAL at 10:39

## 2018-01-01 RX ADMIN — STANDARDIZED SENNA CONCENTRATE AND DOCUSATE SODIUM SCH TAB: 8.6; 5 TABLET, FILM COATED ORAL at 09:00

## 2018-01-01 RX ADMIN — LANSOPRAZOLE SCH MG: 30 TABLET, ORALLY DISINTEGRATING, DELAYED RELEASE ORAL at 10:39

## 2018-01-01 RX ADMIN — LEVETIRACETAM SCH MLS/HR: 100 INJECTION, SOLUTION, CONCENTRATE INTRAVENOUS at 12:04

## 2018-01-01 RX ADMIN — INSULIN ASPART SCH: 100 INJECTION, SOLUTION INTRAVENOUS; SUBCUTANEOUS at 12:00

## 2018-01-01 RX ADMIN — MIDAZOLAM HYDROCHLORIDE PRN MLS/HR: 5 INJECTION, SOLUTION INTRAMUSCULAR; INTRAVENOUS at 00:56

## 2018-01-01 RX ADMIN — POLYETHYLENE GLYCOL 3350 SCH GM: 17 POWDER, FOR SOLUTION ORAL at 09:00

## 2018-01-01 RX ADMIN — PRAVASTATIN SODIUM SCH MG: 40 TABLET ORAL at 21:00

## 2018-01-01 RX ADMIN — FOSPHENYTOIN SODIUM SCH MLS/HR: 50 INJECTION, SOLUTION INTRAMUSCULAR; INTRAVENOUS at 05:49

## 2018-01-01 RX ADMIN — LACTOBACILLUS ACIDOPHILUS / LACTOBACILLUS BULGARICUS SCH GM: 100 MILLION CFU STRENGTH GRANULES at 17:03

## 2018-01-01 NOTE — HHI.PR
Review/Management


Diagnosis


subclinical status epilepticus. Clinically improved. A little more responsive 

today. Last EEG significantly improved.


Plan


Continue current level of cerebyx and keppra


I agree with d/c of antibiotics since final csf cx negative


Will get repeat EEG tomorrow.


Diagnosis/Plan:  





Subjective


Subjective Comments


No acute events reported


No clinical SZ noted.


Active Medications





Current Medications








 Medications


  (Trade)  Dose


 Ordered  Sig/Aaron


 Route  Start Time


 Stop Time Status Last Admin


 


 Fentanyl Citrate  250 ml @ 5


 mls/hr  TITRATE  PRN


 IV  12/24/17 09:45


    12/31/17 12:46


 


 


  (NS Flush)  2 ml  UNSCH  PRN


 IV FLUSH  12/24/17 11:15


    12/29/17 08:59


 


 


  (NS Flush)  2 ml  BID


 IV FLUSH  12/24/17 21:00


    12/31/17 20:34


 


 


  (Tears Naturale


 Opth Soln)  1 drop  TID


 EACH EYE  12/24/17 13:00


    12/31/17 17:12


 


 


 Miscellaneous


 Information  1  Q361D


 XX  12/24/17 11:15


    12/24/17 11:15


 


 


  (Chlorhexidine


 2% Cloth)  


 Taper  DAILY@04


 TOP  12/25/17 04:00


 12/21/18 03:59  12/30/17 03:05


 


 


  (Chlorhexidine


 2% Cloth)  3 pack  UNSCH  PRN


 TOP  12/24/17 11:15


     


 


 


  (Ruby-Colace)  1 tab  BID


 PO  12/24/17 21:00


    12/31/17 07:59


 


 


  (Milk Of


 Magnesia Liq)  30 ml  Q12H  PRN


 PO  12/24/17 11:15


     


 


 


  (Senokot)  17.2 mg  Q12H  PRN


 PO  12/24/17 11:15


     


 


 


  (Dulcolax Supp)  10 mg  DAILY  PRN


 RECTAL  12/24/17 11:15


    12/29/17 15:19


 


 


  (D50w (Vial) Inj)  50 ml  UNSCH  PRN


 IV PUSH  12/24/17 11:30


     


 


 


  (Glucagon Inj)  1 mg  UNSCH  PRN


 OTHER  12/24/17 11:30


     


 


 


  (Vimpat)  100 mg  BID


 PO  12/24/17 14:15


    12/31/17 20:34


 


 


  (Pravachol)  40 mg  HS


 PO  12/24/17 21:00


    12/31/17 20:34


 


 


  (Trandate Inj)  10 mg  Q4H  PRN


 IV PUSH  12/24/17 14:30


     


 


 


  (Apresoline Inj)  20 mg  Q4H  PRN


 IV PUSH  12/24/17 14:30


    1/1/18 08:23


 


 


  (Lopressor)  25 mg  Q6HR


 PO  12/24/17 21:00


    1/1/18 05:51


 


 


 Potassium Chloride  100 ml @ 


 50 mls/hr  Q2H  PRN


 IV  12/26/17 09:15


     


 


 


 Potassium Chloride  100 ml @ 


 50 mls/hr  Q2H  PRN


 IV  12/26/17 09:15


    12/26/17 15:57


 


 


  (K-Lyte Cl  Eff)  50 meq  UNSCH  PRN


 PO  12/26/17 09:15


     


 


 


 Potassium Chloride  100 ml @ 


 25 mls/hr  UNSCH  PRN


 IV  12/26/17 09:15


     


 


 


 Potassium Chloride  100 ml @ 


 50 mls/hr  Q2H  PRN


 IV  12/26/17 09:15


     


 


 


 Magnesium Sulfate


 4 gm/Sodium


 Chloride  100 ml @ 


 50 mls/hr  UNSCH  PRN


 IV  12/26/17 09:15


     


 


 


  (Mag-Ox)  800 mg  UNSCH  PRN


 PO  12/26/17 09:15


     


 


 


 Magnesium Sulfate


 2 gm/Sodium


 Chloride  100 ml @ 


 50 mls/hr  UNSCH  PRN


 IV  12/26/17 09:15


     


 


 


  (K-Phos)  2,000 mg  Q4H  PRN


 PO  12/26/17 09:15


     


 


 


 Sodium Phosphate


 30 mmol/Sodium


 Chloride  250 ml @ 


 42 mls/hr  UNSCH  PRN


 IV  12/26/17 09:15


     


 


 


  (K-Phos)  2,000 mg  UNSCH  PRN


 PO/TUBE  12/26/17 09:15


     


 


 


 Potassium


 Phosphate 30 mmol/


 Sodium Chloride  260 ml @ 


 42 mls/hr  UNSCH  PRN


 IV  12/26/17 09:15


     


 


 


  (Lactinex Pkt)  1 gm  TID


 OG-TUBE  12/26/17 13:00


    12/31/17 17:11


 


 


  (Ativan Inj)  2 mg  Q5M  PRN


 IV PUSH  12/26/17 10:45


    12/26/17 14:18


 


 


 Midazolam HCl  100 ml @ 2


 mls/hr  TITRATE  PRN


 IV  12/26/17 11:15


    1/1/18 00:56


 


 


  (Tylenol 650 Mg/


 20 ml Liq)  650 mg  Q6H  PRN


 NG  12/27/17 17:00


    12/30/17 11:53


 


 


  (NovoLOG


 SUPPLEMENTAL


 SCALE)  1  Q6HR


 SQ  12/27/17 18:00


    1/1/18 06:01


 


 


  (Albuterol Neb)  2.5 mg  Q2HR NEB  PRN


 NEB  12/27/17 17:00


     


 


 


  (Prevacid Odt)  30 mg  DAILY


 NG  12/28/17 09:00


    12/31/17 07:59


 


 


 Levetriacetam 500


 mg/Sodium Chloride  105 ml @ 


 420 mls/hr  Q6HR


 IV  12/28/17 12:00


    1/1/18 05:50


 


 


  (Miralax)  17 gm  BID


 PO  12/28/17 21:00


    12/31/17 07:59


 


 


  (Plavix)  75 mg  DAILY


 PO  12/30/17 09:00


    12/31/17 07:59


 


 


  (Heparin Inj)  5,000 units  Q8HR


 SQ  12/29/17 22:00


    1/1/18 05:51


 


 


  (Peridex 0.12%


 Liq)  15 ml  BID@08,20


 MT  12/29/17 20:00


    12/31/17 20:34


 


 


  (Lactulose Liq)  30 ml  Q6HR


 PO  12/29/17 18:00


    12/29/17 18:18


 


 


 Fosphenytoin


 Sodium 200 mgpe/


 Sodium Chloride  54 ml @ 


 216 mls/hr  Q8HR


 IV  12/30/17 06:00


    1/1/18 05:49


 


 


  (Reglan Inj)  5 mg  Q8HR


 IV PUSH  12/29/17 23:00


    1/1/18 05:51


 








Allergies





Allergies


Coded Allergies


  Influenza Virus Vaccines (Unverified Allergy, Intermediate, 10/27/17)


  egg (Unverified Allergy, Intermediate, 10/27/17)


  codeine (Unverified Allergy, Mild, 10/27/17)


  meperidine (Unverified Allergy, Mild, 10/27/17)


  morphine (Unverified Allergy, Mild, 10/27/17)


  acetaminophen (Unverified Adverse Reaction, Mild, HEADACHE, 10/27/17)


  hydrocodone (Unverified Adverse Reaction, Mild, HEADACHE, 10/27/17)





Exam


I&O / VS





Vital Signs








  Date Time  Temp Pulse Resp B/P (MAP) Pulse Ox O2 Delivery O2 Flow Rate FiO2


 


1/1/18 08:09     99   30


 


1/1/18 06:00  117      


 


1/1/18 04:16     100   30


 


1/1/18 04:00  117      


 


1/1/18 04:00        30


 


1/1/18 04:00 100.3 117 17 156/87 (110) 100   


 


1/1/18 02:00  105      


 


1/1/18 00:21     100   30


 


1/1/18 00:00  115      


 


1/1/18 00:00        30


 


1/1/18 00:00 99.5 115 19 165/68 (100) 100   


 


12/31/17 22:00  110      


 


12/31/17 20:00 98.9 111 19 141/68 (92) 100   


 


12/31/17 20:00        30


 


12/31/17 20:00  111      


 


12/31/17 19:36     100   35


 


12/31/17 18:00  116      


 


12/31/17 16:00  126      


 


12/31/17 16:00 99.0 126 19 160/73 (102) 98   


 


12/31/17 16:00        30


 


12/31/17 14:56     97   30


 


12/31/17 14:00  105      


 


12/31/17 12:00     100   30


 


12/31/17 12:00        30


 


12/31/17 12:00        30


 


12/31/17 12:00  114      


 


12/31/17 10:00  109      








Exam Comments


sedated, attempts to open eyes to voice


PERRL


withdraws BLE to tactile stimulation





Objective


Micro and Labs





Laboratory Tests








Test


  1/1/18


08:05 1/1/18


09:30


 


White Blood Count 10.4  


 


Red Blood Count 2.49  


 


Hemoglobin 7.4  


 


Hematocrit 21.9  


 


Mean Corpuscular Volume 87.6  


 


Mean Corpuscular Hemoglobin 29.7  


 


Mean Corpuscular Hemoglobin


Concent 33.9 


  


 


 


Red Cell Distribution Width 15.8  


 


Platelet Count 332  


 


Mean Platelet Volume 7.4  


 


Neutrophils (%) (Auto) 78.6  


 


Lymphocytes (%) (Auto) 12.1  


 


Monocytes (%) (Auto) 7.7  


 


Eosinophils (%) (Auto) 1.3  


 


Basophils (%) (Auto) 0.3  


 


Neutrophils # (Auto) 8.2  


 


Lymphocytes # (Auto) 1.3  


 


Monocytes # (Auto) 0.8  


 


Eosinophils # (Auto) 0.1  


 


Basophils # (Auto) 0.0  


 


CBC Comment AUTO DIFF  


 


Differential Comment


  AUTO DIFF


CONFIRMED 


 


 


Platelet Estimate NORMAL  


 


Platelet Morphology Comment NORMAL  


 


Red Cell Morphology Comment NORMAL  


 


Blood Urea Nitrogen 26  


 


Creatinine 1.01  


 


Random Glucose 136  


 


Calcium Level 8.6  


 


Sodium Level 137  


 


Potassium Level 3.8  


 


Chloride Level 105  


 


Carbon Dioxide Level 22.9  


 


Anion Gap 9  


 


Estimat Glomerular Filtration


Rate 53 


  


 














 Date/Time


Source Procedure


Growth Status


 


 


 12/24/17 08:55


Blood Peripheral Aerobic Blood Culture - Final


NO GROWTH IN 5 DAYS Complete


 


 12/24/17 08:55


Blood Peripheral Anaerobic Blood Culture - Final


NO GROWTH IN 5 DAYS Complete





 12/28/17 09:47


Cerebral Spinal Fluid Lumbar Puncture Fungal Smear - Final


NO FUNGAL ELEMENTS SEEN. Resulted


 


 12/28/17 09:47


Cerebral Spinal Fluid Lumbar Puncture Fungal Culture


Pending Resulted


 


 12/24/17 09:54


Urine Catheterized Urine Streptococcus pneumoniae Antigen (M - Final


PRESUMPTIVE NEGATIVE FOR STREPTOCOCCU... Complete

















Colton Martin MD PhD Jan 1, 2018 09:56

## 2018-01-02 VITALS — OXYGEN SATURATION: 99 %

## 2018-01-02 VITALS
DIASTOLIC BLOOD PRESSURE: 76 MMHG | HEART RATE: 94 BPM | RESPIRATION RATE: 20 BRPM | SYSTOLIC BLOOD PRESSURE: 176 MMHG | OXYGEN SATURATION: 100 % | TEMPERATURE: 98.4 F

## 2018-01-02 VITALS
OXYGEN SATURATION: 95 % | TEMPERATURE: 100.7 F | DIASTOLIC BLOOD PRESSURE: 95 MMHG | SYSTOLIC BLOOD PRESSURE: 154 MMHG | RESPIRATION RATE: 21 BRPM | HEART RATE: 94 BPM

## 2018-01-02 VITALS — OXYGEN SATURATION: 93 %

## 2018-01-02 VITALS
RESPIRATION RATE: 22 BRPM | TEMPERATURE: 98 F | SYSTOLIC BLOOD PRESSURE: 189 MMHG | OXYGEN SATURATION: 97 % | DIASTOLIC BLOOD PRESSURE: 84 MMHG | HEART RATE: 87 BPM

## 2018-01-02 VITALS
OXYGEN SATURATION: 100 % | HEART RATE: 105 BPM | TEMPERATURE: 98.7 F | SYSTOLIC BLOOD PRESSURE: 190 MMHG | DIASTOLIC BLOOD PRESSURE: 84 MMHG | RESPIRATION RATE: 25 BRPM

## 2018-01-02 VITALS — OXYGEN SATURATION: 96 %

## 2018-01-02 VITALS — HEART RATE: 95 BPM

## 2018-01-02 VITALS — HEART RATE: 94 BPM

## 2018-01-02 VITALS
SYSTOLIC BLOOD PRESSURE: 159 MMHG | OXYGEN SATURATION: 98 % | RESPIRATION RATE: 31 BRPM | DIASTOLIC BLOOD PRESSURE: 119 MMHG | HEART RATE: 87 BPM | TEMPERATURE: 98.1 F

## 2018-01-02 VITALS
SYSTOLIC BLOOD PRESSURE: 157 MMHG | HEART RATE: 102 BPM | OXYGEN SATURATION: 94 % | RESPIRATION RATE: 19 BRPM | DIASTOLIC BLOOD PRESSURE: 72 MMHG | TEMPERATURE: 100.6 F

## 2018-01-02 VITALS — OXYGEN SATURATION: 100 %

## 2018-01-02 VITALS — OXYGEN SATURATION: 95 %

## 2018-01-02 VITALS — HEART RATE: 108 BPM

## 2018-01-02 LAB
BUN SERPL-MCNC: 28 MG/DL (ref 7–18)
CALCIUM SERPL-MCNC: 8.7 MG/DL (ref 8.5–10.1)
CHLORIDE SERPL-SCNC: 108 MEQ/L (ref 98–107)
CREAT SERPL-MCNC: 0.93 MG/DL (ref 0.5–1)
EEEV IGG TITR CSF IF: (no result) {TITER}
EEEV IGM TITR CSF IF: (no result) {TITER}
ERYTHROCYTE [DISTWIDTH] IN BLOOD BY AUTOMATED COUNT: 15.8 % (ref 11.6–17.2)
GFR SERPLBLD BASED ON 1.73 SQ M-ARVRAT: 59 ML/MIN (ref 89–?)
GLUCOSE SERPL-MCNC: 142 MG/DL (ref 74–106)
HCO3 BLD-SCNC: 20 MEQ/L (ref 21–32)
HCT VFR BLD CALC: 21.6 % (ref 35–46)
HGB BLD-MCNC: 7.2 GM/DL (ref 11.6–15.3)
LACV IGG TITR CSF IF: (no result) {TITER}
LACV IGM TITR CSF IF: (no result) {TITER}
Lab: (no result)
Lab: (no result)
MCH RBC QN AUTO: 29.7 PG (ref 27–34)
MCHC RBC AUTO-ENTMCNC: 33.5 % (ref 32–36)
MCV RBC AUTO: 88.6 FL (ref 80–100)
PLATELET # BLD: 327 TH/MM3 (ref 150–450)
PMV BLD AUTO: 8 FL (ref 7–11)
RBC # BLD AUTO: 2.44 MIL/MM3 (ref 4–5.3)
SLEV IGG TITR CSF IF: (no result) {TITER}
SLEV IGM TITR CSF IF: (no result) {TITER}
SODIUM SERPL-SCNC: 140 MEQ/L (ref 136–145)
WBC # BLD AUTO: 14.2 TH/MM3 (ref 4–11)

## 2018-01-02 PROCEDURE — 30233N1 TRANSFUSION OF NONAUTOLOGOUS RED BLOOD CELLS INTO PERIPHERAL VEIN, PERCUTANEOUS APPROACH: ICD-10-PCS | Performed by: INTERNAL MEDICINE

## 2018-01-02 RX ADMIN — POLYVINYL ALCOHOL SCH DROP: 14 SOLUTION/ DROPS OPHTHALMIC at 18:00

## 2018-01-02 RX ADMIN — SODIUM CHLORIDE SCH MG: 900 INJECTION, SOLUTION INTRAVENOUS at 06:48

## 2018-01-02 RX ADMIN — CHLORHEXIDINE GLUCONATE 0.12% ORAL RINSE SCH ML: 1.2 LIQUID ORAL at 19:52

## 2018-01-02 RX ADMIN — SODIUM CHLORIDE SCH MG: 900 INJECTION, SOLUTION INTRAVENOUS at 01:39

## 2018-01-02 RX ADMIN — LEVETIRACETAM SCH MLS/HR: 100 INJECTION, SOLUTION, CONCENTRATE INTRAVENOUS at 23:17

## 2018-01-02 RX ADMIN — HEPARIN SODIUM SCH UNITS: 10000 INJECTION, SOLUTION INTRAVENOUS; SUBCUTANEOUS at 01:37

## 2018-01-02 RX ADMIN — WATER SCH ML: 1 IRRIGANT IRRIGATION at 06:48

## 2018-01-02 RX ADMIN — PROPRANOLOL HYDROCHLORIDE SCH MG: 20 TABLET ORAL at 04:03

## 2018-01-02 RX ADMIN — PROPRANOLOL HYDROCHLORIDE SCH MG: 20 TABLET ORAL at 19:52

## 2018-01-02 RX ADMIN — Medication SCH ML: at 08:17

## 2018-01-02 RX ADMIN — FOSPHENYTOIN SODIUM SCH MLS/HR: 50 INJECTION, SOLUTION INTRAMUSCULAR; INTRAVENOUS at 15:00

## 2018-01-02 RX ADMIN — WATER SCH ML: 1 IRRIGANT IRRIGATION at 18:00

## 2018-01-02 RX ADMIN — POLYETHYLENE GLYCOL 3350 SCH GM: 17 POWDER, FOR SOLUTION ORAL at 09:00

## 2018-01-02 RX ADMIN — LEVETIRACETAM SCH MLS/HR: 100 INJECTION, SOLUTION, CONCENTRATE INTRAVENOUS at 00:00

## 2018-01-02 RX ADMIN — WATER SCH ML: 1 IRRIGANT IRRIGATION at 12:00

## 2018-01-02 RX ADMIN — PRAVASTATIN SODIUM SCH MG: 40 TABLET ORAL at 19:52

## 2018-01-02 RX ADMIN — CLOPIDOGREL BISULFATE SCH MG: 75 TABLET, FILM COATED ORAL at 08:15

## 2018-01-02 RX ADMIN — LANSOPRAZOLE SCH MG: 30 TABLET, ORALLY DISINTEGRATING, DELAYED RELEASE ORAL at 08:15

## 2018-01-02 RX ADMIN — POLYETHYLENE GLYCOL 3350 SCH GM: 17 POWDER, FOR SOLUTION ORAL at 19:53

## 2018-01-02 RX ADMIN — Medication SCH ML: at 01:41

## 2018-01-02 RX ADMIN — WATER SCH ML: 1 IRRIGANT IRRIGATION at 23:19

## 2018-01-02 RX ADMIN — LACOSAMIDE SCH MG: 100 TABLET, FILM COATED ORAL at 01:40

## 2018-01-02 RX ADMIN — MIDAZOLAM HYDROCHLORIDE PRN MLS/HR: 5 INJECTION, SOLUTION INTRAMUSCULAR; INTRAVENOUS at 10:50

## 2018-01-02 RX ADMIN — STANDARDIZED SENNA CONCENTRATE AND DOCUSATE SODIUM SCH TAB: 8.6; 5 TABLET, FILM COATED ORAL at 09:00

## 2018-01-02 RX ADMIN — LACOSAMIDE SCH MG: 100 TABLET, FILM COATED ORAL at 08:15

## 2018-01-02 RX ADMIN — POLYVINYL ALCOHOL SCH DROP: 14 SOLUTION/ DROPS OPHTHALMIC at 13:00

## 2018-01-02 RX ADMIN — LACTOBACILLUS ACIDOPHILUS / LACTOBACILLUS BULGARICUS SCH GM: 100 MILLION CFU STRENGTH GRANULES at 13:04

## 2018-01-02 RX ADMIN — POLYVINYL ALCOHOL SCH DROP: 14 SOLUTION/ DROPS OPHTHALMIC at 08:17

## 2018-01-02 RX ADMIN — INSULIN ASPART SCH: 100 INJECTION, SOLUTION INTRAVENOUS; SUBCUTANEOUS at 00:00

## 2018-01-02 RX ADMIN — PROPRANOLOL HYDROCHLORIDE SCH MG: 20 TABLET ORAL at 04:02

## 2018-01-02 RX ADMIN — FOSPHENYTOIN SODIUM SCH MLS/HR: 50 INJECTION, SOLUTION INTRAMUSCULAR; INTRAVENOUS at 23:18

## 2018-01-02 RX ADMIN — LEVETIRACETAM SCH MLS/HR: 100 INJECTION, SOLUTION, CONCENTRATE INTRAVENOUS at 18:01

## 2018-01-02 RX ADMIN — PROPRANOLOL HYDROCHLORIDE SCH MG: 20 TABLET ORAL at 07:30

## 2018-01-02 RX ADMIN — STANDARDIZED SENNA CONCENTRATE AND DOCUSATE SODIUM SCH TAB: 8.6; 5 TABLET, FILM COATED ORAL at 01:36

## 2018-01-02 RX ADMIN — CHLORHEXIDINE GLUCONATE 0.12% ORAL RINSE SCH ML: 1.2 LIQUID ORAL at 08:16

## 2018-01-02 RX ADMIN — INSULIN ASPART SCH: 100 INJECTION, SOLUTION INTRAVENOUS; SUBCUTANEOUS at 18:00

## 2018-01-02 RX ADMIN — POLYETHYLENE GLYCOL 3350 SCH GM: 17 POWDER, FOR SOLUTION ORAL at 01:36

## 2018-01-02 RX ADMIN — LACTOBACILLUS ACIDOPHILUS / LACTOBACILLUS BULGARICUS SCH GM: 100 MILLION CFU STRENGTH GRANULES at 18:01

## 2018-01-02 RX ADMIN — INSULIN ASPART SCH: 100 INJECTION, SOLUTION INTRAVENOUS; SUBCUTANEOUS at 12:00

## 2018-01-02 RX ADMIN — STANDARDIZED SENNA CONCENTRATE AND DOCUSATE SODIUM SCH TAB: 8.6; 5 TABLET, FILM COATED ORAL at 19:53

## 2018-01-02 RX ADMIN — WATER SCH ML: 1 IRRIGANT IRRIGATION at 01:37

## 2018-01-02 RX ADMIN — LACTOBACILLUS ACIDOPHILUS / LACTOBACILLUS BULGARICUS SCH GM: 100 MILLION CFU STRENGTH GRANULES at 09:00

## 2018-01-02 RX ADMIN — Medication SCH ML: at 19:52

## 2018-01-02 RX ADMIN — LEVETIRACETAM SCH MLS/HR: 100 INJECTION, SOLUTION, CONCENTRATE INTRAVENOUS at 13:04

## 2018-01-02 RX ADMIN — LACOSAMIDE SCH MG: 100 TABLET, FILM COATED ORAL at 19:52

## 2018-01-02 RX ADMIN — SODIUM CHLORIDE SCH MG: 900 INJECTION, SOLUTION INTRAVENOUS at 23:17

## 2018-01-02 RX ADMIN — PROPRANOLOL HYDROCHLORIDE SCH MG: 20 TABLET ORAL at 13:04

## 2018-01-02 RX ADMIN — HEPARIN SODIUM SCH UNITS: 10000 INJECTION, SOLUTION INTRAVENOUS; SUBCUTANEOUS at 23:17

## 2018-01-02 RX ADMIN — HEPARIN SODIUM SCH UNITS: 10000 INJECTION, SOLUTION INTRAVENOUS; SUBCUTANEOUS at 06:49

## 2018-01-02 RX ADMIN — INSULIN ASPART SCH: 100 INJECTION, SOLUTION INTRAVENOUS; SUBCUTANEOUS at 06:00

## 2018-01-02 RX ADMIN — INSULIN ASPART SCH: 100 INJECTION, SOLUTION INTRAVENOUS; SUBCUTANEOUS at 23:21

## 2018-01-02 RX ADMIN — CHLORHEXIDINE GLUCONATE SCH PACK: 500 CLOTH TOPICAL at 04:00

## 2018-01-02 RX ADMIN — SODIUM CHLORIDE SCH MG: 900 INJECTION, SOLUTION INTRAVENOUS at 14:45

## 2018-01-02 RX ADMIN — HEPARIN SODIUM SCH UNITS: 10000 INJECTION, SOLUTION INTRAVENOUS; SUBCUTANEOUS at 14:45

## 2018-01-02 RX ADMIN — LEVETIRACETAM SCH MLS/HR: 100 INJECTION, SOLUTION, CONCENTRATE INTRAVENOUS at 06:48

## 2018-01-02 NOTE — MG
cc:

CCList****

 

 

Sex:  F

 

DATE OF STUDY: 1/2/2018

 

Test: 

 

TECHNIQUE:

17 channel EEG.

 

DESCRIPTION

The background rhythm reveals initially an alpha rhythm frequency of 8 Hz,

amplitude is 20 microvolts.  There is some slowing in the theta range at 5-6

Hz.  There are no epileptiform discharges. There are no lateralizing features

seen.  There is some muscle artifact present.  Photic stimulation results in a

normal driving response.

 

INTERPRETATION

This is a normal EEG.

 

 

 

                              _________________________________

                              MD JOAO Belle/KENDAL

D:  1/2/2018/3:36 PM

T:  1/2/2018/4:08 PM

Visit #:  [id number]

Job #:  [id number]

## 2018-01-02 NOTE — HHI.CCPN
Subjective


Remarks/Hospital Course


This is a 77-year-old female that presented to the ED for evaluation of altered 

mental status. Per report, according to EMS the patient normally is more alert 

and  has a history of stroke, he was able to speak with the patient's sister 

who states that she is also power of , the patient apparently normally 

is ambulatory and can carry on a conversation without any difficulty. Per 

records reviewed, the patient was last seen normal and at her baseline at 1 am. 

The patient then sat down in her lazy chair, her sister also noted that she 

started beating her head against a piece of furniture.  The patient was noted 

to have a right frontal contusion upon presentation to the ED .She had a GCS of 

7-8 on arrival. The patient's medical history is significant for a recent 

admission 09/2017 for altered mental status, medication induced.  Her past 

medical history is also significant for a recent history of UTI and C. 

difficile in addition to her history of having a CVA and reportedly residual 

effects in the right upper and lower extremity.  Laboratory and imaging studies 

revealed that most likely the patient has a UTI, CT of the brain revealed no 

abnormality and the patient was noted to have an elevated lactate level.  The 

patient was emergently intubated in the ED, and the patient was noted to be 

significantly hypertensive and a nicardipine infusion was instituted.  Upon 

entering the ED the patient's blood pressure was noted to be 219/92, Cardizem 

infusion had been ordered.  The patient was noted to have spontaneous movement 

of the right upper and lower extremity with a gag reflex. Critical care 

medicine was consulted.





12/25: The patient was weaned off of nicardipine infusion.  Normotensive the 

patient continues on labetalol twice a day scheduled home dosing.  EEG 

attempted last evening however due to patient's movement bilaterally to 

complete artifact EEG reordered.  Fentanyl infusion discontinued for daily 

sedation vacation approximately 7 hours ago the patient remains nonresponsive.  

Opens eyes spontaneously, moves limbs spontaneously but does not follow any 

commands.  Brain MRI/MRA, and CT all negative findings.  EEG scheduled for 

a.m..  The patient continues in severe metabolic acidosis, 2 Amps of sodium 

bicarbonate IV push given this a.m., sodium bicarbonate infusion increased.  

Lactate is cleared, creatinine is improving, CT of the abdomen and pelvis is 

pending this afternoon.


12/26 CT report from yesterday shows bladder edema consistent with cystitis.  

URine culture with GNR  Blood cultures NGTD. . Getting EEG now. 


12/27:  Resting comfortable in bed in no acute distress.  No obvious seizure 

activity.  Plan for lumbar puncture in AM.  Low-grade temperatures overnight.  

Tolerating tube feeding at goal.  No bowel movement


12/28:  Continues EEG has been discontinued.  Tmax 100.  Currently 99.8.  No 

bowel movement since admission.  Tolerating tube feeds at goal.  No active 

seizure activity overnight.


12/29:  Tmax 100.  Currently 99.9.  Became tachycardic with sedation lowered so 

RN increased midazolam to 9 milligrams an hour.  Also fentanyl drip at 250 mcg 

per hour.  No bowel movement since admission.  Tolerating tube feedings with 

only 40 cc residuals.  Receiving methylnaltrexone milligrams subcutaneous 1 

along with mineral oil and suppository.  KUB pending.  Patient improved 

neurological exam.  Blinks with my suddenhand  movements towards face.  

Positive gag.  Withdraws to pain in all 4 extremities.


12/30 LP results not yet finalized. On versed 7 mg/hr and fentanyl 250 mcg/hr. 

EEG from 12/29 - no epileptiform features. 


12/31 Weaning fentanyl down but remains on versed for seizure suppression. 

Because she had difficult to control subclinical seizures, will need  close EEG 

monitoring for  benzo weaning.  


Was breathing on PSV for couple of hours day, even on sedation. 


CSF negative final culture.  No Leukocytosis or fever.  Will stop antibiotic. 

Updated sister. 


1/1: no seizures evident on EEG. slightly more awake today, but not following 

commands. had discussion with Dr. Martin, will wean versed to 4mg/hr and re-

evaluate on EEG tomorrow.





Subjective





1/2: no seizures on EEG. ok to wean versed to off per Dr. Martin. will work on 

waking and weaning mechanical ventilation.





Objective





Vital Signs








  Date Time  Temp Pulse Resp B/P (MAP) Pulse Ox O2 Delivery O2 Flow Rate FiO2


 


1/2/18 18:00  94      


 


1/2/18 16:45        40


 


1/2/18 16:28     93   


 


1/2/18 16:00 98.1  31 159/119 (132)    














Intake and Output   


 


 1/2/18 1/2/18 1/3/18





 08:00 16:00 00:00


 


Intake Total 985 ml  655 ml


 


Output Total 600 ml  600 ml


 


Balance 385 ml  55 ml








Result Diagram:  


1/2/18 1015                                                                    

            1/2/18 0845





Imaging





Last Impressions








Chest X-Ray 12/29/17 0600 Signed





Impressions: 





 Service Date/Time:  Friday, December 29, 2017 04:41 - CONCLUSION:  No 





 significant interval change.     Christian Mendiola MD 


 


Lumbar Puncture Fluoroscopy 12/28/17 0000 Signed





Impressions: 





 Service Date/Time:  Thursday, December 28, 2017 09:39 - CONCLUSION: 





 Uncomplicated fluoroscopically guided lumbar puncture.     Reji Townsend MD 


 


Brain MRI 12/28/17 0000 Signed





Impressions: 





 Service Date/Time:  Thursday, December 28, 2017 10:24 - CONCLUSION:  No acute 





 intracranial findings     Bronson Mar MD 


 


Upper Extremity Ultrasound 12/27/17 0000 Signed





Impressions: 





 Service Date/Time:  Wednesday, December 27, 2017 21:32 - CONCLUSION:  1. No 





 sonographic evidence for right upper extremity DVT.     Reji Townsend MD 


 


Lower Extremity Ultrasound 12/27/17 0000 Signed





Impressions: 





 Service Date/Time:  Wednesday, December 27, 2017 21:12 - CONCLUSION:  1. No 





 sonographic evidence for lower extremity DVT.     Reji Townsend MD 


 


Abdomen/Pelvis CT 12/25/17 0000 Signed





Impressions: 





 Service Date/Time:  Monday, December 25, 2017 16:05 - CONCLUSION:  1. Mild 

edema 





 surrounding the urinary bladder, question cystitis. Erazo catheter in place. 

2. 





 Status post cholecystectomy.  3. Left lower lobe atelectasis. 4. Nasogastric 





 tube tip at the gastroesophageal junction.    Christian Mendiola MD 


 


Neck Magnetic Resonance Angiography 12/24/17 1119 Signed





Impressions: 





 Service Date/Time:  Sunday, December 24, 2017 13:11 - CONCLUSION:  Negative 

for 





 hemodynamically significant carotid stenosis     Pb López MD  FACR


 


Head Magnetic Resonance Angiography 12/24/17 1119 Signed





Impressions: 





 Service Date/Time:  Sunday, December 24, 2017 13:11 - CONCLUSION: Negative for 





 major branch vessel occlusion.      Pb López MD  FACR


 


Head CT 12/24/17 0851 Signed





Impressions: 





 Service Date/Time:  Sunday, December 24, 2017 09:02 - CONCLUSION:  Negative 

for 





 acute process..     Pb López MD  FACR


 


Cervical Spine CT 12/24/17 0000 Signed





Impressions: 





 Service Date/Time:  Sunday, December 24, 2017 09:07 - CONCLUSION:  Fusion as 





 above, negative for fracture.     Pb López MD  FACR








Objective Remarks


GENERAL: 77-year-old  female currently orotracheally intubated


SKIN: Warm and dry.


HEAD: Atraumatic. Normocephalic.  


EYES: R periorbital ecchmosis. Pupils equal and round 1-2 mm bilaterally. No 

scleral icterus. No injection or drainage. 


ENT: No nasal bleeding or discharge. Mucous membranes pink and moist.


NECK: Trachea midline. No JVD. 


CARDIOVASCULAR: RRR. 


RESPIRATORY: No accessory muscle use. Breath sounds equal bilaterally.  


GASTROINTESTINAL: Abdomen soft, non-tender, nondistended. No guarding. 


MUSCULOSKELETAL: Right upper extremity with 2+ edema and erythema.  Negative 

Doppler ultrasound 12/27 for DVT


NEUROLOGICAL: Cranial nerves II through XII appear grossly intact, gaze 

conjugate.  Eyes open with noxious stimuli..  Does not track.  Positive gag.  

Positive corneal reflex.    Withdraws to pain in all 4 extremities currently.  

Per report the patient does have deficits status post CVA of right upper and 

lower extremity patient with post polio syndrome with wasting bilateral lower 

extremities.





A/P


Assessment and Plan


Assessment: 77yF with subclinical status which has been very difficult to 

control. now on multiple AEDs and finally without evidence of ongoing status.  

wean sedation and start weaning mechanical ventilation. 





Neuro/Psych





Post polio syndrome bilateral lower extremity weakness


Status epilepticus


History of CVATIA with R sided deficits - right lentiform nucleus


H/O seizures


Anxiety/depression


Left maxillary/ethmoid fluid levels question sinusitis





12/28 interpretation EEG - Abnormal EEG due to some mild to moderate slowing, 

but also some occasional sharps and spike and slow waves seen, but improved 

from prior EEG's.  Clinical correlation.


Bvijvryebu442 milligrams twice a day will be continued.  


Loaded with fosphenytoin - currently fosphenytoin  200 mg IV every  8 hours.  

Leevel 13 12/30


Levetiracetam 500 mg IV every 6 hours


Lorazepam 2 mg IV every 5 minutes as needed.  Seizures


d/c versed drip. start precedex for weaning.


TSH normal-3.0


Random cortisol level only 3.5. Unclear significance as patient had lactic 

acidemia but also hypertensive at the time.  Recheck 12/28  12.3.  Cosyntropin 

test ordered 12/29, had adequate response 


12/24 MRI brain-Moderate periventricular white matter changes are evident.  

There is no restricted diffusion.  There is moderate atrophy with dilatation of 

ventricular sulcal spaces.  There are no extra-axial fluid collections 

appreciated.  Posterior fossa is unremarkable.  


12/24 MRA brain-negative for major branch occlusion 


12/24 CT brain-no acute intracranial process


MRI brain 12/28 - There is mild periventricular white matter T2 prolongation. A 

tiny lacunar infarct in the right lentiform nucleus appears old. There is no 

evidence of intracranial mass or hemorrhage. The brainstem and posterior fossa 

structures are unremarkable. There is nothing to suggest acute infarction. 

There is fluid in the left maxillary sinus and occasional ethmoid sinuses.


Patient's home meds baclofen 10 mg 3 times a day, tramadol 50 mg every 4 hours 

as needed and sertraline 50 mg twice a day have been on hold due to  concern 

for serotonin syndrome, seizure activity and rhabdomyolysis.  


   Patient has history of altered mental status secondary to being medication 

induced.  Reported by family member -the patient had discontinued baclofen for 

approximately 3 months and took her initial dose of baclofen on 12/23 with a 

TID schedule dosing.





LP - HSV negative, final cx neg. 


Dr. Martin actively following.  





Respiratory:





Acute hypoxemic respiratory failure





[facility]


Ventilator bundle


Maintain O2 sat greater than 92%


Albuterol/ipratropium aerosols nebs every 6 hours scheduled, albuterol aerosols 

every 2 hours when necessary


start SBTs.





Cardiovascular:





Hypertensive emergency-resolved


History of hypertension


Hyperlipidemia


Congestive heart failure - ejection fraction 55-60% 2010





Labetalol, hydralazine IV , PRN to maintain SBP <160


Serial troponins were negative.


propranolol 40mg po q6h given hypertension and tachycardia.


   On metoprolol 50 mg sustained release daily at home


continue clopidogrel 75 mg by mouth daily/home medication Resumed 12/30


Continue pravastatin 40 mg by mouth daily/home medication for dyslipidemia


add clonidine 0.2mg po q8hr.





Renal//FEN:





Chronic kidney disease stage IIIB


Hypo-magnesium





Monitor urine output


Monitor BMP


Replete electrolytes as needed


Previous Creatinine 09/2017- 1.15, upon admission 2.76, now at baseline


Maintain OGT -continue Glucerna with goal rate 50 ml/hr per nutrition's 

recommendations





GI:





Esophageal stricture -history of esophageal dilatation


GERD


Elevated ammonia


Constipation- resolved.





Patient is currently on Glucerna 1.5 goal 50 cc per hour per nutrition's 

recommendations 


Lansoprazole 30 mg daily GI prophylaxis. On Dexlansoprazole 60 mg times daily 

at home


Docusate sodium/senna 1 tablet twice a day for bowel regimen.  polyethylene 

glycol 17 g twice a day and lactulose 30 cc 4x a day


   1 dose of methylnaltrexone 12 MG SUBCUTANEOUS 1 AND 30 CC MINERAL OIL 1 12/ 29


Now having BMs after aggressive above bowel regimen. 


KUB  12/29 - mild ileus


Ammonia level Recheck in a.m. 12/30 <10


Tolerating tube feeds. 





ID:





Escherichia coli UTI





Monitor CBC


Blood cultures 12/24 - negative


Urine culture 12/24 -  Escherichia coli, pansensitive


Strep pneumococcal antigen 12/24  - negative 


CSF 12/28 Gram stain, fungal and AFB negative as of 12/29





Recent history of C. difficile colitis. Lactinex tid. 


off abx. monitor. reculture for fever.








HEME/ONC:





History of cervical cancer


Normocytic anemia


History of right lower extremity DVT - posterior tibial and femoral vein 

previously on Apixaban





Clopidogrel 75 mg daily initially held for LP.  Restarted 12/30


Lactate level 1.8


Recheck Dopplers bilateral and right upper lower extremities 12/28 negative for 

DVT





Endocrine:





Diabetes mellitus


Low cortisol





Glucose monitoring per ICU protocol, low dose regimen Novulog every 6 hours


Hold metformin thousand milligrams twice a day





Prophylaxis:


GI Prophylaxis


Lansoprazole 30 mg daily


DVT Prophylaxis


-- SCDs continue heparin 5000 units subcutaneous every 8 hours..  Discussed 

with Dr. Martin.





Lines:


Peripheral IVs providing adequate access.











Braydon Javier MD Jan 2, 2018 19:44

## 2018-01-02 NOTE — HHI.PR
Review/Management


Diagnosis


subclinical status epilepticus. Clinically improved. More responsive this am as 

versed is reduced.


Plan


Continue current level of cerebyx and keppra and vimpat


I agree with d/c of antibiotics since final csf cx negative


Will get repeat EEG this am and if stable with no or few epileptiform discharges

, recommend continue to wean off versed.


Will order repeat phenytoin level this am


Diagnosis/Plan:  





Subjective


Subjective Comments


Versed has been reduced to 4 mg/hr


No clinical seizures have been noted.


Continues on cerebyx, keppra and vimpat


Active Medications





Current Medications








 Medications


  (Trade)  Dose


 Ordered  Sig/Aaron


 Route  Start Time


 Stop Time Status Last Admin


 


 Fentanyl Citrate  250 ml @ 5


 mls/hr  TITRATE  PRN


 IV  12/24/17 09:45


    12/31/17 12:46


 


 


  (NS Flush)  2 ml  UNSCH  PRN


 IV FLUSH  12/24/17 11:15


    12/29/17 08:59


 


 


  (NS Flush)  2 ml  BID


 IV FLUSH  12/24/17 21:00


    1/2/18 01:41


 


 


  (Tears Naturale


 Opth Soln)  1 drop  TID


 EACH EYE  12/24/17 13:00


    1/1/18 17:03


 


 


 Miscellaneous


 Information  1  Q361D


 XX  12/24/17 11:15


    12/24/17 11:15


 


 


  (Chlorhexidine


 2% Cloth)  


 Taper  DAILY@04


 TOP  12/25/17 04:00


 12/21/18 03:59  1/2/18 04:00


 


 


  (Chlorhexidine


 2% Cloth)  3 pack  UNSCH  PRN


 TOP  12/24/17 11:15


     


 


 


  (Ruby-Colace)  1 tab  BID


 PO  12/24/17 21:00


    1/2/18 01:36


 


 


  (Milk Of


 Magnesia Liq)  30 ml  Q12H  PRN


 PO  12/24/17 11:15


     


 


 


  (Senokot)  17.2 mg  Q12H  PRN


 PO  12/24/17 11:15


     


 


 


  (Dulcolax Supp)  10 mg  DAILY  PRN


 RECTAL  12/24/17 11:15


    12/29/17 15:19


 


 


  (D50w (Vial) Inj)  50 ml  UNSCH  PRN


 IV PUSH  12/24/17 11:30


     


 


 


  (Glucagon Inj)  1 mg  UNSCH  PRN


 OTHER  12/24/17 11:30


     


 


 


  (Vimpat)  100 mg  BID


 PO  12/24/17 14:15


    1/2/18 01:40


 


 


  (Pravachol)  40 mg  HS


 PO  12/24/17 21:00


    1/1/18 21:00


 


 


  (Trandate Inj)  10 mg  Q4H  PRN


 IV PUSH  12/24/17 14:30


    1/1/18 14:42


 


 


  (Apresoline Inj)  20 mg  Q4H  PRN


 IV PUSH  12/24/17 14:30


    1/1/18 16:09


 


 


  (Lopressor)  25 mg  Q6HR


 PO  12/24/17 21:00


   Future Hold 1/1/18 12:05


 


 


 Potassium Chloride  100 ml @ 


 50 mls/hr  Q2H  PRN


 IV  12/26/17 09:15


     


 


 


 Potassium Chloride  100 ml @ 


 50 mls/hr  Q2H  PRN


 IV  12/26/17 09:15


    12/26/17 15:57


 


 


  (K-Lyte Cl  Eff)  50 meq  UNSCH  PRN


 PO  12/26/17 09:15


     


 


 


 Potassium Chloride  100 ml @ 


 25 mls/hr  UNSCH  PRN


 IV  12/26/17 09:15


     


 


 


 Potassium Chloride  100 ml @ 


 50 mls/hr  Q2H  PRN


 IV  12/26/17 09:15


     


 


 


 Magnesium Sulfate


 4 gm/Sodium


 Chloride  100 ml @ 


 50 mls/hr  UNSCH  PRN


 IV  12/26/17 09:15


     


 


 


  (Mag-Ox)  800 mg  UNSCH  PRN


 PO  12/26/17 09:15


     


 


 


 Magnesium Sulfate


 2 gm/Sodium


 Chloride  100 ml @ 


 50 mls/hr  UNSCH  PRN


 IV  12/26/17 09:15


     


 


 


  (K-Phos)  2,000 mg  Q4H  PRN


 PO  12/26/17 09:15


     


 


 


 Sodium Phosphate


 30 mmol/Sodium


 Chloride  250 ml @ 


 42 mls/hr  UNSCH  PRN


 IV  12/26/17 09:15


     


 


 


  (K-Phos)  2,000 mg  UNSCH  PRN


 PO/TUBE  12/26/17 09:15


     


 


 


 Potassium


 Phosphate 30 mmol/


 Sodium Chloride  260 ml @ 


 42 mls/hr  UNSCH  PRN


 IV  12/26/17 09:15


     


 


 


  (Lactinex Pkt)  1 gm  TID


 OG-TUBE  12/26/17 13:00


    1/1/18 17:03


 


 


  (Ativan Inj)  2 mg  Q5M  PRN


 IV PUSH  12/26/17 10:45


    12/26/17 14:18


 


 


  (Tylenol 650 Mg/


 20 ml Liq)  650 mg  Q6H  PRN


 NG  12/27/17 17:00


    1/1/18 10:37


 


 


  (NovoLOG


 SUPPLEMENTAL


 SCALE)  1  Q6HR


 SQ  12/27/17 18:00


    1/2/18 06:00


 


 


  (Albuterol Neb)  2.5 mg  Q2HR NEB  PRN


 NEB  12/27/17 17:00


     


 


 


  (Prevacid Odt)  30 mg  DAILY


 NG  12/28/17 09:00


    1/1/18 10:39


 


 


 Levetriacetam 500


 mg/Sodium Chloride  105 ml @ 


 420 mls/hr  Q6HR


 IV  12/28/17 12:00


    1/2/18 06:48


 


 


  (Miralax)  17 gm  BID


 PO  12/28/17 21:00


    1/2/18 01:36


 


 


  (Plavix)  75 mg  DAILY


 PO  12/30/17 09:00


    1/1/18 10:39


 


 


  (Heparin Inj)  5,000 units  Q8HR


 SQ  12/29/17 22:00


    1/2/18 06:49


 


 


  (Peridex 0.12%


 Liq)  15 ml  BID@08,20


 MT  12/29/17 20:00


    1/1/18 20:00


 


 


  (Lactulose Liq)  30 ml  Q6HR


 PO  12/29/17 18:00


    1/2/18 06:48


 


 


 Fosphenytoin


 Sodium 200 mgpe/


 Sodium Chloride  54 ml @ 


 216 mls/hr  Q8HR


 IV  12/30/17 06:00


    1/1/18 22:00


 


 


  (Reglan Inj)  5 mg  Q8HR


 IV PUSH  12/29/17 23:00


    1/2/18 06:48


 


 


  (Inderal)  40 mg  Q6H


 PO  1/1/18 13:30


    1/2/18 04:03


 


 


 Midazolam HCl  100 ml @ 4


 mls/hr  TITRATE  PRN


 IV  1/1/18 15:00


    1/1/18 15:01


 








Allergies





Allergies


Coded Allergies


  Influenza Virus Vaccines (Unverified Allergy, Intermediate, 10/27/17)


  egg (Unverified Allergy, Intermediate, 10/27/17)


  codeine (Unverified Allergy, Mild, 10/27/17)


  meperidine (Unverified Allergy, Mild, 10/27/17)


  morphine (Unverified Allergy, Mild, 10/27/17)


  acetaminophen (Unverified Adverse Reaction, Mild, HEADACHE, 10/27/17)


  hydrocodone (Unverified Adverse Reaction, Mild, HEADACHE, 10/27/17)





Exam


I&O / VS





Vital Signs








  Date Time  Temp Pulse Resp B/P (MAP) Pulse Ox O2 Delivery O2 Flow Rate FiO2


 


1/2/18 06:00  95      


 


1/2/18 04:33     95   30


 


1/2/18 04:00 100.7 94 21 154/95 (114) 95   


 


1/2/18 04:00        40


 


1/2/18 04:00  94      


 


1/2/18 02:00  108      


 


1/2/18 00:34     100   30


 


1/2/18 00:00  102      


 


1/2/18 00:00        40


 


1/2/18 00:00 100.6 102 19 157/72 (100) 94   


 


1/1/18 22:00  104      


 


1/1/18 20:00        40


 


1/1/18 20:00 100.5 103 20  90   


 


1/1/18 20:00  103      


 


1/1/18 19:33     100   30


 


1/1/18 18:00  102      


 


1/1/18 16:00  94      


 


1/1/18 16:00 99.6 94 20 187/129 (148) 100   


 


1/1/18 16:00        30


 


1/1/18 15:32     100   30


 


1/1/18 15:00  92 19 169/83 (111) 100   


 


1/1/18 14:00  103      


 


1/1/18 14:00  103 21 197/81 (119) 100   


 


1/1/18 13:00  103 18 202/79 (120) 100   


 


1/1/18 12:00 100.0 110 18 185/77 (113) 100   


 


1/1/18 12:00  110      


 


1/1/18 12:00        30


 


1/1/18 11:00  115 20 173/74 (107) 100   


 


1/1/18 11:00     100   30


 


1/1/18 10:00  130 28 188/86 (120) 100   


 


1/1/18 10:00  130      


 


1/1/18 09:40  130 28 144/79 (100) 100   


 


1/1/18 09:08  130 25 143/69 (93) 99   


 


1/1/18 08:09     99   30


 


1/1/18 08:00 101.8 115 19 177/77 (110) 99   


 


1/1/18 08:00  115      


 


1/1/18 08:00        30








Exam Comments


More responsive this am. opens eyes to voice and does follow commands


PERRL


withdraws BLE to tactile stimulation





Objective


Micro and Labs





Laboratory Tests








Test


  1/1/18


08:05 1/1/18


09:30


 


White Blood Count 10.4  


 


Red Blood Count 2.49  


 


Hemoglobin 7.4  


 


Hematocrit 21.9  


 


Mean Corpuscular Volume 87.6  


 


Mean Corpuscular Hemoglobin 29.7  


 


Mean Corpuscular Hemoglobin


Concent 33.9 


  


 


 


Red Cell Distribution Width 15.8  


 


Platelet Count 332  


 


Mean Platelet Volume 7.4  


 


Neutrophils (%) (Auto) 78.6  


 


Lymphocytes (%) (Auto) 12.1  


 


Monocytes (%) (Auto) 7.7  


 


Eosinophils (%) (Auto) 1.3  


 


Basophils (%) (Auto) 0.3  


 


Neutrophils # (Auto) 8.2  


 


Lymphocytes # (Auto) 1.3  


 


Monocytes # (Auto) 0.8  


 


Eosinophils # (Auto) 0.1  


 


Basophils # (Auto) 0.0  


 


CBC Comment AUTO DIFF  


 


Differential Comment


  AUTO DIFF


CONFIRMED 


 


 


Platelet Estimate NORMAL  


 


Platelet Morphology Comment NORMAL  


 


Red Cell Morphology Comment NORMAL  


 


Blood Urea Nitrogen 26  


 


Creatinine 1.01  


 


Random Glucose 136  


 


Calcium Level 8.6  


 


Sodium Level 137  


 


Potassium Level 3.8  


 


Chloride Level 105  


 


Carbon Dioxide Level 22.9  


 


Anion Gap 9  


 


Estimat Glomerular Filtration


Rate 53 


  


 


 


Stool C. difficile Toxin (PCR)  NEGATIVE 


 


Stl C. difficile Toxin


Epiderm 027 


  PRESUMPTIVE


NEGATIVE














 Date/Time


Source Procedure


Growth Status


 


 


 1/1/18 16:54


Blood Peripheral Aerobic Blood Culture


Pending Received


 


 1/1/18 16:54


Blood Peripheral Anaerobic Blood Culture


Pending Received





 12/28/17 09:47


Cerebral Spinal Fluid Lumbar Puncture Fungal Smear - Final


NO FUNGAL ELEMENTS SEEN. Resulted


 


 12/28/17 09:47


Cerebral Spinal Fluid Lumbar Puncture Fungal Culture


Pending Resulted


 


 12/24/17 09:54


Urine Catheterized Urine Streptococcus pneumoniae Antigen (M - Final


PRESUMPTIVE NEGATIVE FOR STREPTOCOCCU... Complete

















Colton Martin MD PhD Jan 2, 2018 07:47

## 2018-01-03 VITALS
DIASTOLIC BLOOD PRESSURE: 80 MMHG | TEMPERATURE: 99.8 F | SYSTOLIC BLOOD PRESSURE: 153 MMHG | OXYGEN SATURATION: 100 % | HEART RATE: 75 BPM | RESPIRATION RATE: 18 BRPM

## 2018-01-03 VITALS
HEART RATE: 83 BPM | SYSTOLIC BLOOD PRESSURE: 149 MMHG | OXYGEN SATURATION: 100 % | DIASTOLIC BLOOD PRESSURE: 65 MMHG | TEMPERATURE: 100 F | RESPIRATION RATE: 24 BRPM

## 2018-01-03 VITALS
DIASTOLIC BLOOD PRESSURE: 67 MMHG | OXYGEN SATURATION: 100 % | HEART RATE: 95 BPM | SYSTOLIC BLOOD PRESSURE: 160 MMHG | RESPIRATION RATE: 19 BRPM | TEMPERATURE: 99.3 F

## 2018-01-03 VITALS
DIASTOLIC BLOOD PRESSURE: 63 MMHG | OXYGEN SATURATION: 100 % | TEMPERATURE: 97.8 F | RESPIRATION RATE: 24 BRPM | HEART RATE: 83 BPM | SYSTOLIC BLOOD PRESSURE: 146 MMHG

## 2018-01-03 VITALS
HEART RATE: 76 BPM | TEMPERATURE: 98.2 F | DIASTOLIC BLOOD PRESSURE: 63 MMHG | RESPIRATION RATE: 25 BRPM | SYSTOLIC BLOOD PRESSURE: 137 MMHG | OXYGEN SATURATION: 99 %

## 2018-01-03 VITALS
RESPIRATION RATE: 39 BRPM | TEMPERATURE: 98.6 F | SYSTOLIC BLOOD PRESSURE: 166 MMHG | OXYGEN SATURATION: 100 % | HEART RATE: 85 BPM | DIASTOLIC BLOOD PRESSURE: 71 MMHG

## 2018-01-03 VITALS
HEART RATE: 80 BPM | TEMPERATURE: 98.3 F | DIASTOLIC BLOOD PRESSURE: 70 MMHG | OXYGEN SATURATION: 100 % | SYSTOLIC BLOOD PRESSURE: 117 MMHG | RESPIRATION RATE: 23 BRPM

## 2018-01-03 VITALS — OXYGEN SATURATION: 100 %

## 2018-01-03 VITALS
DIASTOLIC BLOOD PRESSURE: 73 MMHG | RESPIRATION RATE: 27 BRPM | TEMPERATURE: 99.4 F | OXYGEN SATURATION: 100 % | HEART RATE: 88 BPM | SYSTOLIC BLOOD PRESSURE: 127 MMHG

## 2018-01-03 VITALS — HEART RATE: 79 BPM

## 2018-01-03 VITALS — HEART RATE: 84 BPM

## 2018-01-03 VITALS
OXYGEN SATURATION: 100 % | HEART RATE: 80 BPM | SYSTOLIC BLOOD PRESSURE: 145 MMHG | RESPIRATION RATE: 33 BRPM | TEMPERATURE: 97.8 F | DIASTOLIC BLOOD PRESSURE: 67 MMHG

## 2018-01-03 VITALS — HEART RATE: 86 BPM

## 2018-01-03 VITALS — HEART RATE: 85 BPM

## 2018-01-03 VITALS — HEART RATE: 74 BPM

## 2018-01-03 VITALS — HEART RATE: 82 BPM

## 2018-01-03 VITALS — HEART RATE: 83 BPM

## 2018-01-03 LAB
BUN SERPL-MCNC: 28 MG/DL (ref 7–18)
CALCIUM SERPL-MCNC: 8.4 MG/DL (ref 8.5–10.1)
CHLORIDE SERPL-SCNC: 108 MEQ/L (ref 98–107)
CREAT SERPL-MCNC: 0.96 MG/DL (ref 0.5–1)
ERYTHROCYTE [DISTWIDTH] IN BLOOD BY AUTOMATED COUNT: 15.7 % (ref 11.6–17.2)
GFR SERPLBLD BASED ON 1.73 SQ M-ARVRAT: 57 ML/MIN (ref 89–?)
GLUCOSE SERPL-MCNC: 140 MG/DL (ref 74–106)
HCO3 BLD-SCNC: 22.9 MEQ/L (ref 21–32)
HCT VFR BLD CALC: 20.9 % (ref 35–46)
HGB BLD-MCNC: 6.7 GM/DL (ref 11.6–15.3)
MCH RBC QN AUTO: 28.4 PG (ref 27–34)
MCHC RBC AUTO-ENTMCNC: 32.1 % (ref 32–36)
MCV RBC AUTO: 88.3 FL (ref 80–100)
PLATELET # BLD: 370 TH/MM3 (ref 150–450)
PMV BLD AUTO: 7.8 FL (ref 7–11)
RBC # BLD AUTO: 2.37 MIL/MM3 (ref 4–5.3)
SODIUM SERPL-SCNC: 141 MEQ/L (ref 136–145)
WBC # BLD AUTO: 14.9 TH/MM3 (ref 4–11)

## 2018-01-03 RX ADMIN — DEXMEDETOMIDINE HYDROCHLORIDE PRN MLS/HR: 100 INJECTION, SOLUTION, CONCENTRATE INTRAVENOUS at 19:40

## 2018-01-03 RX ADMIN — LANSOPRAZOLE SCH MG: 30 TABLET, ORALLY DISINTEGRATING, DELAYED RELEASE ORAL at 08:32

## 2018-01-03 RX ADMIN — LACOSAMIDE SCH MG: 100 TABLET, FILM COATED ORAL at 21:49

## 2018-01-03 RX ADMIN — PRAVASTATIN SODIUM SCH MG: 40 TABLET ORAL at 21:49

## 2018-01-03 RX ADMIN — LEVETIRACETAM SCH MLS/HR: 100 INJECTION, SOLUTION, CONCENTRATE INTRAVENOUS at 12:48

## 2018-01-03 RX ADMIN — DEXMEDETOMIDINE HYDROCHLORIDE PRN MLS/HR: 100 INJECTION, SOLUTION, CONCENTRATE INTRAVENOUS at 10:16

## 2018-01-03 RX ADMIN — DEXMEDETOMIDINE HYDROCHLORIDE PRN MLS/HR: 100 INJECTION, SOLUTION, CONCENTRATE INTRAVENOUS at 23:26

## 2018-01-03 RX ADMIN — DEXMEDETOMIDINE HYDROCHLORIDE PRN MLS/HR: 100 INJECTION, SOLUTION, CONCENTRATE INTRAVENOUS at 05:41

## 2018-01-03 RX ADMIN — POLYETHYLENE GLYCOL 3350 SCH GM: 17 POWDER, FOR SOLUTION ORAL at 08:32

## 2018-01-03 RX ADMIN — LACOSAMIDE SCH MG: 100 TABLET, FILM COATED ORAL at 08:32

## 2018-01-03 RX ADMIN — POLYVINYL ALCOHOL SCH DROP: 14 SOLUTION/ DROPS OPHTHALMIC at 18:09

## 2018-01-03 RX ADMIN — INSULIN ASPART SCH: 100 INJECTION, SOLUTION INTRAVENOUS; SUBCUTANEOUS at 12:00

## 2018-01-03 RX ADMIN — FOSPHENYTOIN SODIUM SCH MLS/HR: 50 INJECTION, SOLUTION INTRAMUSCULAR; INTRAVENOUS at 21:50

## 2018-01-03 RX ADMIN — Medication SCH ML: at 21:50

## 2018-01-03 RX ADMIN — FOSPHENYTOIN SODIUM SCH MLS/HR: 50 INJECTION, SOLUTION INTRAMUSCULAR; INTRAVENOUS at 14:43

## 2018-01-03 RX ADMIN — DEXMEDETOMIDINE HYDROCHLORIDE PRN MLS/HR: 100 INJECTION, SOLUTION, CONCENTRATE INTRAVENOUS at 01:49

## 2018-01-03 RX ADMIN — Medication SCH ML: at 08:33

## 2018-01-03 RX ADMIN — HEPARIN SODIUM SCH UNITS: 10000 INJECTION, SOLUTION INTRAVENOUS; SUBCUTANEOUS at 05:43

## 2018-01-03 RX ADMIN — WATER SCH ML: 1 IRRIGANT IRRIGATION at 05:42

## 2018-01-03 RX ADMIN — WATER SCH ML: 1 IRRIGANT IRRIGATION at 12:00

## 2018-01-03 RX ADMIN — INSULIN ASPART SCH: 100 INJECTION, SOLUTION INTRAVENOUS; SUBCUTANEOUS at 05:50

## 2018-01-03 RX ADMIN — LEVETIRACETAM SCH MLS/HR: 100 INJECTION, SOLUTION, CONCENTRATE INTRAVENOUS at 17:04

## 2018-01-03 RX ADMIN — LACTOBACILLUS ACIDOPHILUS / LACTOBACILLUS BULGARICUS SCH GM: 100 MILLION CFU STRENGTH GRANULES at 08:32

## 2018-01-03 RX ADMIN — SODIUM CHLORIDE SCH MG: 900 INJECTION, SOLUTION INTRAVENOUS at 14:08

## 2018-01-03 RX ADMIN — LACTOBACILLUS ACIDOPHILUS / LACTOBACILLUS BULGARICUS SCH GM: 100 MILLION CFU STRENGTH GRANULES at 14:07

## 2018-01-03 RX ADMIN — POLYETHYLENE GLYCOL 3350 SCH GM: 17 POWDER, FOR SOLUTION ORAL at 21:00

## 2018-01-03 RX ADMIN — STANDARDIZED SENNA CONCENTRATE AND DOCUSATE SODIUM SCH TAB: 8.6; 5 TABLET, FILM COATED ORAL at 21:00

## 2018-01-03 RX ADMIN — FOSPHENYTOIN SODIUM SCH MLS/HR: 50 INJECTION, SOLUTION INTRAMUSCULAR; INTRAVENOUS at 05:40

## 2018-01-03 RX ADMIN — CHLORHEXIDINE GLUCONATE SCH PACK: 500 CLOTH TOPICAL at 01:49

## 2018-01-03 RX ADMIN — STANDARDIZED SENNA CONCENTRATE AND DOCUSATE SODIUM SCH TAB: 8.6; 5 TABLET, FILM COATED ORAL at 08:32

## 2018-01-03 RX ADMIN — WATER SCH ML: 1 IRRIGANT IRRIGATION at 18:34

## 2018-01-03 RX ADMIN — SODIUM CHLORIDE SCH MG: 900 INJECTION, SOLUTION INTRAVENOUS at 21:49

## 2018-01-03 RX ADMIN — PROPRANOLOL HYDROCHLORIDE SCH MG: 20 TABLET ORAL at 14:07

## 2018-01-03 RX ADMIN — PROPRANOLOL HYDROCHLORIDE SCH MG: 20 TABLET ORAL at 08:50

## 2018-01-03 RX ADMIN — SODIUM CHLORIDE SCH MG: 900 INJECTION, SOLUTION INTRAVENOUS at 05:42

## 2018-01-03 RX ADMIN — LEVETIRACETAM SCH MLS/HR: 100 INJECTION, SOLUTION, CONCENTRATE INTRAVENOUS at 05:41

## 2018-01-03 RX ADMIN — POLYVINYL ALCOHOL SCH DROP: 14 SOLUTION/ DROPS OPHTHALMIC at 14:07

## 2018-01-03 RX ADMIN — LACTOBACILLUS ACIDOPHILUS / LACTOBACILLUS BULGARICUS SCH GM: 100 MILLION CFU STRENGTH GRANULES at 18:09

## 2018-01-03 RX ADMIN — PROPRANOLOL HYDROCHLORIDE SCH MG: 20 TABLET ORAL at 01:49

## 2018-01-03 RX ADMIN — CHLORHEXIDINE GLUCONATE 0.12% ORAL RINSE SCH ML: 1.2 LIQUID ORAL at 08:33

## 2018-01-03 RX ADMIN — INSULIN ASPART SCH: 100 INJECTION, SOLUTION INTRAVENOUS; SUBCUTANEOUS at 18:00

## 2018-01-03 RX ADMIN — CHLORHEXIDINE GLUCONATE 0.12% ORAL RINSE SCH ML: 1.2 LIQUID ORAL at 20:00

## 2018-01-03 RX ADMIN — ACETAMINOPHEN PRN MG: 650 SOLUTION ORAL at 19:00

## 2018-01-03 RX ADMIN — CLOPIDOGREL BISULFATE SCH MG: 75 TABLET, FILM COATED ORAL at 08:32

## 2018-01-03 RX ADMIN — PROPRANOLOL HYDROCHLORIDE SCH MG: 20 TABLET ORAL at 21:49

## 2018-01-03 RX ADMIN — POLYVINYL ALCOHOL SCH DROP: 14 SOLUTION/ DROPS OPHTHALMIC at 08:33

## 2018-01-03 NOTE — HHI.CCPN
Subjective


Remarks/Hospital Course


This is a 77-year-old female that presented to the ED for evaluation of altered 

mental status. Per report, according to EMS the patient normally is more alert 

and  has a history of stroke, he was able to speak with the patient's sister 

who states that she is also power of , the patient apparently normally 

is ambulatory and can carry on a conversation without any difficulty. Per 

records reviewed, the patient was last seen normal and at her baseline at 1 am. 

The patient then sat down in her lazy chair, her sister also noted that she 

started beating her head against a piece of furniture.  The patient was noted 

to have a right frontal contusion upon presentation to the ED .She had a GCS of 

7-8 on arrival. The patient's medical history is significant for a recent 

admission 09/2017 for altered mental status, medication induced.  Her past 

medical history is also significant for a recent history of UTI and C. 

difficile in addition to her history of having a CVA and reportedly residual 

effects in the right upper and lower extremity.  Laboratory and imaging studies 

revealed that most likely the patient has a UTI, CT of the brain revealed no 

abnormality and the patient was noted to have an elevated lactate level.  The 

patient was emergently intubated in the ED, and the patient was noted to be 

significantly hypertensive and a nicardipine infusion was instituted.  Upon 

entering the ED the patient's blood pressure was noted to be 219/92, Cardizem 

infusion had been ordered.  The patient was noted to have spontaneous movement 

of the right upper and lower extremity with a gag reflex. Critical care 

medicine was consulted.





12/25: The patient was weaned off of nicardipine infusion.  Normotensive the 

patient continues on labetalol twice a day scheduled home dosing.  EEG 

attempted last evening however due to patient's movement bilaterally to 

complete artifact EEG reordered.  Fentanyl infusion discontinued for daily 

sedation vacation approximately 7 hours ago the patient remains nonresponsive.  

Opens eyes spontaneously, moves limbs spontaneously but does not follow any 

commands.  Brain MRI/MRA, and CT all negative findings.  EEG scheduled for 

a.m..  The patient continues in severe metabolic acidosis, 2 Amps of sodium 

bicarbonate IV push given this a.m., sodium bicarbonate infusion increased.  

Lactate is cleared, creatinine is improving, CT of the abdomen and pelvis is 

pending this afternoon.


12/26 CT report from yesterday shows bladder edema consistent with cystitis.  

URine culture with GNR  Blood cultures NGTD. . Getting EEG now. 


12/27:  Resting comfortable in bed in no acute distress.  No obvious seizure 

activity.  Plan for lumbar puncture in AM.  Low-grade temperatures overnight.  

Tolerating tube feeding at goal.  No bowel movement


12/28:  Continues EEG has been discontinued.  Tmax 100.  Currently 99.8.  No 

bowel movement since admission.  Tolerating tube feeds at goal.  No active 

seizure activity overnight.


12/29:  Tmax 100.  Currently 99.9.  Became tachycardic with sedation lowered so 

RN increased midazolam to 9 milligrams an hour.  Also fentanyl drip at 250 mcg 

per hour.  No bowel movement since admission.  Tolerating tube feedings with 

only 40 cc residuals.  Receiving methylnaltrexone milligrams subcutaneous 1 

along with mineral oil and suppository.  KUB pending.  Patient improved 

neurological exam.  Blinks with my suddenhand  movements towards face.  

Positive gag.  Withdraws to pain in all 4 extremities.


12/30 LP results not yet finalized. On versed 7 mg/hr and fentanyl 250 mcg/hr. 

EEG from 12/29 - no epileptiform features. 


12/31 Weaning fentanyl down but remains on versed for seizure suppression. 

Because she had difficult to control subclinical seizures, will need  close EEG 

monitoring for  benzo weaning.  


Was breathing on PSV for couple of hours day, even on sedation. 


CSF negative final culture.  No Leukocytosis or fever.  Will stop antibiotic. 

Updated sister. 


1/1: no seizures evident on EEG. slightly more awake today, but not following 

commands. had discussion with Dr. Martin, will wean versed to 4mg/hr and re-

evaluate on EEG tomorrow.


1/2: no seizures on EEG. ok to wean versed to off per Dr. Martin. will work on 

waking and weaning mechanical ventilation.





Subjective





1/3: acute drop in hgb. ordered 1 unit prbc. sent haptoglobin and LDH. pt awake 

following commands. fails SBT for RSBI > 110.





Objective





Vital Signs








  Date Time  Temp Pulse Resp B/P (MAP) Pulse Ox O2 Delivery O2 Flow Rate FiO2


 


1/3/18 18:28 100.0 83 24 149/65 100   


 


1/3/18 15:09        35














Intake and Output   


 


 1/3/18 1/3/18 1/3/18





 07:59 15:59 23:59


 


Intake Total 1072 ml  


 


Output Total 600 ml  


 


Balance 472 ml  








Result Diagram:  


1/3/18 0502                                                                    

            1/3/18 0502





Other Results





Laboratory Tests








Test


  1/3/18


16:21


 


Blood Gas Puncture Site RT RADIAL 


 


Blood Gas Patient Temperature 98.6 


 


Blood Gas HCO3


  21 mmol/L


(22-26)


 


Blood Gas Base Excess


  -2.1 mmol/L


(-2-2)


 


Blood Gas Oxygen Saturation 96 % () 


 


Arterial Blood pH


  7.47


(7.380-7.420)


 


Arterial Blood Partial


Pressure CO2 29 mmHg


(38-42)


 


Arterial Blood Partial


Pressure O2 119 mmHg


()


 


Arterial Blood Oxygen Content


  9.2 Vol %


(12.0-20.0)


 


Arterial Blood


Carboxyhemoglobin 0.9 % (0-4) 


 


 


Arterial Blood Methemoglobin 1.3 % (0-2) 


 


Blood Gas Hemoglobin


  6.6 G/DL


(12.0-16.0)


 


Oxygen Delivery Device VENTILATOR 


 


Blood Gas Ventilator Setting CPAP+5/PS+5 


 


Blood Gas Inspired Oxygen 35 % 








Imaging





Last Impressions








Chest X-Ray 12/29/17 0600 Signed





Impressions: 





 Service Date/Time:  Friday, December 29, 2017 04:41 - CONCLUSION:  No 





 significant interval change.     Christian Mendiola MD 


 


Lumbar Puncture Fluoroscopy 12/28/17 0000 Signed





Impressions: 





 Service Date/Time:  Thursday, December 28, 2017 09:39 - CONCLUSION: 





 Uncomplicated fluoroscopically guided lumbar puncture.     Reji Townsend MD 


 


Brain MRI 12/28/17 0000 Signed





Impressions: 





 Service Date/Time:  Thursday, December 28, 2017 10:24 - CONCLUSION:  No acute 





 intracranial findings     Bronson Mar MD 


 


Upper Extremity Ultrasound 12/27/17 0000 Signed





Impressions: 





 Service Date/Time:  Wednesday, December 27, 2017 21:32 - CONCLUSION:  1. No 





 sonographic evidence for right upper extremity DVT.     Reji Townsend MD 


 


Lower Extremity Ultrasound 12/27/17 0000 Signed





Impressions: 





 Service Date/Time:  Wednesday, December 27, 2017 21:12 - CONCLUSION:  1. No 





 sonographic evidence for lower extremity DVT.     Reji Townsend MD 


 


Abdomen/Pelvis CT 12/25/17 0000 Signed





Impressions: 





 Service Date/Time:  Monday, December 25, 2017 16:05 - CONCLUSION:  1. Mild 

edema 





 surrounding the urinary bladder, question cystitis. Erazo catheter in place. 

2. 





 Status post cholecystectomy.  3. Left lower lobe atelectasis. 4. Nasogastric 





 tube tip at the gastroesophageal junction.    Christian Mendiola MD 


 


Neck Magnetic Resonance Angiography 12/24/17 1119 Signed





Impressions: 





 Service Date/Time:  Sunday, December 24, 2017 13:11 - CONCLUSION:  Negative 

for 





 hemodynamically significant carotid stenosis     Pb López MD  FACR


 


Head Magnetic Resonance Angiography 12/24/17 1119 Signed





Impressions: 





 Service Date/Time:  Sunday, December 24, 2017 13:11 - CONCLUSION: Negative for 





 major branch vessel occlusion.      Pb López MD  FACR


 


Head CT 12/24/17 0851 Signed





Impressions: 





 Service Date/Time:  Sunday, December 24, 2017 09:02 - CONCLUSION:  Negative 

for 





 acute process..     Pb López MD  FACR


 


Cervical Spine CT 12/24/17 0000 Signed





Impressions: 





 Service Date/Time:  Sunday, December 24, 2017 09:07 - CONCLUSION:  Fusion as 





 above, negative for fracture.     Pb López MD  FACR








Objective Remarks


GENERAL: 77-year-old  female currently orotracheally intubated


SKIN: Warm and dry.


HEAD: Atraumatic. Normocephalic.  


EYES: R periorbital ecchmosis. Pupils equal and round 1-2 mm bilaterally. No 

scleral icterus. No injection or drainage. 


ENT: No nasal bleeding or discharge. Mucous membranes pink and moist.


NECK: Trachea midline. No JVD. 


CARDIOVASCULAR: RRR. 


RESPIRATORY: No accessory muscle use. Breath sounds equal bilaterally.  


GASTROINTESTINAL: Abdomen soft, non-tender, nondistended. No guarding. 


MUSCULOSKELETAL: Right upper extremity with 2+ edema and erythema.  Negative 

Doppler ultrasound 12/27 for DVT


NEUROLOGICAL: follows commands. RASS -1.





A/P


Assessment and Plan


Assessment: 77yF with subclinical status which has been very difficult to 

control. now on multiple AEDs and finally without evidence of ongoing status.  

wean sedation and start weaning mechanical ventilation. failing SBTs for 

tachypnea and weakness. off pathway.





Neuro/Psych





Post polio syndrome bilateral lower extremity weakness


Status epilepticus


History of CVATIA with R sided deficits - right lentiform nucleus


H/O seizures


Anxiety/depression


Left maxillary/ethmoid fluid levels question sinusitis





12/28 interpretation EEG - Abnormal EEG due to some mild to moderate slowing, 

but also some occasional sharps and spike and slow waves seen, but improved 

from prior EEG's.  Clinical correlation.


[id number] milligrams twice a day will be continued.  


Loaded with fosphenytoin - currently fosphenytoin  200 mg IV every  8 hours.  

Leevel 13 12/30


Levetiracetam 500 mg IV every 6 hours


Lorazepam 2 mg IV every 5 minutes as needed.  Seizures


d/c versed drip. start precedex for weaning.


TSH normal-3.0


Random cortisol level only 3.5. Unclear significance as patient had lactic 

acidemia but also hypertensive at the time.  Recheck 12/28  12.3.  Cosyntropin 

test ordered 12/29, had adequate response 


12/24 MRI brain-Moderate periventricular white matter changes are evident.  

There is no restricted diffusion.  There is moderate atrophy with dilatation of 

ventricular sulcal spaces.  There are no extra-axial fluid collections 

appreciated.  Posterior fossa is unremarkable.  


12/24 MRA brain-negative for major branch occlusion 


12/24 CT brain-no acute intracranial process


MRI brain 12/28 - There is mild periventricular white matter T2 prolongation. A 

tiny lacunar infarct in the right lentiform nucleus appears old. There is no 

evidence of intracranial mass or hemorrhage. The brainstem and posterior fossa 

structures are unremarkable. There is nothing to suggest acute infarction. 

There is fluid in the left maxillary sinus and occasional ethmoid sinuses.


Patient's home meds baclofen 10 mg 3 times a day, tramadol 50 mg every 4 hours 

as needed and sertraline 50 mg twice a day have been on hold due to  concern 

for serotonin syndrome, seizure activity and rhabdomyolysis.  


   Patient has history of altered mental status secondary to being medication 

induced.  Reported by family member -the patient had discontinued baclofen for 

approximately 3 months and took her initial dose of baclofen on 12/23 with a 

TID schedule dosing.





LP - HSV negative, final cx neg. 


Dr. Martin actively following.  





Respiratory:





Acute hypoxemic respiratory failure





[facility] 16/500/1/5/35


Ventilator bundle


Maintain O2 sat greater than 92%


Albuterol/ipratropium aerosols nebs every 6 hours scheduled, albuterol aerosols 

every 2 hours when necessary


continue daily SBTs.





Cardiovascular:





Hypertensive emergency-resolved


History of hypertension


Hyperlipidemia


Congestive heart failure - ejection fraction 55-60% 2010





Labetalol, hydralazine IV , PRN to maintain SBP <160


Serial troponins were negative.


propranolol 40mg po q6h given hypertension and tachycardia.


   On metoprolol 50 mg sustained release daily at home


continue clopidogrel 75 mg by mouth daily/home medication Resumed 12/30


Continue pravastatin 40 mg by mouth daily/home medication for dyslipidemia


clonidine 0.2mg po q8hr.





Renal//FEN:





Chronic kidney disease stage IIIB


Hypo-magnesium





Monitor urine output


Monitor BMP


Replete electrolytes as needed


Previous Creatinine 09/2017- 1.15, upon admission 2.76, now at baseline


Maintain OGT -continue Glucerna with goal rate 50 ml/hr per nutrition's 

recommendations





GI:





Esophageal stricture -history of esophageal dilatation


GERD


Elevated ammonia


Constipation- resolved.





Patient is currently on Glucerna 1.5 goal 50 cc per hour per nutrition's 

recommendations 


Lansoprazole 30 mg daily GI prophylaxis. On Dexlansoprazole 60 mg times daily 

at home


Docusate sodium/senna 1 tablet twice a day for bowel regimen.  polyethylene 

glycol 17 g twice a day and lactulose 30 cc 4x a day


   1 dose of methylnaltrexone 12 MG SUBCUTANEOUS 1 AND 30 CC MINERAL OIL 1 12/ 29


Now having BMs after aggressive above bowel regimen. 


KUB  12/29 - mild ileus


Ammonia level Recheck in a.m. 12/30 <10


Tolerating tube feeds. 





ID:





Escherichia coli UTI





Monitor CBC


Blood cultures 12/24 - negative


Urine culture 12/24 -  Escherichia coli, pansensitive


Strep pneumococcal antigen 12/24  - negative 


CSF 12/28 Gram stain, fungal and AFB negative as of 12/29





Recent history of C. difficile colitis. Lactinex tid. 


off abx. monitor. reculture for fever.








HEME/ONC:





History of cervical cancer


Normocytic anemia


History of right lower extremity DVT - posterior tibial and femoral vein 

previously on Apixaban





Clopidogrel 75 mg daily initially held for LP.  Restarted 12/30


Lactate level 1.8


Recheck Dopplers bilateral and right upper lower extremities 12/28 negative for 

DVT


1 unit prbc


f/u haptoglobin and LDH


hold SQH


send stool guiac.





Endocrine:





Diabetes mellitus


Low cortisol





Glucose monitoring per ICU protocol, low dose regimen Novulog every 6 hours


Hold metformin thousand milligrams twice a day





Prophylaxis:


GI Prophylaxis


Lansoprazole 30 mg daily


DVT Prophylaxis


-- SCDs hold SQH given anemia.





Lines:


Peripheral IVs providing adequate access.











Javier,Braydon S MD Anders 3, 2018 19:45

## 2018-01-04 VITALS
DIASTOLIC BLOOD PRESSURE: 77 MMHG | OXYGEN SATURATION: 97 % | HEART RATE: 95 BPM | SYSTOLIC BLOOD PRESSURE: 183 MMHG | RESPIRATION RATE: 44 BRPM

## 2018-01-04 VITALS
SYSTOLIC BLOOD PRESSURE: 168 MMHG | RESPIRATION RATE: 23 BRPM | HEART RATE: 72 BPM | OXYGEN SATURATION: 99 % | DIASTOLIC BLOOD PRESSURE: 77 MMHG | TEMPERATURE: 98.7 F

## 2018-01-04 VITALS
TEMPERATURE: 97.9 F | DIASTOLIC BLOOD PRESSURE: 64 MMHG | HEART RATE: 93 BPM | RESPIRATION RATE: 22 BRPM | OXYGEN SATURATION: 96 % | SYSTOLIC BLOOD PRESSURE: 145 MMHG

## 2018-01-04 VITALS — OXYGEN SATURATION: 100 %

## 2018-01-04 VITALS
DIASTOLIC BLOOD PRESSURE: 67 MMHG | SYSTOLIC BLOOD PRESSURE: 162 MMHG | OXYGEN SATURATION: 97 % | RESPIRATION RATE: 31 BRPM | HEART RATE: 102 BPM | TEMPERATURE: 98.3 F

## 2018-01-04 VITALS
OXYGEN SATURATION: 99 % | HEART RATE: 75 BPM | TEMPERATURE: 98.4 F | RESPIRATION RATE: 24 BRPM | SYSTOLIC BLOOD PRESSURE: 135 MMHG | DIASTOLIC BLOOD PRESSURE: 62 MMHG

## 2018-01-04 VITALS
OXYGEN SATURATION: 98 % | DIASTOLIC BLOOD PRESSURE: 72 MMHG | RESPIRATION RATE: 42 BRPM | HEART RATE: 96 BPM | SYSTOLIC BLOOD PRESSURE: 170 MMHG | TEMPERATURE: 99.6 F

## 2018-01-04 VITALS — HEART RATE: 93 BPM

## 2018-01-04 VITALS
HEART RATE: 94 BPM | RESPIRATION RATE: 24 BRPM | TEMPERATURE: 98.4 F | OXYGEN SATURATION: 98 % | SYSTOLIC BLOOD PRESSURE: 192 MMHG | DIASTOLIC BLOOD PRESSURE: 78 MMHG

## 2018-01-04 VITALS — HEART RATE: 94 BPM

## 2018-01-04 VITALS — OXYGEN SATURATION: 94 %

## 2018-01-04 VITALS — HEART RATE: 86 BPM

## 2018-01-04 VITALS — HEART RATE: 89 BPM

## 2018-01-04 VITALS — OXYGEN SATURATION: 97 %

## 2018-01-04 VITALS — OXYGEN SATURATION: 99 %

## 2018-01-04 VITALS — OXYGEN SATURATION: 95 %

## 2018-01-04 VITALS — HEART RATE: 77 BPM

## 2018-01-04 VITALS — HEART RATE: 98 BPM

## 2018-01-04 VITALS — OXYGEN SATURATION: 98 %

## 2018-01-04 LAB
BUN SERPL-MCNC: 25 MG/DL (ref 7–18)
CALCIUM SERPL-MCNC: 8.3 MG/DL (ref 8.5–10.1)
CHLORIDE SERPL-SCNC: 107 MEQ/L (ref 98–107)
CREAT SERPL-MCNC: 0.94 MG/DL (ref 0.5–1)
ERYTHROCYTE [DISTWIDTH] IN BLOOD BY AUTOMATED COUNT: 15.4 % (ref 11.6–17.2)
GFR SERPLBLD BASED ON 1.73 SQ M-ARVRAT: 58 ML/MIN (ref 89–?)
GLUCOSE SERPL-MCNC: 127 MG/DL (ref 74–106)
HCO3 BLD-SCNC: 17.3 MEQ/L (ref 21–32)
HCT VFR BLD CALC: 26 % (ref 35–46)
HGB BLD-MCNC: 8.5 GM/DL (ref 11.6–15.3)
MCH RBC QN AUTO: 29.8 PG (ref 27–34)
MCHC RBC AUTO-ENTMCNC: 32.8 % (ref 32–36)
MCV RBC AUTO: 90.6 FL (ref 80–100)
PLATELET # BLD: 392 TH/MM3 (ref 150–450)
PMV BLD AUTO: 8 FL (ref 7–11)
RBC # BLD AUTO: 2.87 MIL/MM3 (ref 4–5.3)
SODIUM SERPL-SCNC: 138 MEQ/L (ref 136–145)
WBC # BLD AUTO: 13 TH/MM3 (ref 4–11)

## 2018-01-04 RX ADMIN — FOSPHENYTOIN SODIUM SCH MLS/HR: 50 INJECTION, SOLUTION INTRAMUSCULAR; INTRAVENOUS at 15:36

## 2018-01-04 RX ADMIN — POLYETHYLENE GLYCOL 3350 SCH GM: 17 POWDER, FOR SOLUTION ORAL at 19:42

## 2018-01-04 RX ADMIN — WATER SCH ML: 1 IRRIGANT IRRIGATION at 00:00

## 2018-01-04 RX ADMIN — CLOPIDOGREL BISULFATE SCH MG: 75 TABLET, FILM COATED ORAL at 08:56

## 2018-01-04 RX ADMIN — WATER SCH ML: 1 IRRIGANT IRRIGATION at 19:43

## 2018-01-04 RX ADMIN — LANSOPRAZOLE SCH MG: 30 TABLET, ORALLY DISINTEGRATING, DELAYED RELEASE ORAL at 08:55

## 2018-01-04 RX ADMIN — POLYVINYL ALCOHOL SCH DROP: 14 SOLUTION/ DROPS OPHTHALMIC at 18:40

## 2018-01-04 RX ADMIN — Medication SCH ML: at 08:56

## 2018-01-04 RX ADMIN — SODIUM CHLORIDE SCH MG: 900 INJECTION, SOLUTION INTRAVENOUS at 05:17

## 2018-01-04 RX ADMIN — LEVETIRACETAM SCH MLS/HR: 100 INJECTION, SOLUTION, CONCENTRATE INTRAVENOUS at 12:27

## 2018-01-04 RX ADMIN — CHLORHEXIDINE GLUCONATE 0.12% ORAL RINSE SCH ML: 1.2 LIQUID ORAL at 08:56

## 2018-01-04 RX ADMIN — LACTOBACILLUS ACIDOPHILUS / LACTOBACILLUS BULGARICUS SCH GM: 100 MILLION CFU STRENGTH GRANULES at 18:00

## 2018-01-04 RX ADMIN — CHLORHEXIDINE GLUCONATE 0.12% ORAL RINSE SCH ML: 1.2 LIQUID ORAL at 20:00

## 2018-01-04 RX ADMIN — PROPRANOLOL HYDROCHLORIDE SCH MG: 20 TABLET ORAL at 08:55

## 2018-01-04 RX ADMIN — LACTOBACILLUS ACIDOPHILUS / LACTOBACILLUS BULGARICUS SCH GM: 100 MILLION CFU STRENGTH GRANULES at 08:55

## 2018-01-04 RX ADMIN — LACOSAMIDE SCH MG: 100 TABLET, FILM COATED ORAL at 08:56

## 2018-01-04 RX ADMIN — INSULIN ASPART SCH: 100 INJECTION, SOLUTION INTRAVENOUS; SUBCUTANEOUS at 05:16

## 2018-01-04 RX ADMIN — LACOSAMIDE SCH MG: 100 TABLET, FILM COATED ORAL at 19:42

## 2018-01-04 RX ADMIN — CHLORHEXIDINE GLUCONATE SCH PACK: 500 CLOTH TOPICAL at 00:39

## 2018-01-04 RX ADMIN — POLYETHYLENE GLYCOL 3350 SCH GM: 17 POWDER, FOR SOLUTION ORAL at 08:57

## 2018-01-04 RX ADMIN — PROPRANOLOL HYDROCHLORIDE SCH MG: 20 TABLET ORAL at 12:27

## 2018-01-04 RX ADMIN — FOSPHENYTOIN SODIUM SCH MLS/HR: 50 INJECTION, SOLUTION INTRAMUSCULAR; INTRAVENOUS at 22:57

## 2018-01-04 RX ADMIN — PRAVASTATIN SODIUM SCH MG: 40 TABLET ORAL at 19:42

## 2018-01-04 RX ADMIN — LEVETIRACETAM SCH MLS/HR: 100 INJECTION, SOLUTION, CONCENTRATE INTRAVENOUS at 05:16

## 2018-01-04 RX ADMIN — STANDARDIZED SENNA CONCENTRATE AND DOCUSATE SODIUM SCH TAB: 8.6; 5 TABLET, FILM COATED ORAL at 08:57

## 2018-01-04 RX ADMIN — WATER SCH ML: 1 IRRIGANT IRRIGATION at 05:19

## 2018-01-04 RX ADMIN — WATER SCH ML: 1 IRRIGANT IRRIGATION at 12:00

## 2018-01-04 RX ADMIN — POLYVINYL ALCOHOL SCH DROP: 14 SOLUTION/ DROPS OPHTHALMIC at 08:56

## 2018-01-04 RX ADMIN — INSULIN ASPART SCH: 100 INJECTION, SOLUTION INTRAVENOUS; SUBCUTANEOUS at 00:00

## 2018-01-04 RX ADMIN — INSULIN ASPART SCH: 100 INJECTION, SOLUTION INTRAVENOUS; SUBCUTANEOUS at 16:54

## 2018-01-04 RX ADMIN — Medication SCH ML: at 22:56

## 2018-01-04 RX ADMIN — LEVETIRACETAM SCH MLS/HR: 100 INJECTION, SOLUTION, CONCENTRATE INTRAVENOUS at 19:53

## 2018-01-04 RX ADMIN — LEVETIRACETAM SCH MLS/HR: 100 INJECTION, SOLUTION, CONCENTRATE INTRAVENOUS at 00:38

## 2018-01-04 RX ADMIN — POLYVINYL ALCOHOL SCH DROP: 14 SOLUTION/ DROPS OPHTHALMIC at 12:27

## 2018-01-04 RX ADMIN — STANDARDIZED SENNA CONCENTRATE AND DOCUSATE SODIUM SCH TAB: 8.6; 5 TABLET, FILM COATED ORAL at 19:42

## 2018-01-04 RX ADMIN — DEXMEDETOMIDINE HYDROCHLORIDE PRN MLS/HR: 100 INJECTION, SOLUTION, CONCENTRATE INTRAVENOUS at 02:29

## 2018-01-04 RX ADMIN — INSULIN ASPART SCH: 100 INJECTION, SOLUTION INTRAVENOUS; SUBCUTANEOUS at 11:57

## 2018-01-04 RX ADMIN — WATER SCH ML: 1 IRRIGANT IRRIGATION at 16:54

## 2018-01-04 RX ADMIN — LACTOBACILLUS ACIDOPHILUS / LACTOBACILLUS BULGARICUS SCH GM: 100 MILLION CFU STRENGTH GRANULES at 12:27

## 2018-01-04 RX ADMIN — PROPRANOLOL HYDROCHLORIDE SCH MG: 20 TABLET ORAL at 00:38

## 2018-01-04 RX ADMIN — PROPRANOLOL HYDROCHLORIDE SCH MG: 20 TABLET ORAL at 19:30

## 2018-01-04 RX ADMIN — LABETALOL HYDROCHLORIDE PRN MG: 5 INJECTION, SOLUTION INTRAVENOUS at 22:58

## 2018-01-04 RX ADMIN — PROPRANOLOL HYDROCHLORIDE SCH MG: 20 TABLET ORAL at 23:44

## 2018-01-04 RX ADMIN — FOSPHENYTOIN SODIUM SCH MLS/HR: 50 INJECTION, SOLUTION INTRAMUSCULAR; INTRAVENOUS at 05:19

## 2018-01-04 NOTE — RADRPT
EXAM DATE/TIME:  01/04/2018 06:33 

 

[facility] COMPARISON:     

CHEST SINGLE AP, December 30, 2017, 3:52.

 

                     

INDICATIONS :     

Shortness of breath, possible pulmonary disease.

                     

 

MEDICAL HISTORY :     

Cardiovascular disease.  Diabetes mellitus type I.  Osteoporosis.   Polio Cervical ca   

 

SURGICAL HISTORY :     

Appendectomy. Hysterectomy. Cholecystectomy. 

 

ENCOUNTER:     

Subsequent                                        

 

ACUITY:     

2 weeks      

 

PAIN SCORE:     

Non-responsive.

 

LOCATION:     

Bilateral chest 

 

FINDINGS:     

A single portable frontal view the chest shows endotracheal tube 2 cm proximal to the patricia. Nasogas
tric tube coiled in the stomach. Lungs are clear with exception of linear atelectasis at the retrocar
diac left lung base. No infiltrates or effusions. Heart is normal in size.

 

CONCLUSION:     

Minimal left basilar atelectasis.

 

 

 

 Sean Raymond Jr., MD on January 04, 2018 at 6:48           

Board Certified Radiologist.

 This report was verified electronically.

## 2018-01-04 NOTE — HHI.PR
Review/Management


Diagnosis


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


Plan


Continue current level of  keppra and vimpat





Will give additional cerebyx dose 300 mg and  recheck phenytoin level in am


Diagnosis/Plan:  





Subjective


Subjective Comments


No acute events reported


No headache


No SZ.


Active Medications





Current Medications








 Medications


  (Trade)  Dose


 Ordered  Sig/Aaron


 Route  Start Time


 Stop Time Status Last Admin


 


  (NS Flush)  2 ml  UNSCH  PRN


 IV FLUSH  12/24/17 11:15


    12/29/17 08:59


 


 


  (NS Flush)  2 ml  BID


 IV FLUSH  12/24/17 21:00


    1/4/18 08:56


 


 


  (Tears Naturale


 Opth Soln)  1 drop  TID


 EACH EYE  12/24/17 13:00


    1/4/18 12:27


 


 


 Miscellaneous


 Information  1  Q361D


 XX  12/24/17 11:15


    12/24/17 11:15


 


 


  (Chlorhexidine


 2% Cloth)  


 Taper  DAILY@04


 TOP  12/25/17 04:00


 12/21/18 03:59  1/4/18 00:39


 


 


  (Chlorhexidine


 2% Cloth)  3 pack  UNSCH  PRN


 TOP  12/24/17 11:15


     


 


 


  (Ruby-Colace)  1 tab  BID


 PO  12/24/17 21:00


    1/2/18 01:36


 


 


  (Milk Of


 Magnesia Liq)  30 ml  Q12H  PRN


 PO  12/24/17 11:15


     


 


 


  (Senokot)  17.2 mg  Q12H  PRN


 PO  12/24/17 11:15


     


 


 


  (Dulcolax Supp)  10 mg  DAILY  PRN


 RECTAL  12/24/17 11:15


    12/29/17 15:19


 


 


  (D50w (Vial) Inj)  50 ml  UNSCH  PRN


 IV PUSH  12/24/17 11:30


     


 


 


  (Glucagon Inj)  1 mg  UNSCH  PRN


 OTHER  12/24/17 11:30


     


 


 


  (Vimpat)  100 mg  BID


 PO  12/24/17 14:15


    1/4/18 08:56


 


 


  (Pravachol)  40 mg  HS


 PO  12/24/17 21:00


    1/3/18 21:49


 


 


  (Trandate Inj)  10 mg  Q4H  PRN


 IV PUSH  12/24/17 14:30


    1/1/18 14:42


 


 


  (Apresoline Inj)  20 mg  Q4H  PRN


 IV PUSH  12/24/17 14:30


    1/4/18 15:36


 


 


  (Lopressor)  25 mg  Q6HR


 PO  12/24/17 21:00


   Future Hold 1/1/18 12:05


 


 


 Potassium Chloride  100 ml @ 


 50 mls/hr  Q2H  PRN


 IV  12/26/17 09:15


     


 


 


 Potassium Chloride  100 ml @ 


 50 mls/hr  Q2H  PRN


 IV  12/26/17 09:15


    12/26/17 15:57


 


 


  (K-Lyte Cl  Eff)  50 meq  UNSCH  PRN


 PO  12/26/17 09:15


     


 


 


 Potassium Chloride  100 ml @ 


 25 mls/hr  UNSCH  PRN


 IV  12/26/17 09:15


     


 


 


 Potassium Chloride  100 ml @ 


 50 mls/hr  Q2H  PRN


 IV  12/26/17 09:15


     


 


 


 Magnesium Sulfate


 4 gm/Sodium


 Chloride  100 ml @ 


 50 mls/hr  UNSCH  PRN


 IV  12/26/17 09:15


     


 


 


  (Mag-Ox)  800 mg  UNSCH  PRN


 PO  12/26/17 09:15


     


 


 


 Magnesium Sulfate


 2 gm/Sodium


 Chloride  100 ml @ 


 50 mls/hr  UNSCH  PRN


 IV  12/26/17 09:15


     


 


 


  (K-Phos)  2,000 mg  Q4H  PRN


 PO  12/26/17 09:15


     


 


 


 Sodium Phosphate


 30 mmol/Sodium


 Chloride  250 ml @ 


 42 mls/hr  UNSCH  PRN


 IV  12/26/17 09:15


     


 


 


  (K-Phos)  2,000 mg  UNSCH  PRN


 PO/TUBE  12/26/17 09:15


     


 


 


 Potassium


 Phosphate 30 mmol/


 Sodium Chloride  260 ml @ 


 42 mls/hr  UNSCH  PRN


 IV  12/26/17 09:15


     


 


 


  (Lactinex Pkt)  1 gm  TID


 OG-TUBE  12/26/17 13:00


    1/4/18 12:27


 


 


  (Ativan Inj)  2 mg  Q5M  PRN


 IV PUSH  12/26/17 10:45


    12/26/17 14:18


 


 


  (Tylenol 650 Mg/


 20 ml Liq)  650 mg  Q6H  PRN


 NG  12/27/17 17:00


    1/3/18 19:00


 


 


  (NovoLOG


 SUPPLEMENTAL


 SCALE)  1  Q6HR


 SQ  12/27/17 18:00


    1/3/18 05:50


 


 


  (Albuterol Neb)  2.5 mg  Q2HR NEB  PRN


 NEB  12/27/17 17:00


     


 


 


  (Prevacid Odt)  30 mg  DAILY


 NG  12/28/17 09:00


    1/4/18 08:55


 


 


 Levetriacetam 500


 mg/Sodium Chloride  105 ml @ 


 420 mls/hr  Q6HR


 IV  12/28/17 12:00


    1/4/18 12:27


 


 


  (Miralax)  17 gm  BID


 PO  12/28/17 21:00


    1/2/18 01:36


 


 


  (Plavix)  75 mg  DAILY


 PO  12/30/17 09:00


    1/4/18 08:56


 


 


  (Heparin Inj)  5,000 units  Q8HR


 SQ  12/29/17 22:00


   Future Hold 1/3/18 05:43


 


 


  (Peridex 0.12%


 Liq)  15 ml  BID@08,20


 MT  12/29/17 20:00


    1/4/18 08:56


 


 


  (Lactulose Liq)  30 ml  Q6HR


 PO  12/29/17 18:00


    1/3/18 18:34


 


 


 Fosphenytoin


 Sodium 200 mgpe/


 Sodium Chloride  54 ml @ 


 216 mls/hr  Q8HR


 IV  12/30/17 06:00


    1/4/18 15:36


 


 


  (Inderal)  40 mg  Q6H


 PO  1/1/18 13:30


    1/4/18 12:27


 


 


  (Catapres)  0.2 mg  Q8HR


 PO  1/2/18 22:00


    1/4/18 05:17


 








Allergies





Allergies


Coded Allergies


  Influenza Virus Vaccines (Unverified Allergy, Intermediate, 10/27/17)


  egg (Unverified Allergy, Intermediate, 10/27/17)


  codeine (Unverified Allergy, Mild, 10/27/17)


  meperidine (Unverified Allergy, Mild, 10/27/17)


  morphine (Unverified Allergy, Mild, 10/27/17)


  acetaminophen (Unverified Adverse Reaction, Mild, HEADACHE, 10/27/17)


  hydrocodone (Unverified Adverse Reaction, Mild, HEADACHE, 10/27/17)





Exam


I&O / VS











 1/4/18 1/4/18 1/5/18





 14:59 22:59 06:59


 


Intake Total 468 ml  


 


Balance 468 ml  


 


   


 


IV Total 468 ml  








Vital Signs








  Date Time  Temp Pulse Resp B/P (MAP) Pulse Ox O2 Delivery O2 Flow Rate FiO2


 


1/4/18 16:00  96      


 


1/4/18 16:00 99.6 96 42 170/72 (104) 98   


 


1/4/18 15:43     98 Simple Mask 7.00 


 


1/4/18 14:00  98      


 


1/4/18 13:15     97 Nasal Cannula 3 


 


1/4/18 12:00 98.3 102 31 162/67 (98) 97   


 


1/4/18 12:00        30


 


1/4/18 12:00  102      


 


1/4/18 11:39     97   30


 


1/4/18 11:00  95 44 183/77 (112) 97   


 


1/4/18 10:00  89      


 


1/4/18 08:46     99   30


 


1/4/18 08:00  93      


 


1/4/18 08:00        30


 


1/4/18 08:00 97.9 93 22 145/64 (91) 96   


 


1/4/18 06:00  86      


 


1/4/18 04:33     100   35


 


1/4/18 04:00 98.7 72 23 168/77 (107) 99   


 


1/4/18 04:00  72      


 


1/4/18 04:00        30


 


1/4/18 02:00  77      


 


1/4/18 00:26     100   35


 


1/4/18 00:00        30


 


1/4/18 00:00  75      


 


1/4/18 00:00 98.4 75 24 135/62 (86) 99   


 


1/3/18 23:43 98.2 76 25 137/63 99   


 


1/3/18 22:00  74      


 


1/3/18 20:41     100   35


 


1/3/18 20:00        35


 


1/3/18 20:00  85      


 


1/3/18 20:00 98.6 85 39 166/71 (102) 100   


 


1/3/18 18:28 100.0 83 24 149/65 100   


 


1/3/18 18:06 97.8 83 24 146/63 100   


 


1/3/18 18:00  82      








Exam Comments


 opens eyes to voice and does follow commands


PERRL


withdraws BLE to tactile stimulation





Objective


Micro and Labs





Laboratory Tests








Test


  1/4/18


07:17


 


White Blood Count 13.0 


 


Red Blood Count 2.87 


 


Hemoglobin 8.5 


 


Hematocrit 26.0 


 


Mean Corpuscular Volume 90.6 


 


Mean Corpuscular Hemoglobin 29.8 


 


Mean Corpuscular Hemoglobin


Concent 32.8 


 


 


Red Cell Distribution Width 15.4 


 


Platelet Count 392 


 


Mean Platelet Volume 8.0 


 


Blood Urea Nitrogen 25 


 


Creatinine 0.94 


 


Random Glucose 127 


 


Calcium Level 8.3 


 


Sodium Level 138 


 


Potassium Level 3.9 


 


Chloride Level 107 


 


Carbon Dioxide Level 17.3 


 


Anion Gap 14 


 


Estimat Glomerular Filtration


Rate 58 


 














 Date/Time


Source Procedure


Growth Status


 


 


 1/1/18 16:54


Blood Peripheral Aerobic Blood Culture - Preliminary


NO GROWTH IN 3 DAYS Resulted


 


 1/1/18 16:54


Blood Peripheral Anaerobic Blood Culture - Preliminary


NO GROWTH IN 3 DAYS Resulted





 12/28/17 09:47


Cerebral Spinal Fluid Lumbar Puncture Fungal Smear - Final


NO FUNGAL ELEMENTS SEEN. Resulted


 


 12/28/17 09:47


Cerebral Spinal Fluid Lumbar Puncture Fungal Culture - Preliminary


NO GROWTH IN 1 WEEK Resulted


 


 1/4/18 02:00


Stool Stool Stool Occult Blood (GARETH) - Final


HEMOCCULT NEGATIVE Complete





 1/3/18 22:16


Sputum Endotracheal Gram Stain - Final Resulted


 


 1/3/18 22:16


Sputum Endotracheal Sputum Culture


Pending Resulted


 


 12/24/17 09:54


Urine Catheterized Urine Streptococcus pneumoniae Antigen (M - Final


PRESUMPTIVE NEGATIVE FOR STREPTOCOCCU... Complete

















Colton Martin MD PhD Jan 4, 2018 17:25

## 2018-01-04 NOTE — HHI.CCPN
Subjective


Remarks/Hospital Course


This is a 77-year-old female that presented to the ED for evaluation of altered 

mental status. Per report, according to EMS the patient normally is more alert 

and  has a history of stroke, he was able to speak with the patient's sister 

who states that she is also power of , the patient apparently normally 

is ambulatory and can carry on a conversation without any difficulty. Per 

records reviewed, the patient was last seen normal and at her baseline at 1 am. 

The patient then sat down in her lazy chair, her sister also noted that she 

started beating her head against a piece of furniture.  The patient was noted 

to have a right frontal contusion upon presentation to the ED .She had a GCS of 

7-8 on arrival. The patient's medical history is significant for a recent 

admission 09/2017 for altered mental status, medication induced.  Her past 

medical history is also significant for a recent history of UTI and C. 

difficile in addition to her history of having a CVA and reportedly residual 

effects in the right upper and lower extremity.  Laboratory and imaging studies 

revealed that most likely the patient has a UTI, CT of the brain revealed no 

abnormality and the patient was noted to have an elevated lactate level.  The 

patient was emergently intubated in the ED, and the patient was noted to be 

significantly hypertensive and a nicardipine infusion was instituted.  Upon 

entering the ED the patient's blood pressure was noted to be 219/92, Cardizem 

infusion had been ordered.  The patient was noted to have spontaneous movement 

of the right upper and lower extremity with a gag reflex. Critical care 

medicine was consulted.





12/25: The patient was weaned off of nicardipine infusion.  Normotensive the 

patient continues on labetalol twice a day scheduled home dosing.  EEG 

attempted last evening however due to patient's movement bilaterally to 

complete artifact EEG reordered.  Fentanyl infusion discontinued for daily 

sedation vacation approximately 7 hours ago the patient remains nonresponsive.  

Opens eyes spontaneously, moves limbs spontaneously but does not follow any 

commands.  Brain MRI/MRA, and CT all negative findings.  EEG scheduled for 

a.m..  The patient continues in severe metabolic acidosis, 2 Amps of sodium 

bicarbonate IV push given this a.m., sodium bicarbonate infusion increased.  

Lactate is cleared, creatinine is improving, CT of the abdomen and pelvis is 

pending this afternoon.


12/26 CT report from yesterday shows bladder edema consistent with cystitis.  

URine culture with GNR  Blood cultures NGTD. . Getting EEG now. 


12/27:  Resting comfortable in bed in no acute distress.  No obvious seizure 

activity.  Plan for lumbar puncture in AM.  Low-grade temperatures overnight.  

Tolerating tube feeding at goal.  No bowel movement


12/28:  Continues EEG has been discontinued.  Tmax 100.  Currently 99.8.  No 

bowel movement since admission.  Tolerating tube feeds at goal.  No active 

seizure activity overnight.


12/29:  Tmax 100.  Currently 99.9.  Became tachycardic with sedation lowered so 

RN increased midazolam to 9 milligrams an hour.  Also fentanyl drip at 250 mcg 

per hour.  No bowel movement since admission.  Tolerating tube feedings with 

only 40 cc residuals.  Receiving methylnaltrexone milligrams subcutaneous 1 

along with mineral oil and suppository.  KUB pending.  Patient improved 

neurological exam.  Blinks with my suddenhand  movements towards face.  

Positive gag.  Withdraws to pain in all 4 extremities.


12/30 LP results not yet finalized. On versed 7 mg/hr and fentanyl 250 mcg/hr. 

EEG from 12/29 - no epileptiform features. 


12/31 Weaning fentanyl down but remains on versed for seizure suppression. 

Because she had difficult to control subclinical seizures, will need  close EEG 

monitoring for  benzo weaning.  


Was breathing on PSV for couple of hours day, even on sedation. 


CSF negative final culture.  No Leukocytosis or fever.  Will stop antibiotic. 

Updated sister. 


1/1: no seizures evident on EEG. slightly more awake today, but not following 

commands. had discussion with Dr. Martin, will wean versed to 4mg/hr and re-

evaluate on EEG tomorrow.


1/2: no seizures on EEG. ok to wean versed to off per Dr. Martin. will work on 

waking and weaning mechanical ventilation.


1/3: acute drop in hgb. ordered 1 unit prbc. sent haptoglobin and LDH. pt awake 

following commands. fails SBT for RSBI > 110.





Subjective





1/4: hgb improved. still awake, alert, following commands. extubated 

successfully.





Objective





Vital Signs








  Date Time  Temp Pulse Resp B/P (MAP) Pulse Ox O2 Delivery O2 Flow Rate FiO2


 


1/4/18 18:00  93      


 


1/4/18 16:00 99.6  42 170/72 (104) 98   


 


1/4/18 15:43      Simple Mask 7.00 


 


1/4/18 12:00        30














Intake and Output   


 


 1/4/18 1/4/18 1/5/18





 08:00 16:00 00:00


 


Intake Total 1216 ml  


 


Output Total 200 ml  


 


Balance 1016 ml  








Result Diagram:  


1/4/18 0717                                                                    

            1/4/18 0717





Other Results





Microbiology








 Date/Time


Source Procedure


Growth Status


 


 


 1/4/18 02:00


Stool Stool Stool Occult Blood (GARETH) - Final


HEMOCCULT NEGATIVE Complete








Imaging





Last Impressions








Chest X-Ray 12/29/17 0600 Signed





Impressions: 





 Service Date/Time:  Friday, December 29, 2017 04:41 - CONCLUSION:  No 





 significant interval change.     Christian Mendiola MD 


 


Lumbar Puncture Fluoroscopy 12/28/17 0000 Signed





Impressions: 





 Service Date/Time:  Thursday, December 28, 2017 09:39 - CONCLUSION: 





 Uncomplicated fluoroscopically guided lumbar puncture.     Reji Townsend MD 


 


Brain MRI 12/28/17 0000 Signed





Impressions: 





 Service Date/Time:  Thursday, December 28, 2017 10:24 - CONCLUSION:  No acute 





 intracranial findings     Bronson Mar MD 


 


Upper Extremity Ultrasound 12/27/17 0000 Signed





Impressions: 





 Service Date/Time:  Wednesday, December 27, 2017 21:32 - CONCLUSION:  1. No 





 sonographic evidence for right upper extremity DVT.     Reji Townsend MD 


 


Lower Extremity Ultrasound 12/27/17 0000 Signed





Impressions: 





 Service Date/Time:  Wednesday, December 27, 2017 21:12 - CONCLUSION:  1. No 





 sonographic evidence for lower extremity DVT.     Reji Townsend MD 


 


Abdomen/Pelvis CT 12/25/17 0000 Signed





Impressions: 





 Service Date/Time:  Monday, December 25, 2017 16:05 - CONCLUSION:  1. Mild 

edema 





 surrounding the urinary bladder, question cystitis. Erazo catheter in place. 

2. 





 Status post cholecystectomy.  3. Left lower lobe atelectasis. 4. Nasogastric 





 tube tip at the gastroesophageal junction.    Christian Mendiola MD 


 


Neck Magnetic Resonance Angiography 12/24/17 1119 Signed





Impressions: 





 Service Date/Time:  Sunday, December 24, 2017 13:11 - CONCLUSION:  Negative 

for 





 hemodynamically significant carotid stenosis     Pb López MD  FACR


 


Head Magnetic Resonance Angiography 12/24/17 1119 Signed





Impressions: 





 Service Date/Time:  Sunday, December 24, 2017 13:11 - CONCLUSION: Negative for 





 major branch vessel occlusion.      Pb López MD  FACR


 


Head CT 12/24/17 0851 Signed





Impressions: 





 Service Date/Time:  Sunday, December 24, 2017 09:02 - CONCLUSION:  Negative 

for 





 acute process..     Pb López MD  FACR


 


Cervical Spine CT 12/24/17 0000 Signed





Impressions: 





 Service Date/Time:  Sunday, December 24, 2017 09:07 - CONCLUSION:  Fusion as 





 above, negative for fracture.     Pb López MD  FACR








Objective Remarks


GENERAL: 77-year-old  female currently orotracheally intubated


SKIN: Warm and dry.


HEAD: Atraumatic. Normocephalic.  


EYES: R periorbital ecchmosis. Pupils equal and round 1-2 mm bilaterally. No 

scleral icterus. No injection or drainage. 


ENT: No nasal bleeding or discharge. Mucous membranes pink and moist.


NECK: Trachea midline. No JVD. 


CARDIOVASCULAR: RRR. 


RESPIRATORY: No accessory muscle use. Breath sounds equal bilaterally.  


GASTROINTESTINAL: Abdomen soft, non-tender, nondistended. No guarding. 


MUSCULOSKELETAL: Right upper extremity with 2+ edema and erythema.  Negative 

Doppler ultrasound 12/27 for DVT


NEUROLOGICAL: follows commands. RASS -1.





A/P


Assessment and Plan


Assessment: 77yF with subclinical status which has been very difficult to 

control. now on multiple AEDs and finally without evidence of ongoing status.  

extubated on my exam today. weaned to nc o2. will need formal swallow 

evaluation. keep in ICU for high risk of decompensation.





Neuro/Psych





Post polio syndrome bilateral lower extremity weakness


Status epilepticus


History of CVATIA with R sided deficits - right lentiform nucleus


H/O seizures


Anxiety/depression


Left maxillary/ethmoid fluid levels question sinusitis





12/28 interpretation EEG - Abnormal EEG due to some mild to moderate slowing, 

but also some occasional sharps and spike and slow waves seen, but improved 

from prior EEG's.  Clinical correlation.


[id number] milligrams twice a day will be continued.  


Loaded with fosphenytoin - currently fosphenytoin  200 mg IV every  8 hours.  

Leevel 13 12/30


Levetiracetam 500 mg IV every 6 hours


Lorazepam 2 mg IV every 5 minutes as needed.  Seizures


TSH normal-3.0


Random cortisol level only 3.5. Unclear significance as patient had lactic 

acidemia but also hypertensive at the time.  Recheck 12/28  12.3.  Cosyntropin 

test ordered 12/29, had adequate response 


12/24 MRI brain-Moderate periventricular white matter changes are evident.  

There is no restricted diffusion.  There is moderate atrophy with dilatation of 

ventricular sulcal spaces.  There are no extra-axial fluid collections 

appreciated.  Posterior fossa is unremarkable.  


12/24 MRA brain-negative for major branch occlusion 


12/24 CT brain-no acute intracranial process


MRI brain 12/28 - There is mild periventricular white matter T2 prolongation. A 

tiny lacunar infarct in the right lentiform nucleus appears old. There is no 

evidence of intracranial mass or hemorrhage. The brainstem and posterior fossa 

structures are unremarkable. There is nothing to suggest acute infarction. 

There is fluid in the left maxillary sinus and occasional ethmoid sinuses.


Patient's home meds baclofen 10 mg 3 times a day, tramadol 50 mg every 4 hours 

as needed and sertraline 50 mg twice a day have been on hold due to  concern 

for serotonin syndrome, seizure activity and rhabdomyolysis.  


   Patient has history of altered mental status secondary to being medication 

induced.  Reported by family member -the patient had discontinued baclofen for 

approximately 3 months and took her initial dose of baclofen on 12/23 with a 

TID schedule dosing.





LP - HSV negative, final cx neg. 


Dr. Martin actively following.  





Respiratory:





Acute hypoxemic respiratory failure- improving.





wean o2 by nc.


aggressive pulmonary toilet.


Maintain O2 sat greater than 92%


Albuterol/ipratropium aerosols nebs every 6 hours scheduled, albuterol aerosols 

every 2 hours when necessary





Cardiovascular:





Hypertensive emergency-resolved


History of hypertension


Hyperlipidemia


Congestive heart failure - ejection fraction 55-60% 2010





Labetalol, hydralazine IV , PRN to maintain SBP <160


Serial troponins were negative.


propranolol 40mg po q6h given hypertension and tachycardia.


   On metoprolol 50 mg sustained release daily at home


continue clopidogrel 75 mg by mouth daily/home medication Resumed 12/30


Continue pravastatin 40 mg by mouth daily/home medication for dyslipidemia


clonidine 0.2mg po q8hr.





Renal//FEN:





Chronic kidney disease stage IIIB


Hypo-magnesium





Monitor urine output


Monitor BMP


Replete electrolytes as needed


Previous Creatinine 09/2017- 1.15, upon admission 2.76, now at baseline








GI:





Esophageal stricture -history of esophageal dilatation


GERD


Elevated ammonia


Constipation- resolved.





Patient is currently on Glucerna 1.5 goal 50 cc per hour per nutrition's 

recommendations 


Lansoprazole 30 mg daily GI prophylaxis. On Dexlansoprazole 60 mg times daily 

at home


Docusate sodium/senna 1 tablet twice a day for bowel regimen.  polyethylene 

glycol 17 g twice a day and lactulose 30 cc 4x a day


   1 dose of methylnaltrexone 12 MG SUBCUTANEOUS 1 AND 30 CC MINERAL OIL 1 12/ 29


Now having BMs after aggressive above bowel regimen. 


KUB  12/29 - mild ileus


Ammonia level Recheck in a.m. 12/30 <10


formal swallow eval.





ID:





Escherichia coli UTI





Monitor CBC


Blood cultures 12/24 - negative


Urine culture 12/24 -  Escherichia coli, pansensitive


Strep pneumococcal antigen 12/24  - negative 


CSF 12/28 Gram stain, fungal and AFB negative as of 12/29





Recent history of C. difficile colitis. Lactinex tid. 


off abx. monitor. reculture for fever.








HEME/ONC:





History of cervical cancer


Normocytic anemia


History of right lower extremity DVT - posterior tibial and femoral vein 

previously on Apixaban





Clopidogrel 75 mg daily initially held for LP.  Restarted 12/30


Lactate level 1.8


Recheck Dopplers bilateral and right upper lower extremities 12/28 negative for 

DVT


1 unit prbc


f/u haptoglobin and LDH


hold SQH


guiac negative.


hgb improved. will hold SQH one more day,then likely can restart. unclear 

etiology of hgb drop and may be secondary to dilutional lab draw.





Endocrine:





Diabetes mellitus


Low cortisol





Glucose monitoring per ICU protocol, low dose regimen Novulog every 6 hours


Hold metformin thousand milligrams twice a day





Prophylaxis:


GI Prophylaxis


Lansoprazole 30 mg daily


DVT Prophylaxis


-- SCDs hold SQH given anemia.





Lines:


Peripheral IVs providing adequate access.











Braydon Javier MD Jan 4, 2018 19:49

## 2018-01-05 VITALS
DIASTOLIC BLOOD PRESSURE: 65 MMHG | SYSTOLIC BLOOD PRESSURE: 149 MMHG | OXYGEN SATURATION: 98 % | HEART RATE: 79 BPM | RESPIRATION RATE: 20 BRPM

## 2018-01-05 VITALS
TEMPERATURE: 102 F | RESPIRATION RATE: 22 BRPM | SYSTOLIC BLOOD PRESSURE: 160 MMHG | HEART RATE: 91 BPM | DIASTOLIC BLOOD PRESSURE: 76 MMHG | OXYGEN SATURATION: 98 %

## 2018-01-05 VITALS
OXYGEN SATURATION: 98 % | SYSTOLIC BLOOD PRESSURE: 149 MMHG | RESPIRATION RATE: 25 BRPM | DIASTOLIC BLOOD PRESSURE: 68 MMHG | HEART RATE: 96 BPM | TEMPERATURE: 99.3 F

## 2018-01-05 VITALS
SYSTOLIC BLOOD PRESSURE: 133 MMHG | TEMPERATURE: 98.7 F | OXYGEN SATURATION: 99 % | DIASTOLIC BLOOD PRESSURE: 62 MMHG | HEART RATE: 81 BPM | RESPIRATION RATE: 20 BRPM

## 2018-01-05 VITALS
OXYGEN SATURATION: 98 % | RESPIRATION RATE: 20 BRPM | DIASTOLIC BLOOD PRESSURE: 72 MMHG | HEART RATE: 76 BPM | SYSTOLIC BLOOD PRESSURE: 160 MMHG

## 2018-01-05 VITALS
HEART RATE: 81 BPM | OXYGEN SATURATION: 97 % | TEMPERATURE: 101 F | SYSTOLIC BLOOD PRESSURE: 117 MMHG | RESPIRATION RATE: 22 BRPM | DIASTOLIC BLOOD PRESSURE: 58 MMHG

## 2018-01-05 VITALS
OXYGEN SATURATION: 98 % | SYSTOLIC BLOOD PRESSURE: 152 MMHG | DIASTOLIC BLOOD PRESSURE: 79 MMHG | HEART RATE: 84 BPM | RESPIRATION RATE: 26 BRPM

## 2018-01-05 VITALS
TEMPERATURE: 98.6 F | DIASTOLIC BLOOD PRESSURE: 82 MMHG | SYSTOLIC BLOOD PRESSURE: 165 MMHG | HEART RATE: 92 BPM | OXYGEN SATURATION: 98 % | RESPIRATION RATE: 24 BRPM

## 2018-01-05 VITALS
HEART RATE: 81 BPM | OXYGEN SATURATION: 96 % | DIASTOLIC BLOOD PRESSURE: 53 MMHG | SYSTOLIC BLOOD PRESSURE: 106 MMHG | RESPIRATION RATE: 22 BRPM

## 2018-01-05 VITALS
OXYGEN SATURATION: 99 % | SYSTOLIC BLOOD PRESSURE: 156 MMHG | HEART RATE: 82 BPM | DIASTOLIC BLOOD PRESSURE: 70 MMHG | RESPIRATION RATE: 29 BRPM

## 2018-01-05 VITALS — OXYGEN SATURATION: 95 %

## 2018-01-05 VITALS
DIASTOLIC BLOOD PRESSURE: 67 MMHG | OXYGEN SATURATION: 98 % | RESPIRATION RATE: 20 BRPM | SYSTOLIC BLOOD PRESSURE: 150 MMHG | HEART RATE: 79 BPM

## 2018-01-05 VITALS
HEART RATE: 87 BPM | SYSTOLIC BLOOD PRESSURE: 155 MMHG | OXYGEN SATURATION: 96 % | DIASTOLIC BLOOD PRESSURE: 73 MMHG | RESPIRATION RATE: 27 BRPM

## 2018-01-05 VITALS
OXYGEN SATURATION: 96 % | RESPIRATION RATE: 28 BRPM | HEART RATE: 90 BPM | SYSTOLIC BLOOD PRESSURE: 144 MMHG | DIASTOLIC BLOOD PRESSURE: 96 MMHG

## 2018-01-05 VITALS
SYSTOLIC BLOOD PRESSURE: 142 MMHG | HEART RATE: 74 BPM | RESPIRATION RATE: 20 BRPM | OXYGEN SATURATION: 96 % | DIASTOLIC BLOOD PRESSURE: 66 MMHG

## 2018-01-05 VITALS
OXYGEN SATURATION: 98 % | RESPIRATION RATE: 27 BRPM | SYSTOLIC BLOOD PRESSURE: 143 MMHG | DIASTOLIC BLOOD PRESSURE: 68 MMHG | HEART RATE: 85 BPM

## 2018-01-05 VITALS
DIASTOLIC BLOOD PRESSURE: 65 MMHG | HEART RATE: 82 BPM | RESPIRATION RATE: 27 BRPM | SYSTOLIC BLOOD PRESSURE: 143 MMHG | OXYGEN SATURATION: 99 %

## 2018-01-05 VITALS
DIASTOLIC BLOOD PRESSURE: 69 MMHG | OXYGEN SATURATION: 97 % | SYSTOLIC BLOOD PRESSURE: 146 MMHG | RESPIRATION RATE: 23 BRPM | HEART RATE: 89 BPM

## 2018-01-05 VITALS
HEART RATE: 97 BPM | RESPIRATION RATE: 16 BRPM | SYSTOLIC BLOOD PRESSURE: 153 MMHG | TEMPERATURE: 97.7 F | DIASTOLIC BLOOD PRESSURE: 67 MMHG | OXYGEN SATURATION: 98 %

## 2018-01-05 VITALS
SYSTOLIC BLOOD PRESSURE: 151 MMHG | OXYGEN SATURATION: 99 % | HEART RATE: 86 BPM | RESPIRATION RATE: 26 BRPM | DIASTOLIC BLOOD PRESSURE: 67 MMHG

## 2018-01-05 VITALS — HEART RATE: 77 BPM

## 2018-01-05 VITALS — HEART RATE: 80 BPM

## 2018-01-05 VITALS
RESPIRATION RATE: 26 BRPM | DIASTOLIC BLOOD PRESSURE: 70 MMHG | SYSTOLIC BLOOD PRESSURE: 157 MMHG | OXYGEN SATURATION: 99 % | HEART RATE: 80 BPM

## 2018-01-05 VITALS
OXYGEN SATURATION: 98 % | DIASTOLIC BLOOD PRESSURE: 63 MMHG | SYSTOLIC BLOOD PRESSURE: 132 MMHG | RESPIRATION RATE: 26 BRPM | HEART RATE: 82 BPM

## 2018-01-05 VITALS — HEART RATE: 95 BPM

## 2018-01-05 VITALS — OXYGEN SATURATION: 97 %

## 2018-01-05 VITALS — OXYGEN SATURATION: 99 %

## 2018-01-05 VITALS
DIASTOLIC BLOOD PRESSURE: 65 MMHG | RESPIRATION RATE: 28 BRPM | OXYGEN SATURATION: 98 % | SYSTOLIC BLOOD PRESSURE: 145 MMHG | HEART RATE: 93 BPM

## 2018-01-05 VITALS
DIASTOLIC BLOOD PRESSURE: 69 MMHG | RESPIRATION RATE: 30 BRPM | OXYGEN SATURATION: 95 % | HEART RATE: 91 BPM | SYSTOLIC BLOOD PRESSURE: 148 MMHG

## 2018-01-05 VITALS
RESPIRATION RATE: 28 BRPM | HEART RATE: 90 BPM | OXYGEN SATURATION: 95 % | SYSTOLIC BLOOD PRESSURE: 151 MMHG | DIASTOLIC BLOOD PRESSURE: 71 MMHG

## 2018-01-05 VITALS
SYSTOLIC BLOOD PRESSURE: 149 MMHG | OXYGEN SATURATION: 99 % | RESPIRATION RATE: 27 BRPM | DIASTOLIC BLOOD PRESSURE: 66 MMHG | HEART RATE: 80 BPM

## 2018-01-05 VITALS — HEART RATE: 79 BPM

## 2018-01-05 VITALS
OXYGEN SATURATION: 99 % | RESPIRATION RATE: 28 BRPM | DIASTOLIC BLOOD PRESSURE: 65 MMHG | HEART RATE: 96 BPM | SYSTOLIC BLOOD PRESSURE: 148 MMHG

## 2018-01-05 VITALS — HEART RATE: 98 BPM

## 2018-01-05 VITALS — HEART RATE: 92 BPM

## 2018-01-05 LAB
BACTERIA #/AREA URNS HPF: (no result) /HPF
BUN SERPL-MCNC: 23 MG/DL (ref 7–18)
CALCIUM SERPL-MCNC: 8.5 MG/DL (ref 8.5–10.1)
CHLORIDE SERPL-SCNC: 110 MEQ/L (ref 98–107)
COLOR UR: YELLOW
CREAT SERPL-MCNC: 0.83 MG/DL (ref 0.5–1)
ERYTHROCYTE [DISTWIDTH] IN BLOOD BY AUTOMATED COUNT: 15.7 % (ref 11.6–17.2)
GFR SERPLBLD BASED ON 1.73 SQ M-ARVRAT: 67 ML/MIN (ref 89–?)
GLUCOSE SERPL-MCNC: 114 MG/DL (ref 74–106)
GLUCOSE UR STRIP-MCNC: (no result) MG/DL
HCO3 BLD-SCNC: 17.3 MEQ/L (ref 21–32)
HCT VFR BLD CALC: 29.5 % (ref 35–46)
HGB BLD-MCNC: 9.8 GM/DL (ref 11.6–15.3)
HGB UR QL STRIP: (no result)
KETONES UR STRIP-MCNC: 40 MG/DL
MCH RBC QN AUTO: 30.2 PG (ref 27–34)
MCHC RBC AUTO-ENTMCNC: 33.2 % (ref 32–36)
MCV RBC AUTO: 90.9 FL (ref 80–100)
MUCOUS THREADS #/AREA URNS LPF: (no result) /LPF
NITRITE UR QL STRIP: (no result)
PHENYTOIN (DILANTIN): 8.2 MCG/ML (ref 10–20)
PLATELET # BLD: 542 TH/MM3 (ref 150–450)
PMV BLD AUTO: 7.7 FL (ref 7–11)
RBC # BLD AUTO: 3.24 MIL/MM3 (ref 4–5.3)
SODIUM SERPL-SCNC: 142 MEQ/L (ref 136–145)
SP GR UR STRIP: 1.02 (ref 1–1.03)
URINE LEUKOCYTE ESTERASE: (no result)
WBC # BLD AUTO: 14 TH/MM3 (ref 4–11)
WHITE BLOOD CELL CLUMPS: (no result)

## 2018-01-05 PROCEDURE — 5A1945Z RESPIRATORY VENTILATION, 24-96 CONSECUTIVE HOURS: ICD-10-PCS | Performed by: INTERNAL MEDICINE

## 2018-01-05 PROCEDURE — 0BH17EZ INSERTION OF ENDOTRACHEAL AIRWAY INTO TRACHEA, VIA NATURAL OR ARTIFICIAL OPENING: ICD-10-PCS | Performed by: INTERNAL MEDICINE

## 2018-01-05 RX ADMIN — STANDARDIZED SENNA CONCENTRATE AND DOCUSATE SODIUM SCH TAB: 8.6; 5 TABLET, FILM COATED ORAL at 21:08

## 2018-01-05 RX ADMIN — LANSOPRAZOLE SCH MG: 30 TABLET, ORALLY DISINTEGRATING, DELAYED RELEASE ORAL at 08:11

## 2018-01-05 RX ADMIN — WATER SCH ML: 1 IRRIGANT IRRIGATION at 12:00

## 2018-01-05 RX ADMIN — POLYVINYL ALCOHOL SCH DROP: 14 SOLUTION/ DROPS OPHTHALMIC at 13:42

## 2018-01-05 RX ADMIN — Medication SCH ML: at 21:09

## 2018-01-05 RX ADMIN — POLYVINYL ALCOHOL SCH DROP: 14 SOLUTION/ DROPS OPHTHALMIC at 08:14

## 2018-01-05 RX ADMIN — LEVETIRACETAM SCH MLS/HR: 100 INJECTION, SOLUTION, CONCENTRATE INTRAVENOUS at 05:24

## 2018-01-05 RX ADMIN — POLYETHYLENE GLYCOL 3350 SCH GM: 17 POWDER, FOR SOLUTION ORAL at 08:14

## 2018-01-05 RX ADMIN — DEXMEDETOMIDINE HYDROCHLORIDE PRN MLS/HR: 100 INJECTION, SOLUTION, CONCENTRATE INTRAVENOUS at 09:22

## 2018-01-05 RX ADMIN — FOSPHENYTOIN SODIUM SCH MLS/HR: 50 INJECTION, SOLUTION INTRAMUSCULAR; INTRAVENOUS at 13:42

## 2018-01-05 RX ADMIN — LEVETIRACETAM SCH MLS/HR: 100 INJECTION, SOLUTION, CONCENTRATE INTRAVENOUS at 00:51

## 2018-01-05 RX ADMIN — LACTOBACILLUS ACIDOPHILUS / LACTOBACILLUS BULGARICUS SCH GM: 100 MILLION CFU STRENGTH GRANULES at 08:10

## 2018-01-05 RX ADMIN — ACETAMINOPHEN PRN MG: 650 SOLUTION ORAL at 15:35

## 2018-01-05 RX ADMIN — PROPRANOLOL HYDROCHLORIDE SCH MG: 20 TABLET ORAL at 19:58

## 2018-01-05 RX ADMIN — FOSPHENYTOIN SODIUM SCH MLS/HR: 50 INJECTION, SOLUTION INTRAMUSCULAR; INTRAVENOUS at 22:48

## 2018-01-05 RX ADMIN — LACOSAMIDE SCH MG: 100 TABLET, FILM COATED ORAL at 08:11

## 2018-01-05 RX ADMIN — LACTOBACILLUS ACIDOPHILUS / LACTOBACILLUS BULGARICUS SCH GM: 100 MILLION CFU STRENGTH GRANULES at 17:32

## 2018-01-05 RX ADMIN — WATER SCH ML: 1 IRRIGANT IRRIGATION at 17:32

## 2018-01-05 RX ADMIN — PROPRANOLOL HYDROCHLORIDE SCH MG: 20 TABLET ORAL at 08:10

## 2018-01-05 RX ADMIN — CHLORHEXIDINE GLUCONATE 0.12% ORAL RINSE SCH ML: 1.2 LIQUID ORAL at 20:00

## 2018-01-05 RX ADMIN — FOSPHENYTOIN SODIUM SCH MLS/HR: 50 INJECTION, SOLUTION INTRAMUSCULAR; INTRAVENOUS at 05:25

## 2018-01-05 RX ADMIN — INSULIN ASPART SCH: 100 INJECTION, SOLUTION INTRAVENOUS; SUBCUTANEOUS at 06:00

## 2018-01-05 RX ADMIN — PRAVASTATIN SODIUM SCH MG: 40 TABLET ORAL at 21:08

## 2018-01-05 RX ADMIN — Medication SCH ML: at 08:12

## 2018-01-05 RX ADMIN — POLYVINYL ALCOHOL SCH DROP: 14 SOLUTION/ DROPS OPHTHALMIC at 17:32

## 2018-01-05 RX ADMIN — TAZOBACTAM SODIUM AND PIPERACILLIN SODIUM SCH MLS/HR: 500; 4 INJECTION, SOLUTION INTRAVENOUS at 18:50

## 2018-01-05 RX ADMIN — CHLORHEXIDINE GLUCONATE 0.12% ORAL RINSE SCH ML: 1.2 LIQUID ORAL at 08:13

## 2018-01-05 RX ADMIN — PROPRANOLOL HYDROCHLORIDE SCH MG: 20 TABLET ORAL at 13:42

## 2018-01-05 RX ADMIN — POLYETHYLENE GLYCOL 3350 SCH GM: 17 POWDER, FOR SOLUTION ORAL at 21:08

## 2018-01-05 RX ADMIN — CLOPIDOGREL BISULFATE SCH MG: 75 TABLET, FILM COATED ORAL at 08:11

## 2018-01-05 RX ADMIN — INSULIN ASPART SCH: 100 INJECTION, SOLUTION INTRAVENOUS; SUBCUTANEOUS at 00:00

## 2018-01-05 RX ADMIN — INSULIN ASPART SCH: 100 INJECTION, SOLUTION INTRAVENOUS; SUBCUTANEOUS at 18:00

## 2018-01-05 RX ADMIN — LACTOBACILLUS ACIDOPHILUS / LACTOBACILLUS BULGARICUS SCH GM: 100 MILLION CFU STRENGTH GRANULES at 13:41

## 2018-01-05 RX ADMIN — LEVETIRACETAM SCH MLS/HR: 100 INJECTION, SOLUTION, CONCENTRATE INTRAVENOUS at 13:41

## 2018-01-05 RX ADMIN — STANDARDIZED SENNA CONCENTRATE AND DOCUSATE SODIUM SCH TAB: 8.6; 5 TABLET, FILM COATED ORAL at 08:11

## 2018-01-05 RX ADMIN — LEVETIRACETAM SCH MLS/HR: 100 INJECTION, SOLUTION, CONCENTRATE INTRAVENOUS at 17:32

## 2018-01-05 RX ADMIN — LACOSAMIDE SCH MG: 100 TABLET, FILM COATED ORAL at 21:08

## 2018-01-05 RX ADMIN — INSULIN ASPART SCH: 100 INJECTION, SOLUTION INTRAVENOUS; SUBCUTANEOUS at 12:00

## 2018-01-05 RX ADMIN — WATER SCH ML: 1 IRRIGANT IRRIGATION at 05:04

## 2018-01-05 RX ADMIN — CHLORHEXIDINE GLUCONATE SCH PACK: 500 CLOTH TOPICAL at 04:00

## 2018-01-05 NOTE — RADRPT
EXAM DATE/TIME:  01/05/2018 13:05 

 

[facility] COMPARISON:     

CHEST SINGLE AP, January 05, 2018, 5:28.

 

                     

INDICATIONS :     

PICC line placement.

                     

 

MEDICAL HISTORY :            

Hypercholesterolemia. Hypertension. Diabetes mellitus type 2. seizures   

 

SURGICAL HISTORY :        

Fusion, cervical. Cholecystectomy. Hysterectomy. lumbar discectomy

 

ENCOUNTER:     

Subsequent                                        

 

ACUITY:     

4 - 6 days      

 

PAIN SCORE:     

Non-responsive.

 

LOCATION:     

Bilateral chest 

 

FINDINGS:     

The cardiac silhouette is normal in transverse diameter. There is left lower lobe atelectasis versus 
pneumonia. A tracheostomy tube is in place in the midline. A PICC line is in place via right-sided ap
proach with its tip in the region of the superior vena cava.

 

 

CONCLUSION:     

1. Uncomplicated PICC line placement.

 

 

 

 Casey Cortez MD on January 05, 2018 at 13:30           

Board Certified Radiologist.

 This report was verified electronically.

## 2018-01-05 NOTE — PD.PROCEDR
Procedure Note


Procedure


DATE: 1/5/2018





PROCEDURE: Orotracheal intubation





INDICATION: Hypercapnic respiratory failure





DETAILS OF PROCEDURE


The patient was placed in optimal position and preoxygenated with 100% FiO2 via 

bag valve mask. At the start oxygen saturation was 100%. The patient was 

administered 20 mg etomidate IV and 50 milligrams rocuronium IV. I entered the 

oropharynx with a size 4 GVL glidescope laryngoscope blade and obtained a grade 

2 view of the airway. On single attempt a size 735 cuffed endotracheal tube was 

passed through the vocal cords. Correct tube location was confirmed with end 

tidal CO2 detector and by auscultating over bilateral lung fields. The 

endotracheal tube was secured with adhesive tape at a depth of 23 cm at the 

lips. The patient was connected to the ventilator. The patient tolerated the 

procedure well without any apparent complications. Oxygen saturations were 

maintained greater than 95% all times. STAT  chest x-ray pending at time of 

dictation.











Vishal Moreno MD Jan 5, 2018 03:32

## 2018-01-05 NOTE — HHI.CCPN
Subjective


Remarks/Hospital Course


This is a 77-year-old female that presented to the ED for evaluation of altered 

mental status. Per report, according to EMS the patient normally is more alert 

and  has a history of stroke, he was able to speak with the patient's sister 

who states that she is also power of , the patient apparently normally 

is ambulatory and can carry on a conversation without any difficulty. Per 

records reviewed, the patient was last seen normal and at her baseline at 1 am. 

The patient then sat down in her lazy chair, her sister also noted that she 

started beating her head against a piece of furniture.  The patient was noted 

to have a right frontal contusion upon presentation to the ED .She had a GCS of 

7-8 on arrival. The patient's medical history is significant for a recent 

admission 09/2017 for altered mental status, medication induced.  Her past 

medical history is also significant for a recent history of UTI and C. 

difficile in addition to her history of having a CVA and reportedly residual 

effects in the right upper and lower extremity.  Laboratory and imaging studies 

revealed that most likely the patient has a UTI, CT of the brain revealed no 

abnormality and the patient was noted to have an elevated lactate level.  The 

patient was emergently intubated in the ED, and the patient was noted to be 

significantly hypertensive and a nicardipine infusion was instituted.  Upon 

entering the ED the patient's blood pressure was noted to be 219/92, Cardizem 

infusion had been ordered.  The patient was noted to have spontaneous movement 

of the right upper and lower extremity with a gag reflex. Critical care 

medicine was consulted.





12/25: The patient was weaned off of nicardipine infusion.  Normotensive the 

patient continues on labetalol twice a day scheduled home dosing.  EEG 

attempted last evening however due to patient's movement bilaterally to 

complete artifact EEG reordered.  Fentanyl infusion discontinued for daily 

sedation vacation approximately 7 hours ago the patient remains nonresponsive.  

Opens eyes spontaneously, moves limbs spontaneously but does not follow any 

commands.  Brain MRI/MRA, and CT all negative findings.  EEG scheduled for 

a.m..  The patient continues in severe metabolic acidosis, 2 Amps of sodium 

bicarbonate IV push given this a.m., sodium bicarbonate infusion increased.  

Lactate is cleared, creatinine is improving, CT of the abdomen and pelvis is 

pending this afternoon.


12/26 CT report from yesterday shows bladder edema consistent with cystitis.  

URine culture with GNR  Blood cultures NGTD. . Getting EEG now. 


12/27:  Resting comfortable in bed in no acute distress.  No obvious seizure 

activity.  Plan for lumbar puncture in AM.  Low-grade temperatures overnight.  

Tolerating tube feeding at goal.  No bowel movement


12/28:  Continues EEG has been discontinued.  Tmax 100.  Currently 99.8.  No 

bowel movement since admission.  Tolerating tube feeds at goal.  No active 

seizure activity overnight.


12/29:  Tmax 100.  Currently 99.9.  Became tachycardic with sedation lowered so 

RN increased midazolam to 9 milligrams an hour.  Also fentanyl drip at 250 mcg 

per hour.  No bowel movement since admission.  Tolerating tube feedings with 

only 40 cc residuals.  Receiving methylnaltrexone milligrams subcutaneous 1 

along with mineral oil and suppository.  KUB pending.  Patient improved 

neurological exam.  Blinks with my suddenhand  movements towards face.  

Positive gag.  Withdraws to pain in all 4 extremities.


12/30 LP results not yet finalized. On versed 7 mg/hr and fentanyl 250 mcg/hr. 

EEG from 12/29 - no epileptiform features. 


12/31 Weaning fentanyl down but remains on versed for seizure suppression. 

Because she had difficult to control subclinical seizures, will need  close EEG 

monitoring for  benzo weaning.  


Was breathing on PSV for couple of hours day, even on sedation. 


CSF negative final culture.  No Leukocytosis or fever.  Will stop antibiotic. 

Updated sister. 


1/1: no seizures evident on EEG. slightly more awake today, but not following 

commands. had discussion with Dr. Martin, will wean versed to 4mg/hr and re-

evaluate on EEG tomorrow.


1/2: no seizures on EEG. ok to wean versed to off per Dr. Martin. will work on 

waking and weaning mechanical ventilation.


1/3: acute drop in hgb. ordered 1 unit prbc. sent haptoglobin and LDH. pt awake 

following commands. fails SBT for RSBI > 110.





Subjective





1/4: hgb improved. still awake, alert, following commands. extubated 

successfully. 


1/5: Reintubated last night for worsening respiratory status.  Currently orally 

intubated on mechanical ventilation, arousable easily follows commands.  On 

Versed gtt.





Objective





Vital Signs








  Date Time  Temp Pulse Resp B/P (MAP) Pulse Ox O2 Delivery O2 Flow Rate FiO2


 


1/5/18 11:36     95   35


 


1/5/18 08:00 99.3 96 25 149/68 (95)    


 


1/4/18 21:00      Nasal Cannula 4.00 














Intake and Output   


 


 1/5/18 1/5/18 1/6/18





 08:00 16:00 00:00


 


Intake Total 150 ml 100 ml 


 


Output Total 300 ml  


 


Balance -150 ml 100 ml 








Result Diagram:  


1/5/18 0457                                                                    

            1/5/18 0457





Other Results





Microbiology








 Date/Time


Source Procedure


Growth Status


 


 


 1/4/18 02:00


Stool Stool Stool Occult Blood (GARETH) - Final


HEMOCCULT NEGATIVE Complete








Laboratory Tests








Test


  1/4/18


22:50 1/5/18


04:50


 


Blood Gas Puncture Site RT RADIAL  RT RADIAL 


 


Blood Gas Patient Temperature 98.6  98.6 


 


Blood Gas HCO3


  18 mmol/L


(22-26) 15 mmol/L


(22-26)


 


Blood Gas Base Excess


  -6.2 mmol/L


(-2-2) -8.4 mmol/L


(-2-2)


 


Blood Gas Oxygen Saturation 94 % ()  97 % () 


 


Arterial Blood pH


  7.42


(7.380-7.420) 7.43


(7.380-7.420)


 


Arterial Blood Partial


Pressure CO2 28 mmHg


(38-42) 23 mmHg


(38-42)


 


Arterial Blood Partial


Pressure O2 80 mmHg


() 164 mmHg


()


 


Arterial Blood Oxygen Content


  12.0 Vol %


(12.0-20.0) 13.4 Vol %


(12.0-20.0)


 


Arterial Blood


Carboxyhemoglobin 1.0 % (0-4) 


  0.8 % (0-4) 


 


 


Arterial Blood Methemoglobin 1.0 % (0-2)  1.0 % (0-2) 


 


Blood Gas Hemoglobin


  9.1 G/DL


(12.0-16.0) 9.6 G/DL


(12.0-16.0)


 


Oxygen Delivery Device NASAL CANNULA  VENTILATOR 


 


Blood Gas Liter Flow 4 L/M  


 


Blood Gas Ventilator Setting  PRVC/AC 


 


Blood Gas Inspired Oxygen  35 % 








Imaging





Last Impressions








Chest X-Ray 12/29/17 0600 Signed





Impressions: 





 Service Date/Time:  Friday, December 29, 2017 04:41 - CONCLUSION:  No 





 significant interval change.     Christian Mendiola MD 


 


Lumbar Puncture Fluoroscopy 12/28/17 0000 Signed





Impressions: 





 Service Date/Time:  Thursday, December 28, 2017 09:39 - CONCLUSION: 





 Uncomplicated fluoroscopically guided lumbar puncture.     Reji Townsend MD 


 


Brain MRI 12/28/17 0000 Signed





Impressions: 





 Service Date/Time:  Thursday, December 28, 2017 10:24 - CONCLUSION:  No acute 





 intracranial findings     Bronson Mar MD 


 


Upper Extremity Ultrasound 12/27/17 0000 Signed





Impressions: 





 Service Date/Time:  Wednesday, December 27, 2017 21:32 - CONCLUSION:  1. No 





 sonographic evidence for right upper extremity DVT.     Reji Townsend MD 


 


Lower Extremity Ultrasound 12/27/17 0000 Signed





Impressions: 





 Service Date/Time:  Wednesday, December 27, 2017 21:12 - CONCLUSION:  1. No 





 sonographic evidence for lower extremity DVT.     Reji Townsend MD 


 


Abdomen/Pelvis CT 12/25/17 0000 Signed





Impressions: 





 Service Date/Time:  Monday, December 25, 2017 16:05 - CONCLUSION:  1. Mild 

edema 





 surrounding the urinary bladder, question cystitis. Erazo catheter in place. 

2. 





 Status post cholecystectomy.  3. Left lower lobe atelectasis. 4. Nasogastric 





 tube tip at the gastroesophageal junction.    Christian Mendiola MD 


 


Neck Magnetic Resonance Angiography 12/24/17 1119 Signed





Impressions: 





 Service Date/Time:  Sunday, December 24, 2017 13:11 - CONCLUSION:  Negative 

for 





 hemodynamically significant carotid stenosis     Pb López MD  FACR


 


Head Magnetic Resonance Angiography 12/24/17 1119 Signed





Impressions: 





 Service Date/Time:  Sunday, December 24, 2017 13:11 - CONCLUSION: Negative for 





 major branch vessel occlusion.      Pb López MD  FACR


 


Head CT 12/24/17 0851 Signed





Impressions: 





 Service Date/Time:  Sunday, December 24, 2017 09:02 - CONCLUSION:  Negative 

for 





 acute process..     Pb López MD  FACR


 


Cervical Spine CT 12/24/17 0000 Signed





Impressions: 





 Service Date/Time:  Sunday, December 24, 2017 09:07 - CONCLUSION:  Fusion as 





 above, negative for fracture.     Pb López MD  FACR








Objective Remarks


GENERAL: 77-year-old  female currently orotracheally intubated


SKIN: Warm and dry.


HEAD: Atraumatic. Normocephalic.  


EYES: R periorbital ecchmosis. Pupils equal and round 1-2 mm bilaterally. No 

scleral icterus. No injection or drainage. 


ENT: No nasal bleeding or discharge. Mucous membranes pink and moist.


NECK: Trachea midline. No JVD. 


CARDIOVASCULAR: RRR. 


RESPIRATORY: No accessory muscle use. Breath sounds equal bilaterally.  


GASTROINTESTINAL: Abdomen soft, non-tender, nondistended. No guarding. 


MUSCULOSKELETAL: Right upper extremity with 2+ edema and erythema.  Negative 

Doppler ultrasound 12/27 for DVT


NEUROLOGICAL: follows commands. RASS 0, orally intubated on mechanical 

ventilation moving both upper and lower extremities.





A/P


Assessment and Plan


Assessment: 77yF with subclinical status which has been very difficult to 

control. now on multiple AEDs and finally without evidence of ongoing status.  

Resumed on Versed gtt. following reintubation on 1/4.  Daily sedation vacation





Neuro/Psych





Post polio syndrome bilateral lower extremity weakness


Status epilepticus


History of CVATIA with R sided deficits - right lentiform nucleus


H/O seizures


Anxiety/depression


Left maxillary/ethmoid fluid levels question sinusitis





12/28 interpretation EEG - Abnormal EEG due to some mild to moderate slowing, 

but also some occasional sharps and spike and slow waves seen, but improved 

from prior EEG's.  Clinical correlation.


[id number] milligrams twice a day will be continued.  


Loaded with fosphenytoin - currently fosphenytoin  200 mg IV every  8 hours.  

Leevel 13 12/30


Levetiracetam 500 mg IV every 6 hours


Lorazepam 2 mg IV every 5 minutes as needed.  Seizures


TSH normal-3.0


Random cortisol level only 3.5. Unclear significance as patient had lactic 

acidemia but also hypertensive at the time.  Recheck 12/28  12.3.  Cosyntropin 

test ordered 12/29, had adequate response 


12/24 MRI brain-Moderate periventricular white matter changes are evident.  

There is no restricted diffusion.  There is moderate atrophy with dilatation of 

ventricular sulcal spaces.  There are no extra-axial fluid collections 

appreciated.  Posterior fossa is unremarkable.  


12/24 MRA brain-negative for major branch occlusion 


12/24 CT brain-no acute intracranial process


MRI brain 12/28 - There is mild periventricular white matter T2 prolongation. A 

tiny lacunar infarct in the right lentiform nucleus appears old. There is no 

evidence of intracranial mass or hemorrhage. The brainstem and posterior fossa 

structures are unremarkable. There is nothing to suggest acute infarction. 

There is fluid in the left maxillary sinus and occasional ethmoid sinuses.


Patient's home meds baclofen 10 mg 3 times a day, tramadol 50 mg every 4 hours 

as needed and sertraline 50 mg twice a day have been on hold due to  concern 

for serotonin syndrome, seizure activity and rhabdomyolysis.  


   Patient has history of altered mental status secondary to being medication 

induced.  Reported by family member -the patient had discontinued baclofen for 

approximately 3 months and took her initial dose of baclofen on 12/23 with a 

TID schedule dosing.





LP - HSV negative, final cx neg. 


Dr. Martin actively following.  





Respiratory:





Acute hypoxemic respiratory failure- improving.





Reintubated and placed back on mechanical ventilation on 1/4 following 

extubation.  Daily C Pap trials and will attempt to extubate again over next 

few days.


aggressive pulmonary toilet.


Maintain O2 sat greater than 92%


Albuterol/ipratropium aerosols nebs every 6 hours scheduled, albuterol aerosols 

every 2 hours when necessary





Cardiovascular:





Hypertensive emergency-resolved


History of hypertension


Hyperlipidemia


Congestive heart failure - ejection fraction 55-60% 2010





Labetalol, hydralazine IV , PRN to maintain SBP <160


Serial troponins were negative.


propranolol 40mg po q6h given hypertension and tachycardia.


   On metoprolol 50 mg sustained release daily at home


continue clopidogrel 75 mg by mouth daily/home medication Resumed 12/30


Continue pravastatin 40 mg by mouth daily/home medication for dyslipidemia


clonidine 0.2mg po q8hr.





Renal//FEN:





Chronic kidney disease stage IIIB


Hypo-magnesium





Monitor urine output


Monitor BMP


Replete electrolytes as needed


Previous Creatinine 09/2017- 1.15, upon admission 2.76, now at baseline








GI:





Esophageal stricture -history of esophageal dilatation


GERD


Elevated ammonia


Constipation- resolved.





Patient is currently on Glucerna 1.5 goal 50 cc per hour per nutrition's 

recommendations 


Lansoprazole 30 mg daily GI prophylaxis. On Dexlansoprazole 60 mg times daily 

at home


Docusate sodium/senna 1 tablet twice a day for bowel regimen.  polyethylene 

glycol 17 g twice a day and lactulose 30 cc 4x a day


   1 dose of methylnaltrexone 12 MG SUBCUTANEOUS 1 AND 30 CC MINERAL OIL 1 12/ 29


Now having BMs after aggressive above bowel regimen. 


KUB  12/29 - mild ileus


Ammonia level Recheck in a.m. 12/30 <10


formal swallow eval.





ID:





Escherichia coli UTI





Monitor CBC


Blood cultures 12/24 - negative


Urine culture 12/24 -  Escherichia coli, pansensitive


Strep pneumococcal antigen 12/24  - negative 


CSF 12/28 Gram stain, fungal and AFB negative as of 12/29





Recent history of C. difficile colitis. Lactinex tid. 


off abx. monitor. reculture for fever.








HEME/ONC:





History of cervical cancer


Normocytic anemia


History of right lower extremity DVT - posterior tibial and femoral vein 

previously on Apixaban





Clopidogrel 75 mg daily initially held for LP.  Restarted 12/30


Lactate level 1.8


Recheck Dopplers bilateral and right upper lower extremities 12/28 negative for 

DVT


1 unit prbc


f/u haptoglobin and LDH


hold SQH


guiac negative.


hgb improved. will hold SQH one more day,then likely can restart. unclear 

etiology of hgb drop and may be secondary to dilutional lab draw.





Endocrine:





Diabetes mellitus


Low cortisol





Glucose monitoring per ICU protocol, low dose regimen Novulog every 6 hours


Hold metformin thousand milligrams twice a day





Prophylaxis:


GI Prophylaxis


Lansoprazole 30 mg daily


DVT Prophylaxis


-- SCDs hold SQH given anemia.





Lines:


Peripheral IVs.  Requested PICC placement for vascular access











Nate Anderson MD Jan 5, 2018 12:51

## 2018-01-05 NOTE — RADRPT
EXAM DATE/TIME:  01/05/2018 05:28 

 

[facility] COMPARISON:     

CHEST SINGLE AP, January 04, 2018, 6:33.

 

                     

INDICATIONS :     

Evaluate for pneumonia, Respiratory failure

                     

 

MEDICAL HISTORY :            

Cardiovascular disease. Diabetes mellitus type I. Osteoporosis. Polio, Cervical Cancer   

 

SURGICAL HISTORY :        

Appendectomy. Hysterectomy. Cholecystectomy.

 

ENCOUNTER:     

Subsequent                                        

 

ACUITY:     

2 weeks      

 

PAIN SCORE:     

Non-responsive.

 

LOCATION:     

Bilateral chest 

 

FINDINGS:     

Single AP view of the chest. Endotracheal tube and nasogastric tube remain in place. Mild patchy left
 lung base opacity unchanged. No evidence of pleural effusion or pneumothorax. Cardiomediastinal silh
ouette within normal limits.

 

CONCLUSION:     

No significant interval change with persistent patchy left lung base atelectasis versus mild consolid
ation. 

 

 

 Christian Mendiola MD on January 05, 2018 at 6:12           

Board Certified Radiologist.

 This report was verified electronically.

## 2018-01-06 VITALS
HEART RATE: 68 BPM | OXYGEN SATURATION: 100 % | SYSTOLIC BLOOD PRESSURE: 149 MMHG | RESPIRATION RATE: 20 BRPM | DIASTOLIC BLOOD PRESSURE: 73 MMHG

## 2018-01-06 VITALS
DIASTOLIC BLOOD PRESSURE: 74 MMHG | OXYGEN SATURATION: 96 % | RESPIRATION RATE: 20 BRPM | HEART RATE: 71 BPM | SYSTOLIC BLOOD PRESSURE: 144 MMHG

## 2018-01-06 VITALS
TEMPERATURE: 102 F | OXYGEN SATURATION: 100 % | HEART RATE: 81 BPM | RESPIRATION RATE: 26 BRPM | DIASTOLIC BLOOD PRESSURE: 88 MMHG | SYSTOLIC BLOOD PRESSURE: 156 MMHG

## 2018-01-06 VITALS
RESPIRATION RATE: 20 BRPM | DIASTOLIC BLOOD PRESSURE: 77 MMHG | HEART RATE: 76 BPM | OXYGEN SATURATION: 99 % | SYSTOLIC BLOOD PRESSURE: 177 MMHG

## 2018-01-06 VITALS
OXYGEN SATURATION: 99 % | HEART RATE: 92 BPM | TEMPERATURE: 98.6 F | RESPIRATION RATE: 49 BRPM | DIASTOLIC BLOOD PRESSURE: 92 MMHG | SYSTOLIC BLOOD PRESSURE: 188 MMHG

## 2018-01-06 VITALS
DIASTOLIC BLOOD PRESSURE: 67 MMHG | OXYGEN SATURATION: 98 % | RESPIRATION RATE: 20 BRPM | HEART RATE: 78 BPM | SYSTOLIC BLOOD PRESSURE: 147 MMHG | TEMPERATURE: 98.4 F

## 2018-01-06 VITALS
DIASTOLIC BLOOD PRESSURE: 71 MMHG | RESPIRATION RATE: 20 BRPM | HEART RATE: 67 BPM | OXYGEN SATURATION: 97 % | SYSTOLIC BLOOD PRESSURE: 171 MMHG | TEMPERATURE: 98.4 F

## 2018-01-06 VITALS
OXYGEN SATURATION: 96 % | HEART RATE: 71 BPM | DIASTOLIC BLOOD PRESSURE: 68 MMHG | RESPIRATION RATE: 20 BRPM | SYSTOLIC BLOOD PRESSURE: 153 MMHG

## 2018-01-06 VITALS
TEMPERATURE: 98.8 F | RESPIRATION RATE: 26 BRPM | HEART RATE: 76 BPM | SYSTOLIC BLOOD PRESSURE: 136 MMHG | OXYGEN SATURATION: 96 % | DIASTOLIC BLOOD PRESSURE: 70 MMHG

## 2018-01-06 VITALS — HEART RATE: 77 BPM

## 2018-01-06 VITALS — OXYGEN SATURATION: 98 %

## 2018-01-06 VITALS
SYSTOLIC BLOOD PRESSURE: 188 MMHG | DIASTOLIC BLOOD PRESSURE: 79 MMHG | HEART RATE: 67 BPM | OXYGEN SATURATION: 100 % | TEMPERATURE: 98.8 F | RESPIRATION RATE: 18 BRPM

## 2018-01-06 VITALS — OXYGEN SATURATION: 100 %

## 2018-01-06 VITALS
HEART RATE: 69 BPM | OXYGEN SATURATION: 95 % | SYSTOLIC BLOOD PRESSURE: 171 MMHG | RESPIRATION RATE: 20 BRPM | DIASTOLIC BLOOD PRESSURE: 71 MMHG

## 2018-01-06 VITALS — HEART RATE: 79 BPM

## 2018-01-06 VITALS — HEART RATE: 76 BPM

## 2018-01-06 VITALS — OXYGEN SATURATION: 99 %

## 2018-01-06 VITALS — OXYGEN SATURATION: 96 %

## 2018-01-06 VITALS — TEMPERATURE: 99.5 F

## 2018-01-06 VITALS — OXYGEN SATURATION: 97 %

## 2018-01-06 VITALS — HEART RATE: 83 BPM

## 2018-01-06 LAB
BUN SERPL-MCNC: 19 MG/DL (ref 7–18)
CALCIUM SERPL-MCNC: 8 MG/DL (ref 8.5–10.1)
CHLORIDE SERPL-SCNC: 111 MEQ/L (ref 98–107)
CREAT SERPL-MCNC: 0.94 MG/DL (ref 0.5–1)
ERYTHROCYTE [DISTWIDTH] IN BLOOD BY AUTOMATED COUNT: 15.9 % (ref 11.6–17.2)
GFR SERPLBLD BASED ON 1.73 SQ M-ARVRAT: 58 ML/MIN (ref 89–?)
GLUCOSE SERPL-MCNC: 151 MG/DL (ref 74–106)
HCO3 BLD-SCNC: 19.8 MEQ/L (ref 21–32)
HCT VFR BLD CALC: 22.8 % (ref 35–46)
HCT VFR BLD CALC: 22.9 % (ref 35–46)
HGB BLD-MCNC: 7.5 GM/DL (ref 11.6–15.3)
HGB BLD-MCNC: 7.5 GM/DL (ref 11.6–15.3)
MCH RBC QN AUTO: 29.6 PG (ref 27–34)
MCHC RBC AUTO-ENTMCNC: 32.8 % (ref 32–36)
MCV RBC AUTO: 90.3 FL (ref 80–100)
PLATELET # BLD: 419 TH/MM3 (ref 150–450)
PMV BLD AUTO: 7.4 FL (ref 7–11)
RBC # BLD AUTO: 2.52 MIL/MM3 (ref 4–5.3)
SODIUM SERPL-SCNC: 143 MEQ/L (ref 136–145)
WBC # BLD AUTO: 14.8 TH/MM3 (ref 4–11)

## 2018-01-06 RX ADMIN — HEPARIN SODIUM (PORCINE) LOCK FLUSH IV SOLN 100 UNIT/ML SCH UNITS: 100 SOLUTION at 08:19

## 2018-01-06 RX ADMIN — PROPRANOLOL HYDROCHLORIDE SCH MG: 20 TABLET ORAL at 08:18

## 2018-01-06 RX ADMIN — Medication SCH ML: at 08:19

## 2018-01-06 RX ADMIN — STANDARDIZED SENNA CONCENTRATE AND DOCUSATE SODIUM SCH TAB: 8.6; 5 TABLET, FILM COATED ORAL at 08:19

## 2018-01-06 RX ADMIN — FOSPHENYTOIN SODIUM SCH MLS/HR: 50 INJECTION, SOLUTION INTRAMUSCULAR; INTRAVENOUS at 05:42

## 2018-01-06 RX ADMIN — POTASSIUM CHLORIDE PRN MLS/HR: 400 INJECTION, SOLUTION INTRAVENOUS at 14:02

## 2018-01-06 RX ADMIN — WATER SCH ML: 1 IRRIGANT IRRIGATION at 00:48

## 2018-01-06 RX ADMIN — CHLORHEXIDINE GLUCONATE SCH PACK: 500 CLOTH TOPICAL at 01:55

## 2018-01-06 RX ADMIN — TAZOBACTAM SODIUM AND PIPERACILLIN SODIUM SCH MLS/HR: 500; 4 INJECTION, SOLUTION INTRAVENOUS at 00:00

## 2018-01-06 RX ADMIN — LEVETIRACETAM SCH MLS/HR: 100 INJECTION, SOLUTION, CONCENTRATE INTRAVENOUS at 17:47

## 2018-01-06 RX ADMIN — LACTOBACILLUS ACIDOPHILUS / LACTOBACILLUS BULGARICUS SCH GM: 100 MILLION CFU STRENGTH GRANULES at 17:48

## 2018-01-06 RX ADMIN — FOSPHENYTOIN SODIUM SCH MLS/HR: 50 INJECTION, SOLUTION INTRAMUSCULAR; INTRAVENOUS at 23:15

## 2018-01-06 RX ADMIN — INSULIN ASPART SCH: 100 INJECTION, SOLUTION INTRAVENOUS; SUBCUTANEOUS at 00:00

## 2018-01-06 RX ADMIN — POTASSIUM CHLORIDE PRN MLS/HR: 400 INJECTION, SOLUTION INTRAVENOUS at 06:53

## 2018-01-06 RX ADMIN — STANDARDIZED SENNA CONCENTRATE AND DOCUSATE SODIUM SCH TAB: 8.6; 5 TABLET, FILM COATED ORAL at 21:00

## 2018-01-06 RX ADMIN — LEVETIRACETAM SCH MLS/HR: 100 INJECTION, SOLUTION, CONCENTRATE INTRAVENOUS at 12:25

## 2018-01-06 RX ADMIN — LACTOBACILLUS ACIDOPHILUS / LACTOBACILLUS BULGARICUS SCH GM: 100 MILLION CFU STRENGTH GRANULES at 12:25

## 2018-01-06 RX ADMIN — LANSOPRAZOLE SCH MG: 30 TABLET, ORALLY DISINTEGRATING, DELAYED RELEASE ORAL at 08:19

## 2018-01-06 RX ADMIN — POLYVINYL ALCOHOL SCH DROP: 14 SOLUTION/ DROPS OPHTHALMIC at 08:20

## 2018-01-06 RX ADMIN — POLYETHYLENE GLYCOL 3350 SCH GM: 17 POWDER, FOR SOLUTION ORAL at 08:19

## 2018-01-06 RX ADMIN — LACTOBACILLUS ACIDOPHILUS / LACTOBACILLUS BULGARICUS SCH GM: 100 MILLION CFU STRENGTH GRANULES at 08:18

## 2018-01-06 RX ADMIN — TAZOBACTAM SODIUM AND PIPERACILLIN SODIUM SCH MLS/HR: 500; 4 INJECTION, SOLUTION INTRAVENOUS at 05:08

## 2018-01-06 RX ADMIN — PROPRANOLOL HYDROCHLORIDE SCH MG: 20 TABLET ORAL at 01:55

## 2018-01-06 RX ADMIN — INSULIN ASPART SCH: 100 INJECTION, SOLUTION INTRAVENOUS; SUBCUTANEOUS at 12:00

## 2018-01-06 RX ADMIN — INSULIN ASPART SCH: 100 INJECTION, SOLUTION INTRAVENOUS; SUBCUTANEOUS at 05:09

## 2018-01-06 RX ADMIN — WATER SCH ML: 1 IRRIGANT IRRIGATION at 12:00

## 2018-01-06 RX ADMIN — CLOPIDOGREL BISULFATE SCH MG: 75 TABLET, FILM COATED ORAL at 08:18

## 2018-01-06 RX ADMIN — LACOSAMIDE SCH MG: 100 TABLET, FILM COATED ORAL at 08:18

## 2018-01-06 RX ADMIN — ACETAMINOPHEN PRN MG: 650 SOLUTION ORAL at 16:04

## 2018-01-06 RX ADMIN — PROPRANOLOL HYDROCHLORIDE SCH MG: 20 TABLET ORAL at 12:34

## 2018-01-06 RX ADMIN — CHLORHEXIDINE GLUCONATE 0.12% ORAL RINSE SCH ML: 1.2 LIQUID ORAL at 08:20

## 2018-01-06 RX ADMIN — WATER SCH ML: 1 IRRIGANT IRRIGATION at 17:48

## 2018-01-06 RX ADMIN — LEVETIRACETAM SCH MLS/HR: 100 INJECTION, SOLUTION, CONCENTRATE INTRAVENOUS at 00:47

## 2018-01-06 RX ADMIN — VANCOMYCIN HYDROCHLORIDE SCH MLS/HR: 1 INJECTION, SOLUTION INTRAVENOUS at 16:05

## 2018-01-06 RX ADMIN — WATER SCH ML: 1 IRRIGANT IRRIGATION at 05:09

## 2018-01-06 RX ADMIN — TAZOBACTAM SODIUM AND PIPERACILLIN SODIUM SCH MLS/HR: 500; 4 INJECTION, SOLUTION INTRAVENOUS at 12:24

## 2018-01-06 RX ADMIN — PROPRANOLOL HYDROCHLORIDE SCH MG: 20 TABLET ORAL at 18:44

## 2018-01-06 RX ADMIN — FOSPHENYTOIN SODIUM SCH MLS/HR: 50 INJECTION, SOLUTION INTRAMUSCULAR; INTRAVENOUS at 14:15

## 2018-01-06 RX ADMIN — TAZOBACTAM SODIUM AND PIPERACILLIN SODIUM SCH MLS/HR: 500; 4 INJECTION, SOLUTION INTRAVENOUS at 17:48

## 2018-01-06 RX ADMIN — LACOSAMIDE SCH MG: 100 TABLET, FILM COATED ORAL at 23:14

## 2018-01-06 RX ADMIN — DEXMEDETOMIDINE HYDROCHLORIDE PRN MLS/HR: 100 INJECTION, SOLUTION, CONCENTRATE INTRAVENOUS at 16:08

## 2018-01-06 RX ADMIN — POLYVINYL ALCOHOL SCH DROP: 14 SOLUTION/ DROPS OPHTHALMIC at 17:48

## 2018-01-06 RX ADMIN — POLYVINYL ALCOHOL SCH DROP: 14 SOLUTION/ DROPS OPHTHALMIC at 12:25

## 2018-01-06 RX ADMIN — INSULIN ASPART SCH: 100 INJECTION, SOLUTION INTRAVENOUS; SUBCUTANEOUS at 17:59

## 2018-01-06 RX ADMIN — LEVETIRACETAM SCH MLS/HR: 100 INJECTION, SOLUTION, CONCENTRATE INTRAVENOUS at 05:09

## 2018-01-06 RX ADMIN — ACETAMINOPHEN PRN MG: 650 SOLUTION ORAL at 23:14

## 2018-01-06 NOTE — HHI.PR
Review/Management


Diagnosis


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


Plan


Continue current level of  cerebyx, keppra, vimpat. Phenytoin level corrected 

for hypoalbuminemia is 15.8


Diagnosis/Plan:  





Subjective


Subjective Comments


No acute events reported


No sz


Active Medications





Current Medications








 Medications


  (Trade)  Dose


 Ordered  Sig/Aaron


 Route  Start Time


 Stop Time Status Last Admin


 


  (NS Flush)  2 ml  UNSCH  PRN


 IV FLUSH  12/24/17 11:15


    12/29/17 08:59


 


 


  (NS Flush)  2 ml  BID


 IV FLUSH  12/24/17 21:00


    1/6/18 08:19


 


 


  (Tears Naturale


 Opth Soln)  1 drop  TID


 EACH EYE  12/24/17 13:00


    1/6/18 12:25


 


 


 Miscellaneous


 Information  1  Q361D


 XX  12/24/17 11:15


    12/24/17 11:15


 


 


  (Chlorhexidine


 2% Cloth)  


 Taper  DAILY@04


 TOP  12/25/17 04:00


 12/21/18 03:59  1/6/18 01:55


 


 


  (Chlorhexidine


 2% Cloth)  3 pack  UNSCH  PRN


 TOP  12/24/17 11:15


     


 


 


  (Ruby-Colace)  1 tab  BID


 PO  12/24/17 21:00


    1/5/18 21:08


 


 


  (Milk Of


 Magnesia Liq)  30 ml  Q12H  PRN


 PO  12/24/17 11:15


     


 


 


  (Senokot)  17.2 mg  Q12H  PRN


 PO  12/24/17 11:15


     


 


 


  (Dulcolax Supp)  10 mg  DAILY  PRN


 RECTAL  12/24/17 11:15


    12/29/17 15:19


 


 


  (D50w (Vial) Inj)  50 ml  UNSCH  PRN


 IV PUSH  12/24/17 11:30


     


 


 


  (Glucagon Inj)  1 mg  UNSCH  PRN


 OTHER  12/24/17 11:30


     


 


 


  (Vimpat)  100 mg  BID


 PO  12/24/17 14:15


    1/6/18 08:18


 


 


  (Pravachol)  40 mg  HS


 PO  12/24/17 21:00


    1/5/18 21:08


 


 


  (Trandate Inj)  10 mg  Q4H  PRN


 IV PUSH  12/24/17 14:30


    1/4/18 22:58


 


 


  (Apresoline Inj)  20 mg  Q4H  PRN


 IV PUSH  12/24/17 14:30


    1/5/18 01:22


 


 


  (Lopressor)  25 mg  Q6HR


 PO  12/24/17 21:00


   Future Hold 1/1/18 12:05


 


 


 Potassium Chloride  100 ml @ 


 50 mls/hr  Q2H  PRN


 IV  12/26/17 09:15


    1/6/18 14:02


 


 


 Potassium Chloride  100 ml @ 


 50 mls/hr  Q2H  PRN


 IV  12/26/17 09:15


    12/26/17 15:57


 


 


  (K-Lyte Cl  Eff)  50 meq  UNSCH  PRN


 PO  12/26/17 09:15


     


 


 


 Potassium Chloride  100 ml @ 


 25 mls/hr  UNSCH  PRN


 IV  12/26/17 09:15


     


 


 


 Potassium Chloride  100 ml @ 


 50 mls/hr  Q2H  PRN


 IV  12/26/17 09:15


     


 


 


 Magnesium Sulfate


 4 gm/Sodium


 Chloride  100 ml @ 


 50 mls/hr  UNSCH  PRN


 IV  12/26/17 09:15


     


 


 


  (Mag-Ox)  800 mg  UNSCH  PRN


 PO  12/26/17 09:15


     


 


 


 Magnesium Sulfate


 2 gm/Sodium


 Chloride  100 ml @ 


 50 mls/hr  UNSCH  PRN


 IV  12/26/17 09:15


     


 


 


  (K-Phos)  2,000 mg  Q4H  PRN


 PO  12/26/17 09:15


     


 


 


 Sodium Phosphate


 30 mmol/Sodium


 Chloride  250 ml @ 


 42 mls/hr  UNSCH  PRN


 IV  12/26/17 09:15


     


 


 


  (K-Phos)  2,000 mg  UNSCH  PRN


 PO/TUBE  12/26/17 09:15


     


 


 


 Potassium


 Phosphate 30 mmol/


 Sodium Chloride  260 ml @ 


 42 mls/hr  UNSCH  PRN


 IV  12/26/17 09:15


     


 


 


  (Lactinex Pkt)  1 gm  TID


 OG-TUBE  12/26/17 13:00


    1/6/18 12:25


 


 


  (Ativan Inj)  2 mg  Q5M  PRN


 IV PUSH  12/26/17 10:45


    12/26/17 14:18


 


 


  (Tylenol 650 Mg/


 20 ml Liq)  650 mg  Q6H  PRN


 NG  12/27/17 17:00


    1/6/18 16:04


 


 


  (NovoLOG


 SUPPLEMENTAL


 SCALE)  1  Q6HR


 SQ  12/27/17 18:00


    1/3/18 05:50


 


 


  (Albuterol Neb)  2.5 mg  Q2HR NEB  PRN


 NEB  12/27/17 17:00


     


 


 


  (Prevacid Odt)  30 mg  DAILY


 NG  12/28/17 09:00


    1/6/18 08:19


 


 


 Levetriacetam 500


 mg/Sodium Chloride  105 ml @ 


 420 mls/hr  Q6HR


 IV  12/28/17 12:00


    1/6/18 12:25


 


 


  (Miralax)  17 gm  BID


 PO  12/28/17 21:00


    1/5/18 21:08


 


 


  (Plavix)  75 mg  DAILY


 PO  12/30/17 09:00


    1/6/18 08:18


 


 


  (Heparin Inj)  5,000 units  Q8HR


 SQ  12/29/17 22:00


   Future Hold 1/3/18 05:43


 


 


  (Lactulose Liq)  30 ml  Q6HR


 PO  12/29/17 18:00


    1/6/18 05:09


 


 


 Fosphenytoin


 Sodium 200 mgpe/


 Sodium Chloride  54 ml @ 


 216 mls/hr  Q8HR


 IV  12/30/17 06:00


    1/6/18 14:15


 


 


  (Inderal)  40 mg  Q6H


 PO  1/1/18 13:30


    1/6/18 12:34


 


 


  (Catapres)  0.2 mg  Q8HR


 PO  1/2/18 22:00


    1/6/18 14:00


 


 


  (Peridex 0.12%


 Liq)  15 ml  BID@08,20


 MT  1/5/18 08:00


    1/6/18 08:20


 


 


 Midazolam HCl  100 ml @ 2


 mls/hr  TITRATE  PRN


 IV  1/5/18 03:15


    1/5/18 03:36


 


 


 Dexmedetomidine


 HCl 1000 mcg/


 Sodium Chloride  250 ml @ 


 3.8 mls/hr  TITRATE  PRN


 IV  1/5/18 08:45


    1/6/18 16:08


 


 


  (NS Flush)  See


 Protocol  DAILY


 IV FLUSH  1/6/18 09:00


     


 


 


  (NS Flush)  See


 Protocol  UNSCH  PRN


 IV FLUSH  1/5/18 13:15


     


 


 


  (Heparin Central


 Flush)  See


 Protocol  DAILY


 IV FLUSH  1/6/18 09:00


     


 


 


  (Heparin Central


 Flush)  See


 Protocol  UNSCH  PRN


 IV FLUSH  1/5/18 13:15


     


 


 


  (NS Flush)  SEE


 PROTOCOL  UNSCH  PRN


 IV FLUSH  1/5/18 13:15


     


 


 


 Piperacillin Sod/


 Tazobactam Sod  100 ml @ 


 200 mls/hr  Q6H


 IV  1/5/18 18:00


    1/6/18 12:24


 


 


 Pharmacy Profile


 Note  0 ml @ 0


 mls/hr  UNSCH


 OTHER  1/5/18 17:45


     


 


 


 Vancomycin HCl


 1000 mg/Sodium


 Chloride  250 ml @ 


 250 mls/hr  Q18H


 IV  1/6/18 15:00


    1/6/18 16:05


 


 


 Miscellaneous


 Information  SPECIFIC LAB


 TO BE


 DRAWN:VA...  ONCE  ONCE


 .XX  1/8/18 20:45


 1/8/18 20:46   


 








Allergies





Allergies


Coded Allergies


  Influenza Virus Vaccines (Unverified Allergy, Intermediate, 10/27/17)


  egg (Unverified Allergy, Intermediate, 10/27/17)


  codeine (Unverified Allergy, Mild, 10/27/17)


  meperidine (Unverified Allergy, Mild, 10/27/17)


  morphine (Unverified Allergy, Mild, 10/27/17)


  acetaminophen (Unverified Adverse Reaction, Mild, HEADACHE, 10/27/17)


  hydrocodone (Unverified Adverse Reaction, Mild, HEADACHE, 10/27/17)





Exam


I&O / VS











 1/6/18 1/6/18 1/7/18





 15:00 23:00 07:00


 


Intake Total 200 ml  


 


Balance 200 ml  


 


   


 


IV Total 200 ml  








Vital Signs








  Date Time  Temp Pulse Resp B/P (MAP) Pulse Ox O2 Delivery O2 Flow Rate FiO2


 


1/6/18 14:52     98   34


 


1/6/18 14:00  83      


 


1/6/18 12:01     99   35


 


1/6/18 12:00  92      


 


1/6/18 12:00 98.6 92 49 188/92 (124) 99   


 


1/6/18 10:00  79      


 


1/6/18 08:46     96   35


 


1/6/18 08:00  67      


 


1/6/18 08:00 98.8 67 18 188/79 (115) 100   


 


1/6/18 06:00  68      


 


1/6/18 06:00  68 20 149/73 (98) 100   


 


1/6/18 05:00  71      


 


1/6/18 05:00  71 20 153/68 (96) 96   


 


1/6/18 04:31     98   35


 


1/6/18 04:00  67      


 


1/6/18 04:00 98.4 70 20 171/71 (104) 97   


 


1/6/18 03:00  76 20 177/77 (110) 99   


 


1/6/18 03:00  76      


 


1/6/18 02:00  71      


 


1/6/18 02:00  71 20 144/74 (97) 96   


 


1/6/18 01:00  69      


 


1/6/18 01:00  69 20 171/71 (104) 95   


 


1/6/18 00:30     97   35


 


1/6/18 00:00 98.4 78 20 147/67 (93) 98   


 


1/6/18 00:00  78      


 


1/5/18 23:00  74      


 


1/5/18 23:00  74 20 142/66 (91) 96   


 


1/5/18 22:00  76      


 


1/5/18 22:00  76 20 160/72 (101) 98   


 


1/5/18 21:35     99   35


 


1/5/18 21:00  79 20 150/67 (94) 98   


 


1/5/18 21:00  79      


 


1/5/18 20:00  79 20 149/65 (93) 98   


 


1/5/18 20:00  79      


 


1/5/18 19:00  81      


 


1/5/18 19:00 98.7 81 20 133/62 (85) 99   


 


1/5/18 19:00  74      


 


1/5/18 18:30  77      


 


1/5/18 18:00  79      


 


1/5/18 17:30  80      


 


1/5/18 17:00  80      


 


1/5/18 16:30  81      


 


1/5/18 16:30  81 22 106/53 (70) 96   








Exam Comments


 opens eyes to voice and does follow commands


PERRL


withdraws BLE to tactile stimulation





Objective


Micro and Labs





Laboratory Tests








Test


  1/5/18


17:55 1/6/18


05:06 1/6/18


07:05


 


Urine Color YELLOW   


 


Urine Turbidity CLOUDY   


 


Urine pH 5.5   


 


Urine Specific Gravity 1.023   


 


Urine Protein 100   


 


Urine Glucose (UA) NEG   


 


Urine Ketones 40   


 


Urine Occult Blood SMALL   


 


Urine Nitrite NEG   


 


Urine Bilirubin NEG   


 


Urine Urobilinogen LESS THAN 2.0   


 


Urine Leukocyte Esterase LARGE   


 


Urine    


 


Urine WBC    


 


Urine WBC Clumps MANY   


 


Urine Bacteria MANY   


 


Urine Mucus FEW   


 


Urine Yeast with Hyphae RARE   


 


Urine Yeast (Budding) RARE   


 


Microscopic Urinalysis Comment


  CATH-CULTURE


IND 


  


 


 


White Blood Count  14.8  


 


Red Blood Count  2.52  


 


Hemoglobin  7.5  7.5 


 


Hematocrit  22.8  22.9 


 


Mean Corpuscular Volume  90.3  


 


Mean Corpuscular Hemoglobin  29.6  


 


Mean Corpuscular Hemoglobin


Concent 


  32.8 


  


 


 


Red Cell Distribution Width  15.9  


 


Platelet Count  419  


 


Mean Platelet Volume  7.4  


 


Blood Urea Nitrogen  19  


 


Creatinine  0.94  


 


Random Glucose  151  


 


Calcium Level  8.0  


 


Sodium Level  143  


 


Potassium Level  3.2  


 


Chloride Level  111  


 


Carbon Dioxide Level  19.8  


 


Anion Gap  12  


 


Estimat Glomerular Filtration


Rate 


  58 


  


 














 Date/Time


Source Procedure


Growth Status


 


 


 1/5/18 22:00


Blood Peripheral Aerobic Blood Culture - Preliminary


NO GROWTH IN 1 DAY Resulted


 


 1/5/18 22:00


Blood Peripheral Anaerobic Blood Culture - Preliminary


NO GROWTH IN 1 DAY Resulted





 12/28/17 09:47


Cerebral Spinal Fluid Lumbar Puncture Fungal Smear - Final


NO FUNGAL ELEMENTS SEEN. Resulted


 


 12/28/17 09:47


Cerebral Spinal Fluid Lumbar Puncture Fungal Culture - Preliminary


NO GROWTH IN 1 WEEK Resulted


 


 1/4/18 02:00


Stool Stool Stool Occult Blood (GARETH) - Final


HEMOCCULT NEGATIVE Complete





 1/5/18 17:55


Sputum Endotracheal Gram Stain - Final Resulted


 


 1/5/18 17:55


Sputum Endotracheal Sputum Culture - Preliminary


IMMATURE GROWTH - REINCUBATE Resulted


 


 1/5/18 17:55


Urine Catheterized Urine Urine Culture - Preliminary


IMMATURE GROWTH - REINCUBATE Resulted

















Colton Martin MD PhD Jan 6, 2018 16:14

## 2018-01-06 NOTE — HHI.CCPN
Subjective


Remarks/Hospital Course


This is a 77-year-old female that presented to the ED for evaluation of altered 

mental status. Per report, according to EMS the patient normally is more alert 

and  has a history of stroke, he was able to speak with the patient's sister 

who states that she is also power of , the patient apparently normally 

is ambulatory and can carry on a conversation without any difficulty. Per 

records reviewed, the patient was last seen normal and at her baseline at 1 am. 

The patient then sat down in her lazy chair, her sister also noted that she 

started beating her head against a piece of furniture.  The patient was noted 

to have a right frontal contusion upon presentation to the ED .She had a GCS of 

7-8 on arrival. The patient's medical history is significant for a recent 

admission 09/2017 for altered mental status, medication induced.  Her past 

medical history is also significant for a recent history of UTI and C. 

difficile in addition to her history of having a CVA and reportedly residual 

effects in the right upper and lower extremity.  Laboratory and imaging studies 

revealed that most likely the patient has a UTI, CT of the brain revealed no 

abnormality and the patient was noted to have an elevated lactate level.  The 

patient was emergently intubated in the ED, and the patient was noted to be 

significantly hypertensive and a nicardipine infusion was instituted.  Upon 

entering the ED the patient's blood pressure was noted to be 219/92, Cardizem 

infusion had been ordered.  The patient was noted to have spontaneous movement 

of the right upper and lower extremity with a gag reflex. Critical care 

medicine was consulted.





12/25: The patient was weaned off of nicardipine infusion.  Normotensive the 

patient continues on labetalol twice a day scheduled home dosing.  EEG 

attempted last evening however due to patient's movement bilaterally to 

complete artifact EEG reordered.  Fentanyl infusion discontinued for daily 

sedation vacation approximately 7 hours ago the patient remains nonresponsive.  

Opens eyes spontaneously, moves limbs spontaneously but does not follow any 

commands.  Brain MRI/MRA, and CT all negative findings.  EEG scheduled for 

a.m..  The patient continues in severe metabolic acidosis, 2 Amps of sodium 

bicarbonate IV push given this a.m., sodium bicarbonate infusion increased.  

Lactate is cleared, creatinine is improving, CT of the abdomen and pelvis is 

pending this afternoon.


12/26 CT report from yesterday shows bladder edema consistent with cystitis.  

URine culture with GNR  Blood cultures NGTD. . Getting EEG now. 


12/27:  Resting comfortable in bed in no acute distress.  No obvious seizure 

activity.  Plan for lumbar puncture in AM.  Low-grade temperatures overnight.  

Tolerating tube feeding at goal.  No bowel movement


12/28:  Continues EEG has been discontinued.  Tmax 100.  Currently 99.8.  No 

bowel movement since admission.  Tolerating tube feeds at goal.  No active 

seizure activity overnight.


12/29:  Tmax 100.  Currently 99.9.  Became tachycardic with sedation lowered so 

RN increased midazolam to 9 milligrams an hour.  Also fentanyl drip at 250 mcg 

per hour.  No bowel movement since admission.  Tolerating tube feedings with 

only 40 cc residuals.  Receiving methylnaltrexone milligrams subcutaneous 1 

along with mineral oil and suppository.  KUB pending.  Patient improved 

neurological exam.  Blinks with my suddenhand  movements towards face.  

Positive gag.  Withdraws to pain in all 4 extremities.


12/30 LP results not yet finalized. On versed 7 mg/hr and fentanyl 250 mcg/hr. 

EEG from 12/29 - no epileptiform features. 


12/31 Weaning fentanyl down but remains on versed for seizure suppression. 

Because she had difficult to control subclinical seizures, will need  close EEG 

monitoring for  benzo weaning.  


Was breathing on PSV for couple of hours day, even on sedation. 


CSF negative final culture.  No Leukocytosis or fever.  Will stop antibiotic. 

Updated sister. 


1/1: no seizures evident on EEG. slightly more awake today, but not following 

commands. had discussion with Dr. Martin, will wean versed to 4mg/hr and re-

evaluate on EEG tomorrow.


1/2: no seizures on EEG. ok to wean versed to off per Dr. Martin. will work on 

waking and weaning mechanical ventilation.


1/3: acute drop in hgb. ordered 1 unit prbc. sent haptoglobin and LDH. pt awake 

following commands. fails SBT for RSBI > 110.





Subjective





1/4: hgb improved. still awake, alert, following commands. extubated 

successfully. 


1/5: Reintubated last night for worsening respiratory status.  Currently orally 

intubated on mechanical ventilation, arousable easily follows commands.  On 

Versed gtt.


1/6: Remains orally intubated on mechanical ventilation.  Pancultured and 

started on antibiotics on 1/5.  Sputum growing Burkholderia





Objective





Vital Signs








  Date Time  Temp Pulse Resp B/P (MAP) Pulse Ox O2 Delivery O2 Flow Rate FiO2


 


1/6/18 12:01     99   35


 


1/6/18 12:00  92      


 


1/6/18 08:00 98.8  18 188/79 (115)    


 


1/4/18 21:00      Nasal Cannula 4.00 














Intake and Output   


 


 1/6/18 1/6/18 1/7/18





 08:00 16:00 00:00


 


Intake Total 500 ml 200 ml 


 


Output Total 350 ml  


 


Balance 150 ml 200 ml 








Result Diagram:  


1/6/18 0705                                                                    

            1/6/18 0506





Other Results





Microbiology








 Date/Time


Source Procedure


Growth Status


 


 


 1/4/18 02:00


Stool Stool Stool Occult Blood (GARETH) - Final


HEMOCCULT NEGATIVE Complete





 1/3/18 22:16


Sputum Endotracheal Gram Stain - Final Complete


 


 1/3/18 22:16 Sputum Culture - Final


Burkholderia Cepacia Complete








Imaging





Last Impressions








Chest X-Ray 12/29/17 0600 Signed





Impressions: 





 Service Date/Time:  Friday, December 29, 2017 04:41 - CONCLUSION:  No 





 significant interval change.     Christian Mendiola MD 


 


Lumbar Puncture Fluoroscopy 12/28/17 0000 Signed





Impressions: 





 Service Date/Time:  Thursday, December 28, 2017 09:39 - CONCLUSION: 





 Uncomplicated fluoroscopically guided lumbar puncture.     Reji Townsend MD 


 


Brain MRI 12/28/17 0000 Signed





Impressions: 





 Service Date/Time:  Thursday, December 28, 2017 10:24 - CONCLUSION:  No acute 





 intracranial findings     Bronson Mar MD 


 


Upper Extremity Ultrasound 12/27/17 0000 Signed





Impressions: 





 Service Date/Time:  Wednesday, December 27, 2017 21:32 - CONCLUSION:  1. No 





 sonographic evidence for right upper extremity DVT.     Reji Townsend MD 


 


Lower Extremity Ultrasound 12/27/17 0000 Signed





Impressions: 





 Service Date/Time:  Wednesday, December 27, 2017 21:12 - CONCLUSION:  1. No 





 sonographic evidence for lower extremity DVT.     Reji Townsend MD 


 


Abdomen/Pelvis CT 12/25/17 0000 Signed





Impressions: 





 Service Date/Time:  Monday, December 25, 2017 16:05 - CONCLUSION:  1. Mild 

edema 





 surrounding the urinary bladder, question cystitis. Erazo catheter in place. 

2. 





 Status post cholecystectomy.  3. Left lower lobe atelectasis. 4. Nasogastric 





 tube tip at the gastroesophageal junction.    Christian Mendiola MD 


 


Neck Magnetic Resonance Angiography 12/24/17 1119 Signed





Impressions: 





 Service Date/Time:  Sunday, December 24, 2017 13:11 - CONCLUSION:  Negative 

for 





 hemodynamically significant carotid stenosis     Pb López MD  FACR


 


Head Magnetic Resonance Angiography 12/24/17 1119 Signed





Impressions: 





 Service Date/Time:  Sunday, December 24, 2017 13:11 - CONCLUSION: Negative for 





 major branch vessel occlusion.      Pb López MD  FACR


 


Head CT 12/24/17 0851 Signed





Impressions: 





 Service Date/Time:  Sunday, December 24, 2017 09:02 - CONCLUSION:  Negative 

for 





 acute process..     Pb López MD  FACR


 


Cervical Spine CT 12/24/17 0000 Signed





Impressions: 





 Service Date/Time:  Sunday, December 24, 2017 09:07 - CONCLUSION:  Fusion as 





 above, negative for fracture.     Pb López MD  FACR








Objective Remarks


GENERAL: 77-year-old  female currently orotracheally intubated


SKIN: Warm and dry.


HEAD: Atraumatic. Normocephalic.  


EYES: R periorbital ecchmosis. Pupils equal and round 1-2 mm bilaterally. No 

scleral icterus. No injection or drainage. 


ENT: No nasal bleeding or discharge. Mucous membranes pink and moist.


NECK: Trachea midline. No JVD. 


CARDIOVASCULAR: RRR. 


RESPIRATORY: No accessory muscle use. Breath sounds equal bilaterally.  


GASTROINTESTINAL: Abdomen soft, non-tender, nondistended. No guarding. 


MUSCULOSKELETAL: Right upper extremity with 2+ edema and erythema.  Negative 

Doppler ultrasound 12/27 for DVT


NEUROLOGICAL: follows commands. RASS 0, orally intubated on mechanical 

ventilation moving both upper and lower extremities.





A/P


Assessment and Plan


Assessment: 77yF with subclinical status which has been very difficult to 

control. now on multiple AEDs and finally without evidence of ongoing status.  

Resumed on Versed gtt. following reintubation on 1/4.  Daily sedation vacation





Neuro/Psych





Post polio syndrome bilateral lower extremity weakness


Status epilepticus


History of CVATIA with R sided deficits - right lentiform nucleus


H/O seizures


Anxiety/depression


Left maxillary/ethmoid fluid levels question sinusitis





12/28 interpretation EEG - Abnormal EEG due to some mild to moderate slowing, 

but also some occasional sharps and spike and slow waves seen, but improved 

from prior EEG's.  Clinical correlation.


[id number] milligrams twice a day will be continued.  


Loaded with fosphenytoin - currently fosphenytoin  200 mg IV every  8 hours.  

Leevel 13 12/30


Levetiracetam 500 mg IV every 6 hours


Lorazepam 2 mg IV every 5 minutes as needed.  Seizures


TSH normal-3.0


Random cortisol level only 3.5. Unclear significance as patient had lactic 

acidemia but also hypertensive at the time.  Recheck 12/28  12.3.  Cosyntropin 

test ordered 12/29, had adequate response 


12/24 MRI brain-Moderate periventricular white matter changes are evident.  

There is no restricted diffusion.  There is moderate atrophy with dilatation of 

ventricular sulcal spaces.  There are no extra-axial fluid collections 

appreciated.  Posterior fossa is unremarkable.  


12/24 MRA brain-negative for major branch occlusion 


12/24 CT brain-no acute intracranial process


MRI brain 12/28 - There is mild periventricular white matter T2 prolongation. A 

tiny lacunar infarct in the right lentiform nucleus appears old. There is no 

evidence of intracranial mass or hemorrhage. The brainstem and posterior fossa 

structures are unremarkable. There is nothing to suggest acute infarction. 

There is fluid in the left maxillary sinus and occasional ethmoid sinuses.


Patient's home meds baclofen 10 mg 3 times a day, tramadol 50 mg every 4 hours 

as needed and sertraline 50 mg twice a day have been on hold due to  concern 

for serotonin syndrome, seizure activity and rhabdomyolysis.  


   Patient has history of altered mental status secondary to being medication 

induced.  Reported by family member -the patient had discontinued baclofen for 

approximately 3 months and took her initial dose of baclofen on 12/23 with a 

TID schedule dosing.





LP - HSV negative, final cx neg. 


Dr. Martin actively following.  





Respiratory:





Acute hypoxemic respiratory failure- improving.





Reintubated and placed back on mechanical ventilation on 1/4 following 

extubation.  Daily C Pap trials and will attempt to extubate again over next 

few days.


aggressive pulmonary toilet.


Maintain O2 sat greater than 92%


Albuterol/ipratropium aerosols nebs every 6 hours scheduled, albuterol aerosols 

every 2 hours when necessary





Cardiovascular:





Hypertensive emergency-resolved


History of hypertension


Hyperlipidemia


Congestive heart failure - ejection fraction 55-60% 2010





Labetalol, hydralazine IV , PRN to maintain SBP <160


Serial troponins were negative.


propranolol 40mg po q6h given hypertension and tachycardia.


   On metoprolol 50 mg sustained release daily at home


continue clopidogrel 75 mg by mouth daily/home medication Resumed 12/30


Continue pravastatin 40 mg by mouth daily/home medication for dyslipidemia


clonidine 0.2mg po q8hr.





Renal//FEN:





Chronic kidney disease stage IIIB


Hypo-magnesium





Monitor urine output


Monitor BMP


Replete electrolytes as needed


Previous Creatinine 09/2017- 1.15, upon admission 2.76, now at baseline








GI:





Esophageal stricture -history of esophageal dilatation


GERD


Elevated ammonia


Constipation- resolved.





Patient is currently on Glucerna 1.5 goal 50 cc per hour per nutrition's 

recommendations 


Lansoprazole 30 mg daily GI prophylaxis. On Dexlansoprazole 60 mg times daily 

at home


Docusate sodium/senna 1 tablet twice a day for bowel regimen.  polyethylene 

glycol 17 g twice a day and lactulose 30 cc 4x a day


   1 dose of methylnaltrexone 12 MG SUBCUTANEOUS 1 AND 30 CC MINERAL OIL 1 12/ 29


Now having BMs after aggressive above bowel regimen. 


KUB  12/29 - mild ileus


Ammonia level Recheck in a.m. 12/30 <10


formal swallow eval.





ID:


Sepsis


Escherichia coli UTI


Pneumonia





Monitor CBC


Blood cultures 12/24 - negative


Urine culture 12/24 -  Escherichia coli, pansensitive


sputum 1/3: burkholderia


Strep pneumococcal antigen 12/24  - negative 


CSF 12/28 Gram stain, fungal and AFB negative as of 12/29





Recent history of C. difficile colitis. Lactinex tid. 


monitor. reculture for fever.  Started vancomycin/Zosyn on 1/5 for recurrent 

fever with leukocytosis and suspected sepsis secondary to pneumonia








HEME/ONC:





History of cervical cancer


Normocytic anemia


History of right lower extremity DVT - posterior tibial and femoral vein 

previously on Apixaban





Clopidogrel 75 mg daily initially held for LP.  Restarted 12/30


Lactate level 1.8


Recheck Dopplers bilateral and right upper lower extremities 12/28 negative for 

DVT


1 unit prbc


f/u haptoglobin and LDH


hold SQH


guiac negative.


hgb improved. will hold SQH one more day,then likely can restart. unclear 

etiology of hgb drop and may be secondary to dilutional lab draw.





Endocrine:





Diabetes mellitus


Low cortisol





Glucose monitoring per ICU protocol, low dose regimen Novulog every 6 hours


Hold metformin thousand milligrams twice a day





Prophylaxis:


GI Prophylaxis


Lansoprazole 30 mg daily


DVT Prophylaxis


-- SCDs hold SQH given anemia.





Lines:


right sided PICC











Nate Anderson MD Jan 6, 2018 13:17

## 2018-01-07 VITALS — HEART RATE: 78 BPM

## 2018-01-07 VITALS
RESPIRATION RATE: 45 BRPM | SYSTOLIC BLOOD PRESSURE: 151 MMHG | DIASTOLIC BLOOD PRESSURE: 91 MMHG | HEART RATE: 84 BPM | OXYGEN SATURATION: 92 % | TEMPERATURE: 98.5 F

## 2018-01-07 VITALS — OXYGEN SATURATION: 99 %

## 2018-01-07 VITALS
SYSTOLIC BLOOD PRESSURE: 139 MMHG | OXYGEN SATURATION: 95 % | HEART RATE: 71 BPM | RESPIRATION RATE: 18 BRPM | DIASTOLIC BLOOD PRESSURE: 65 MMHG | TEMPERATURE: 99.4 F

## 2018-01-07 VITALS — OXYGEN SATURATION: 97 %

## 2018-01-07 VITALS
DIASTOLIC BLOOD PRESSURE: 74 MMHG | RESPIRATION RATE: 26 BRPM | HEART RATE: 88 BPM | SYSTOLIC BLOOD PRESSURE: 174 MMHG | OXYGEN SATURATION: 99 % | TEMPERATURE: 98.6 F

## 2018-01-07 VITALS — HEART RATE: 88 BPM

## 2018-01-07 VITALS — HEART RATE: 100 BPM

## 2018-01-07 VITALS
HEART RATE: 74 BPM | SYSTOLIC BLOOD PRESSURE: 145 MMHG | TEMPERATURE: 99.2 F | RESPIRATION RATE: 18 BRPM | OXYGEN SATURATION: 95 % | DIASTOLIC BLOOD PRESSURE: 69 MMHG

## 2018-01-07 VITALS
DIASTOLIC BLOOD PRESSURE: 78 MMHG | RESPIRATION RATE: 22 BRPM | OXYGEN SATURATION: 100 % | SYSTOLIC BLOOD PRESSURE: 146 MMHG | TEMPERATURE: 99.5 F | HEART RATE: 73 BPM

## 2018-01-07 VITALS — HEART RATE: 90 BPM

## 2018-01-07 VITALS — OXYGEN SATURATION: 94 %

## 2018-01-07 VITALS — OXYGEN SATURATION: 100 %

## 2018-01-07 VITALS
DIASTOLIC BLOOD PRESSURE: 77 MMHG | SYSTOLIC BLOOD PRESSURE: 169 MMHG | HEART RATE: 97 BPM | RESPIRATION RATE: 49 BRPM | OXYGEN SATURATION: 96 % | TEMPERATURE: 98.6 F

## 2018-01-07 VITALS — OXYGEN SATURATION: 98 %

## 2018-01-07 VITALS — HEART RATE: 86 BPM

## 2018-01-07 LAB
BUN SERPL-MCNC: 19 MG/DL (ref 7–18)
CALCIUM SERPL-MCNC: 8.3 MG/DL (ref 8.5–10.1)
CHLORIDE SERPL-SCNC: 115 MEQ/L (ref 98–107)
CREAT SERPL-MCNC: 1.04 MG/DL (ref 0.5–1)
ERYTHROCYTE [DISTWIDTH] IN BLOOD BY AUTOMATED COUNT: 15.9 % (ref 11.6–17.2)
GFR SERPLBLD BASED ON 1.73 SQ M-ARVRAT: 52 ML/MIN (ref 89–?)
GLUCOSE SERPL-MCNC: 138 MG/DL (ref 74–106)
HCO3 BLD-SCNC: 20.7 MEQ/L (ref 21–32)
HCT VFR BLD CALC: 22.5 % (ref 35–46)
HGB BLD-MCNC: 7.4 GM/DL (ref 11.6–15.3)
MCH RBC QN AUTO: 29.6 PG (ref 27–34)
MCHC RBC AUTO-ENTMCNC: 32.8 % (ref 32–36)
MCV RBC AUTO: 90.5 FL (ref 80–100)
PLATELET # BLD: 387 TH/MM3 (ref 150–450)
PMV BLD AUTO: 7.5 FL (ref 7–11)
RBC # BLD AUTO: 2.49 MIL/MM3 (ref 4–5.3)
SODIUM SERPL-SCNC: 145 MEQ/L (ref 136–145)
WBC # BLD AUTO: 13.7 TH/MM3 (ref 4–11)

## 2018-01-07 RX ADMIN — HEPARIN SODIUM (PORCINE) LOCK FLUSH IV SOLN 100 UNIT/ML SCH UNITS: 100 SOLUTION at 08:00

## 2018-01-07 RX ADMIN — LACTOBACILLUS ACIDOPHILUS / LACTOBACILLUS BULGARICUS SCH GM: 100 MILLION CFU STRENGTH GRANULES at 08:01

## 2018-01-07 RX ADMIN — LEVETIRACETAM SCH MLS/HR: 100 INJECTION, SOLUTION, CONCENTRATE INTRAVENOUS at 17:23

## 2018-01-07 RX ADMIN — TAZOBACTAM SODIUM AND PIPERACILLIN SODIUM SCH MLS/HR: 500; 4 INJECTION, SOLUTION INTRAVENOUS at 06:20

## 2018-01-07 RX ADMIN — INSULIN ASPART SCH: 100 INJECTION, SOLUTION INTRAVENOUS; SUBCUTANEOUS at 06:00

## 2018-01-07 RX ADMIN — INSULIN ASPART SCH: 100 INJECTION, SOLUTION INTRAVENOUS; SUBCUTANEOUS at 11:44

## 2018-01-07 RX ADMIN — WATER SCH ML: 1 IRRIGANT IRRIGATION at 17:25

## 2018-01-07 RX ADMIN — Medication SCH ML: at 08:02

## 2018-01-07 RX ADMIN — ACETYLCYSTEINE SCH ML: 100 SOLUTION ORAL; RESPIRATORY (INHALATION) at 20:35

## 2018-01-07 RX ADMIN — Medication SCH ML: at 08:00

## 2018-01-07 RX ADMIN — LACOSAMIDE SCH MG: 100 TABLET, FILM COATED ORAL at 08:01

## 2018-01-07 RX ADMIN — FOSPHENYTOIN SODIUM SCH MLS/HR: 50 INJECTION, SOLUTION INTRAMUSCULAR; INTRAVENOUS at 06:00

## 2018-01-07 RX ADMIN — ACETAMINOPHEN PRN MLS/HR: 10 INJECTION, SOLUTION INTRAVENOUS at 16:26

## 2018-01-07 RX ADMIN — LEVETIRACETAM SCH MLS/HR: 100 INJECTION, SOLUTION, CONCENTRATE INTRAVENOUS at 01:01

## 2018-01-07 RX ADMIN — INSULIN ASPART SCH: 100 INJECTION, SOLUTION INTRAVENOUS; SUBCUTANEOUS at 01:02

## 2018-01-07 RX ADMIN — TAZOBACTAM SODIUM AND PIPERACILLIN SODIUM SCH MLS/HR: 500; 4 INJECTION, SOLUTION INTRAVENOUS at 17:24

## 2018-01-07 RX ADMIN — LANSOPRAZOLE SCH MG: 30 TABLET, ORALLY DISINTEGRATING, DELAYED RELEASE ORAL at 08:01

## 2018-01-07 RX ADMIN — PROPRANOLOL HYDROCHLORIDE SCH MG: 20 TABLET ORAL at 01:02

## 2018-01-07 RX ADMIN — STANDARDIZED SENNA CONCENTRATE AND DOCUSATE SODIUM SCH TAB: 8.6; 5 TABLET, FILM COATED ORAL at 08:01

## 2018-01-07 RX ADMIN — POLYVINYL ALCOHOL SCH DROP: 14 SOLUTION/ DROPS OPHTHALMIC at 17:26

## 2018-01-07 RX ADMIN — POLYVINYL ALCOHOL SCH DROP: 14 SOLUTION/ DROPS OPHTHALMIC at 11:45

## 2018-01-07 RX ADMIN — HEPARIN SODIUM SCH UNITS: 10000 INJECTION, SOLUTION INTRAVENOUS; SUBCUTANEOUS at 21:49

## 2018-01-07 RX ADMIN — ALBUTEROL SULFATE PRN MG: 2.5 SOLUTION RESPIRATORY (INHALATION) at 20:34

## 2018-01-07 RX ADMIN — CLOPIDOGREL BISULFATE SCH MG: 75 TABLET, FILM COATED ORAL at 08:01

## 2018-01-07 RX ADMIN — WATER SCH ML: 1 IRRIGANT IRRIGATION at 06:00

## 2018-01-07 RX ADMIN — TAZOBACTAM SODIUM AND PIPERACILLIN SODIUM SCH MLS/HR: 500; 4 INJECTION, SOLUTION INTRAVENOUS at 01:00

## 2018-01-07 RX ADMIN — FOSPHENYTOIN SODIUM SCH MLS/HR: 50 INJECTION, SOLUTION INTRAMUSCULAR; INTRAVENOUS at 14:15

## 2018-01-07 RX ADMIN — ACETYLCYSTEINE SCH ML: 100 SOLUTION ORAL; RESPIRATORY (INHALATION) at 15:29

## 2018-01-07 RX ADMIN — POLYETHYLENE GLYCOL 3350 SCH GM: 17 POWDER, FOR SOLUTION ORAL at 08:02

## 2018-01-07 RX ADMIN — PROPRANOLOL HYDROCHLORIDE SCH MG: 20 TABLET ORAL at 08:46

## 2018-01-07 RX ADMIN — PRAVASTATIN SODIUM SCH MG: 40 TABLET ORAL at 21:00

## 2018-01-07 RX ADMIN — ACETAMINOPHEN PRN MLS/HR: 10 INJECTION, SOLUTION INTRAVENOUS at 21:48

## 2018-01-07 RX ADMIN — LACTOBACILLUS ACIDOPHILUS / LACTOBACILLUS BULGARICUS SCH GM: 100 MILLION CFU STRENGTH GRANULES at 17:24

## 2018-01-07 RX ADMIN — POLYVINYL ALCOHOL SCH DROP: 14 SOLUTION/ DROPS OPHTHALMIC at 08:00

## 2018-01-07 RX ADMIN — WATER SCH ML: 1 IRRIGANT IRRIGATION at 00:00

## 2018-01-07 RX ADMIN — ALBUTEROL SULFATE PRN MG: 2.5 SOLUTION RESPIRATORY (INHALATION) at 15:28

## 2018-01-07 RX ADMIN — LACTOBACILLUS ACIDOPHILUS / LACTOBACILLUS BULGARICUS SCH GM: 100 MILLION CFU STRENGTH GRANULES at 11:45

## 2018-01-07 RX ADMIN — TAZOBACTAM SODIUM AND PIPERACILLIN SODIUM SCH MLS/HR: 500; 4 INJECTION, SOLUTION INTRAVENOUS at 11:32

## 2018-01-07 RX ADMIN — FOSPHENYTOIN SODIUM SCH MLS/HR: 50 INJECTION, SOLUTION INTRAMUSCULAR; INTRAVENOUS at 21:49

## 2018-01-07 RX ADMIN — CHLORHEXIDINE GLUCONATE 0.12% ORAL RINSE SCH ML: 1.2 LIQUID ORAL at 08:02

## 2018-01-07 RX ADMIN — VANCOMYCIN HYDROCHLORIDE SCH MLS/HR: 1 INJECTION, SOLUTION INTRAVENOUS at 08:00

## 2018-01-07 RX ADMIN — PROPRANOLOL HYDROCHLORIDE SCH MG: 20 TABLET ORAL at 13:30

## 2018-01-07 RX ADMIN — INSULIN ASPART SCH: 100 INJECTION, SOLUTION INTRAVENOUS; SUBCUTANEOUS at 17:25

## 2018-01-07 RX ADMIN — LEVETIRACETAM SCH MLS/HR: 100 INJECTION, SOLUTION, CONCENTRATE INTRAVENOUS at 11:34

## 2018-01-07 RX ADMIN — WATER SCH ML: 1 IRRIGANT IRRIGATION at 11:44

## 2018-01-07 NOTE — HHI.CCPN
Subjective


Remarks/Hospital Course


This is a 77-year-old female that presented to the ED for evaluation of altered 

mental status. Per report, according to EMS the patient normally is more alert 

and  has a history of stroke, he was able to speak with the patient's sister 

who states that she is also power of , the patient apparently normally 

is ambulatory and can carry on a conversation without any difficulty. Per 

records reviewed, the patient was last seen normal and at her baseline at 1 am. 

The patient then sat down in her lazy chair, her sister also noted that she 

started beating her head against a piece of furniture.  The patient was noted 

to have a right frontal contusion upon presentation to the ED .She had a GCS of 

7-8 on arrival. The patient's medical history is significant for a recent 

admission 09/2017 for altered mental status, medication induced.  Her past 

medical history is also significant for a recent history of UTI and C. 

difficile in addition to her history of having a CVA and reportedly residual 

effects in the right upper and lower extremity.  Laboratory and imaging studies 

revealed that most likely the patient has a UTI, CT of the brain revealed no 

abnormality and the patient was noted to have an elevated lactate level.  The 

patient was emergently intubated in the ED, and the patient was noted to be 

significantly hypertensive and a nicardipine infusion was instituted.  Upon 

entering the ED the patient's blood pressure was noted to be 219/92, Cardizem 

infusion had been ordered.  The patient was noted to have spontaneous movement 

of the right upper and lower extremity with a gag reflex. Critical care 

medicine was consulted.





12/25: The patient was weaned off of nicardipine infusion.  Normotensive the 

patient continues on labetalol twice a day scheduled home dosing.  EEG 

attempted last evening however due to patient's movement bilaterally to 

complete artifact EEG reordered.  Fentanyl infusion discontinued for daily 

sedation vacation approximately 7 hours ago the patient remains nonresponsive.  

Opens eyes spontaneously, moves limbs spontaneously but does not follow any 

commands.  Brain MRI/MRA, and CT all negative findings.  EEG scheduled for 

a.m..  The patient continues in severe metabolic acidosis, 2 Amps of sodium 

bicarbonate IV push given this a.m., sodium bicarbonate infusion increased.  

Lactate is cleared, creatinine is improving, CT of the abdomen and pelvis is 

pending this afternoon.


12/26 CT report from yesterday shows bladder edema consistent with cystitis.  

URine culture with GNR  Blood cultures NGTD. . Getting EEG now. 


12/27:  Resting comfortable in bed in no acute distress.  No obvious seizure 

activity.  Plan for lumbar puncture in AM.  Low-grade temperatures overnight.  

Tolerating tube feeding at goal.  No bowel movement


12/28:  Continues EEG has been discontinued.  Tmax 100.  Currently 99.8.  No 

bowel movement since admission.  Tolerating tube feeds at goal.  No active 

seizure activity overnight.


12/29:  Tmax 100.  Currently 99.9.  Became tachycardic with sedation lowered so 

RN increased midazolam to 9 milligrams an hour.  Also fentanyl drip at 250 mcg 

per hour.  No bowel movement since admission.  Tolerating tube feedings with 

only 40 cc residuals.  Receiving methylnaltrexone milligrams subcutaneous 1 

along with mineral oil and suppository.  KUB pending.  Patient improved 

neurological exam.  Blinks with my suddenhand  movements towards face.  

Positive gag.  Withdraws to pain in all 4 extremities.


12/30 LP results not yet finalized. On versed 7 mg/hr and fentanyl 250 mcg/hr. 

EEG from 12/29 - no epileptiform features. 


12/31 Weaning fentanyl down but remains on versed for seizure suppression. 

Because she had difficult to control subclinical seizures, will need  close EEG 

monitoring for  benzo weaning.  


Was breathing on PSV for couple of hours day, even on sedation. 


CSF negative final culture.  No Leukocytosis or fever.  Will stop antibiotic. 

Updated sister. 


1/1: no seizures evident on EEG. slightly more awake today, but not following 

commands. had discussion with Dr. Martin, will wean versed to 4mg/hr and re-

evaluate on EEG tomorrow.


1/2: no seizures on EEG. ok to wean versed to off per Dr. Martin. will work on 

waking and weaning mechanical ventilation.


1/3: acute drop in hgb. ordered 1 unit prbc. sent haptoglobin and LDH. pt awake 

following commands. fails SBT for RSBI > 110.


1/4: hgb improved. still awake, alert, following commands. extubated 

successfully. 


1/5: Reintubated last night for worsening respiratory status.  Currently orally 

intubated on mechanical ventilation, arousable easily follows commands.  On 

Versed gtt.


1/6: Remains orally intubated on mechanical ventilation.  Pancultured and 

started on antibiotics on 1/5.  Sputum growing Burkholderia





Subjective





1/7: awake, sitting up, asking to be extubated. also asks to watch the football 

game today. tolerated 3 hours of t-piece trial. successfully extubated. after 

extubation, slightly tachypneic requiring NT suctioning, mucomyst nebs, and iv 

lasix. tachypneic in distress, but insists on non-invasive techniques for 

pulmonary toilet. patient does not want to be "DNR" but refuses any talks about 

reintubation right now.





Objective





Vital Signs








  Date Time  Temp Pulse Resp B/P (MAP) Pulse Ox O2 Delivery O2 Flow Rate FiO2


 


1/7/18 18:00  90      


 


1/7/18 17:25   28     


 


1/7/18 16:00 98.6   169/77 (107) 96   


 


1/7/18 11:36      Nasal Cannula 4 36














Intake and Output   


 


 1/7/18 1/7/18 1/8/18





 08:00 16:00 00:00


 


Intake Total 868 ml 509 ml 365 ml


 


Output Total 450 ml  1850 ml


 


Balance 418 ml 509 ml -1485 ml








Result Diagram:  


1/7/18 0400                                                                    

            1/7/18 0400





Other Results





Microbiology








 Date/Time


Source Procedure


Growth Status


 


 


 1/5/18 17:55


Sputum Endotracheal Gram Stain - Final Complete


 


 1/5/18 17:55 Sputum Culture - Final


Burkholderia Cepacia Complete








Laboratory Tests








Test


  1/7/18


11:13


 


Blood Gas Puncture Site RT RADIAL 


 


Blood Gas Patient Temperature 98.6 


 


Blood Gas HCO3


  16 mmol/L


(22-26)


 


Blood Gas Base Excess


  -8.3 mmol/L


(-2-2)


 


Blood Gas Oxygen Saturation 89 % () 


 


Arterial Blood pH


  7.40


(7.380-7.420)


 


Arterial Blood Partial


Pressure CO2 25 mmHg


(38-42)


 


Arterial Blood Partial


Pressure O2 62 mmHg


()


 


Arterial Blood Oxygen Content


  10.2 Vol %


(12.0-20.0)


 


Arterial Blood


Carboxyhemoglobin 1.1 % (0-4) 


 


 


Arterial Blood Methemoglobin 1.1 % (0-2) 


 


Blood Gas Hemoglobin


  8.1 G/DL


(12.0-16.0)


 


Oxygen Delivery Device T-TUBE 


 


Blood Gas Liter Flow 6 L/M 


 


Blood Gas Inspired Oxygen 40 % 








Imaging





Last Impressions








Chest X-Ray 12/29/17 0600 Signed





Impressions: 





 Service Date/Time:  Friday, December 29, 2017 04:41 - CONCLUSION:  No 





 significant interval change.     Christian Mendiola MD 


 


Lumbar Puncture Fluoroscopy 12/28/17 0000 Signed





Impressions: 





 Service Date/Time:  Thursday, December 28, 2017 09:39 - CONCLUSION: 





 Uncomplicated fluoroscopically guided lumbar puncture.     Reji Townsend MD 


 


Brain MRI 12/28/17 0000 Signed





Impressions: 





 Service Date/Time:  Thursday, December 28, 2017 10:24 - CONCLUSION:  No acute 





 intracranial findings     Bronson Mar MD 


 


Upper Extremity Ultrasound 12/27/17 0000 Signed





Impressions: 





 Service Date/Time:  Wednesday, December 27, 2017 21:32 - CONCLUSION:  1. No 





 sonographic evidence for right upper extremity DVT.     Reji Townsend MD 


 


Lower Extremity Ultrasound 12/27/17 0000 Signed





Impressions: 





 Service Date/Time:  Wednesday, December 27, 2017 21:12 - CONCLUSION:  1. No 





 sonographic evidence for lower extremity DVT.     Reji Townsend MD 


 


Abdomen/Pelvis CT 12/25/17 0000 Signed





Impressions: 





 Service Date/Time:  Monday, December 25, 2017 16:05 - CONCLUSION:  1. Mild 

edema 





 surrounding the urinary bladder, question cystitis. Erazo catheter in place. 

2. 





 Status post cholecystectomy.  3. Left lower lobe atelectasis. 4. Nasogastric 





 tube tip at the gastroesophageal junction.    Christian Mendiola MD 


 


Neck Magnetic Resonance Angiography 12/24/17 1119 Signed





Impressions: 





 Service Date/Time:  Sunday, December 24, 2017 13:11 - CONCLUSION:  Negative 

for 





 hemodynamically significant carotid stenosis     Pb López MD  FACR


 


Head Magnetic Resonance Angiography 12/24/17 1119 Signed





Impressions: 





 Service Date/Time:  Sunday, December 24, 2017 13:11 - CONCLUSION: Negative for 





 major branch vessel occlusion.      Pb López MD  FACR


 


Head CT 12/24/17 0851 Signed





Impressions: 





 Service Date/Time:  Sunday, December 24, 2017 09:02 - CONCLUSION:  Negative 

for 





 acute process..     Pb López MD  FACR


 


Cervical Spine CT 12/24/17 0000 Signed





Impressions: 





 Service Date/Time:  Sunday, December 24, 2017 09:07 - CONCLUSION:  Fusion as 





 above, negative for fracture.     Pb López MD  FACR








Objective Remarks


GENERAL: 77-year-old  female sitting in bed on NRB. tachypneic in 

distress.


SKIN: Warm and dry.


HEAD: Atraumatic. Normocephalic.  


EYES: Pupils equal and round 1-2 mm bilaterally. No scleral icterus. No 

injection or drainage. 


ENT: No nasal bleeding or discharge. Mucous membranes pink and moist.


NECK: Trachea midline. No JVD. 


CARDIOVASCULAR: RRR. 


RESPIRATORY: tachypneic. labored. on NRB. using accessory muscles. upper airway 

ronchi. minimal rales.  


GASTROINTESTINAL: Abdomen soft, non-tender, nondistended. No guarding. 


MUSCULOSKELETAL: Right upper extremity with 2+ edema and erythema.  Negative 

Doppler ultrasound 12/27 for DVT


NEUROLOGICAL: follows commands. RASS 0,





A/P


Assessment and Plan


Assessment: 77yF admitted with subclinical status, now controlled. Now with 

persistent hypoxic respiratory failure. Phos levels normal. Likely this is 

Myopathy of Critical Illness/Critical Illness polyneuropathy, as this fits with 

her deep sedation for multiple days while getting control of her status 

epilepticus. Remains extubated, but very tenuous respiratory status. LTAC would 

be a good option for ongoing pulmonary rehab.





Neuro/Psych





Post polio syndrome bilateral lower extremity weakness


Status epilepticus


History of CVATIA with R sided deficits - right lentiform nucleus


H/O seizures


Anxiety/depression


Left maxillary/ethmoid fluid levels question sinusitis





12/28 interpretation EEG - Abnormal EEG due to some mild to moderate slowing, 

but also some occasional sharps and spike and slow waves seen, but improved 

from prior EEG's.  Clinical correlation.


[id number] milligrams twice a day will be continued.  


Loaded with fosphenytoin - currently fosphenytoin  200 mg IV every  8 hours.  

Leevel 13 12/30


Levetiracetam 500 mg IV every 6 hours


Lorazepam 2 mg IV every 5 minutes as needed.  Seizures


TSH normal-3.0


Random cortisol level only 3.5. Unclear significance as patient had lactic 

acidemia but also hypertensive at the time.  Recheck 12/28  12.3.  Cosyntropin 

test ordered 12/29, had adequate response 


12/24 MRI brain-Moderate periventricular white matter changes are evident.  

There is no restricted diffusion.  There is moderate atrophy with dilatation of 

ventricular sulcal spaces.  There are no extra-axial fluid collections 

appreciated.  Posterior fossa is unremarkable.  


12/24 MRA brain-negative for major branch occlusion 


12/24 CT brain-no acute intracranial process


MRI brain 12/28 - There is mild periventricular white matter T2 prolongation. A 

tiny lacunar infarct in the right lentiform nucleus appears old. There is no 

evidence of intracranial mass or hemorrhage. The brainstem and posterior fossa 

structures are unremarkable. There is nothing to suggest acute infarction. 

There is fluid in the left maxillary sinus and occasional ethmoid sinuses.


Patient's home meds baclofen 10 mg 3 times a day, tramadol 50 mg every 4 hours 

as needed and sertraline 50 mg twice a day have been on hold due to  concern 

for serotonin syndrome, seizure activity and rhabdomyolysis.  


   Patient has history of altered mental status secondary to being medication 

induced.  Reported by family member -the patient had discontinued baclofen for 

approximately 3 months and took her initial dose of baclofen on 12/23 with a 

TID schedule dosing.





LP - HSV negative, final cx neg. 


Dr. Martin actively following.  





Respiratory:





Acute hypoxemic respiratory failure- improving.





Extubated 1/4, reintubated 1/4, extubated 1/7. 


aggressive pulmonary toilet.


Maintain O2 sat greater than 92%


Albuterol/ipratropium aerosols nebs every 6 hours scheduled, albuterol aerosols 

every 2 hours when necessary





Cardiovascular:





Hypertensive emergency-resolved


History of hypertension


Hyperlipidemia


Congestive heart failure - ejection fraction 55-60% 2010





Labetalol, hydralazine IV , PRN to maintain SBP <160


Serial troponins were negative.


propranolol 40mg po q6h given hypertension and tachycardia.


   On metoprolol 50 mg sustained release daily at home


continue clopidogrel 75 mg by mouth daily/home medication Resumed 12/30


Continue pravastatin 40 mg by mouth daily/home medication for dyslipidemia


clonidine 0.2mg po q8hr.





Renal//FEN:





Chronic kidney disease stage IIIB


Hypo-magnesium





Monitor urine output


Monitor BMP


Replete electrolytes as needed


Previous Creatinine 09/2017- 1.15, upon admission 2.76, now at baseline








GI:





Esophageal stricture -history of esophageal dilatation


GERD


Elevated ammonia


Constipation- resolved.





Patient is currently on Glucerna 1.5 goal 50 cc per hour per nutrition's 

recommendations 


Lansoprazole 30 mg daily GI prophylaxis. On Dexlansoprazole 60 mg times daily 

at home


Docusate sodium/senna 1 tablet twice a day for bowel regimen.  polyethylene 

glycol 17 g twice a day and lactulose 30 cc 4x a day


   1 dose of methylnaltrexone 12 MG SUBCUTANEOUS 1 AND 30 CC MINERAL OIL 1 12/ 29


Now having BMs after aggressive above bowel regimen. 


KUB  12/29 - mild ileus


Ammonia level Recheck in a.m. 12/30 <10


formal swallow eval.





ID:


Sepsis


Escherichia coli UTI


Pneumonia





Monitor CBC


Blood cultures 12/24 - negative


Urine culture 12/24 -  Escherichia coli, pansensitive


sputum 1/3: burkholderia


Strep pneumococcal antigen 12/24  - negative 


CSF 12/28 Gram stain, fungal and AFB negative as of 12/29


urine culture 1/5: Group D enterococcus, GNRs.





Recent history of C. difficile colitis. Lactinex tid. 


monitor. reculture for fever.  Started vancomycin/Zosyn on 1/5 for recurrent 

fever with leukocytosis and suspected sepsis secondary to pneumonia. 


f/u sensitivities.








HEME/ONC:





History of cervical cancer


Normocytic anemia


History of right lower extremity DVT - posterior tibial and femoral vein 

previously on Apixaban





Clopidogrel 75 mg daily initially held for LP.  Restarted 12/30


Lactate level 1.8


Recheck Dopplers bilateral and right upper lower extremities 12/28 negative for 

DVT


guiac negative.


hgb improved. start SQH 5000 q12h.





Endocrine:





Diabetes mellitus


Low cortisol





Glucose monitoring per ICU protocol, low dose regimen Novulog every 6 hours


Hold metformin thousand milligrams twice a day





Prophylaxis:


GI Prophylaxis


Lansoprazole 30 mg daily


DVT Prophylaxis


-- SCDs restart SQH.





Lines:


right sided PICC 





dispo: would be appropriate for LTAC level care.











Braydon Javier MD Jan 7, 2018 20:08

## 2018-01-07 NOTE — HHI.PR
Review/Management


Diagnosis


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


Plan


Continue current level of  cerebyx, keppra, vimpat. Phenytoin level corrected 

for hypoalbuminemia is 15.8


Diagnosis/Plan:  





Subjective


Subjective Comments


No acute events reported


No sz


Active Medications





Current Medications








 Medications


  (Trade)  Dose


 Ordered  Sig/Aaron


 Route  Start Time


 Stop Time Status Last Admin


 


  (NS Flush)  2 ml  UNSCH  PRN


 IV FLUSH  12/24/17 11:15


    12/29/17 08:59


 


 


  (NS Flush)  2 ml  BID


 IV FLUSH  12/24/17 21:00


    1/6/18 08:19


 


 


  (Tears Naturale


 Opth Soln)  1 drop  TID


 EACH EYE  12/24/17 13:00


    1/7/18 08:00


 


 


 Miscellaneous


 Information  1  Q361D


 XX  12/24/17 11:15


    12/24/17 11:15


 


 


  (Chlorhexidine


 2% Cloth)  


 Taper  DAILY@04


 TOP  12/25/17 04:00


 12/21/18 03:59  1/6/18 01:55


 


 


  (Chlorhexidine


 2% Cloth)  3 pack  UNSCH  PRN


 TOP  12/24/17 11:15


     


 


 


  (Ruby-Colace)  1 tab  BID


 PO  12/24/17 21:00


    1/5/18 21:08


 


 


  (Milk Of


 Magnesia Liq)  30 ml  Q12H  PRN


 PO  12/24/17 11:15


     


 


 


  (Senokot)  17.2 mg  Q12H  PRN


 PO  12/24/17 11:15


     


 


 


  (Dulcolax Supp)  10 mg  DAILY  PRN


 RECTAL  12/24/17 11:15


    12/29/17 15:19


 


 


  (D50w (Vial) Inj)  50 ml  UNSCH  PRN


 IV PUSH  12/24/17 11:30


     


 


 


  (Glucagon Inj)  1 mg  UNSCH  PRN


 OTHER  12/24/17 11:30


     


 


 


  (Vimpat)  100 mg  BID


 PO  12/24/17 14:15


    1/7/18 08:01


 


 


  (Pravachol)  40 mg  HS


 PO  12/24/17 21:00


    1/5/18 21:08


 


 


  (Trandate Inj)  10 mg  Q4H  PRN


 IV PUSH  12/24/17 14:30


    1/4/18 22:58


 


 


  (Apresoline Inj)  20 mg  Q4H  PRN


 IV PUSH  12/24/17 14:30


    1/5/18 01:22


 


 


  (Lopressor)  25 mg  Q6HR


 PO  12/24/17 21:00


   Future Hold 1/1/18 12:05


 


 


 Potassium Chloride  100 ml @ 


 50 mls/hr  Q2H  PRN


 IV  12/26/17 09:15


    1/6/18 14:02


 


 


 Potassium Chloride  100 ml @ 


 50 mls/hr  Q2H  PRN


 IV  12/26/17 09:15


    12/26/17 15:57


 


 


  (K-Lyte Cl  Eff)  50 meq  UNSCH  PRN


 PO  12/26/17 09:15


     


 


 


 Potassium Chloride  100 ml @ 


 25 mls/hr  UNSCH  PRN


 IV  12/26/17 09:15


     


 


 


 Potassium Chloride  100 ml @ 


 50 mls/hr  Q2H  PRN


 IV  12/26/17 09:15


     


 


 


 Magnesium Sulfate


 4 gm/Sodium


 Chloride  100 ml @ 


 50 mls/hr  UNSCH  PRN


 IV  12/26/17 09:15


     


 


 


  (Mag-Ox)  800 mg  UNSCH  PRN


 PO  12/26/17 09:15


     


 


 


 Magnesium Sulfate


 2 gm/Sodium


 Chloride  100 ml @ 


 50 mls/hr  UNSCH  PRN


 IV  12/26/17 09:15


     


 


 


  (K-Phos)  2,000 mg  Q4H  PRN


 PO  12/26/17 09:15


     


 


 


 Sodium Phosphate


 30 mmol/Sodium


 Chloride  250 ml @ 


 42 mls/hr  UNSCH  PRN


 IV  12/26/17 09:15


     


 


 


  (K-Phos)  2,000 mg  UNSCH  PRN


 PO/TUBE  12/26/17 09:15


     


 


 


 Potassium


 Phosphate 30 mmol/


 Sodium Chloride  260 ml @ 


 42 mls/hr  UNSCH  PRN


 IV  12/26/17 09:15


     


 


 


  (Lactinex Pkt)  1 gm  TID


 OG-TUBE  12/26/17 13:00


    1/7/18 08:01


 


 


  (Ativan Inj)  2 mg  Q5M  PRN


 IV PUSH  12/26/17 10:45


    12/26/17 14:18


 


 


  (Tylenol 650 Mg/


 20 ml Liq)  650 mg  Q6H  PRN


 NG  12/27/17 17:00


    1/6/18 23:14


 


 


  (NovoLOG


 SUPPLEMENTAL


 SCALE)  1  Q6HR


 SQ  12/27/17 18:00


    1/7/18 01:02


 


 


  (Albuterol Neb)  2.5 mg  Q2HR NEB  PRN


 NEB  12/27/17 17:00


     


 


 


  (Prevacid Odt)  30 mg  DAILY


 NG  12/28/17 09:00


    1/7/18 08:01


 


 


 Levetriacetam 500


 mg/Sodium Chloride  105 ml @ 


 420 mls/hr  Q6HR


 IV  12/28/17 12:00


    1/7/18 01:01


 


 


  (Miralax)  17 gm  BID


 PO  12/28/17 21:00


    1/5/18 21:08


 


 


  (Plavix)  75 mg  DAILY


 PO  12/30/17 09:00


    1/7/18 08:01


 


 


  (Heparin Inj)  5,000 units  Q8HR


 SQ  12/29/17 22:00


   Future Hold 1/3/18 05:43


 


 


  (Lactulose Liq)  30 ml  Q6HR


 PO  12/29/17 18:00


    1/6/18 05:09


 


 


 Fosphenytoin


 Sodium 200 mgpe/


 Sodium Chloride  54 ml @ 


 216 mls/hr  Q8HR


 IV  12/30/17 06:00


    1/7/18 06:00


 


 


  (Inderal)  40 mg  Q6H


 PO  1/1/18 13:30


    1/7/18 08:46


 


 


  (Catapres)  0.2 mg  Q8HR


 PO  1/2/18 22:00


    1/6/18 23:14


 


 


  (Peridex 0.12%


 Liq)  15 ml  BID@08,20


 MT  1/5/18 08:00


    1/7/18 08:02


 


 


 Midazolam HCl  100 ml @ 2


 mls/hr  TITRATE  PRN


 IV  1/5/18 03:15


    1/5/18 03:36


 


 


 Dexmedetomidine


 HCl 1000 mcg/


 Sodium Chloride  250 ml @ 


 3.8 mls/hr  TITRATE  PRN


 IV  1/5/18 08:45


    1/6/18 16:08


 


 


  (NS Flush)  See


 Protocol  DAILY


 IV FLUSH  1/6/18 09:00


     


 


 


  (NS Flush)  See


 Protocol  UNSCH  PRN


 IV FLUSH  1/5/18 13:15


     


 


 


  (Heparin Central


 Flush)  See


 Protocol  DAILY


 IV FLUSH  1/6/18 09:00


     


 


 


  (Heparin Central


 Flush)  See


 Protocol  UNSCH  PRN


 IV FLUSH  1/5/18 13:15


     


 


 


  (NS Flush)  SEE


 PROTOCOL  UNSCH  PRN


 IV FLUSH  1/5/18 13:15


     


 


 


 Piperacillin Sod/


 Tazobactam Sod  100 ml @ 


 200 mls/hr  Q6H


 IV  1/5/18 18:00


    1/7/18 06:20


 


 


 Pharmacy Profile


 Note  0 ml @ 0


 mls/hr  UNSCH


 OTHER  1/5/18 17:45


     


 


 


 Vancomycin HCl


 1000 mg/Sodium


 Chloride  250 ml @ 


 250 mls/hr  Q18H


 IV  1/6/18 15:00


    1/7/18 08:00


 


 


 Miscellaneous


 Information  SPECIFIC LAB


 TO BE


 DRAWN:VA...  ONCE  ONCE


 .XX  1/8/18 20:45


 1/8/18 20:46   


 








Allergies





Allergies


Coded Allergies


  Influenza Virus Vaccines (Unverified Allergy, Intermediate, 10/27/17)


  egg (Unverified Allergy, Intermediate, 10/27/17)


  codeine (Unverified Allergy, Mild, 10/27/17)


  meperidine (Unverified Allergy, Mild, 10/27/17)


  morphine (Unverified Allergy, Mild, 10/27/17)


  acetaminophen (Unverified Adverse Reaction, Mild, HEADACHE, 10/27/17)


  hydrocodone (Unverified Adverse Reaction, Mild, HEADACHE, 10/27/17)





Exam


I&O / VS











 1/7/18 1/7/18 1/8/18





 15:00 23:00 07:00


 


Intake Total 105 ml  


 


Balance 105 ml  


 


   


 


IV Total 105 ml  








Vital Signs








  Date Time  Temp Pulse Resp B/P (MAP) Pulse Ox O2 Delivery O2 Flow Rate FiO2


 


1/7/18 10:00  78      


 


1/7/18 08:32     100 T-piece 6.00 40


 


1/7/18 08:00        35


 


1/7/18 08:00 99.5 73 22 146/78 (100) 100   


 


1/7/18 08:00  73      


 


1/7/18 06:00  88      


 


1/7/18 04:16     98   35


 


1/7/18 04:00  88      


 


1/7/18 04:00        35


 


1/7/18 04:00 99.2 74 18 145/69 (94) 95   


 


1/7/18 02:00  86      


 


1/7/18 01:21     97   35


 


1/7/18 00:00        35


 


1/7/18 00:00 99.4 71 18 139/65 (89) 95   


 


1/7/18 00:00  86      


 


1/6/18 22:28     100   35


 


1/6/18 22:00  76      


 


1/6/18 20:08     100   35


 


1/6/18 20:00  76      


 


1/6/18 20:00        35


 


1/6/18 20:00 98.8 76 26 136/70 (92) 96   


 


1/6/18 18:00  77      


 


1/6/18 18:00  83      


 


1/6/18 17:49   19     


 


1/6/18 16:45 99.5       


 


1/6/18 16:00        35


 


1/6/18 16:00  81      


 


1/6/18 16:00 102.0 81 26 156/88 (110) 100   


 


1/6/18 14:52     98   34


 


1/6/18 14:00  83      


 


1/6/18 12:01     99   35


 


1/6/18 12:00  92      


 


1/6/18 12:00 98.6 92 49 188/92 (124) 99   








Exam Comments


 opens eyes to voice and does follow commands


PERRL


moves BUE equally with no drift





Objective


Micro and Labs





Laboratory Tests








Test


  1/6/18


20:10 1/6/18


22:30 1/7/18


04:00 1/7/18


08:25


 


Potassium Level 4.5  3.9  3.6  


 


White Blood Count   13.7  


 


Red Blood Count   2.49  


 


Hemoglobin   7.4  


 


Hematocrit   22.5  


 


Mean Corpuscular Volume   90.5  


 


Mean Corpuscular Hemoglobin   29.6  


 


Mean Corpuscular Hemoglobin


Concent 


  


  32.8 


  


 


 


Red Cell Distribution Width   15.9  


 


Platelet Count   387  


 


Mean Platelet Volume   7.5  


 


Blood Urea Nitrogen   19  


 


Creatinine   1.04  


 


Random Glucose   138  


 


Calcium Level   8.3  


 


Sodium Level   145  


 


Chloride Level   115  


 


Carbon Dioxide Level   20.7  


 


Anion Gap   9  


 


Estimat Glomerular Filtration


Rate 


  


  52 


  


 


 


Phosphorus Level    2.8 














 Date/Time


Source Procedure


Growth Status


 


 


 1/5/18 22:00


Blood Peripheral Aerobic Blood Culture - Preliminary


NO GROWTH IN 2 DAYS Resulted


 


 1/5/18 22:00


Blood Peripheral Anaerobic Blood Culture - Preliminary


NO GROWTH IN 2 DAYS Resulted





 12/28/17 09:47


Cerebral Spinal Fluid Lumbar Puncture Fungal Smear - Final


NO FUNGAL ELEMENTS SEEN. Resulted


 


 12/28/17 09:47


Cerebral Spinal Fluid Lumbar Puncture Fungal Culture - Preliminary


NO GROWTH IN 1 WEEK Resulted


 


 1/4/18 02:00


Stool Stool Stool Occult Blood (GARETH) - Final


HEMOCCULT NEGATIVE Complete





 1/5/18 17:55


Sputum Endotracheal Gram Stain - Final Resulted


 


 1/5/18 17:55


Sputum Endotracheal Sputum Culture - Preliminary


IMMATURE GROWTH - REINCUBATE Resulted


 


 1/5/18 17:55


Urine Catheterized Urine Urine Culture - Preliminary


Gram Negative Kvng


Group D Enterococcus Resulted

















Colton Martin MD PhD Jan 7, 2018 11:14

## 2018-01-08 VITALS — HEART RATE: 100 BPM

## 2018-01-08 VITALS
HEART RATE: 115 BPM | TEMPERATURE: 97.9 F | RESPIRATION RATE: 46 BRPM | DIASTOLIC BLOOD PRESSURE: 114 MMHG | SYSTOLIC BLOOD PRESSURE: 148 MMHG | OXYGEN SATURATION: 95 %

## 2018-01-08 VITALS
DIASTOLIC BLOOD PRESSURE: 65 MMHG | SYSTOLIC BLOOD PRESSURE: 138 MMHG | HEART RATE: 114 BPM | OXYGEN SATURATION: 97 % | RESPIRATION RATE: 42 BRPM | TEMPERATURE: 97.7 F

## 2018-01-08 VITALS
SYSTOLIC BLOOD PRESSURE: 154 MMHG | DIASTOLIC BLOOD PRESSURE: 81 MMHG | OXYGEN SATURATION: 98 % | HEART RATE: 109 BPM | RESPIRATION RATE: 36 BRPM | TEMPERATURE: 97.9 F

## 2018-01-08 VITALS — OXYGEN SATURATION: 100 % | HEART RATE: 102 BPM

## 2018-01-08 VITALS
SYSTOLIC BLOOD PRESSURE: 182 MMHG | TEMPERATURE: 99.5 F | RESPIRATION RATE: 28 BRPM | HEART RATE: 97 BPM | OXYGEN SATURATION: 96 % | DIASTOLIC BLOOD PRESSURE: 81 MMHG

## 2018-01-08 VITALS
HEART RATE: 100 BPM | DIASTOLIC BLOOD PRESSURE: 86 MMHG | RESPIRATION RATE: 30 BRPM | OXYGEN SATURATION: 97 % | TEMPERATURE: 99.8 F | SYSTOLIC BLOOD PRESSURE: 168 MMHG

## 2018-01-08 VITALS — HEART RATE: 112 BPM

## 2018-01-08 VITALS
TEMPERATURE: 97.8 F | HEART RATE: 100 BPM | DIASTOLIC BLOOD PRESSURE: 85 MMHG | RESPIRATION RATE: 49 BRPM | OXYGEN SATURATION: 99 % | SYSTOLIC BLOOD PRESSURE: 185 MMHG

## 2018-01-08 VITALS — HEART RATE: 102 BPM

## 2018-01-08 VITALS — OXYGEN SATURATION: 98 %

## 2018-01-08 VITALS — OXYGEN SATURATION: 99 %

## 2018-01-08 VITALS — HEART RATE: 105 BPM

## 2018-01-08 LAB
BASOPHILS # BLD AUTO: 0.1 TH/MM3 (ref 0–0.2)
BASOPHILS NFR BLD: 0.3 % (ref 0–2)
BUN SERPL-MCNC: 20 MG/DL (ref 7–18)
BUN SERPL-MCNC: 21 MG/DL (ref 7–18)
BURR CELLS BLD QL SMEAR: (no result)
CALCIUM SERPL-MCNC: 8.5 MG/DL (ref 8.5–10.1)
CALCIUM SERPL-MCNC: 8.5 MG/DL (ref 8.5–10.1)
CHLORIDE SERPL-SCNC: 109 MEQ/L (ref 98–107)
CHLORIDE SERPL-SCNC: 113 MEQ/L (ref 98–107)
CREAT SERPL-MCNC: 1.06 MG/DL (ref 0.5–1)
CREAT SERPL-MCNC: 1.16 MG/DL (ref 0.5–1)
EOSINOPHIL # BLD: 0 TH/MM3 (ref 0–0.4)
EOSINOPHIL NFR BLD: 0.1 % (ref 0–4)
ERYTHROCYTE [DISTWIDTH] IN BLOOD BY AUTOMATED COUNT: 16.3 % (ref 11.6–17.2)
ERYTHROCYTE [DISTWIDTH] IN BLOOD BY AUTOMATED COUNT: 16.6 % (ref 11.6–17.2)
GFR SERPLBLD BASED ON 1.73 SQ M-ARVRAT: 46 ML/MIN (ref 89–?)
GFR SERPLBLD BASED ON 1.73 SQ M-ARVRAT: 51 ML/MIN (ref 89–?)
GLUCOSE SERPL-MCNC: 128 MG/DL (ref 74–106)
GLUCOSE SERPL-MCNC: 159 MG/DL (ref 74–106)
HCO3 BLD-SCNC: 12.2 MEQ/L (ref 21–32)
HCO3 BLD-SCNC: 13.2 MEQ/L (ref 21–32)
HCT VFR BLD CALC: 27.6 % (ref 35–46)
HCT VFR BLD CALC: 27.9 % (ref 35–46)
HGB BLD-MCNC: 9.2 GM/DL (ref 11.6–15.3)
HGB BLD-MCNC: 9.7 GM/DL (ref 11.6–15.3)
LYMPHOCYTES # BLD AUTO: 1.1 TH/MM3 (ref 1–4.8)
LYMPHOCYTES NFR BLD AUTO: 5.1 % (ref 9–44)
LYMPHOCYTES: 2 % (ref 9–44)
MAGNESIUM SERPL-MCNC: 1.6 MG/DL (ref 1.5–2.5)
MCH RBC QN AUTO: 30.3 PG (ref 27–34)
MCH RBC QN AUTO: 32.4 PG (ref 27–34)
MCHC RBC AUTO-ENTMCNC: 33 % (ref 32–36)
MCHC RBC AUTO-ENTMCNC: 35 % (ref 32–36)
MCV RBC AUTO: 91.9 FL (ref 80–100)
MCV RBC AUTO: 92.4 FL (ref 80–100)
METAMYELOCYTES NFR BLD: 3 % (ref 0–1)
MONOCYTE #: 1.5 TH/MM3 (ref 0–0.9)
MONOCYTES NFR BLD: 6.7 % (ref 0–8)
MONOCYTES: 3 % (ref 0–8)
NEUTROPHILS # BLD AUTO: 19.1 TH/MM3 (ref 1.8–7.7)
NEUTROPHILS NFR BLD AUTO: 87.8 % (ref 16–70)
NEUTS BAND # BLD MANUAL: 20.6 TH/MM3 (ref 1.8–7.7)
NEUTS BAND NFR BLD: 3 % (ref 0–6)
NEUTS SEG NFR BLD MANUAL: 89 % (ref 16–70)
OVALOCYTES BLD QL SMEAR: (no result)
PHOSPHATE SERPL-MCNC: 3.4 MG/DL (ref 2.5–4.9)
PLATELET # BLD: 609 TH/MM3 (ref 150–450)
PLATELET # BLD: 635 TH/MM3 (ref 150–450)
PMV BLD AUTO: 7.5 FL (ref 7–11)
PMV BLD AUTO: 7.7 FL (ref 7–11)
RBC # BLD AUTO: 2.98 MIL/MM3 (ref 4–5.3)
RBC # BLD AUTO: 3.04 MIL/MM3 (ref 4–5.3)
SODIUM SERPL-SCNC: 142 MEQ/L (ref 136–145)
SODIUM SERPL-SCNC: 147 MEQ/L (ref 136–145)
SPHEROCYTES BLD QL SMEAR: (no result)
TOXIC GRANULES BLD QL SMEAR: (no result)
WBC # BLD AUTO: 19.7 TH/MM3 (ref 4–11)
WBC # BLD AUTO: 21.7 TH/MM3 (ref 4–11)

## 2018-01-08 PROCEDURE — 0BH17EZ INSERTION OF ENDOTRACHEAL AIRWAY INTO TRACHEA, VIA NATURAL OR ARTIFICIAL OPENING: ICD-10-PCS | Performed by: INTERNAL MEDICINE

## 2018-01-08 PROCEDURE — 5A1945Z RESPIRATORY VENTILATION, 24-96 CONSECUTIVE HOURS: ICD-10-PCS | Performed by: INTERNAL MEDICINE

## 2018-01-08 RX ADMIN — TAZOBACTAM SODIUM AND PIPERACILLIN SODIUM SCH MLS/HR: 500; 4 INJECTION, SOLUTION INTRAVENOUS at 05:45

## 2018-01-08 RX ADMIN — POTASSIUM CHLORIDE PRN MLS/HR: 400 INJECTION, SOLUTION INTRAVENOUS at 12:30

## 2018-01-08 RX ADMIN — LACOSAMIDE SCH MG: 100 TABLET, FILM COATED ORAL at 09:00

## 2018-01-08 RX ADMIN — ACETYLCYSTEINE SCH ML: 100 SOLUTION ORAL; RESPIRATORY (INHALATION) at 15:43

## 2018-01-08 RX ADMIN — LACTOBACILLUS ACIDOPHILUS / LACTOBACILLUS BULGARICUS SCH GM: 100 MILLION CFU STRENGTH GRANULES at 17:32

## 2018-01-08 RX ADMIN — POTASSIUM CHLORIDE PRN MLS/HR: 400 INJECTION, SOLUTION INTRAVENOUS at 17:40

## 2018-01-08 RX ADMIN — ALBUTEROL SULFATE PRN MG: 2.5 SOLUTION RESPIRATORY (INHALATION) at 03:53

## 2018-01-08 RX ADMIN — TAZOBACTAM SODIUM AND PIPERACILLIN SODIUM SCH MLS/HR: 500; 4 INJECTION, SOLUTION INTRAVENOUS at 13:01

## 2018-01-08 RX ADMIN — LEVETIRACETAM SCH MLS/HR: 100 INJECTION, SOLUTION, CONCENTRATE INTRAVENOUS at 13:02

## 2018-01-08 RX ADMIN — INSULIN ASPART SCH: 100 INJECTION, SOLUTION INTRAVENOUS; SUBCUTANEOUS at 12:00

## 2018-01-08 RX ADMIN — WATER SCH ML: 1 IRRIGANT IRRIGATION at 12:00

## 2018-01-08 RX ADMIN — Medication PRN MLS/HR: at 23:48

## 2018-01-08 RX ADMIN — PROPOFOL PRN MLS/HR: 10 INJECTION, EMULSION INTRAVENOUS at 20:26

## 2018-01-08 RX ADMIN — ALBUTEROL SULFATE PRN MG: 2.5 SOLUTION RESPIRATORY (INHALATION) at 15:43

## 2018-01-08 RX ADMIN — ALBUTEROL SULFATE PRN MG: 2.5 SOLUTION RESPIRATORY (INHALATION) at 20:37

## 2018-01-08 RX ADMIN — ACETYLCYSTEINE SCH ML: 100 SOLUTION ORAL; RESPIRATORY (INHALATION) at 20:39

## 2018-01-08 RX ADMIN — CHLORHEXIDINE GLUCONATE 0.12% ORAL RINSE SCH ML: 1.2 LIQUID ORAL at 20:00

## 2018-01-08 RX ADMIN — LEVETIRACETAM SCH MLS/HR: 100 INJECTION, SOLUTION, CONCENTRATE INTRAVENOUS at 05:45

## 2018-01-08 RX ADMIN — ALBUTEROL SULFATE PRN MG: 2.5 SOLUTION RESPIRATORY (INHALATION) at 09:07

## 2018-01-08 RX ADMIN — TAZOBACTAM SODIUM AND PIPERACILLIN SODIUM SCH MLS/HR: 500; 4 INJECTION, SOLUTION INTRAVENOUS at 01:50

## 2018-01-08 RX ADMIN — LACOSAMIDE SCH MG: 100 TABLET, FILM COATED ORAL at 22:40

## 2018-01-08 RX ADMIN — FOSPHENYTOIN SODIUM SCH MLS/HR: 50 INJECTION, SOLUTION INTRAMUSCULAR; INTRAVENOUS at 15:07

## 2018-01-08 RX ADMIN — PROPRANOLOL HYDROCHLORIDE SCH MG: 20 TABLET ORAL at 19:30

## 2018-01-08 RX ADMIN — CHLORHEXIDINE GLUCONATE 0.12% ORAL RINSE SCH ML: 1.2 LIQUID ORAL at 09:30

## 2018-01-08 RX ADMIN — Medication SCH ML: at 09:32

## 2018-01-08 RX ADMIN — PROPRANOLOL HYDROCHLORIDE SCH MG: 20 TABLET ORAL at 07:30

## 2018-01-08 RX ADMIN — STANDARDIZED SENNA CONCENTRATE AND DOCUSATE SODIUM SCH TAB: 8.6; 5 TABLET, FILM COATED ORAL at 09:00

## 2018-01-08 RX ADMIN — PRAVASTATIN SODIUM SCH MG: 40 TABLET ORAL at 21:00

## 2018-01-08 RX ADMIN — WATER SCH ML: 1 IRRIGANT IRRIGATION at 17:32

## 2018-01-08 RX ADMIN — STANDARDIZED SENNA CONCENTRATE AND DOCUSATE SODIUM SCH TAB: 8.6; 5 TABLET, FILM COATED ORAL at 22:40

## 2018-01-08 RX ADMIN — VANCOMYCIN HYDROCHLORIDE SCH MLS/HR: 1 INJECTION, SOLUTION INTRAVENOUS at 20:28

## 2018-01-08 RX ADMIN — LEVETIRACETAM SCH MLS/HR: 100 INJECTION, SOLUTION, CONCENTRATE INTRAVENOUS at 01:51

## 2018-01-08 RX ADMIN — ACETYLCYSTEINE SCH ML: 100 SOLUTION ORAL; RESPIRATORY (INHALATION) at 09:08

## 2018-01-08 RX ADMIN — HEPARIN SODIUM SCH UNITS: 10000 INJECTION, SOLUTION INTRAVENOUS; SUBCUTANEOUS at 20:26

## 2018-01-08 RX ADMIN — ACETYLCYSTEINE SCH ML: 100 SOLUTION ORAL; RESPIRATORY (INHALATION) at 03:54

## 2018-01-08 RX ADMIN — INSULIN ASPART SCH: 100 INJECTION, SOLUTION INTRAVENOUS; SUBCUTANEOUS at 18:00

## 2018-01-08 RX ADMIN — POLYVINYL ALCOHOL SCH DROP: 14 SOLUTION/ DROPS OPHTHALMIC at 12:32

## 2018-01-08 RX ADMIN — PRAVASTATIN SODIUM SCH MG: 40 TABLET ORAL at 22:40

## 2018-01-08 RX ADMIN — LACTOBACILLUS ACIDOPHILUS / LACTOBACILLUS BULGARICUS SCH GM: 100 MILLION CFU STRENGTH GRANULES at 12:32

## 2018-01-08 RX ADMIN — CLOPIDOGREL BISULFATE SCH MG: 75 TABLET, FILM COATED ORAL at 09:00

## 2018-01-08 RX ADMIN — LANSOPRAZOLE SCH MG: 30 TABLET, ORALLY DISINTEGRATING, DELAYED RELEASE ORAL at 09:00

## 2018-01-08 RX ADMIN — HEPARIN SODIUM SCH UNITS: 10000 INJECTION, SOLUTION INTRAVENOUS; SUBCUTANEOUS at 09:35

## 2018-01-08 RX ADMIN — INSULIN ASPART SCH: 100 INJECTION, SOLUTION INTRAVENOUS; SUBCUTANEOUS at 23:55

## 2018-01-08 RX ADMIN — LEVETIRACETAM SCH MLS/HR: 100 INJECTION, SOLUTION, CONCENTRATE INTRAVENOUS at 17:37

## 2018-01-08 RX ADMIN — TAZOBACTAM SODIUM AND PIPERACILLIN SODIUM SCH MLS/HR: 500; 4 INJECTION, SOLUTION INTRAVENOUS at 17:37

## 2018-01-08 RX ADMIN — POLYETHYLENE GLYCOL 3350 SCH GM: 17 POWDER, FOR SOLUTION ORAL at 09:00

## 2018-01-08 RX ADMIN — POLYETHYLENE GLYCOL 3350 SCH GM: 17 POWDER, FOR SOLUTION ORAL at 21:00

## 2018-01-08 RX ADMIN — Medication SCH ML: at 09:34

## 2018-01-08 RX ADMIN — FOSPHENYTOIN SODIUM SCH MLS/HR: 50 INJECTION, SOLUTION INTRAMUSCULAR; INTRAVENOUS at 05:19

## 2018-01-08 RX ADMIN — POLYVINYL ALCOHOL SCH DROP: 14 SOLUTION/ DROPS OPHTHALMIC at 09:33

## 2018-01-08 RX ADMIN — PROPRANOLOL HYDROCHLORIDE SCH MG: 20 TABLET ORAL at 12:32

## 2018-01-08 RX ADMIN — Medication SCH ML: at 20:28

## 2018-01-08 RX ADMIN — LACTOBACILLUS ACIDOPHILUS / LACTOBACILLUS BULGARICUS SCH GM: 100 MILLION CFU STRENGTH GRANULES at 09:00

## 2018-01-08 RX ADMIN — HEPARIN SODIUM (PORCINE) LOCK FLUSH IV SOLN 100 UNIT/ML SCH UNITS: 100 SOLUTION at 09:33

## 2018-01-08 RX ADMIN — POLYVINYL ALCOHOL SCH DROP: 14 SOLUTION/ DROPS OPHTHALMIC at 17:38

## 2018-01-08 NOTE — HHI.CCPN
Subjective


Remarks/Hospital Course


This is a 77-year-old female that presented to the ED for evaluation of altered 

mental status. Per report, according to EMS the patient normally is more alert 

and  has a history of stroke, he was able to speak with the patient's sister 

who states that she is also power of , the patient apparently normally 

is ambulatory and can carry on a conversation without any difficulty. Per 

records reviewed, the patient was last seen normal and at her baseline at 1 am. 

The patient then sat down in her lazy chair, her sister also noted that she 

started beating her head against a piece of furniture.  The patient was noted 

to have a right frontal contusion upon presentation to the ED .She had a GCS of 

7-8 on arrival. The patient's medical history is significant for a recent 

admission 09/2017 for altered mental status, medication induced.  Her past 

medical history is also significant for a recent history of UTI and C. 

difficile in addition to her history of having a CVA and reportedly residual 

effects in the right upper and lower extremity.  Laboratory and imaging studies 

revealed that most likely the patient has a UTI, CT of the brain revealed no 

abnormality and the patient was noted to have an elevated lactate level.  The 

patient was emergently intubated in the ED, and the patient was noted to be 

significantly hypertensive and a nicardipine infusion was instituted.  Upon 

entering the ED the patient's blood pressure was noted to be 219/92, Cardizem 

infusion had been ordered.  The patient was noted to have spontaneous movement 

of the right upper and lower extremity with a gag reflex. Critical care 

medicine was consulted.





12/25: The patient was weaned off of nicardipine infusion.  Normotensive the 

patient continues on labetalol twice a day scheduled home dosing.  EEG 

attempted last evening however due to patient's movement bilaterally to 

complete artifact EEG reordered.  Fentanyl infusion discontinued for daily 

sedation vacation approximately 7 hours ago the patient remains nonresponsive.  

Opens eyes spontaneously, moves limbs spontaneously but does not follow any 

commands.  Brain MRI/MRA, and CT all negative findings.  EEG scheduled for 

a.m..  The patient continues in severe metabolic acidosis, 2 Amps of sodium 

bicarbonate IV push given this a.m., sodium bicarbonate infusion increased.  

Lactate is cleared, creatinine is improving, CT of the abdomen and pelvis is 

pending this afternoon.


12/26 CT report from yesterday shows bladder edema consistent with cystitis.  

URine culture with GNR  Blood cultures NGTD. . Getting EEG now. 


12/27:  Resting comfortable in bed in no acute distress.  No obvious seizure 

activity.  Plan for lumbar puncture in AM.  Low-grade temperatures overnight.  

Tolerating tube feeding at goal.  No bowel movement


12/28:  Continues EEG has been discontinued.  Tmax 100.  Currently 99.8.  No 

bowel movement since admission.  Tolerating tube feeds at goal.  No active 

seizure activity overnight.


12/29:  Tmax 100.  Currently 99.9.  Became tachycardic with sedation lowered so 

RN increased midazolam to 9 milligrams an hour.  Also fentanyl drip at 250 mcg 

per hour.  No bowel movement since admission.  Tolerating tube feedings with 

only 40 cc residuals.  Receiving methylnaltrexone milligrams subcutaneous 1 

along with mineral oil and suppository.  KUB pending.  Patient improved 

neurological exam.  Blinks with my suddenhand  movements towards face.  

Positive gag.  Withdraws to pain in all 4 extremities.


12/30 LP results not yet finalized. On versed 7 mg/hr and fentanyl 250 mcg/hr. 

EEG from 12/29 - no epileptiform features. 


12/31 Weaning fentanyl down but remains on versed for seizure suppression. 

Because she had difficult to control subclinical seizures, will need  close EEG 

monitoring for  benzo weaning.  


Was breathing on PSV for couple of hours day, even on sedation. 


CSF negative final culture.  No Leukocytosis or fever.  Will stop antibiotic. 

Updated sister. 


1/1: no seizures evident on EEG. slightly more awake today, but not following 

commands. had discussion with Dr. Martin, will wean versed to 4mg/hr and re-

evaluate on EEG tomorrow.


1/2: no seizures on EEG. ok to wean versed to off per Dr. Martin. will work on 

waking and weaning mechanical ventilation.


1/3: acute drop in hgb. ordered 1 unit prbc. sent haptoglobin and LDH. pt awake 

following commands. fails SBT for RSBI > 110.


1/4: hgb improved. still awake, alert, following commands. extubated 

successfully. 


1/5: Reintubated last night for worsening respiratory status.  Currently orally 

intubated on mechanical ventilation, arousable easily follows commands.  On 

Versed gtt.


1/6: Remains orally intubated on mechanical ventilation.  Pancultured and 

started on antibiotics on 1/5.  Sputum growing Burkholderia





Subjective





1/7: awake, sitting up, asking to be extubated. also asks to watch the football 

game today. tolerated 3 hours of t-piece trial. successfully extubated. after 

extubation, slightly tachypneic requiring NT suctioning, mucomyst nebs, and iv 

lasix. tachypneic in distress, but insists on non-invasive techniques for 

pulmonary toilet. patient does not want to be "DNR" but refuses any talks about 

reintubation right now.


1/8 Patient was extubated yesterday. Hypertensive, on partial rebreather.





Objective





Vital Signs








  Date Time  Temp Pulse Resp B/P (MAP) Pulse Ox O2 Delivery O2 Flow Rate FiO2


 


1/8/18 09:09     99 Partial Rebreather 12.00 


 


1/8/18 06:00  100      


 


1/8/18 04:00 99.8  30 168/86 (113)    


 


1/7/18 11:36        36














Intake and Output   


 


 1/8/18 1/8/18 1/9/18





 08:00 16:00 00:00


 


Intake Total 389 ml  


 


Output Total 2450 ml  


 


Balance -2061 ml  








Result Diagram:  


1/8/18 0430                                                                    

            1/8/18 0430





Other Results





Laboratory Tests








Test


  1/8/18


04:30


 


White Blood Count 19.7 TH/MM3 


 


Red Blood Count 3.04 MIL/MM3 


 


Hemoglobin 9.2 GM/DL 


 


Hematocrit 27.9 % 


 


Mean Corpuscular Volume 91.9 FL 


 


Mean Corpuscular Hemoglobin 30.3 PG 


 


Mean Corpuscular Hemoglobin


Concent 33.0 % 


 


 


Red Cell Distribution Width 16.3 % 


 


Platelet Count 609 TH/MM3 


 


Mean Platelet Volume 7.7 FL 


 


Blood Urea Nitrogen 20 MG/DL 


 


Creatinine 1.06 MG/DL 


 


Random Glucose 128 MG/DL 


 


Calcium Level 8.5 MG/DL 


 


Sodium Level 142 MEQ/L 


 


Potassium Level 2.7 MEQ/L 


 


Chloride Level 109 MEQ/L 


 


Carbon Dioxide Level 13.2 MEQ/L 


 


Anion Gap 20 MEQ/L 


 


Estimat Glomerular Filtration


Rate 51 ML/MIN 


 








Imaging





Last Impressions








Chest X-Ray 1/5/18 0000 Signed





Impressions: 





 Service Date/Time:  Friday, January 5, 2018 13:05 - CONCLUSION:  1. 





 Uncomplicated PICC line placement.     Casey Cortez MD 


 


Abdomen X-Ray 12/29/17 0000 Signed





Impressions: 





 Service Date/Time:  Friday, December 29, 2017 20:00 - CONCLUSION:  Nonspecific

, 





 nonobstructive bowel gas pattern which to represent a mild ileus. Nasogastric 





 tube is in place.     Teo Carranza MD 


 


Lumbar Puncture Fluoroscopy 12/28/17 0000 Signed





Impressions: 





 Service Date/Time:  Thursday, December 28, 2017 09:39 - CONCLUSION: 





 Uncomplicated fluoroscopically guided lumbar puncture.     Reji Townsend MD 


 


Brain MRI 12/28/17 0000 Signed





Impressions: 





 Service Date/Time:  Thursday, December 28, 2017 10:24 - CONCLUSION:  No acute 





 intracranial findings     Bronson Mar MD 


 


Upper Extremity Ultrasound 12/27/17 0000 Signed





Impressions: 





 Service Date/Time:  Wednesday, December 27, 2017 21:32 - CONCLUSION:  1. No 





 sonographic evidence for right upper extremity DVT.     Reji Townsend MD 


 


Lower Extremity Ultrasound 12/27/17 0000 Signed





Impressions: 





 Service Date/Time:  Wednesday, December 27, 2017 21:12 - CONCLUSION:  1. No 





 sonographic evidence for lower extremity DVT.     Reji Townsend MD 


 


Abdomen/Pelvis CT 12/25/17 0000 Signed





Impressions: 





 Service Date/Time:  Monday, December 25, 2017 16:05 - CONCLUSION:  1. Mild 

edema 





 surrounding the urinary bladder, question cystitis. Erazo catheter in place. 

2. 





 Status post cholecystectomy.  3. Left lower lobe atelectasis. 4. Nasogastric 





 tube tip at the gastroesophageal junction.    Christian Mendiola MD 


 


Neck Magnetic Resonance Angiography 12/24/17 1119 Signed





Impressions: 





 Service Date/Time:  Sunday, December 24, 2017 13:11 - CONCLUSION:  Negative 

for 





 hemodynamically significant carotid stenosis     Pb López MD  FACR


 


Head Magnetic Resonance Angiography 12/24/17 1119 Signed





Impressions: 





 Service Date/Time:  Sunday, December 24, 2017 13:11 - CONCLUSION: Negative for 





 major branch vessel occlusion.      Pb López MD  FACR


 


Head CT 12/24/17 0851 Signed





Impressions: 





 Service Date/Time:  Sunday, December 24, 2017 09:02 - CONCLUSION:  Negative 

for 





 acute process..     Pb López MD  FACR


 


Cervical Spine CT 12/24/17 0000 Signed





Impressions: 





 Service Date/Time:  [place], December 24, 2017 09:07 - CONCLUSION:  Fusion as 





 above, negative for fracture.     Pb López MD  FACR








Objective Remarks


GENERAL: 77-year-old  female sitting in bed on partial rebreather


SKIN: Warm and dry.


HEAD: Atraumatic. Normocephalic.  


EYES: Pupils equal and round 1-2 mm bilaterally. No scleral icterus. No 

injection or drainage. 


ENT: No nasal bleeding or discharge. Mucous membranes pink and moist.


NECK: Trachea midline. No JVD. 


CARDIOVASCULAR: RRR. 


RESPIRATORY: Diffuse coarse BS/crackles.


GASTROINTESTINAL: Abdomen soft, non-tender, nondistended. No guarding. 


MUSCULOSKELETAL: Right upper extremity with 2+ edema and erythema.  Negative 

Doppler ultrasound 12/27 for DVT


NEUROLOGICAL: follows commands. RASS 0,





A/P


Assessment and Plan


Assessment: 77yF admitted with subclinical status, now controlled. Now with 

persistent hypoxic respiratory failure. Phos levels normal. Likely this is 

Myopathy of Critical Illness/Critical Illness polyneuropathy, as this fits with 

her deep sedation for multiple days while getting control of her status 

epilepticus. Remains extubated, but very tenuous respiratory status. LTAC would 

be a good option for ongoing pulmonary rehab.





Neuro/Psych





Post polio syndrome bilateral lower extremity weakness


Status epilepticus


History of CVATIA with R sided deficits - right lentiform nucleus


H/O seizures


Anxiety/depression


Left maxillary/ethmoid fluid levels question sinusitis





12/28 interpretation EEG - Abnormal EEG due to some mild to moderate slowing, 

but also some occasional sharps and spike and slow waves seen, but improved 

from prior EEG's.  Clinical correlation.


Vdlriofyyp495 milligrams twice a day will be continued.  


Loaded with fosphenytoin - currently fosphenytoin  200 mg IV every  8 hours.  

Level  8.2 on 12/5


Levetiracetam 500 mg IV every 6 hours


Lorazepam 2 mg IV every 5 minutes as needed.  Seizures


TSH normal-3.0


Random cortisol level only 3.5. Unclear significance as patient had lactic 

acidemia but also hypertensive at the time.  Recheck 12/28  12.3.  Cosyntropin 

test ordered 12/29, had adequate response 


12/24 MRI brain-Moderate periventricular white matter changes are evident.  

There is no restricted diffusion.  There is moderate atrophy with dilatation of 

ventricular sulcal spaces.  There are no extra-axial fluid collections 

appreciated.  Posterior fossa is unremarkable.  


12/24 MRA brain-negative for major branch occlusion 


12/24 CT brain-no acute intracranial process


MRI brain 12/28 - There is mild periventricular white matter T2 prolongation. A 

tiny lacunar infarct in the right lentiform nucleus appears old. There is no 

evidence of intracranial mass or hemorrhage. The brainstem and posterior fossa 

structures are unremarkable. There is nothing to suggest acute infarction. 

There is fluid in the left maxillary sinus and occasional ethmoid sinuses.


Patient's home meds baclofen 10 mg 3 times a day, tramadol 50 mg every 4 hours 

as needed and sertraline 50 mg twice a day have been on hold due to  concern 

for serotonin syndrome, seizure activity and rhabdomyolysis.  


Patient has history of altered mental status secondary to being medication 

induced.  Reported by family member -the patient had discontinued baclofen for 

approximately 3 months and took her initial dose of baclofen on 12/23 with a 

TID schedule dosing.





LP - HSV negative, final cx neg. 


Dr. Martin actively following.  





Respiratory:





Acute hypoxemic respiratory failure- improving.





Extubated 1/4, reintubated 1/4, extubated 1/7. 


aggressive pulmonary toilet.


Maintain O2 sat greater than 92%


Albuterol/ipratropium aerosols nebs every 6 hours scheduled, albuterol aerosols 

every 2 hours when necessary





Cardiovascular:





Hypertensive emergency-resolved


History of hypertension


Hyperlipidemia


Congestive heart failure - ejection fraction 55-60% 2010





Labetalol, hydralazine IV , PRN to maintain SBP <160


Serial troponin were negative.


propranolol 40mg po q6h given hypertension and tachycardia.


On metoprolol 50 mg sustained release daily at home


continue clopidogrel 75 mg by mouth daily/home medication Resumed 12/30


Continue pravastatin 40 mg by mouth daily/home medication for dyslipidemia


clonidine 0.2mg po q8hr.





Renal//FEN:





Chronic kidney disease stage IIIB


Hypokalemia





Monitor renal function, electrolytes replacement per protocol. For K 

replacement today


Given Lasix 100mg IV total yesterday








GI:





Esophageal stricture -history of esophageal dilatation


GERD


Elevated ammonia


Constipation- resolved.





Speech eval, diet per speech


Lansoprazole 30 mg daily GI prophylaxis. On Dexlansoprazole 60 mg times daily 

at home


Docusate sodium/senna 1 tablet twice a day for bowel regimen.  polyethylene 

glycol 17 g twice a day and lactulose 30 cc 4x a day


KUB  12/29 - mild ileus


Ammonia level Recheck in a.m. 12/30 <10








ID:


Sepsis


Escherichia coli UTI


Pneumonia





Monitor CBC


Blood cultures 12/24 - negative


Urine culture 12/24 -  Escherichia coli, pansensitive


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


Strep pneumococcal antigen 12/24  - negative 


CSF 12/28 Gram stain, fungal and AFB negative as of 12/29


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


Recent history of C. difficile colitis. Lactinex tid. 


Continue vancomycin/Zosyn started on 1/5 








HEME/ONC:





History of cervical cancer


Normocytic anemia


History of right lower extremity DVT - posterior tibial and femoral vein 

previously on Apixaban





Clopidogrel 75 mg daily initially held for LP.  Restarted 12/30


Lactate level 1.8


Recheck Doppler bilateral and right upper lower extremities 12/28 negative for 

DVT


guaiac negative.


Monitor CBC





Endocrine:





Diabetes mellitus


Low cortisol





Glucose monitoring per ICU protocol, low dose regimen Novulog every 6 hours


Hold metformin thousand milligrams twice a day





Prophylaxis:


GI Prophylaxis


Lansoprazole 30 mg daily


DVT Prophylaxis


-- SCDs SQH.





Lines:


right sided PICC 





Dispo: would be appropriate for LTAC level care.





Level 2











Roberth Amaya MD Jan 8, 2018 12:17

## 2018-01-08 NOTE — RADRPT
EXAM DATE/TIME:  01/08/2018 18:04 

 

[facility] COMPARISON:     

No previous studies available for comparison.

 

                     

INDICATIONS :     

Post intubation.

                     

 

MEDICAL HISTORY :            

Hypercholesterolemia. Hypertension Diabetes mellitus type II. Siezures.   

 

SURGICAL HISTORY :        

Fusion, cervical. Cholecystectomy. Hysterectomy. Lumbar discectomy.

 

ENCOUNTER:     

Subsequent                                        

 

ACUITY:     

3 days      

 

PAIN SCORE:     

Non-responsive.

 

LOCATION:     

Bilateral chest 

 

FINDINGS:     

A single view of the chest demonstrates the endotracheal tube is in good position. There is a patchy 
infiltrate right upper lobe in the left lung base. Heart and mediastinum are unremarkable..  Osseous 
structures are intact.

 

CONCLUSION:     

Endotracheal tube in good position. Patchy infiltrate throughout the lungs are unchanged. PICC line i
n good position..  

 

 

 

 Zackary Dyer MD on January 08, 2018 at 18:32           

Board Certified Radiologist.

 This report was verified electronically.

## 2018-01-08 NOTE — RADRPT
EXAM DATE/TIME:  01/08/2018 16:26 

 

[facility] COMPARISON:     

CHEST SINGLE AP, January 05, 2018, 13:05.

 

                     

INDICATIONS :     

Short of breath. 

                     

 

MEDICAL HISTORY :     

Hypercholesterolemia.  Hypertension  Diabetes mellitus type II.   Siezures.   

 

SURGICAL HISTORY :     

Fusion, cervical. Cholecystectomy. Hysterectomy. Lumbar discectomy. 

 

ENCOUNTER:     

Subsequent                                        

 

ACUITY:     

3 days      

 

PAIN SCORE:     

Non-responsive.

 

LOCATION:     

Bilateral chest 

 

FINDINGS:     

A single view of the chest demonstrates the lungs to be symmetrically aerated with persistent left ba
silar airspace disease and possible associated effusion. Developing airspace disease in the right upp
er lung. Patient has been extubated and the nasogastric tube removed right upper extremity PICC line 
remains unchanged in position with the tip projecting over the central venous system. Heart size is n
ormal. Osseous structures are intact.

 

CONCLUSION:     

1. Persistent left basilar airspace disease/consolidation with possible associated effusion.

2. Developing airspace infiltrate in the right upper lung.

3. Patient has been extubated and the nasogastric tube removed. Right upper extremity PICC line is un
changed in position.

 

 

 

 Mario Santos MD on January 08, 2018 at 16:51           

Board Certified Radiologist.

 This report was verified electronically.

## 2018-01-08 NOTE — RADRPT
EXAM DATE/TIME:  01/08/2018 21:55 

 

[facility] COMPARISON:     

CHEST SINGLE AP, January 08, 2018, 18:04.

 

                     

INDICATIONS :     

OG tube placement.

                     

 

MEDICAL HISTORY :            

Hypercholesterolemia. Hypertension Diabetes mellitus type II. Siezures.   

 

SURGICAL HISTORY :        

Fusion, cervical. Cholecystectomy. Hysterectomy. Lumbar discectomy.

 

ENCOUNTER:     

Subsequent                                        

 

ACUITY:     

3 days      

 

PAIN SCORE:     

Non-responsive.

 

LOCATION:     

Bilateral chest 

 

FINDINGS:     

A single view of the chest demonstrates the endotracheal tube, right-sided PICC line and diffuse patc
hy infiltrates are unchanged. An orogastric tube is in good position.  The cardiomediastinal contours
 are unremarkable.  Osseous structures are intact. There is widening of the left a.c. joint.

 

CONCLUSION:     

Nasogastric tube is in good position. Patchy infiltrates are unchanged.

 

 

 

 Zackary Dyer MD on January 08, 2018 at 22:17           

Board Certified Radiologist.

 This report was verified electronically.

## 2018-01-09 VITALS
DIASTOLIC BLOOD PRESSURE: 56 MMHG | TEMPERATURE: 97.4 F | SYSTOLIC BLOOD PRESSURE: 111 MMHG | HEART RATE: 72 BPM | RESPIRATION RATE: 29 BRPM | OXYGEN SATURATION: 99 %

## 2018-01-09 VITALS
DIASTOLIC BLOOD PRESSURE: 54 MMHG | SYSTOLIC BLOOD PRESSURE: 123 MMHG | TEMPERATURE: 99 F | HEART RATE: 75 BPM | RESPIRATION RATE: 17 BRPM | OXYGEN SATURATION: 98 %

## 2018-01-09 VITALS
RESPIRATION RATE: 20 BRPM | DIASTOLIC BLOOD PRESSURE: 58 MMHG | SYSTOLIC BLOOD PRESSURE: 113 MMHG | OXYGEN SATURATION: 99 % | HEART RATE: 76 BPM | TEMPERATURE: 98.6 F

## 2018-01-09 VITALS
DIASTOLIC BLOOD PRESSURE: 60 MMHG | SYSTOLIC BLOOD PRESSURE: 126 MMHG | RESPIRATION RATE: 22 BRPM | TEMPERATURE: 99.2 F | HEART RATE: 91 BPM | OXYGEN SATURATION: 98 %

## 2018-01-09 VITALS — OXYGEN SATURATION: 99 %

## 2018-01-09 VITALS
RESPIRATION RATE: 17 BRPM | TEMPERATURE: 98.4 F | SYSTOLIC BLOOD PRESSURE: 114 MMHG | OXYGEN SATURATION: 100 % | DIASTOLIC BLOOD PRESSURE: 56 MMHG | HEART RATE: 71 BPM

## 2018-01-09 VITALS — OXYGEN SATURATION: 100 %

## 2018-01-09 VITALS — HEART RATE: 72 BPM

## 2018-01-09 VITALS — HEART RATE: 76 BPM

## 2018-01-09 VITALS — HEART RATE: 73 BPM

## 2018-01-09 VITALS — OXYGEN SATURATION: 98 %

## 2018-01-09 VITALS
SYSTOLIC BLOOD PRESSURE: 111 MMHG | DIASTOLIC BLOOD PRESSURE: 65 MMHG | TEMPERATURE: 97.5 F | HEART RATE: 97 BPM | RESPIRATION RATE: 27 BRPM | OXYGEN SATURATION: 100 %

## 2018-01-09 VITALS — HEART RATE: 69 BPM

## 2018-01-09 VITALS — HEART RATE: 101 BPM

## 2018-01-09 LAB
BASOPHILS # BLD AUTO: 0.1 TH/MM3 (ref 0–0.2)
BASOPHILS NFR BLD: 0.9 % (ref 0–2)
BUN SERPL-MCNC: 22 MG/DL (ref 7–18)
CALCIUM SERPL-MCNC: 8 MG/DL (ref 8.5–10.1)
CHLORIDE SERPL-SCNC: 119 MEQ/L (ref 98–107)
CREAT SERPL-MCNC: 1.05 MG/DL (ref 0.5–1)
EOSINOPHIL # BLD: 0.3 TH/MM3 (ref 0–0.4)
EOSINOPHIL NFR BLD: 2.2 % (ref 0–4)
ERYTHROCYTE [DISTWIDTH] IN BLOOD BY AUTOMATED COUNT: 16.8 % (ref 11.6–17.2)
GFR SERPLBLD BASED ON 1.73 SQ M-ARVRAT: 51 ML/MIN (ref 89–?)
GLUCOSE SERPL-MCNC: 114 MG/DL (ref 74–106)
HCO3 BLD-SCNC: 18.4 MEQ/L (ref 21–32)
HCT VFR BLD CALC: 24.7 % (ref 35–46)
HGB BLD-MCNC: 8.3 GM/DL (ref 11.6–15.3)
LYMPHOCYTES # BLD AUTO: 2.1 TH/MM3 (ref 1–4.8)
LYMPHOCYTES NFR BLD AUTO: 17.3 % (ref 9–44)
MAGNESIUM SERPL-MCNC: 1.9 MG/DL (ref 1.5–2.5)
MCH RBC QN AUTO: 30.6 PG (ref 27–34)
MCHC RBC AUTO-ENTMCNC: 33.5 % (ref 32–36)
MCV RBC AUTO: 91.2 FL (ref 80–100)
MONOCYTE #: 0.9 TH/MM3 (ref 0–0.9)
MONOCYTES NFR BLD: 6.9 % (ref 0–8)
NEUTROPHILS # BLD AUTO: 9 TH/MM3 (ref 1.8–7.7)
NEUTROPHILS NFR BLD AUTO: 72.7 % (ref 16–70)
PHOSPHATE SERPL-MCNC: 3 MG/DL (ref 2.5–4.9)
PLATELET # BLD: 538 TH/MM3 (ref 150–450)
PMV BLD AUTO: 7.1 FL (ref 7–11)
RBC # BLD AUTO: 2.71 MIL/MM3 (ref 4–5.3)
SODIUM SERPL-SCNC: 151 MEQ/L (ref 136–145)
WBC # BLD AUTO: 12.4 TH/MM3 (ref 4–11)

## 2018-01-09 RX ADMIN — LEVETIRACETAM SCH MLS/HR: 100 INJECTION, SOLUTION, CONCENTRATE INTRAVENOUS at 06:36

## 2018-01-09 RX ADMIN — PROPRANOLOL HYDROCHLORIDE SCH MG: 20 TABLET ORAL at 13:07

## 2018-01-09 RX ADMIN — CHLORHEXIDINE GLUCONATE 0.12% ORAL RINSE SCH ML: 1.2 LIQUID ORAL at 20:45

## 2018-01-09 RX ADMIN — TAZOBACTAM SODIUM AND PIPERACILLIN SODIUM SCH MLS/HR: 500; 4 INJECTION, SOLUTION INTRAVENOUS at 17:06

## 2018-01-09 RX ADMIN — LACTOBACILLUS ACIDOPHILUS / LACTOBACILLUS BULGARICUS SCH GM: 100 MILLION CFU STRENGTH GRANULES at 17:33

## 2018-01-09 RX ADMIN — ACETYLCYSTEINE SCH ML: 100 SOLUTION ORAL; RESPIRATORY (INHALATION) at 04:30

## 2018-01-09 RX ADMIN — TAZOBACTAM SODIUM AND PIPERACILLIN SODIUM SCH MLS/HR: 500; 4 INJECTION, SOLUTION INTRAVENOUS at 11:50

## 2018-01-09 RX ADMIN — INSULIN ASPART SCH: 100 INJECTION, SOLUTION INTRAVENOUS; SUBCUTANEOUS at 16:38

## 2018-01-09 RX ADMIN — ALBUTEROL SULFATE PRN MG: 2.5 SOLUTION RESPIRATORY (INHALATION) at 04:30

## 2018-01-09 RX ADMIN — ACETYLCYSTEINE SCH ML: 100 SOLUTION ORAL; RESPIRATORY (INHALATION) at 16:00

## 2018-01-09 RX ADMIN — ACETYLCYSTEINE SCH ML: 100 SOLUTION ORAL; RESPIRATORY (INHALATION) at 08:46

## 2018-01-09 RX ADMIN — FOSPHENYTOIN SODIUM SCH MLS/HR: 50 INJECTION, SOLUTION INTRAMUSCULAR; INTRAVENOUS at 02:11

## 2018-01-09 RX ADMIN — CHLORHEXIDINE GLUCONATE 0.12% ORAL RINSE SCH ML: 1.2 LIQUID ORAL at 08:15

## 2018-01-09 RX ADMIN — LEVETIRACETAM SCH MLS/HR: 100 INJECTION, SOLUTION, CONCENTRATE INTRAVENOUS at 11:27

## 2018-01-09 RX ADMIN — ALBUTEROL SULFATE PRN MG: 2.5 SOLUTION RESPIRATORY (INHALATION) at 16:30

## 2018-01-09 RX ADMIN — POLYETHYLENE GLYCOL 3350 SCH GM: 17 POWDER, FOR SOLUTION ORAL at 09:00

## 2018-01-09 RX ADMIN — HEPARIN SODIUM SCH UNITS: 10000 INJECTION, SOLUTION INTRAVENOUS; SUBCUTANEOUS at 20:46

## 2018-01-09 RX ADMIN — HEPARIN SODIUM (PORCINE) LOCK FLUSH IV SOLN 100 UNIT/ML SCH UNITS: 100 SOLUTION at 08:15

## 2018-01-09 RX ADMIN — INSULIN ASPART SCH: 100 INJECTION, SOLUTION INTRAVENOUS; SUBCUTANEOUS at 11:52

## 2018-01-09 RX ADMIN — CLOPIDOGREL BISULFATE SCH MG: 75 TABLET, FILM COATED ORAL at 08:14

## 2018-01-09 RX ADMIN — Medication SCH ML: at 08:15

## 2018-01-09 RX ADMIN — PROPOFOL PRN MLS/HR: 10 INJECTION, EMULSION INTRAVENOUS at 09:55

## 2018-01-09 RX ADMIN — INSULIN ASPART SCH: 100 INJECTION, SOLUTION INTRAVENOUS; SUBCUTANEOUS at 06:00

## 2018-01-09 RX ADMIN — PROPOFOL PRN MLS/HR: 10 INJECTION, EMULSION INTRAVENOUS at 22:05

## 2018-01-09 RX ADMIN — Medication PRN MLS/HR: at 18:09

## 2018-01-09 RX ADMIN — PRAVASTATIN SODIUM SCH MG: 40 TABLET ORAL at 20:46

## 2018-01-09 RX ADMIN — LANSOPRAZOLE SCH MG: 30 TABLET, ORALLY DISINTEGRATING, DELAYED RELEASE ORAL at 08:14

## 2018-01-09 RX ADMIN — Medication SCH ML: at 20:46

## 2018-01-09 RX ADMIN — WATER SCH ML: 1 IRRIGANT IRRIGATION at 00:00

## 2018-01-09 RX ADMIN — HEPARIN SODIUM SCH UNITS: 10000 INJECTION, SOLUTION INTRAVENOUS; SUBCUTANEOUS at 09:55

## 2018-01-09 RX ADMIN — ALBUTEROL SULFATE PRN MG: 2.5 SOLUTION RESPIRATORY (INHALATION) at 08:27

## 2018-01-09 RX ADMIN — LACTOBACILLUS ACIDOPHILUS / LACTOBACILLUS BULGARICUS SCH GM: 100 MILLION CFU STRENGTH GRANULES at 08:14

## 2018-01-09 RX ADMIN — WATER SCH ML: 1 IRRIGANT IRRIGATION at 11:50

## 2018-01-09 RX ADMIN — LACOSAMIDE SCH MG: 100 TABLET, FILM COATED ORAL at 20:46

## 2018-01-09 RX ADMIN — WATER SCH ML: 1 IRRIGANT IRRIGATION at 06:00

## 2018-01-09 RX ADMIN — PROPOFOL PRN MLS/HR: 10 INJECTION, EMULSION INTRAVENOUS at 14:10

## 2018-01-09 RX ADMIN — LEVETIRACETAM SCH MLS/HR: 100 INJECTION, SOLUTION, CONCENTRATE INTRAVENOUS at 02:28

## 2018-01-09 RX ADMIN — POLYVINYL ALCOHOL SCH DROP: 14 SOLUTION/ DROPS OPHTHALMIC at 08:15

## 2018-01-09 RX ADMIN — LACTOBACILLUS ACIDOPHILUS / LACTOBACILLUS BULGARICUS SCH GM: 100 MILLION CFU STRENGTH GRANULES at 13:06

## 2018-01-09 RX ADMIN — WATER SCH ML: 1 IRRIGANT IRRIGATION at 17:07

## 2018-01-09 RX ADMIN — POLYVINYL ALCOHOL SCH DROP: 14 SOLUTION/ DROPS OPHTHALMIC at 17:07

## 2018-01-09 RX ADMIN — TAZOBACTAM SODIUM AND PIPERACILLIN SODIUM SCH MLS/HR: 500; 4 INJECTION, SOLUTION INTRAVENOUS at 03:50

## 2018-01-09 RX ADMIN — VANCOMYCIN HYDROCHLORIDE SCH MLS/HR: 1 INJECTION, SOLUTION INTRAVENOUS at 14:19

## 2018-01-09 RX ADMIN — LACOSAMIDE SCH MG: 100 TABLET, FILM COATED ORAL at 08:14

## 2018-01-09 RX ADMIN — PROPRANOLOL HYDROCHLORIDE SCH MG: 20 TABLET ORAL at 20:46

## 2018-01-09 RX ADMIN — STANDARDIZED SENNA CONCENTRATE AND DOCUSATE SODIUM SCH TAB: 8.6; 5 TABLET, FILM COATED ORAL at 09:00

## 2018-01-09 RX ADMIN — LEVETIRACETAM SCH MLS/HR: 100 INJECTION, SOLUTION, CONCENTRATE INTRAVENOUS at 16:37

## 2018-01-09 RX ADMIN — FOSPHENYTOIN SODIUM SCH MLS/HR: 50 INJECTION, SOLUTION INTRAMUSCULAR; INTRAVENOUS at 06:00

## 2018-01-09 RX ADMIN — STANDARDIZED SENNA CONCENTRATE AND DOCUSATE SODIUM SCH TAB: 8.6; 5 TABLET, FILM COATED ORAL at 20:46

## 2018-01-09 RX ADMIN — FOSPHENYTOIN SODIUM SCH MLS/HR: 50 INJECTION, SOLUTION INTRAMUSCULAR; INTRAVENOUS at 13:06

## 2018-01-09 RX ADMIN — POLYVINYL ALCOHOL SCH DROP: 14 SOLUTION/ DROPS OPHTHALMIC at 12:00

## 2018-01-09 RX ADMIN — PROPRANOLOL HYDROCHLORIDE SCH MG: 20 TABLET ORAL at 02:11

## 2018-01-09 RX ADMIN — Medication SCH ML: at 11:26

## 2018-01-09 RX ADMIN — ALBUTEROL SULFATE PRN MG: 2.5 SOLUTION RESPIRATORY (INHALATION) at 22:12

## 2018-01-09 RX ADMIN — ACETYLCYSTEINE SCH ML: 100 SOLUTION ORAL; RESPIRATORY (INHALATION) at 22:12

## 2018-01-09 RX ADMIN — FOSPHENYTOIN SODIUM SCH MLS/HR: 50 INJECTION, SOLUTION INTRAMUSCULAR; INTRAVENOUS at 20:47

## 2018-01-09 RX ADMIN — POLYETHYLENE GLYCOL 3350 SCH GM: 17 POWDER, FOR SOLUTION ORAL at 20:46

## 2018-01-09 RX ADMIN — PROPRANOLOL HYDROCHLORIDE SCH MG: 20 TABLET ORAL at 08:14

## 2018-01-09 RX ADMIN — Medication SCH ML: at 13:06

## 2018-01-09 NOTE — HHI.CCPN
Subjective


Remarks/Hospital Course


This is a 77-year-old female that presented to the ED for evaluation of altered 

mental status. Per report, according to EMS the patient normally is more alert 

and  has a history of stroke, he was able to speak with the patient's sister 

who states that she is also power of , the patient apparently normally 

is ambulatory and can carry on a conversation without any difficulty. Per 

records reviewed, the patient was last seen normal and at her baseline at 1 am. 

The patient then sat down in her lazy chair, her sister also noted that she 

started beating her head against a piece of furniture.  The patient was noted 

to have a right frontal contusion upon presentation to the ED .She had a GCS of 

7-8 on arrival. The patient's medical history is significant for a recent 

admission 09/2017 for altered mental status, medication induced.  Her past 

medical history is also significant for a recent history of UTI and C. 

difficile in addition to her history of having a CVA and reportedly residual 

effects in the right upper and lower extremity.  Laboratory and imaging studies 

revealed that most likely the patient has a UTI, CT of the brain revealed no 

abnormality and the patient was noted to have an elevated lactate level.  The 

patient was emergently intubated in the ED, and the patient was noted to be 

significantly hypertensive and a nicardipine infusion was instituted.  Upon 

entering the ED the patient's blood pressure was noted to be 219/92, Cardizem 

infusion had been ordered.  The patient was noted to have spontaneous movement 

of the right upper and lower extremity with a gag reflex. Critical care 

medicine was consulted.





12/25: The patient was weaned off of nicardipine infusion.  Normotensive the 

patient continues on labetalol twice a day scheduled home dosing.  EEG 

attempted last evening however due to patient's movement bilaterally to 

complete artifact EEG reordered.  Fentanyl infusion discontinued for daily 

sedation vacation approximately 7 hours ago the patient remains nonresponsive.  

Opens eyes spontaneously, moves limbs spontaneously but does not follow any 

commands.  Brain MRI/MRA, and CT all negative findings.  EEG scheduled for 

a.m..  The patient continues in severe metabolic acidosis, 2 Amps of sodium 

bicarbonate IV push given this a.m., sodium bicarbonate infusion increased.  

Lactate is cleared, creatinine is improving, CT of the abdomen and pelvis is 

pending this afternoon.


12/26 CT report from yesterday shows bladder edema consistent with cystitis.  

URine culture with GNR  Blood cultures NGTD. . Getting EEG now. 


12/27:  Resting comfortable in bed in no acute distress.  No obvious seizure 

activity.  Plan for lumbar puncture in AM.  Low-grade temperatures overnight.  

Tolerating tube feeding at goal.  No bowel movement


12/28:  Continues EEG has been discontinued.  Tmax 100.  Currently 99.8.  No 

bowel movement since admission.  Tolerating tube feeds at goal.  No active 

seizure activity overnight.


12/29:  Tmax 100.  Currently 99.9.  Became tachycardic with sedation lowered so 

RN increased midazolam to 9 milligrams an hour.  Also fentanyl drip at 250 mcg 

per hour.  No bowel movement since admission.  Tolerating tube feedings with 

only 40 cc residuals.  Receiving methylnaltrexone milligrams subcutaneous 1 

along with mineral oil and suppository.  KUB pending.  Patient improved 

neurological exam.  Blinks with my suddenhand  movements towards face.  

Positive gag.  Withdraws to pain in all 4 extremities.


12/30 LP results not yet finalized. On versed 7 mg/hr and fentanyl 250 mcg/hr. 

EEG from 12/29 - no epileptiform features. 


12/31 Weaning fentanyl down but remains on versed for seizure suppression. 

Because she had difficult to control subclinical seizures, will need  close EEG 

monitoring for  benzo weaning.  


Was breathing on PSV for couple of hours day, even on sedation. 


CSF negative final culture.  No Leukocytosis or fever.  Will stop antibiotic. 

Updated sister. 


1/1: no seizures evident on EEG. slightly more awake today, but not following 

commands. had discussion with Dr. Martin, will wean versed to 4mg/hr and re-

evaluate on EEG tomorrow.


1/2: no seizures on EEG. ok to wean versed to off per Dr. Martin. will work on 

waking and weaning mechanical ventilation.


1/3: acute drop in hgb. ordered 1 unit prbc. sent haptoglobin and LDH. pt awake 

following commands. fails SBT for RSBI > 110.


1/4: hgb improved. still awake, alert, following commands. extubated 

successfully. 


1/5: Reintubated last night for worsening respiratory status.  Currently orally 

intubated on mechanical ventilation, arousable easily follows commands.  On 

Versed gtt.


1/6: Remains orally intubated on mechanical ventilation.  Pancultured and 

started on antibiotics on 1/5.  Sputum growing Burkholderia





Subjective





1/7: awake, sitting up, asking to be extubated. also asks to watch the football 

game today. tolerated 3 hours of t-piece trial. successfully extubated. after 

extubation, slightly tachypneic requiring NT suctioning, mucomyst nebs, and iv 

lasix. tachypneic in distress, but insists on non-invasive techniques for 

pulmonary toilet. patient does not want to be "DNR" but refuses any talks about 

reintubation right now.


1/8 Patient was extubated yesterday. Hypertensive, on partial rebreather.


1/9 Patient was intubated yesterday for resp failure sedated with Diprivan and 

Fentanyl drips.





Objective





Vital Signs








  Date Time  Temp Pulse Resp B/P (MAP) Pulse Ox O2 Delivery O2 Flow Rate FiO2


 


1/9/18 08:28     99   40


 


1/9/18 08:00 98.6 76 20 113/58 (76)    


 


1/8/18 09:09      Partial Rebreather 12.00 














Intake and Output   


 


 1/9/18 1/9/18 1/9/18





 07:59 15:59 23:59


 


Intake Total 500 ml  


 


Output Total 275 ml  


 


Balance 225 ml  








Result Diagram:  


1/9/18 0415                                                                    

            1/9/18 0415





Other Results





Laboratory Tests








Test


  1/8/18


13:30 1/8/18


16:52 1/8/18


18:15 1/8/18


18:32


 


Phosphorus Level 3.4 MG/DL    


 


Magnesium Level 1.6 MG/DL    


 


Blood Gas Puncture Site  LT RADIAL   LT RADIAL 


 


Blood Gas Patient Temperature  98.6   98.6 


 


Blood Gas HCO3  8 mmol/L   9 mmol/L 


 


Blood Gas Base Excess  -17.6 mmol/L   -16.2 mmol/L 


 


Blood Gas Oxygen Saturation  91 %   94 % 


 


Arterial Blood pH  7.25   7.33 


 


Arterial Blood Partial


Pressure CO2 


  20 mmHg 


  


  17 mmHg 


 


 


Arterial Blood Partial


Pressure O2 


  82 mmHg 


  


  89 mmHg 


 


 


Arterial Blood Oxygen Content  12.9 Vol %   13.4 Vol % 


 


Arterial Blood


Carboxyhemoglobin 


  0.8 % 


  


  0.7 % 


 


 


Arterial Blood Methemoglobin  1.3 %   1.2 % 


 


Blood Gas Hemoglobin  10.0 G/DL   10.0 G/DL 


 


Oxygen Delivery Device  BiPAP   VENTILATOR 


 


Blood Gas Ventilator Setting


  


  IPAP10/EPAP5 


  


  A/C


12/500/PEEP5


 


Blood Gas Inspired Oxygen  30 %   40 % 


 


White Blood Count   21.7 TH/MM3  


 


Red Blood Count   2.98 MIL/MM3  


 


Hemoglobin   9.7 GM/DL  


 


Hematocrit   27.6 %  


 


Mean Corpuscular Volume   92.4 FL  


 


Mean Corpuscular Hemoglobin   32.4 PG  


 


Mean Corpuscular Hemoglobin


Concent 


  


  35.0 % 


  


 


 


Red Cell Distribution Width   16.6 %  


 


Platelet Count   635 TH/MM3  


 


Mean Platelet Volume   7.5 FL  


 


Neutrophils (%) (Auto)   87.8 %  


 


Lymphocytes (%) (Auto)   5.1 %  


 


Monocytes (%) (Auto)   6.7 %  


 


Eosinophils (%) (Auto)   0.1 %  


 


Basophils (%) (Auto)   0.3 %  


 


Neutrophils # (Auto)   19.1 TH/MM3  


 


Lymphocytes # (Auto)   1.1 TH/MM3  


 


Monocytes # (Auto)   1.5 TH/MM3  


 


Eosinophils # (Auto)   0.0 TH/MM3  


 


Basophils # (Auto)   0.1 TH/MM3  


 


CBC Comment   AUTO DIFF  


 


Differential Total Cells


Counted 


  


  100 


  


 


 


Neutrophils % (Manual)   89 %  


 


Band Neutrophils %   3 %  


 


Lymphocytes %   2 %  


 


Monocytes %   3 %  


 


Neutrophils # (Manual)   20.6 TH/MM3  


 


Metamyelocytes   3 %  


 


Differential Comment


  


  


  FINAL DIFF


MANUAL 


 


 


Toxic Granulation   1+  


 


Platelet Estimate   HIGH  


 


Platelet Morphology Comment   NORMAL  


 


Spherocytes   1+  


 


Ovalocytes   1+  


 


Sam Cells   2+  


 


Blood Urea Nitrogen   21 MG/DL  


 


Creatinine   1.16 MG/DL  


 


Random Glucose   159 MG/DL  


 


Calcium Level   8.5 MG/DL  


 


Sodium Level   147 MEQ/L  


 


Potassium Level   3.3 MEQ/L  


 


Chloride Level   113 MEQ/L  


 


Carbon Dioxide Level   12.2 MEQ/L  


 


Anion Gap   22 MEQ/L  


 


Estimat Glomerular Filtration


Rate 


  


  46 ML/MIN 


  


 


 


Lactic Acid Level   1.3 mmol/L  


 


Test


  1/9/18


04:15 


  


  


 


 


White Blood Count 12.4 TH/MM3    


 


Red Blood Count 2.71 MIL/MM3    


 


Hemoglobin 8.3 GM/DL    


 


Hematocrit 24.7 %    


 


Mean Corpuscular Volume 91.2 FL    


 


Mean Corpuscular Hemoglobin 30.6 PG    


 


Mean Corpuscular Hemoglobin


Concent 33.5 % 


  


  


  


 


 


Red Cell Distribution Width 16.8 %    


 


Platelet Count 538 TH/MM3    


 


Mean Platelet Volume 7.1 FL    


 


Neutrophils (%) (Auto) 72.7 %    


 


Lymphocytes (%) (Auto) 17.3 %    


 


Monocytes (%) (Auto) 6.9 %    


 


Eosinophils (%) (Auto) 2.2 %    


 


Basophils (%) (Auto) 0.9 %    


 


Neutrophils # (Auto) 9.0 TH/MM3    


 


Lymphocytes # (Auto) 2.1 TH/MM3    


 


Monocytes # (Auto) 0.9 TH/MM3    


 


Eosinophils # (Auto) 0.3 TH/MM3    


 


Basophils # (Auto) 0.1 TH/MM3    


 


CBC Comment AUTO DIFF    


 


Differential Comment


  AUTO DIFF


CONFIRMED 


  


  


 


 


Blood Urea Nitrogen 22 MG/DL    


 


Creatinine 1.05 MG/DL    


 


Random Glucose 114 MG/DL    


 


Calcium Level 8.0 MG/DL    


 


Phosphorus Level 3.0 MG/DL    


 


Magnesium Level 1.9 MG/DL    


 


Sodium Level 151 MEQ/L    


 


Potassium Level 3.4 MEQ/L    


 


Chloride Level 119 MEQ/L    


 


Carbon Dioxide Level 18.4 MEQ/L    


 


Anion Gap 14 MEQ/L    


 


Estimat Glomerular Filtration


Rate 51 ML/MIN 


  


  


  


 








Imaging





Last Impressions








Chest X-Ray 1/8/18 0000 Signed





Impressions: 





 Service Date/Time:  Monday, January 8, 2018 21:55 - CONCLUSION:  Nasogastric 





 tube is in good position. Patchy infiltrates are unchanged.     Zackary Dyer MD 


 


Abdomen X-Ray 12/29/17 0000 Signed





Impressions: 





 Service Date/Time:  Friday, December 29, 2017 20:00 - CONCLUSION:  Nonspecific

, 





 nonobstructive bowel gas pattern which to represent a mild ileus. Nasogastric 





 tube is in place.     Teo Carranza MD 


 


Lumbar Puncture Fluoroscopy 12/28/17 0000 Signed





Impressions: 





 Service Date/Time:  Thursday, December 28, 2017 09:39 - CONCLUSION: 





 Uncomplicated fluoroscopically guided lumbar puncture.     Reji Townsend MD 


 


Brain MRI 12/28/17 0000 Signed





Impressions: 





 Service Date/Time:  Thursday, December 28, 2017 10:24 - CONCLUSION:  No acute 





 intracranial findings     Bronson Mar MD 


 


Upper Extremity Ultrasound 12/27/17 0000 Signed





Impressions: 





 Service Date/Time:  Wednesday, December 27, 2017 21:32 - CONCLUSION:  1. No 





 sonographic evidence for right upper extremity DVT.     Reji Townsend MD 


 


Lower Extremity Ultrasound 12/27/17 0000 Signed





Impressions: 





 Service Date/Time:  Wednesday, December 27, 2017 21:12 - CONCLUSION:  1. No 





 sonographic evidence for lower extremity DVT.     Reji Townsend MD 


 


Abdomen/Pelvis CT 12/25/17 0000 Signed





Impressions: 





 Service Date/Time:  Monday, December 25, 2017 16:05 - CONCLUSION:  1. Mild 

edema 





 surrounding the urinary bladder, question cystitis. Erazo catheter in place. 

2. 





 Status post cholecystectomy.  3. Left lower lobe atelectasis. 4. Nasogastric 





 tube tip at the gastroesophageal junction.    Christian Mendiola MD 


 


Neck Magnetic Resonance Angiography 12/24/17 1119 Signed





Impressions: 





 Service Date/Time:  Sunday, December 24, 2017 13:11 - CONCLUSION:  Negative 

for 





 hemodynamically significant carotid stenosis     Pb López MD  FACR


 


Head Magnetic Resonance Angiography 12/24/17 1119 Signed





Impressions: 





 Service Date/Time:  Sunday, December 24, 2017 13:11 - CONCLUSION: Negative for 





 major branch vessel occlusion.      Pb López MD  FACR


 


Head CT 12/24/17 0851 Signed





Impressions: 





 Service Date/Time:  Sunday, December 24, 2017 09:02 - CONCLUSION:  Negative 

for 





 acute process..     Pb López MD  FACR


 


Cervical Spine CT 12/24/17 0000 Signed





Impressions: 





 Service Date/Time:  Sunday, December 24, 2017 09:07 - CONCLUSION:  Fusion as 





 above, negative for fracture.     Pb López MD  FACR








Objective Remarks


GENERAL: 77-year-old  female sitting in bed on partial rebreather


SKIN: Warm and dry.


HEAD: Atraumatic. Normocephalic.  


EYES: Pupils equal and round 1-2 mm bilaterally. No scleral icterus. No 

injection or drainage. 


ENT: No nasal bleeding or discharge. Mucous membranes pink and moist.


NECK: Trachea midline. No JVD. 


CARDIOVASCULAR: RRR. 


RESPIRATORY: Diffuse coarse BS/crackles.


GASTROINTESTINAL: Abdomen soft, non-tender, nondistended. No guarding. 


MUSCULOSKELETAL: Right upper extremity with 2+ edema and erythema.  Negative 

Doppler ultrasound 12/27 for DVT


NEUROLOGICAL: follows commands. RASS 0,





A/P


Assessment and Plan


Assessment: 77yF admitted with subclinical status, now controlled. Now with 

persistent hypoxic respiratory failure. Phos levels normal. Likely this is 

Myopathy of Critical Illness/Critical Illness polyneuropathy, as this fits with 

her deep sedation for multiple days while getting control of her status 

epilepticus. Remains extubated, but very tenuous respiratory status. LTAC would 

be a good option for ongoing pulmonary rehab.





Neuro/Psych





Post polio syndrome bilateral lower extremity weakness


Status epilepticus


History of CVATIA with R sided deficits - right lentiform nucleus


H/O seizures


Anxiety/depression


Left maxillary/ethmoid fluid levels question sinusitis





On Diprivan and Fentanyl drips for sedation and vent synchrony. Daily sedation 

vacation.


12/28 interpretation EEG - Abnormal EEG due to some mild to moderate slowing, 

but also some occasional sharps and spike and slow waves seen, but improved 

from prior EEG's.  Clinical correlation.


[id number] milligrams twice a day will be continued.  


Loaded with fosphenytoin - currently fosphenytoin  200 mg IV every  8 hours.  

Level  8.2 on 1/5


Levetiracetam 500 mg IV every 6 hours


Lorazepam 2 mg IV every 5 minutes as needed.  Seizures


TSH normal-3.0


Random cortisol level only 3.5. Unclear significance as patient had lactic 

acidemia but also hypertensive at the time.  Recheck 12/28  12.3.  Cosyntropin 

test ordered 12/29, had adequate response 


12/24 MRI brain-Moderate periventricular white matter changes are evident.  

There is no restricted diffusion.  There is moderate atrophy with dilatation of 

ventricular sulcal spaces.  There are no extra-axial fluid collections 

appreciated.  Posterior fossa is unremarkable.  


12/24 MRA brain-negative for major branch occlusion 


12/24 CT brain-no acute intracranial process


MRI brain 12/28 - There is mild periventricular white matter T2 prolongation. A 

tiny lacunar infarct in the right lentiform nucleus appears old. There is no 

evidence of intracranial mass or hemorrhage. The brainstem and posterior fossa 

structures are unremarkable. There is nothing to suggest acute infarction. 

There is fluid in the left maxillary sinus and occasional ethmoid sinuses.


Patient's home meds baclofen 10 mg 3 times a day, tramadol 50 mg every 4 hours 

as needed and sertraline 50 mg twice a day have been on hold due to  concern 

for serotonin syndrome, seizure activity and rhabdomyolysis.  


Patient has history of altered mental status secondary to being medication 

induced.  Reported by family member -the patient had discontinued baclofen for 

approximately 3 months and took her initial dose of baclofen on 12/23 with a 

TID schedule dosing.


LP - HSV negative, final cx neg. 


Dr. Martin actively following.  





Respiratory:





Acute hypoxemic respiratory failure- improving.





Extubated 1/4, reintubated 1/4, extubated 1/7 reintubated 1/8


Continue with vent support keep sat >92%


Albuterol/ipratropium aerosols nebs every 6 hours scheduled, albuterol aerosols 

every 2 hours when necessary


SBT daily as marly.





Cardiovascular:





Hypertensive emergency-resolved


History of hypertension


Hyperlipidemia


Congestive heart failure - ejection fraction 55-60% 2010





Labetalol, hydralazine IV , PRN to maintain SBP <160


Serial troponin were negative.


propranolol 40mg po q6h 


On metoprolol 50 mg sustained release daily at home


continue clopidogrel 75 mg by mouth daily/home medication Resumed 12/30


Continue pravastatin 40 mg by mouth daily/home medication for dyslipidemia


clonidine 0.2mg po q8hr.





Renal//FEN:





Chronic kidney disease stage IIIB





Monitor renal function, electrolytes replacement per protocol. For K 

replacement today


Add Free water 250ml Q8, monitor sodium level.











GI:





Esophageal stricture -history of esophageal dilatation


GERD


Elevated ammonia


Constipation- resolved.





Start tube feeds- Glucerna 1.5 with goal rate 50ml/hr


Lansoprazole 30 mg daily GI prophylaxis. On Dexlansoprazole 60 mg times daily 

at home


Docusate sodium/senna 1 tablet twice a day for bowel regimen.  polyethylene 

glycol 17 g twice a day and lactulose 30 cc 4x a day


KUB  12/29 - mild ileus











ID:


Sepsis


Escherichia coli UTI


Pneumonia





Monitor CBC


Blood cultures 12/24 - negative


Urine culture 12/24 -  Escherichia coli, pansensitive


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


Strep pneumococcal antigen 12/24  - negative 


CSF 12/28 Gram stain, fungal and AFB negative as of 12/29


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


Recent history of C. difficile colitis. Lactinex tid. 


Continue vancomycin/Zosyn started on 1/5 








HEME/ONC:





History of cervical cancer


Normocytic anemia


History of right lower extremity DVT - posterior tibial and femoral vein 

previously on Apixaban





Clopidogrel 75 mg daily initially held for LP.  Restarted 12/30


Lactate level 1.8


Recheck Doppler bilateral and right upper lower extremities 12/28 negative for 

DVT


guaiac negative.


Monitor CBC





Endocrine:





Diabetes mellitus


Low cortisol





Glucose monitoring per ICU protocol, low dose regimen Novulog every 6 hours


Hold metformin thousand milligrams twice a day





Prophylaxis:


GI Prophylaxis


Lansoprazole 30 mg daily


DVT Prophylaxis


-- SCDs SQH.





Lines:


right sided PICC 











Level 3











Roberth Amaya MD Jan 9, 2018 10:29

## 2018-01-10 VITALS — OXYGEN SATURATION: 100 %

## 2018-01-10 VITALS — HEART RATE: 84 BPM

## 2018-01-10 VITALS
RESPIRATION RATE: 12 BRPM | SYSTOLIC BLOOD PRESSURE: 103 MMHG | HEART RATE: 77 BPM | TEMPERATURE: 99 F | OXYGEN SATURATION: 100 % | DIASTOLIC BLOOD PRESSURE: 51 MMHG

## 2018-01-10 VITALS
SYSTOLIC BLOOD PRESSURE: 109 MMHG | RESPIRATION RATE: 12 BRPM | DIASTOLIC BLOOD PRESSURE: 54 MMHG | TEMPERATURE: 98.7 F | HEART RATE: 71 BPM | OXYGEN SATURATION: 100 %

## 2018-01-10 VITALS
TEMPERATURE: 97.8 F | SYSTOLIC BLOOD PRESSURE: 113 MMHG | DIASTOLIC BLOOD PRESSURE: 59 MMHG | RESPIRATION RATE: 15 BRPM | HEART RATE: 79 BPM | OXYGEN SATURATION: 100 %

## 2018-01-10 VITALS
DIASTOLIC BLOOD PRESSURE: 58 MMHG | HEART RATE: 79 BPM | TEMPERATURE: 98.2 F | OXYGEN SATURATION: 100 % | RESPIRATION RATE: 16 BRPM | SYSTOLIC BLOOD PRESSURE: 99 MMHG

## 2018-01-10 VITALS
DIASTOLIC BLOOD PRESSURE: 55 MMHG | OXYGEN SATURATION: 100 % | TEMPERATURE: 99.5 F | HEART RATE: 80 BPM | SYSTOLIC BLOOD PRESSURE: 112 MMHG | RESPIRATION RATE: 17 BRPM

## 2018-01-10 VITALS — HEART RATE: 72 BPM

## 2018-01-10 VITALS — HEART RATE: 85 BPM

## 2018-01-10 VITALS — HEART RATE: 82 BPM

## 2018-01-10 LAB
BASOPHILS # BLD AUTO: 0.1 TH/MM3 (ref 0–0.2)
BASOPHILS NFR BLD: 1.3 % (ref 0–2)
BUN SERPL-MCNC: 27 MG/DL (ref 7–18)
CALCIUM SERPL-MCNC: 7.8 MG/DL (ref 8.5–10.1)
CHLORIDE SERPL-SCNC: 117 MEQ/L (ref 98–107)
CREAT SERPL-MCNC: 1.13 MG/DL (ref 0.5–1)
EOSINOPHIL # BLD: 0.7 TH/MM3 (ref 0–0.4)
EOSINOPHIL NFR BLD: 6.6 % (ref 0–4)
ERYTHROCYTE [DISTWIDTH] IN BLOOD BY AUTOMATED COUNT: 16.5 % (ref 11.6–17.2)
GFR SERPLBLD BASED ON 1.73 SQ M-ARVRAT: 47 ML/MIN (ref 89–?)
GLUCOSE SERPL-MCNC: 123 MG/DL (ref 74–106)
HCO3 BLD-SCNC: 22.6 MEQ/L (ref 21–32)
HCT VFR BLD CALC: 24.1 % (ref 35–46)
HGB BLD-MCNC: 7.9 GM/DL (ref 11.6–15.3)
LYMPHOCYTES # BLD AUTO: 2.2 TH/MM3 (ref 1–4.8)
LYMPHOCYTES NFR BLD AUTO: 21.2 % (ref 9–44)
LYMPHOCYTES: 14 % (ref 9–44)
MCH RBC QN AUTO: 30.6 PG (ref 27–34)
MCHC RBC AUTO-ENTMCNC: 32.8 % (ref 32–36)
MCV RBC AUTO: 93.2 FL (ref 80–100)
METAMYELOCYTES NFR BLD: 5 % (ref 0–1)
MONOCYTE #: 0.8 TH/MM3 (ref 0–0.9)
MONOCYTES NFR BLD: 8.1 % (ref 0–8)
MONOCYTES: 11 % (ref 0–8)
NEUTROPHILS # BLD AUTO: 6.4 TH/MM3 (ref 1.8–7.7)
NEUTROPHILS NFR BLD AUTO: 62.8 % (ref 16–70)
NEUTS BAND # BLD MANUAL: 6.7 TH/MM3 (ref 1.8–7.7)
NEUTS BAND NFR BLD: 12 % (ref 0–6)
NEUTS SEG NFR BLD MANUAL: 49 % (ref 16–70)
OVALOCYTES BLD QL SMEAR: (no result)
PLATELET # BLD: 504 TH/MM3 (ref 150–450)
PMV BLD AUTO: 7.7 FL (ref 7–11)
RBC # BLD AUTO: 2.59 MIL/MM3 (ref 4–5.3)
SODIUM SERPL-SCNC: 148 MEQ/L (ref 136–145)
WBC # BLD AUTO: 10.2 TH/MM3 (ref 4–11)

## 2018-01-10 RX ADMIN — Medication SCH ML: at 13:50

## 2018-01-10 RX ADMIN — HEPARIN SODIUM (PORCINE) LOCK FLUSH IV SOLN 100 UNIT/ML SCH UNITS: 100 SOLUTION at 08:22

## 2018-01-10 RX ADMIN — WATER SCH ML: 1 IRRIGANT IRRIGATION at 00:00

## 2018-01-10 RX ADMIN — INSULIN ASPART SCH: 100 INJECTION, SOLUTION INTRAVENOUS; SUBCUTANEOUS at 06:00

## 2018-01-10 RX ADMIN — TAZOBACTAM SODIUM AND PIPERACILLIN SODIUM SCH MLS/HR: 500; 4 INJECTION, SOLUTION INTRAVENOUS at 00:53

## 2018-01-10 RX ADMIN — STANDARDIZED SENNA CONCENTRATE AND DOCUSATE SODIUM SCH TAB: 8.6; 5 TABLET, FILM COATED ORAL at 08:23

## 2018-01-10 RX ADMIN — ACETYLCYSTEINE SCH ML: 100 SOLUTION ORAL; RESPIRATORY (INHALATION) at 04:15

## 2018-01-10 RX ADMIN — LANSOPRAZOLE SCH MG: 30 TABLET, ORALLY DISINTEGRATING, DELAYED RELEASE ORAL at 08:22

## 2018-01-10 RX ADMIN — LACTOBACILLUS ACIDOPHILUS / LACTOBACILLUS BULGARICUS SCH GM: 100 MILLION CFU STRENGTH GRANULES at 12:50

## 2018-01-10 RX ADMIN — ALBUTEROL SULFATE PRN MG: 2.5 SOLUTION RESPIRATORY (INHALATION) at 08:41

## 2018-01-10 RX ADMIN — LEVETIRACETAM SCH MLS/HR: 100 INJECTION, SOLUTION, CONCENTRATE INTRAVENOUS at 13:16

## 2018-01-10 RX ADMIN — LEVETIRACETAM SCH MLS/HR: 100 INJECTION, SOLUTION, CONCENTRATE INTRAVENOUS at 06:38

## 2018-01-10 RX ADMIN — Medication SCH ML: at 08:22

## 2018-01-10 RX ADMIN — HEPARIN SODIUM SCH UNITS: 10000 INJECTION, SOLUTION INTRAVENOUS; SUBCUTANEOUS at 08:22

## 2018-01-10 RX ADMIN — LEVETIRACETAM SCH MLS/HR: 100 INJECTION, SOLUTION, CONCENTRATE INTRAVENOUS at 16:58

## 2018-01-10 RX ADMIN — TAZOBACTAM SODIUM AND PIPERACILLIN SODIUM SCH MLS/HR: 500; 4 INJECTION, SOLUTION INTRAVENOUS at 17:11

## 2018-01-10 RX ADMIN — Medication SCH ML: at 05:44

## 2018-01-10 RX ADMIN — WATER SCH ML: 1 IRRIGANT IRRIGATION at 05:44

## 2018-01-10 RX ADMIN — POLYVINYL ALCOHOL SCH DROP: 14 SOLUTION/ DROPS OPHTHALMIC at 12:50

## 2018-01-10 RX ADMIN — ACETAMINOPHEN PRN MG: 650 SOLUTION ORAL at 16:57

## 2018-01-10 RX ADMIN — PROPOFOL PRN MLS/HR: 10 INJECTION, EMULSION INTRAVENOUS at 03:13

## 2018-01-10 RX ADMIN — PROPOFOL PRN MLS/HR: 10 INJECTION, EMULSION INTRAVENOUS at 09:43

## 2018-01-10 RX ADMIN — LACTOBACILLUS ACIDOPHILUS / LACTOBACILLUS BULGARICUS SCH GM: 100 MILLION CFU STRENGTH GRANULES at 08:23

## 2018-01-10 RX ADMIN — INSULIN ASPART SCH: 100 INJECTION, SOLUTION INTRAVENOUS; SUBCUTANEOUS at 11:57

## 2018-01-10 RX ADMIN — INSULIN ASPART SCH: 100 INJECTION, SOLUTION INTRAVENOUS; SUBCUTANEOUS at 16:58

## 2018-01-10 RX ADMIN — WATER SCH ML: 1 IRRIGANT IRRIGATION at 16:58

## 2018-01-10 RX ADMIN — LACTOBACILLUS ACIDOPHILUS / LACTOBACILLUS BULGARICUS SCH GM: 100 MILLION CFU STRENGTH GRANULES at 16:57

## 2018-01-10 RX ADMIN — POLYETHYLENE GLYCOL 3350 SCH GM: 17 POWDER, FOR SOLUTION ORAL at 08:23

## 2018-01-10 RX ADMIN — TAZOBACTAM SODIUM AND PIPERACILLIN SODIUM SCH MLS/HR: 500; 4 INJECTION, SOLUTION INTRAVENOUS at 11:27

## 2018-01-10 RX ADMIN — ALBUTEROL SULFATE PRN MG: 2.5 SOLUTION RESPIRATORY (INHALATION) at 04:15

## 2018-01-10 RX ADMIN — PROPRANOLOL HYDROCHLORIDE SCH MG: 20 TABLET ORAL at 00:53

## 2018-01-10 RX ADMIN — LEVETIRACETAM SCH MLS/HR: 100 INJECTION, SOLUTION, CONCENTRATE INTRAVENOUS at 00:00

## 2018-01-10 RX ADMIN — INSULIN ASPART SCH: 100 INJECTION, SOLUTION INTRAVENOUS; SUBCUTANEOUS at 00:00

## 2018-01-10 RX ADMIN — ALBUTEROL SULFATE PRN MG: 2.5 SOLUTION RESPIRATORY (INHALATION) at 15:50

## 2018-01-10 RX ADMIN — TAZOBACTAM SODIUM AND PIPERACILLIN SODIUM SCH MLS/HR: 500; 4 INJECTION, SOLUTION INTRAVENOUS at 05:44

## 2018-01-10 RX ADMIN — FOSPHENYTOIN SODIUM SCH MLS/HR: 50 INJECTION, SOLUTION INTRAMUSCULAR; INTRAVENOUS at 15:03

## 2018-01-10 RX ADMIN — CHLORHEXIDINE GLUCONATE SCH PACK: 500 CLOTH TOPICAL at 03:14

## 2018-01-10 RX ADMIN — CHLORHEXIDINE GLUCONATE 0.12% ORAL RINSE SCH ML: 1.2 LIQUID ORAL at 08:21

## 2018-01-10 RX ADMIN — Medication PRN MLS/HR: at 06:38

## 2018-01-10 RX ADMIN — PROPRANOLOL HYDROCHLORIDE SCH MG: 20 TABLET ORAL at 05:43

## 2018-01-10 RX ADMIN — WATER SCH ML: 1 IRRIGANT IRRIGATION at 12:00

## 2018-01-10 RX ADMIN — POLYVINYL ALCOHOL SCH DROP: 14 SOLUTION/ DROPS OPHTHALMIC at 16:58

## 2018-01-10 RX ADMIN — CLOPIDOGREL BISULFATE SCH MG: 75 TABLET, FILM COATED ORAL at 08:23

## 2018-01-10 RX ADMIN — POLYVINYL ALCOHOL SCH DROP: 14 SOLUTION/ DROPS OPHTHALMIC at 08:22

## 2018-01-10 RX ADMIN — ACETYLCYSTEINE SCH ML: 100 SOLUTION ORAL; RESPIRATORY (INHALATION) at 15:50

## 2018-01-10 RX ADMIN — FOSPHENYTOIN SODIUM SCH MLS/HR: 50 INJECTION, SOLUTION INTRAMUSCULAR; INTRAVENOUS at 05:43

## 2018-01-10 RX ADMIN — PROPRANOLOL HYDROCHLORIDE SCH MG: 20 TABLET ORAL at 15:10

## 2018-01-10 RX ADMIN — ACETYLCYSTEINE SCH ML: 100 SOLUTION ORAL; RESPIRATORY (INHALATION) at 08:41

## 2018-01-10 RX ADMIN — LACOSAMIDE SCH MG: 100 TABLET, FILM COATED ORAL at 08:22

## 2018-01-10 NOTE — RADRPT
EXAM DATE/TIME:  01/10/2018 11:17 

 

[facility] COMPARISON:     

CHEST SINGLE AP, January 08, 2018, 21:55.

 

                     

INDICATIONS :     

VDRF.

                     

 

MEDICAL HISTORY :            

Cardiovascular disease. Diabetes mellitus type I. Osteoporosis. Polio, cervical cancer.   

 

SURGICAL HISTORY :        

Appendectomy. Hysterectomy. Cholecystectomy

 

ENCOUNTER:     

Subsequent                                        

 

ACUITY:     

4 - 6 days      

 

PAIN SCORE:     

Non-responsive.

 

LOCATION:     

Bilateral chest 

 

FINDINGS:     

ET tube and nasogastric tube in good position.  Central line in position.  Patchy airspace disease in
 both right and left lungs.

 

CONCLUSION:     

Increasing patchy airspace disease in both lungs.

Support apparatus in good position..

 

 

 

 Pb López MD FACR on January 10, 2018 at 11:59           

Board Certified Radiologist.

 This report was verified electronically.

## 2018-01-10 NOTE — HHI.CCPN
Subjective


Remarks/Hospital Course


This is a 77-year-old female that presented to the ED for evaluation of altered 

mental status. Per report, according to EMS the patient normally is more alert 

and  has a history of stroke, he was able to speak with the patient's sister 

who states that she is also power of , the patient apparently normally 

is ambulatory and can carry on a conversation without any difficulty. Per 

records reviewed, the patient was last seen normal and at her baseline at 1 am. 

The patient then sat down in her lazy chair, her sister also noted that she 

started beating her head against a piece of furniture.  The patient was noted 

to have a right frontal contusion upon presentation to the ED .She had a GCS of 

7-8 on arrival. The patient's medical history is significant for a recent 

admission 09/2017 for altered mental status, medication induced.  Her past 

medical history is also significant for a recent history of UTI and C. 

difficile in addition to her history of having a CVA and reportedly residual 

effects in the right upper and lower extremity.  Laboratory and imaging studies 

revealed that most likely the patient has a UTI, CT of the brain revealed no 

abnormality and the patient was noted to have an elevated lactate level.  The 

patient was emergently intubated in the ED, and the patient was noted to be 

significantly hypertensive and a nicardipine infusion was instituted.  Upon 

entering the ED the patient's blood pressure was noted to be 219/92, Cardizem 

infusion had been ordered.  The patient was noted to have spontaneous movement 

of the right upper and lower extremity with a gag reflex. Critical care 

medicine was consulted.





12/25: The patient was weaned off of nicardipine infusion.  Normotensive the 

patient continues on labetalol twice a day scheduled home dosing.  EEG 

attempted last evening however due to patient's movement bilaterally to 

complete artifact EEG reordered.  Fentanyl infusion discontinued for daily 

sedation vacation approximately 7 hours ago the patient remains nonresponsive.  

Opens eyes spontaneously, moves limbs spontaneously but does not follow any 

commands.  Brain MRI/MRA, and CT all negative findings.  EEG scheduled for 

a.m..  The patient continues in severe metabolic acidosis, 2 Amps of sodium 

bicarbonate IV push given this a.m., sodium bicarbonate infusion increased.  

Lactate is cleared, creatinine is improving, CT of the abdomen and pelvis is 

pending this afternoon.


12/26 CT report from yesterday shows bladder edema consistent with cystitis.  

URine culture with GNR  Blood cultures NGTD. . Getting EEG now. 


12/27:  Resting comfortable in bed in no acute distress.  No obvious seizure 

activity.  Plan for lumbar puncture in AM.  Low-grade temperatures overnight.  

Tolerating tube feeding at goal.  No bowel movement


12/28:  Continues EEG has been discontinued.  Tmax 100.  Currently 99.8.  No 

bowel movement since admission.  Tolerating tube feeds at goal.  No active 

seizure activity overnight.


12/29:  Tmax 100.  Currently 99.9.  Became tachycardic with sedation lowered so 

RN increased midazolam to 9 milligrams an hour.  Also fentanyl drip at 250 mcg 

per hour.  No bowel movement since admission.  Tolerating tube feedings with 

only 40 cc residuals.  Receiving methylnaltrexone milligrams subcutaneous 1 

along with mineral oil and suppository.  KUB pending.  Patient improved 

neurological exam.  Blinks with my suddenhand  movements towards face.  

Positive gag.  Withdraws to pain in all 4 extremities.


12/30 LP results not yet finalized. On versed 7 mg/hr and fentanyl 250 mcg/hr. 

EEG from 12/29 - no epileptiform features. 


12/31 Weaning fentanyl down but remains on versed for seizure suppression. 

Because she had difficult to control subclinical seizures, will need  close EEG 

monitoring for  benzo weaning.  


Was breathing on PSV for couple of hours day, even on sedation. 


CSF negative final culture.  No Leukocytosis or fever.  Will stop antibiotic. 

Updated sister. 


1/1: no seizures evident on EEG. slightly more awake today, but not following 

commands. had discussion with Dr. Martin, will wean versed to 4mg/hr and re-

evaluate on EEG tomorrow.


1/2: no seizures on EEG. ok to wean versed to off per Dr. Martin. will work on 

waking and weaning mechanical ventilation.


1/3: acute drop in hgb. ordered 1 unit prbc. sent haptoglobin and LDH. pt awake 

following commands. fails SBT for RSBI > 110.


1/4: hgb improved. still awake, alert, following commands. extubated 

successfully. 


1/5: Reintubated last night for worsening respiratory status.  Currently orally 

intubated on mechanical ventilation, arousable easily follows commands.  On 

Versed gtt.


1/6: Remains orally intubated on mechanical ventilation.  Pancultured and 

started on antibiotics on 1/5.  Sputum growing Burkholderia





Subjective





1/7: awake, sitting up, asking to be extubated. also asks to watch the football 

game today. tolerated 3 hours of t-piece trial. successfully extubated. after 

extubation, slightly tachypneic requiring NT suctioning, mucomyst nebs, and iv 

lasix. tachypneic in distress, but insists on non-invasive techniques for 

pulmonary toilet. patient does not want to be "DNR" but refuses any talks about 

reintubation right now.


1/8 Patient was extubated yesterday. Hypertensive, on partial rebreather.


1/9 Patient was intubated yesterday for resp failure sedated with Diprivan and 

Fentanyl drips.


1/10 No events overnight. Sedated and intubated. Afebrile





Objective





Vital Signs








  Date Time  Temp Pulse Resp B/P (MAP) Pulse Ox O2 Delivery O2 Flow Rate FiO2


 


1/10/18 10:00  72      


 


1/10/18 08:43     100   40


 


1/10/18 08:00 98.7  12 109/54 (72)    


 


1/8/18 09:09      Partial Rebreather 12.00 














Intake and Output   


 


 1/10/18 1/10/18 1/11/18





 08:00 16:00 00:00


 


Intake Total 1829 ml  


 


Output Total 300 ml  


 


Balance 1529 ml  








Result Diagram:  


1/10/18 0410                                                                   

             1/10/18 0410





Other Results





Laboratory Tests








Test


  1/9/18


14:15 1/10/18


04:10


 


Vancomycin Level Trough 21.2 MCG/ML  


 


White Blood Count  10.2 TH/MM3 


 


Red Blood Count  2.59 MIL/MM3 


 


Hemoglobin  7.9 GM/DL 


 


Hematocrit  24.1 % 


 


Mean Corpuscular Volume  93.2 FL 


 


Mean Corpuscular Hemoglobin  30.6 PG 


 


Mean Corpuscular Hemoglobin


Concent 


  32.8 % 


 


 


Red Cell Distribution Width  16.5 % 


 


Platelet Count  504 TH/MM3 


 


Mean Platelet Volume  7.7 FL 


 


Neutrophils (%) (Auto)  62.8 % 


 


Lymphocytes (%) (Auto)  21.2 % 


 


Monocytes (%) (Auto)  8.1 % 


 


Eosinophils (%) (Auto)  6.6 % 


 


Basophils (%) (Auto)  1.3 % 


 


Neutrophils # (Auto)  6.4 TH/MM3 


 


Lymphocytes # (Auto)  2.2 TH/MM3 


 


Monocytes # (Auto)  0.8 TH/MM3 


 


Eosinophils # (Auto)  0.7 TH/MM3 


 


Basophils # (Auto)  0.1 TH/MM3 


 


CBC Comment  AUTO DIFF 


 


Differential Total Cells


Counted 


  100 


 


 


Neutrophils % (Manual)  49 % 


 


Band Neutrophils %  12 % 


 


Lymphocytes %  14 % 


 


Monocytes %  11 % 


 


Eosinophils %  9 % 


 


Neutrophils # (Manual)  6.7 TH/MM3 


 


Metamyelocytes  5 % 


 


Differential Comment


  


  FINAL DIFF


MANUAL


 


Platelet Estimate  HIGH 


 


Platelet Morphology Comment  NORMAL 


 


Ovalocytes  1+ 


 


Blood Urea Nitrogen  27 MG/DL 


 


Creatinine  1.13 MG/DL 


 


Random Glucose  123 MG/DL 


 


Calcium Level  7.8 MG/DL 


 


Sodium Level  148 MEQ/L 


 


Potassium Level  4.1 MEQ/L 


 


Chloride Level  117 MEQ/L 


 


Carbon Dioxide Level  22.6 MEQ/L 


 


Anion Gap  8 MEQ/L 


 


Estimat Glomerular Filtration


Rate 


  47 ML/MIN 


 








Imaging





Last Impressions








Chest X-Ray 1/8/18 0000 Signed





Impressions: 





 Service Date/Time:  Monday, January 8, 2018 21:55 - CONCLUSION:  Nasogastric 





 tube is in good position. Patchy infiltrates are unchanged.     Zackary Dyer MD 


 


Abdomen X-Ray 12/29/17 0000 Signed





Impressions: 





 Service Date/Time:  Friday, December 29, 2017 20:00 - CONCLUSION:  Nonspecific

, 





 nonobstructive bowel gas pattern which to represent a mild ileus. Nasogastric 





 tube is in place.     Teo Carranza MD 


 


Lumbar Puncture Fluoroscopy 12/28/17 0000 Signed





Impressions: 





 Service Date/Time:  Thursday, December 28, 2017 09:39 - CONCLUSION: 





 Uncomplicated fluoroscopically guided lumbar puncture.     Reji Townsend MD 


 


Brain MRI 12/28/17 0000 Signed





Impressions: 





 Service Date/Time:  Thursday, December 28, 2017 10:24 - CONCLUSION:  No acute 





 intracranial findings     Bronson Mar MD 


 


Upper Extremity Ultrasound 12/27/17 0000 Signed





Impressions: 





 Service Date/Time:  Wednesday, December 27, 2017 21:32 - CONCLUSION:  1. No 





 sonographic evidence for right upper extremity DVT.     Reji Townsend MD 


 


Lower Extremity Ultrasound 12/27/17 0000 Signed





Impressions: 





 Service Date/Time:  Wednesday, December 27, 2017 21:12 - CONCLUSION:  1. No 





 sonographic evidence for lower extremity DVT.     Reji Townsend MD 


 


Abdomen/Pelvis CT 12/25/17 0000 Signed





Impressions: 





 Service Date/Time:  Monday, December 25, 2017 16:05 - CONCLUSION:  1. Mild 

edema 





 surrounding the urinary bladder, question cystitis. Erazo catheter in place. 

2. 





 Status post cholecystectomy.  3. Left lower lobe atelectasis. 4. Nasogastric 





 tube tip at the gastroesophageal junction.    Christian Mendiola MD 


 


Neck Magnetic Resonance Angiography 12/24/17 1119 Signed





Impressions: 





 Service Date/Time:  Sunday, December 24, 2017 13:11 - CONCLUSION:  Negative 

for 





 hemodynamically significant carotid stenosis     Pb López MD  FACR


 


Head Magnetic Resonance Angiography 12/24/17 1119 Signed





Impressions: 





 Service Date/Time:  Sunday, December 24, 2017 13:11 - CONCLUSION: Negative for 





 major branch vessel occlusion.      Pb López MD  FACR


 


Head CT 12/24/17 0851 Signed





Impressions: 





 Service Date/Time:  Sunday, December 24, 2017 09:02 - CONCLUSION:  Negative 

for 





 acute process..     Pb López MD  FACR


 


Cervical Spine CT 12/24/17 0000 Signed





Impressions: 





 Service Date/Time:  Sunday, December 24, 2017 09:07 - CONCLUSION:  Fusion as 





 above, negative for fracture.     Pb López MD  FACR








Objective Remarks


GENERAL: 77-year-old  female sedated and intubated


SKIN: Warm and dry.


HEAD: Atraumatic. Normocephalic.  


EYES: Pupils equal and round 1-2 mm bilaterally. No scleral icterus. No 

injection or drainage. 


ENT: No nasal bleeding or discharge. Mucous membranes pink and moist.


NECK: Trachea midline. No JVD. 


CARDIOVASCULAR: RRR. 


RESPIRATORY: B/l equal air entry, few coarse BS


GASTROINTESTINAL: Abdomen soft, non-tender, nondistended. No guarding. 


MUSCULOSKELETAL: Right upper extremity with 2+ edema and erythema.  


NEUROLOGICAL:Sedated, intubated





A/P


Assessment and Plan





Neuro/Psych





Post polio syndrome bilateral lower extremity weakness


Status epilepticus


History of CVATIA with R sided deficits - right lentiform nucleus


H/O seizures


Anxiety/depression


Left maxillary/ethmoid fluid levels question sinusitis





On Diprivan and Fentanyl drips for sedation and vent synchrony. Daily sedation 

vacation.


12/28 interpretation EEG - Abnormal EEG due to some mild to moderate slowing, 

but also some occasional sharps and spike and slow waves seen, but improved 

from prior EEG's.  Clinical correlation.


[id number] milligrams twice a day will be continued.  


Loaded with fosphenytoin - currently fosphenytoin  200 mg IV every  8 hours.  

Level  8.2 on 1/5


Levetiracetam 500 mg IV every 6 hours


Lorazepam 2 mg IV every 5 minutes as needed.  Seizures


TSH normal-3.0


Random cortisol level only 3.5. Unclear significance as patient had lactic 

acidemia but also hypertensive at the time.  Recheck 12/28  12.3.  Cosyntropin 

test ordered 12/29, had adequate response 


12/24 MRI brain-Moderate periventricular white matter changes are evident.  

There is no restricted diffusion.  There is moderate atrophy with dilatation of 

ventricular sulcal spaces.  There are no extra-axial fluid collections 

appreciated.  Posterior fossa is unremarkable.  


12/24 MRA brain-negative for major branch occlusion 


12/24 CT brain-no acute intracranial process


MRI brain 12/28 - There is mild periventricular white matter T2 prolongation. A 

tiny lacunar infarct in the right lentiform nucleus appears old. There is no 

evidence of intracranial mass or hemorrhage. The brainstem and posterior fossa 

structures are unremarkable. There is nothing to suggest acute infarction. 

There is fluid in the left maxillary sinus and occasional ethmoid sinuses.


Patient's home meds baclofen 10 mg 3 times a day, tramadol 50 mg every 4 hours 

as needed and sertraline 50 mg twice a day have been on hold due to  concern 

for serotonin syndrome, seizure activity and rhabdomyolysis.  


Patient has history of altered mental status secondary to being medication 

induced.  Reported by family member -the patient had discontinued baclofen for 

approximately 3 months and took her initial dose of baclofen on 12/23 with a 

TID schedule dosing.


LP - HSV negative, final cx neg. 


Dr. Martin actively following.  





Respiratory:





Acute hypoxemic respiratory failure- improving.





Extubated 1/4, reintubated 1/4, extubated 1/7 reintubated 1/8


Continue with vent support keep sat >92%


Albuterol/ipratropium aerosols nebs every 6 hours scheduled, albuterol aerosols 

every 2 hours when necessary


SBT daily as marly. Check CXR patient might need trach/PEG tube placements





Cardiovascular:





Hypertensive emergency-resolved


History of hypertension


Hyperlipidemia


Congestive heart failure - ejection fraction 55-60% 2010





Labetalol, hydralazine IV , PRN to maintain SBP <160


Serial troponin were negative.


propranolol 40mg po q6h 


On metoprolol 50 mg sustained release daily at home


continue clopidogrel 75 mg by mouth daily/home medication Resumed 12/30


Continue pravastatin 40 mg by mouth daily/home medication for dyslipidemia


clonidine 0.2mg po q8hr.





Renal//FEN:





Chronic kidney disease stage IIIB





Monitor renal function, electrolytes replacement per protocol. 


On Free water 250ml Q8, monitor sodium level.


Diurese with Lasix 40mg x1











GI:





Esophageal stricture -history of esophageal dilatation


GERD


Elevated ammonia


Constipation- resolved.





On tube feeds- Glucerna 1.5 @ 50ml/hr


Lansoprazole 30 mg daily GI prophylaxis. On Dexlansoprazole 60 mg times daily 

at home


Docusate sodium/senna 1 tablet twice a day for bowel regimen.  polyethylene 

glycol 17 g twice a day and lactulose 30 cc 4x a day


KUB  12/29 - mild ileus











ID:


Sepsis


Escherichia coli UTI


Pneumonia





Monitor CBC


Blood cultures 12/24 - negative


Urine culture 12/24 -  Escherichia coli, pansensitive


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


Strep pneumococcal antigen 12/24  - negative 


CSF 12/28 Gram stain, fungal and AFB negative as of 12/29


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


Recent history of C. difficile colitis. Lactinex tid. 


Continue vancomycin/Zosyn started on 1/5 








HEME/ONC:





History of cervical cancer


Normocytic anemia


History of right lower extremity DVT - posterior tibial and femoral vein 

previously on Apixaban





Clopidogrel 75 mg daily initially held for LP.  Restarted 12/30


Lactate level 1.8


Recheck Doppler bilateral and right upper lower extremities 12/28 negative for 

DVT


guaiac negative.


Monitor CBC





Endocrine:





Diabetes mellitus


Low cortisol





Glucose monitoring per ICU protocol, low dose regimen Novulog every 6 hours





Prophylaxis:


GI Prophylaxis


Lansoprazole 30 mg daily


DVT Prophylaxis


-- SCDs SQH.





Lines:


right sided PICC 





Discussed with patient's sister who is POA update her on her condition.


For [facility].











Level 3











Roberth Amaya MD Anders 10, 2018 11:10

## 2018-01-11 NOTE — MB
cc:

GUNNAR LOU MD

****

 

 

DATE OF CONSULTATION:  01/10/2018

 

REQUESTING PHYSICIAN

Dr. Roberth Elder.

 

REASON FOR CONSULTATION

Pulm management.

 

HISTORY OF PRESENT ILLNESS

Ms. Dickinson is a pleasant 77-year-old female with history of COPD,

hypertension, history of CVA.  The patient was brought to the hospital by the

[facility]. As per her sister normally she is very active. On the day of admission

she was seen by sister, she was banging her head against a chair. She had 
frontal

contusion. She was brought to the hospital. Her blood pressure was 200 /92.

While she was in the hospital she had a CT scan of the brain done, which was

negative.  EEG was done. The patient developed respiratory failure and she was

intubated.  She was successfully extubated but needed reintubation. Currently

she is on C-PAP trial.  She was on Diprivan, which was weaned.

 

The patient's discharge  [facility]

 

LABORATORY DATA

Her lab evaluation reveals WBC count 10.3, hemoglobin 7.9, hematocrit 24.1, MCV

93, platelet count 504, sodium 148, potassium 4.1, chloride 117, CO2 22, BUN

27, creatinine 1.13, blood gas on 40% FIO2 pH 7.35, pCO2 35, pO2 110, bicarb

19.

 

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

 

MEDICATIONS

She is currently takin. Vancomycin IV.

2. Fentanyl IV.

3. Mucomyst nebulizer treatment.

4. Heparin subcu.

5. Zosyn IV.

7. Keppra 500 mg q.6 hours.

8. Prevacid 30 mg a day.

9. Labetalol 10 mg p.r.n.

 

ALLERGIES

INFLUENZA VACCINE, ACETAMINOPHEN, CODEINE, HYDROCODONE, MEPERIDINE, MORPHINE.

 

 

SOCIAL HISTORY

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

 

FAMILY HISTORY

She has one son.  She lives for more than 40 years with her  sister.

 

REVIEW OF SYSTEMS

The patient's sister states normally she is up, around and active.  Has history

of stroke.  No malignancy.  No DVT or pulmonary embolism.

 

PHYSICAL EXAMINATION

VITAL SIGNS: A Frail elderly female on ventilator.  Blood pressure 102/51,

heart rate 77, respirations 16, temperature 99.

HEENT:  Pupils are equal, round, reactive to light. Oral mucosa, nasal mucosa

normal.

NECK: JVP not raised.

CHEST: Equal air entry.  Has rhonchi.

CVS: S1, S2 normal.

ABDOMEN: Benign.

EXTREMITIES: No edema.

 

IMPRESSION

1.  Ventilator dependent respiratory failure status post reintubation.

2.  COPD.

3.  Hypertension.

4.  Chronic kidney disease.

5.  Sepsis.

6.  UTI.

7.  Diabetes mellitus.

 

PLAN

I discussed with the patient's sister at the bedside. She will be maintained on

ventilator. [facility]  I will

continue to take care of her Ventilator and wean her from the ventilator, she 
is not

able to wean from the ventilator, she will need tracheostomy tube placement.

Continue antibiotic vancomycin and Zosyn, aerosol treatment. Further treatment

will depend on the course in the hospital.

 

Thank you Dr. Elder for this consultation.

 

 

 

                              _________________________________

                              MD WES Greco/LAST

D:  1/10/2018/2:45 PM

T:  2018/9:48 AM

Visit #:  [id number]

Job #:  [id number]

[email or web address]

## 2018-05-01 ENCOUNTER — HOSPITAL ENCOUNTER (OUTPATIENT)
Dept: HOSPITAL 17 - NEPC | Age: 78
Setting detail: OBSERVATION
LOS: 2 days | Discharge: HOME | End: 2018-05-03
Attending: HOSPITALIST | Admitting: HOSPITALIST
Payer: MEDICARE

## 2018-05-01 VITALS
DIASTOLIC BLOOD PRESSURE: 76 MMHG | RESPIRATION RATE: 16 BRPM | TEMPERATURE: 98.3 F | SYSTOLIC BLOOD PRESSURE: 127 MMHG | HEART RATE: 92 BPM | OXYGEN SATURATION: 100 %

## 2018-05-01 VITALS
OXYGEN SATURATION: 99 % | DIASTOLIC BLOOD PRESSURE: 60 MMHG | RESPIRATION RATE: 19 BRPM | HEART RATE: 96 BPM | SYSTOLIC BLOOD PRESSURE: 122 MMHG | TEMPERATURE: 98.4 F

## 2018-05-01 VITALS — WEIGHT: 130.27 LBS | BODY MASS INDEX: 19.74 KG/M2 | HEIGHT: 68 IN

## 2018-05-01 VITALS
OXYGEN SATURATION: 99 % | DIASTOLIC BLOOD PRESSURE: 53 MMHG | HEART RATE: 94 BPM | RESPIRATION RATE: 18 BRPM | SYSTOLIC BLOOD PRESSURE: 107 MMHG | TEMPERATURE: 98.3 F

## 2018-05-01 VITALS
TEMPERATURE: 98.3 F | RESPIRATION RATE: 16 BRPM | OXYGEN SATURATION: 100 % | SYSTOLIC BLOOD PRESSURE: 137 MMHG | HEART RATE: 90 BPM | DIASTOLIC BLOOD PRESSURE: 80 MMHG

## 2018-05-01 VITALS
SYSTOLIC BLOOD PRESSURE: 122 MMHG | DIASTOLIC BLOOD PRESSURE: 78 MMHG | RESPIRATION RATE: 16 BRPM | OXYGEN SATURATION: 97 % | HEART RATE: 82 BPM

## 2018-05-01 DIAGNOSIS — N18.3: ICD-10-CM

## 2018-05-01 DIAGNOSIS — I13.0: ICD-10-CM

## 2018-05-01 DIAGNOSIS — Z85.41: ICD-10-CM

## 2018-05-01 DIAGNOSIS — I50.9: ICD-10-CM

## 2018-05-01 DIAGNOSIS — Z86.12: ICD-10-CM

## 2018-05-01 DIAGNOSIS — E78.5: ICD-10-CM

## 2018-05-01 DIAGNOSIS — E11.22: ICD-10-CM

## 2018-05-01 DIAGNOSIS — K21.9: ICD-10-CM

## 2018-05-01 DIAGNOSIS — J44.9: ICD-10-CM

## 2018-05-01 DIAGNOSIS — Z79.899: ICD-10-CM

## 2018-05-01 DIAGNOSIS — F41.9: ICD-10-CM

## 2018-05-01 DIAGNOSIS — F32.9: ICD-10-CM

## 2018-05-01 DIAGNOSIS — Z85.43: ICD-10-CM

## 2018-05-01 DIAGNOSIS — G40.909: ICD-10-CM

## 2018-05-01 DIAGNOSIS — Z86.73: ICD-10-CM

## 2018-05-01 DIAGNOSIS — N30.00: Primary | ICD-10-CM

## 2018-05-01 DIAGNOSIS — Z79.84: ICD-10-CM

## 2018-05-01 LAB
ALBUMIN SERPL-MCNC: 3.4 GM/DL (ref 3.4–5)
ALP SERPL-CCNC: 112 U/L (ref 45–117)
ALT SERPL-CCNC: 15 U/L (ref 10–53)
AST SERPL-CCNC: 14 U/L (ref 15–37)
BACTERIA #/AREA URNS HPF: (no result) /HPF
BASOPHILS # BLD AUTO: 0 TH/MM3 (ref 0–0.2)
BASOPHILS NFR BLD: 0.4 % (ref 0–2)
BILIRUB SERPL-MCNC: 0.2 MG/DL (ref 0.2–1)
BUN SERPL-MCNC: 12 MG/DL (ref 7–18)
CALCIUM SERPL-MCNC: 9.4 MG/DL (ref 8.5–10.1)
CHLORIDE SERPL-SCNC: 111 MEQ/L (ref 98–107)
COLOR UR: YELLOW
CREAT SERPL-MCNC: 1.14 MG/DL (ref 0.5–1)
EOSINOPHIL # BLD: 0.2 TH/MM3 (ref 0–0.4)
EOSINOPHIL NFR BLD: 1.9 % (ref 0–4)
ERYTHROCYTE [DISTWIDTH] IN BLOOD BY AUTOMATED COUNT: 15.7 % (ref 11.6–17.2)
GFR SERPLBLD BASED ON 1.73 SQ M-ARVRAT: 46 ML/MIN (ref 89–?)
GLUCOSE SERPL-MCNC: 119 MG/DL (ref 74–106)
GLUCOSE UR STRIP-MCNC: (no result) MG/DL
HCO3 BLD-SCNC: 18.7 MEQ/L (ref 21–32)
HCT VFR BLD CALC: 30.9 % (ref 35–46)
HGB BLD-MCNC: 10.4 GM/DL (ref 11.6–15.3)
HGB UR QL STRIP: (no result)
INR PPP: 1.1 RATIO
KETONES UR STRIP-MCNC: (no result) MG/DL
LYMPHOCYTES # BLD AUTO: 1.7 TH/MM3 (ref 1–4.8)
LYMPHOCYTES NFR BLD AUTO: 16.8 % (ref 9–44)
MCH RBC QN AUTO: 31.3 PG (ref 27–34)
MCHC RBC AUTO-ENTMCNC: 33.7 % (ref 32–36)
MCV RBC AUTO: 92.8 FL (ref 80–100)
MONOCYTE #: 0.6 TH/MM3 (ref 0–0.9)
MONOCYTES NFR BLD: 6.3 % (ref 0–8)
MUCOUS THREADS #/AREA URNS LPF: (no result) /LPF
NEUTROPHILS # BLD AUTO: 7.7 TH/MM3 (ref 1.8–7.7)
NEUTROPHILS NFR BLD AUTO: 74.6 % (ref 16–70)
NITRITE UR QL STRIP: (no result)
PLATELET # BLD: 549 TH/MM3 (ref 150–450)
PMV BLD AUTO: 7 FL (ref 7–11)
PROT SERPL-MCNC: 7.6 GM/DL (ref 6.4–8.2)
PROTHROMBIN TIME: 10.9 SEC (ref 9.8–11.6)
RBC # BLD AUTO: 3.33 MIL/MM3 (ref 4–5.3)
SODIUM SERPL-SCNC: 140 MEQ/L (ref 136–145)
SP GR UR STRIP: 1.02 (ref 1–1.03)
URINE LEUKOCYTE ESTERASE: (no result)
WBC # BLD AUTO: 10.3 TH/MM3 (ref 4–11)
WHITE BLOOD CELL CLUMPS: (no result)

## 2018-05-01 PROCEDURE — 87040 BLOOD CULTURE FOR BACTERIA: CPT

## 2018-05-01 PROCEDURE — 80048 BASIC METABOLIC PNL TOTAL CA: CPT

## 2018-05-01 PROCEDURE — G0378 HOSPITAL OBSERVATION PER HR: HCPCS

## 2018-05-01 PROCEDURE — 74177 CT ABD & PELVIS W/CONTRAST: CPT

## 2018-05-01 PROCEDURE — 81001 URINALYSIS AUTO W/SCOPE: CPT

## 2018-05-01 PROCEDURE — 85730 THROMBOPLASTIN TIME PARTIAL: CPT

## 2018-05-01 PROCEDURE — 80053 COMPREHEN METABOLIC PANEL: CPT

## 2018-05-01 PROCEDURE — 96366 THER/PROPH/DIAG IV INF ADDON: CPT

## 2018-05-01 PROCEDURE — 99285 EMERGENCY DEPT VISIT HI MDM: CPT

## 2018-05-01 PROCEDURE — 76775 US EXAM ABDO BACK WALL LIM: CPT

## 2018-05-01 PROCEDURE — 85025 COMPLETE CBC W/AUTO DIFF WBC: CPT

## 2018-05-01 PROCEDURE — 76937 US GUIDE VASCULAR ACCESS: CPT

## 2018-05-01 PROCEDURE — 97162 PT EVAL MOD COMPLEX 30 MIN: CPT

## 2018-05-01 PROCEDURE — 96365 THER/PROPH/DIAG IV INF INIT: CPT

## 2018-05-01 PROCEDURE — 96361 HYDRATE IV INFUSION ADD-ON: CPT

## 2018-05-01 PROCEDURE — 96372 THER/PROPH/DIAG INJ SC/IM: CPT

## 2018-05-01 PROCEDURE — 85610 PROTHROMBIN TIME: CPT

## 2018-05-01 PROCEDURE — 83690 ASSAY OF LIPASE: CPT

## 2018-05-01 PROCEDURE — 83605 ASSAY OF LACTIC ACID: CPT

## 2018-05-01 PROCEDURE — 82948 REAGENT STRIP/BLOOD GLUCOSE: CPT

## 2018-05-01 PROCEDURE — 71045 X-RAY EXAM CHEST 1 VIEW: CPT

## 2018-05-01 PROCEDURE — 87086 URINE CULTURE/COLONY COUNT: CPT

## 2018-05-01 RX ADMIN — ACETAMINOPHEN PRN MG: 325 TABLET ORAL at 18:45

## 2018-05-01 RX ADMIN — HEPARIN SODIUM SCH UNITS: 10000 INJECTION, SOLUTION INTRAVENOUS; SUBCUTANEOUS at 18:45

## 2018-05-01 RX ADMIN — INSULIN ASPART SCH: 100 INJECTION, SOLUTION INTRAVENOUS; SUBCUTANEOUS at 21:26

## 2018-05-01 RX ADMIN — INSULIN ASPART SCH: 100 INJECTION, SOLUTION INTRAVENOUS; SUBCUTANEOUS at 17:00

## 2018-05-01 RX ADMIN — Medication SCH ML: at 21:27

## 2018-05-01 NOTE — RADRPT
EXAM DATE/TIME:  05/01/2018 15:43 

 

[facility] COMPARISON:     

CT ABDOMEN & PELVIS W CONTRAST, May 01, 2018, 13:26.  US KIDNEY/RENAL/BLADDER, June 11, 2017, 19:56.

        

 

 

INDICATIONS :     

Increased lab values.

                     

 

MEDICAL HISTORY :     

Stroke. Hypercholesterolemia. Hypertension. Seizures. Dizziness. Head trauma. Anticoagulant therapy. 
Gastroesophageal reflux disease. Dysuria. Arthritis. Osteoporosis. Diabetes. Anxiety. Depression. Ova
gavin cancer. C-Diff. 

 

SURGICAL HISTORY :     

Appendectomy. Cholecystectomy. Hysterectomy. Bilateral cataract surgery. C4-C6 plate placed. Right kn
ee reconstruction. Laparoscopic owen. 

 

ENCOUNTER:     

Initial

 

ACUITY:     

1 week

 

PAIN SCORE:     

3/10

 

LOCATION:     

Bilateral flank 

MEASUREMENTS:     

 

RIGHT KIDNEY:     

9.3 x 3.5 x 4.4 cm

 

LEFT KIDNEY:     

9.2 x 3.7 x 4.8  cm

 

FINDINGS:     

 

RIGHT KIDNEY:     

Renal cortex is normal in thickness and echotexture.  No hydronephrosis, stone, or mass.  1 cm cyst l
ower pole parenchyma.  

 

LEFT KIDNEY:     

Renal cortex is normal in thickness and echotexture.  No hydronephrosis, stone, or mass.  

 

BLADDER:     

Within normal limits given the degree of distension.  

 

CONCLUSION:     

Negative renal sonogram.

 

 

 

 Sean Barajas MD on May 01, 2018 at 16:17           

Board Certified Radiologist.

 This report was verified electronically.

## 2018-05-01 NOTE — RADRPT
EXAM DATE/TIME:  05/01/2018 11:51 

 

[facility] COMPARISON:     

CHEST SINGLE AP, January 10, 2018, 11:17.

 

                     

INDICATIONS :     

Cough.

                     

 

MEDICAL HISTORY :            

Hypercholesterolemia. Hypertension Diabetes mellitus type II. Siezures.   

 

SURGICAL HISTORY :        

Fusion, cervical. Cholecystectomy. Hysterectomy. Lumbar discectomy.

 

ENCOUNTER:     

Initial                                        

 

ACUITY:     

1 day      

 

PAIN SCORE:     

0/10

 

LOCATION:     

Bilateral chest 

 

FINDINGS:     

A single view of the chest demonstrates the lungs to be symmetrically aerated without evidence of mas
s, infiltrate or effusion.  The cardiomediastinal contours are unremarkable.  Osseous structures are 
intact.

 

CONCLUSION:     

No acute disease.  There is no evidence of pneumonia.

 

 

 

 Teo Carranza MD on May 01, 2018 at 12:07           

Board Certified Radiologist.

 This report was verified electronically.

## 2018-05-01 NOTE — PD
HPI


Chief Complaint:   Complaint


Time Seen by Provider:  11:39


Travel History


International Travel<30 days:  No


Contact w/Intl Traveler<30days:  No


Traveled to known affect area:  No





History of Present Illness


HPI


78-year-old female, poor historian coming from home via [facility] evaluation of 

urinary tract infection symptoms for approximately 4 weeks.  Patient states 

that she has had dysuria, frequency, dark urine, and malodorous urine for the 

last 4 weeks but decided to come in today because she developed right upper 

quadrant and bilateral flank pain.  Patient states that she has had home health 

care for a recent hospital visit who has been changing her bandages from her 

previous tracheostomy site.  She is due to have these changed tomorrow.  

Patient states that she was given an antibiotic by her nurse 3 days ago and 

states compliance with this medication.  She is unable to tell me what 

medication this is.  She denies fevers or chills.  Denies nausea, vomiting or 

diarrhea.  Denies shortness of breath or chest pain.  Says she has a cough 

however, this cough is been present since her last hospital visit status post 

extubation.  Patient is on a blood thinner for an unknown reason and has a 

history of seizure disorder and diabetes mellitus.  [facility] states that her blood 

sugar was 119, vital signs stable.  Patient requests that I speak with her 

sister, Graciela upon arrival.  Graciela states that patient has had a decreased 

appetite in combination with these urinary symptoms.  She requests that patient 

be admitted for treatment and evaluation.





PFSH


Past Medical History


Hx Anticoagulant Therapy:  Yes


Anemia:  Yes


Arthritis:  Yes


Anxiety:  Yes


Depression:  Yes


Cancer:  Yes (OVARIAN CA)


Cardiovascular Problems:  Yes


High Cholesterol:  Yes


Chest Pain:  No


Congestive Heart Failure:  No


Cerebrovascular Accident:  Yes


Diabetes:  Yes


Patient Takes Glucophage:  No


Diminished Hearing:  No


Gastrointestinal Disorders:  Yes (esophageal strictures)


GERD:  Yes


Genitourinary:  Yes (incontinent)


Headaches:  No


Hypertension:  Yes (PT DENIES)


Musculoskeletal:  Yes (fracture to right ankle )


Neurologic:  Yes (SEIZURE DISORDER, HX CVA)


Reproductive:  Yes (CERVICAL CANCER)


Respiratory:  Yes (COPD)


Immunizations Current:  No


Migraines:  No


Seizures:  Yes


PNEUMOCCOCAL Vaccine (Year):  2


Pregnant?:  Not Pregnant


Menopausal:  Yes





Past Surgical History


Appendectomy:  Yes


Cardiac Surgery:  No


Cholecystectomy:  Yes


Ear Surgery:  No


Endocrine Surgery:  Yes


Eye Surgery:  No


Genitourinary Surgery:  Yes


Gynecologic Surgery:  Yes (HYSTERECTOMY,CERVICAL CA REMOVED.)


Hysterectomy:  Yes (OVARIAN CANCER)


Neurologic Surgery:  Yes (C4,C5,C6 PLATE PLACED)


Oral Surgery:  Yes


Thoracic Surgery:  No


Other Surgery:  Yes (LAPAROSCOPIC GREGG(WRAP STOMACH AROUND ESOPHAGUS TO HELP 

WITH GERD))





Social History


Alcohol Use:  No (DENIES)


Tobacco Use:  No (DENIES)


Substance Use:  No





Allergies-Medications


(Allergen,Severity, Reaction):  


Coded Allergies:  


     Influenza Virus Vaccines (Verified  Allergy, Intermediate, 5/1/18)


 "ALLERGIC TO EGGS SO I CAN NOT HAVE THE FLU SHOT"


     egg (Verified  Allergy, Intermediate, 5/1/18)


     codeine (Verified  Allergy, Mild, 5/1/18)


     meperidine (Verified  Allergy, Mild, 5/1/18)


     morphine (Verified  Allergy, Mild, 5/1/18)


     acetaminophen (Verified  Adverse Reaction, Mild, HEADACHE, 5/1/18)


     hydrocodone (Verified  Adverse Reaction, Mild, HEADACHE, 5/1/18)


Reported Meds & Prescriptions





Reported Meds & Active Scripts


Active


Reported


Dexilant (Dexlansoprazole) 60 Mg Cap.bp   


Sertraline (Sertraline HCl) 50 Mg Tab 50 Mg PO DAILY


Tramadol (Tramadol HCl) 50 Mg Tab 50 Mg PO Q4H PRN


Vimpat (Lacosamide) 100 Mg Tab 100 Mg PO BID


Baclofen 10 Mg Tab 10 Mg PO TID


Clopidogrel (Clopidogrel Bisulfate) 75 Mg Tab 75 Mg PO DAILY


Proair Hfa 8.5 GM Inh (Albuterol Sulfate) 90 Mcg/Act Aer 2 Puff INH Q6H PRN


     108 mcg/actuation


Metformin ER (Metformin HCl) 1,000 Mg Jass 1,000 Mg PO BID


     With evening meal


Toprol XL (Metoprolol Succinate) 50 Mg Tab 50 Mg PO BID


Pravachol (Pravastatin) 40 Mg Tab 40 Mg PO HS








Review of Systems


Except as stated in HPI:  all other systems reviewed are Neg





Physical Exam


Narrative


GENERAL: Well-developed thin no apparent distress


SKIN: Focused skin assessment warm/dry.


HEAD: Atraumatic. Normocephalic. 


EYES: Pupils equal and round. No scleral icterus. No injection or drainage. 


ENT: No nasal bleeding or discharge.  Mucous membranes pink and on the  

side.


NECK: Trachea midline. No JVD.  Bandage in place mid trachea without obvious 

exudate.


CARDIOVASCULAR: Regular rate and rhythm.  No murmur appreciated.


RESPIRATORY: No accessory muscle use.. Breath sounds equal bilaterally.  Slight 

rhonchi bilateral lower lobes


GASTROINTESTINAL: Abdomen soft, tender palpation localized to the lower 

abdominal region.  Mild tenderness palpation to the right upper quadrant. 

positive CVA tenderness left-sided.  Previous G-tube and healing process.  No 

significant erythema or edema.


MUSCULOSKELETAL: No obvious deformities. No clubbing.  No cyanosis.  No edema.  

Homans sign negative bilaterally


NEUROLOGICAL: Awake and alert. No obvious cranial nerve deficits.  Motor 

grossly within normal limits. Normal speech.


PSYCHIATRIC: Appropriate mood and affect; insight and judgment normal.





Data


Data


Last Documented VS





Vital Signs








  Date Time  Temp Pulse Resp B/P (MAP) Pulse Ox O2 Delivery O2 Flow Rate FiO2


 


5/1/18 12:00 98.3 92 16 127/76 (93) 100 Room Air  








Orders





 Orders


Complete Blood Count With Diff (5/1/18 11:41)


Comprehensive Metabolic Panel (5/1/18 11:41)


Urinalysis - C+S If Indicated (5/1/18 11:41)


Iv Access Insert/Monitor (5/1/18 11:41)


Ecg Monitoring (5/1/18 11:41)


Sodium Chloride 0.9% Flush (Ns Flush) (5/1/18 11:45)


Sodium Chlor 0.9% 1000 Ml Inj (Ns 1000 M (5/1/18 11:41)


Sepsis Workup Initiated (5/1/18 )


Prothrombin Time / Inr (Pt) (5/1/18 11:41)


Act Partial Throm Time (Ptt) (5/1/18 11:41)


Lactic Acid Sepsis Protocol (5/1/18 11:41)


Lipase (5/1/18 11:41)


Blood Culture (5/1/18 11:41)


Chest, Single Ap (5/1/18 11:41)


Oximetry (5/1/18 11:41)


Ct Abd/Pel W Iv Contrast(Rout) (5/1/18 11:41)


Urine Culture (5/1/18 12:00)


Ceftriaxone Inj (Rocephin Inj) (5/1/18 13:15)


Iohexol 350 Inj (Omnipaque 350 Inj) (5/1/18 13:33)


Admit Order (Ed Use Only) (5/1/18 14:37)





Labs





Laboratory Tests








Test


  5/1/18


12:00 5/1/18


12:05


 


Urine Color YELLOW  


 


Urine Turbidity CLOUDY  


 


Urine pH 5.5  


 


Urine Specific Gravity 1.018  


 


Urine Protein 30 mg/dL  


 


Urine Glucose (UA) NEG mg/dL  


 


Urine Ketones TRACE mg/dL  


 


Urine Occult Blood MOD  


 


Urine Nitrite NEG  


 


Urine Bilirubin NEG  


 


Urine Urobilinogen


  LESS THAN 2.0


MG/DL 


 


 


Urine Leukocyte Esterase LARGE  


 


Urine RBC 65 /hpf  


 


Urine WBC  /hpf  


 


Urine WBC Clumps MANY  


 


Urine Bacteria OCC /hpf  


 


Urine Mucus FEW /lpf  


 


Microscopic Urinalysis Comment


  CATH-CULTURE


IND 


 


 


Lactic Acid Level 1.5 mmol/L  


 


White Blood Count  10.3 TH/MM3 


 


Red Blood Count  3.33 MIL/MM3 


 


Hemoglobin  10.4 GM/DL 


 


Hematocrit  30.9 % 


 


Mean Corpuscular Volume  92.8 FL 


 


Mean Corpuscular Hemoglobin  31.3 PG 


 


Mean Corpuscular Hemoglobin


Concent 


  33.7 % 


 


 


Red Cell Distribution Width  15.7 % 


 


Platelet Count  549 TH/MM3 


 


Mean Platelet Volume  7.0 FL 


 


Neutrophils (%) (Auto)  74.6 % 


 


Lymphocytes (%) (Auto)  16.8 % 


 


Monocytes (%) (Auto)  6.3 % 


 


Eosinophils (%) (Auto)  1.9 % 


 


Basophils (%) (Auto)  0.4 % 


 


Neutrophils # (Auto)  7.7 TH/MM3 


 


Lymphocytes # (Auto)  1.7 TH/MM3 


 


Monocytes # (Auto)  0.6 TH/MM3 


 


Eosinophils # (Auto)  0.2 TH/MM3 


 


Basophils # (Auto)  0.0 TH/MM3 


 


CBC Comment  DIFF FINAL 


 


Differential Comment   


 


Prothrombin Time  10.9 SEC 


 


Prothromb Time International


Ratio 


  1.1 RATIO 


 


 


Activated Partial


Thromboplast Time 


  27.6 SEC 


 


 


Blood Urea Nitrogen  12 MG/DL 


 


Creatinine  1.14 MG/DL 


 


Random Glucose  119 MG/DL 


 


Total Protein  7.6 GM/DL 


 


Albumin  3.4 GM/DL 


 


Calcium Level  9.4 MG/DL 


 


Alkaline Phosphatase  112 U/L 


 


Aspartate Amino Transf


(AST/SGOT) 


  14 U/L 


 


 


Alanine Aminotransferase


(ALT/SGPT) 


  15 U/L 


 


 


Total Bilirubin  0.2 MG/DL 


 


Sodium Level  140 MEQ/L 


 


Potassium Level  4.5 MEQ/L 


 


Chloride Level  111 MEQ/L 


 


Carbon Dioxide Level  18.7 MEQ/L 


 


Anion Gap  10 MEQ/L 


 


Estimat Glomerular Filtration


Rate 


  46 ML/MIN 


 


 


Lipase  178 U/L 











[facility]


Medical Decision Making


Medical Screen Exam Complete:  Yes


Emergency Medical Condition:  Yes


Differential Diagnosis


UTI, hydronephrosis, nephrolithiasis, sepsis, pyelonephritis


Narrative Course


78 year female presents emergency for evaluation of urinary tract infection.  

According to patient, her nurse placed on antibiotic 3 days ago from her 

urologist, Dr. Jeong and states compliance with medication.  She is unable to 

tell me the name of this medication.


Labs and imaging studies ordered.


After review the EMR, it appears that patient was discharged January 10 after 

admission December 24, 2017 for altered mental status and uncontrolled 

hypertension.  She was evaluated by neurologist and received an EEG.  An LP was 

performed as well and was culture negative.  She was intubated and extubated 

multiple times and also required a G-tube for nutrition.  She was subsequently 

discharged to a skilled nursing facility.





Past medical history of CVA, COPD, seizure disorder, anxiety, depression, 

hypertension, hyperlipidemia, CHF, GERD, esophageal strictures, chronic UTIs.





CBC & BMP Diagram


5/1/18 12:05








Total Protein 7.6, Albumin 3.4, Calcium Level 9.4, Alkaline Phosphatase 112, 

Aspartate Amino Transf (AST/SGOT) 14 L, Alanine Aminotransferase (ALT/SGPT) 15, 

Total Bilirubin 0.2


Urinalysis suggestive of urinary tract infection.





Last Impressions








Chest X-Ray 5/1/18 1141 Signed





Impressions: 





 Service Date/Time:  Tuesday, May 1, 2018 11:51 - CONCLUSION:  No acute 

disease.  





 There is no evidence of pneumonia.     Teo Carranza MD 


 


Abdomen/Pelvis CT 5/1/18 1141 Signed





Impressions: 





 Service Date/Time:  Tuesday, May 1, 2018 13:26 - CONCLUSION:  1. Dilation of 

the 





 extrahepatic biliary system up to 11 mm which is above normal post 





 cholecystectomy.  This could represent reservoir phenomenon, but since there 

is 





 dilation down to the ampulla, distal obstruction cannot be excluded.  May 





 consider performing a patent biliary tract scan to evaluate biliary excretory 





 dynamics. 2. No evidence of hydronephrosis.  No calcified stones seen.     





 Sean Barajas MD 








Rocephin 1g administered.


Pt should be admitted for failed outpatient therapy of abx for UTI. She is 

stable at this time. 


In addition, the finding on the CT of the abdomen and pelvis were suggestive of 

a possible obstruction of the biliary tree, consider further imaging for 

evaluation.  If the patient does have mild right upper quadrant pain with 

palpation without rebound tenderness.  Most of her pain is located in her 

pelvis region especially when she urinates.


I spoke with Dr. Jarvis who agreed to the admission.





Diagnosis





 Primary Impression:  


 UTI (urinary tract infection)


 Qualified Codes:  N30.00 - Acute cystitis without hematuria


 Additional Impression:  


 Dilation of biliary tract





Admitting Information


Admitting Physician Requests:  Observation


Disposition:  01 DISCHARGE HOME


Condition:  Stable











Kyara Woodall May 1, 2018 11:58

## 2018-05-01 NOTE — RADRPT
EXAM DATE/TIME:  05/01/2018 13:26 

 

[facility] COMPARISON:     

No previous studies available for comparison.

 

 

INDICATIONS :     

Generalized abdomen pain, recent UTI. 

                      

 

IV CONTRAST:     

94 cc Omnipaque 350 (iohexol) IV 

 

 

ORAL CONTRAST:      

No oral contrast ingested.

                      

 

RADIATION DOSE:     

6.02 CTDIvol (mGy) 

 

 

MEDICAL HISTORY :     

Cardiovascular disease. Hypertension. Cervical cancer

 

SURGICAL HISTORY :      

Appendectomy. Hysterectomy.Cholecystectomy.

 

ENCOUNTER:      

Initial

 

ACUITY:      

1 week

 

PAIN SCALE:      

8/10

 

LOCATION:       

Bilateral  abdomen

 

TECHNIQUE:     

Volumetric scanning of the abdomen and pelvis was performed.  Using automated exposure control and ad
justment of the mA and/or kV according to patient size, radiation dose was kept as low as reasonably 
achievable to obtain optimal diagnostic quality images.  DICOM format image data is available electro
nically for review and comparison.  

 

FINDINGS:     

 

LOWER LUNGS:     

The visualized lower lungs are clear.

 

LIVER:     

Homogeneous enhancement throughout the liver.  Cholecystectomy.  The extrahepatic biliary system is p
rominent, measuring up to 11 mm with dilation down to the ampulla.

 

SPLEEN:     

Normal size without lesion.

 

PANCREAS:     

Within normal limits.

 

KIDNEYS:     

Normal in size and shape.  There is no mass, stone or hydronephrosis.

 

ADRENAL GLANDS:     

Within normal limits.

 

VASCULAR:     

There is no aortic aneurysm.

 

BOWEL/MESENTERY:     

No dilated loops of small or large bowel.  No evidence of free fluid.

 

ABDOMINAL WALL:     

Within normal limits.

 

RETROPERITONEUM:     

There is no lymphadenopathy. 

 

BLADDER:     

No wall thickening or mass. 

 

REPRODUCTIVE:     

Within normal limits.

 

INGUINAL:     

There is no lymphadenopathy or hernia. 

 

MUSCULOSKELETAL:     

Within normal limits for patient age. 

 

CONCLUSION:     

1. Dilation of the extrahepatic biliary system up to 11 mm which is above normal post cholecystectomy
.  This could represent reservoir phenomenon, but since there is dilation down to the ampulla, distal
 obstruction cannot be excluded.  May consider performing a patent biliary tract scan to evaluate [place]
iary excretory dynamics.

2. No evidence of hydronephrosis.  No calcified stones seen.

 

 

 

 Sean Barajas MD on May 01, 2018 at 13:40           

Board Certified Radiologist.

 This report was verified electronically.

## 2018-05-01 NOTE — HHI.HP
__________________________________________________





HPI


Service


[facility]


Primary Care Physician


Unknown


Admission Diagnosis





UTI failed outpt abx therapy


Diagnoses:  


Chief Complaint:  


UTI


Travel History


International Travel<30 Days:  No


Contact w/Intl Traveler <30 Da:  No


Traveled to Known Affected Are:  No


History of Present Illness


78-year-old female brought in by E back with complaint of worsening urinary 

tract infection.  Patient reports she has been fighting with her symptoms for 

the past 4 weeks.  She reports dysuria, suprapubic pain that has been ongoing.  

She states she was seen by her urologist and was prescribed antibiotics about 4 

days ago but that has not made any difference.  She believes she had fevers up 

to 101.  Her sister Graciela at bedside provided much of the history.  Workup in 

the emergency room confirm abnormal urinalysis.  Probable UTI, failed 

outpatient therapy.





Review of Systems


Constitutional:  COMPLAINS OF: Fever, DENIES: Chills


Respiratory:  DENIES: Cough, Shortness of breath


Cardiovascular:  DENIES: Chest pain


Gastrointestinal:  DENIES: Nausea, Vomiting


Genitourinary:  COMPLAINS OF: Urinary frequency, Urgency, Dysuria


Integumentary:  DENIES: Rash


Neurologic:  DENIES: Headache


Psychiatric:  DENIES: Confusion





Past Family Social History


Past Medical History


Anemia 


OA


Anxiety/depression


Cervical cancer


DM


Hyperlipidemia


CVA


Diabetes mellitus


Esophageal stricture


GERD


Incontinence


Seizures


Chronic bronchitis


Hx of polio


Past Surgical History


Appendectomy


Cholecystectomy


Hysterectomy with cervical cancer.


C4-C6 plate placed


Laparoscopic Nissen procedure


Oral surgery


R knee replacement


Reported Medications





Reported Meds & Active Scripts


Active


Reported


Dexilant (Dexlansoprazole) 60 Mg Cap.bp   


Sertraline (Sertraline HCl) 50 Mg Tab 50 Mg PO DAILY


Tramadol (Tramadol HCl) 50 Mg Tab 50 Mg PO Q4H PRN


Vimpat (Lacosamide) 100 Mg Tab 100 Mg PO BID


Baclofen 10 Mg Tab 10 Mg PO TID


Clopidogrel (Clopidogrel Bisulfate) 75 Mg Tab 75 Mg PO DAILY


Proair Hfa 8.5 GM Inh (Albuterol Sulfate) 90 Mcg/Act Aer 2 Puff INH Q6H PRN


     108 mcg/actuation


Metformin ER (Metformin HCl) 1,000 Mg Jass 1,000 Mg PO BID


     With evening meal


Toprol XL (Metoprolol Succinate) 50 Mg Tab 50 Mg PO BID


Pravachol (Pravastatin) 40 Mg Tab 40 Mg PO HS


Allergies:  


Coded Allergies:  


     Influenza Virus Vaccines (Verified  Allergy, Intermediate, 18)


 "ALLERGIC TO EGGS SO I CAN NOT HAVE THE FLU SHOT"


     egg (Verified  Allergy, Intermediate, 18)


     codeine (Verified  Allergy, Mild, 18)


     meperidine (Verified  Allergy, Mild, 18)


     morphine (Verified  Allergy, Mild, 18)


     acetaminophen (Verified  Adverse Reaction, Mild, HEADACHE, 18)


     hydrocodone (Verified  Adverse Reaction, Mild, HEADACHE, 18)


Family History


Patient was adopted.


Social History


No tobacco or alcohol use.





Physical Exam


Vital Signs





Vital Signs








  Date Time  Temp Pulse Resp B/P (MAP) Pulse Ox O2 Delivery O2 Flow Rate FiO2


 


18 12:00 98.3 92 16 127/76 (93) 100 Room Air  


 


18 11:41   16     


 


18 11:36 98.3 90 16 137/80 (99) 100   








Physical Exam


GENERAL: Elderly female in no acute distress.


SKIN: No rashes, ecchymoses or lesions. Cool and dry.


HEAD: Atraumatic. Normocephalic. No temporal or scalp tenderness.


EYES: Pupils equal round and reactive. Extraocular motions intact. No scleral 

icterus. No injection or drainage. 


ENT: Nose without bleeding, purulent drainage or septal hematoma. Throat 

without erythema, tonsillar hypertrophy or exudate. Uvula midline. Airway 

patent.


NECK: Trachea midline. No JVD or lymphadenopathy. Supple, nontender, no 

meningeal signs.


CARDIOVASCULAR: Regular rate and rhythm without murmurs, gallops, or rubs. 


RESPIRATORY: Clear to auscultation. Breath sounds equal bilaterally. No wheezes

, rales, or rhonchi.  


GASTROINTESTINAL: Patient endorse suprapubic tenderness.  Mild suprapubic 

distention.


MUSCULOSKELETAL: Extremities without clubbing, cyanosis, or edema. No calf 

tenderness. Negative Homans sign bilaterally.


NEUROLOGICAL: Awake and alert. Cranial nerves II through XII intact.  Motor and 

sensory grossly within normal limits. Five out of 5 muscle strength in all 

muscle groups.  Normal speech.


Laboratory





Laboratory Tests








Test


  18


12:00 18


12:05


 


Urine Color YELLOW  


 


Urine Turbidity CLOUDY  


 


Urine pH 5.5  


 


Urine Specific Gravity 1.018  


 


Urine Protein 30  


 


Urine Glucose (UA) NEG  


 


Urine Ketones TRACE  


 


Urine Occult Blood MOD  


 


Urine Nitrite NEG  


 


Urine Bilirubin NEG  


 


Urine Urobilinogen LESS THAN 2.0  


 


Urine Leukocyte Esterase LARGE  


 


Urine RBC 65  


 


Urine WBC   


 


Urine WBC Clumps MANY  


 


Urine Bacteria OCC  


 


Urine Mucus FEW  


 


Microscopic Urinalysis Comment


  CATH-CULTURE


IND 


 


 


Lactic Acid Level 1.5  


 


White Blood Count  10.3 


 


Red Blood Count  3.33 


 


Hemoglobin  10.4 


 


Hematocrit  30.9 


 


Mean Corpuscular Volume  92.8 


 


Mean Corpuscular Hemoglobin  31.3 


 


Mean Corpuscular Hemoglobin


Concent 


  33.7 


 


 


Red Cell Distribution Width  15.7 


 


Platelet Count  549 


 


Mean Platelet Volume  7.0 


 


Neutrophils (%) (Auto)  74.6 


 


Lymphocytes (%) (Auto)  16.8 


 


Monocytes (%) (Auto)  6.3 


 


Eosinophils (%) (Auto)  1.9 


 


Basophils (%) (Auto)  0.4 


 


Neutrophils # (Auto)  7.7 


 


Lymphocytes # (Auto)  1.7 


 


Monocytes # (Auto)  0.6 


 


Eosinophils # (Auto)  0.2 


 


Basophils # (Auto)  0.0 


 


CBC Comment  DIFF FINAL 


 


Differential Comment   


 


Prothrombin Time  10.9 


 


Prothromb Time International


Ratio 


  1.1 


 


 


Activated Partial


Thromboplast Time 


  27.6 


 


 


Blood Urea Nitrogen  12 


 


Creatinine  1.14 


 


Random Glucose  119 


 


Total Protein  7.6 


 


Albumin  3.4 


 


Calcium Level  9.4 


 


Alkaline Phosphatase  112 


 


Aspartate Amino Transf


(AST/SGOT) 


  14 


 


 


Alanine Aminotransferase


(ALT/SGPT) 


  15 


 


 


Total Bilirubin  0.2 


 


Sodium Level  140 


 


Potassium Level  4.5 


 


Chloride Level  111 


 


Carbon Dioxide Level  18.7 


 


Anion Gap  10 


 


Estimat Glomerular Filtration


Rate 


  46 


 


 


Lipase  178 














 Date/Time


Source Procedure


Growth Status


 


 


 18 12:05


Blood Peripheral Aerobic Blood Culture


Pending Received


 


 18 12:05


Blood Peripheral Anaerobic Blood Culture


Pending Received


 


 18 12:00


Urine Catheterized Urine Urine Culture


Pending Received








Result Diagram:  


18 1205                                                                    

            18 1205





Imaging





Last Impressions








Chest X-Ray 18 1141 Signed





Impressions: 





 Service Date/Time:  Tuesday, May 1, 2018 11:51 - CONCLUSION:  No acute 

disease.  





 There is no evidence of pneumonia.     Teo Carranza MD 


 


Abdomen/Pelvis CT 18 1141 Signed





Impressions: 





 Service Date/Time:  Tuesday, May 1, 2018 13:26 - CONCLUSION:  1. Dilation of 

the 





 extrahepatic biliary system up to 11 mm which is above normal post 





 cholecystectomy.  This could represent reservoir phenomenon, but since there 

is 





 dilation down to the ampulla, distal obstruction cannot be excluded.  May 





 consider performing a patent biliary tract scan to evaluate biliary excretory 





 dynamics. 2. No evidence of hydronephrosis.  No calcified stones seen.     





 Thomas J. Yuschok, MD Caprini VTE Risk Assessment


Caprinjosh VTE Risk Assessment:  Mod/High Risk (score >= 2)


Caprini Risk Assessment Model











 Point Value = 1          Point Value = 2  Point Value = 3  Point Value = 5


 


Age 41-60


Minor surgery


BMI > 25 kg/m2


Swollen legs


Varicose veins


Pregnancy or postpartum


History of unexplained or recurrent


   spontaneous 


Oral contraceptives or hormone


   replacement


Sepsis (< 1 month)


Serious lung disease, including


   pneumonia (< 1 month)


Abnormal pulmonary function


Acute myocardial infarction


Congestive heart failure (< 1 month)


History of inflammatory bowel disease


Medical patient at bed rest Age 61-74


Arthroscopic surgery


Major open surgery (> 45 min)


Laparoscopic surgery (> 45 min)


Malignancy


Confined to bed (> 72 hours)


Immobilizing plaster cast


Central venous access Age >= 75


History of VTE


Family history of VTE


Factor V Leiden


Prothrombin 52216H


Lupus anticoagulant


Anticardiolipin antibodies


Elevated serum homocysteine


Heparin-induced thrombocytopenia


Other congenital or acquired


   thrombophilia Stroke (< 1 month)


Elective arthroplasty


Hip, pelvis, or leg fracture


Acute spinal cord injury (< 1 month)








Prophylaxis Regimen











   Total Risk


Factor Score Risk Level Prophylaxis Regimen


 


0-1      Low Early ambulation


 


2 Moderate Order ONE of the following:


*Sequential Compression Device (SCD)


*Heparin 5000 units SQ BID


 


3-4 Higher Order ONE of the following medications:


*Heparin 5000 units SQ TID


*Enoxaparin/Lovenox 40 mg SQ daily (WT < 150 kg, CrCl > 30 mL/min)


*Enoxaparin/Lovenox 30 mg SQ daily (WT < 150 kg, CrCl > 10-29 mL/min)


*Enoxaparin/Lovenox 30 mg SQ BID (WT < 150 kg, CrCl > 30 mL/min)


AND/OR


*Sequential Compression Device (SCD)


 


5 or more Highest Order ONE of the following medications:


*Heparin 5000 units SQ TID (Preferred with Epidurals)


*Enoxaparin/Lovenox 40 mg SQ daily (WT < 150 kg, CrCl > 30 mL/min)


*Enoxaparin/Lovenox 30 mg SQ daily (WT < 150 kg, CrCl > 10-29 mL/min)


*Enoxaparin/Lovenox 30 mg SQ BID (WT < 150 kg, CrCl > 30 mL/min)


AND


*Sequential Compression Device (SCD)











Assessment and Plan


Assessment and Plan


78-year-old female with:





UTI: Failed outpatient therapy.  Persistently symptomatic.  Follows with 

urology outpatient.


- Continue Rocephin.  Follow cultures


- Obtain kidney and bladder ultrasound.





CKD:


-Stable


- Avoid nephrotoxic agents.





GERD


- Continue PPI.





Abnormal abdominal CT:


- Reviewed. No abdominal symptoms to account for biliary dilation.


- Monitor clinically. 





Diabetes


On metformin as an outpatient.


- Hold metformin.


- Insulin sliding scale and diabetic diet.





Continue the rest of the patient's home dose medications for chronic conditions.





PPx: Heparin











Betty Jarvis MD May 1, 2018 15:40

## 2018-05-02 VITALS
HEART RATE: 100 BPM | OXYGEN SATURATION: 100 % | DIASTOLIC BLOOD PRESSURE: 60 MMHG | RESPIRATION RATE: 18 BRPM | SYSTOLIC BLOOD PRESSURE: 135 MMHG | TEMPERATURE: 98.6 F

## 2018-05-02 VITALS
SYSTOLIC BLOOD PRESSURE: 145 MMHG | HEART RATE: 96 BPM | RESPIRATION RATE: 16 BRPM | OXYGEN SATURATION: 100 % | TEMPERATURE: 98.6 F | DIASTOLIC BLOOD PRESSURE: 65 MMHG

## 2018-05-02 VITALS
OXYGEN SATURATION: 100 % | RESPIRATION RATE: 18 BRPM | DIASTOLIC BLOOD PRESSURE: 87 MMHG | SYSTOLIC BLOOD PRESSURE: 135 MMHG | TEMPERATURE: 98.2 F | HEART RATE: 99 BPM

## 2018-05-02 VITALS
SYSTOLIC BLOOD PRESSURE: 143 MMHG | OXYGEN SATURATION: 99 % | RESPIRATION RATE: 18 BRPM | DIASTOLIC BLOOD PRESSURE: 66 MMHG | HEART RATE: 107 BPM | TEMPERATURE: 98.7 F

## 2018-05-02 LAB
BASOPHILS # BLD AUTO: 0 TH/MM3 (ref 0–0.2)
BASOPHILS NFR BLD: 0.5 % (ref 0–2)
BUN SERPL-MCNC: 17 MG/DL (ref 7–18)
CALCIUM SERPL-MCNC: 9.2 MG/DL (ref 8.5–10.1)
CHLORIDE SERPL-SCNC: 109 MEQ/L (ref 98–107)
CREAT SERPL-MCNC: 1.24 MG/DL (ref 0.5–1)
EOSINOPHIL # BLD: 0.1 TH/MM3 (ref 0–0.4)
EOSINOPHIL NFR BLD: 1.6 % (ref 0–4)
ERYTHROCYTE [DISTWIDTH] IN BLOOD BY AUTOMATED COUNT: 15.4 % (ref 11.6–17.2)
GFR SERPLBLD BASED ON 1.73 SQ M-ARVRAT: 42 ML/MIN (ref 89–?)
GLUCOSE SERPL-MCNC: 148 MG/DL (ref 74–106)
HCO3 BLD-SCNC: 20.3 MEQ/L (ref 21–32)
HCT VFR BLD CALC: 30.9 % (ref 35–46)
HGB BLD-MCNC: 10.3 GM/DL (ref 11.6–15.3)
LYMPHOCYTES # BLD AUTO: 1.9 TH/MM3 (ref 1–4.8)
LYMPHOCYTES NFR BLD AUTO: 25.3 % (ref 9–44)
MCH RBC QN AUTO: 31.4 PG (ref 27–34)
MCHC RBC AUTO-ENTMCNC: 33.3 % (ref 32–36)
MCV RBC AUTO: 94.1 FL (ref 80–100)
MONOCYTE #: 0.5 TH/MM3 (ref 0–0.9)
MONOCYTES NFR BLD: 6.4 % (ref 0–8)
NEUTROPHILS # BLD AUTO: 5.1 TH/MM3 (ref 1.8–7.7)
NEUTROPHILS NFR BLD AUTO: 66.2 % (ref 16–70)
PLATELET # BLD: 480 TH/MM3 (ref 150–450)
PMV BLD AUTO: 7.2 FL (ref 7–11)
RBC # BLD AUTO: 3.28 MIL/MM3 (ref 4–5.3)
SODIUM SERPL-SCNC: 139 MEQ/L (ref 136–145)
WBC # BLD AUTO: 7.7 TH/MM3 (ref 4–11)

## 2018-05-02 RX ADMIN — INSULIN ASPART SCH: 100 INJECTION, SOLUTION INTRAVENOUS; SUBCUTANEOUS at 17:00

## 2018-05-02 RX ADMIN — HEPARIN SODIUM SCH UNITS: 10000 INJECTION, SOLUTION INTRAVENOUS; SUBCUTANEOUS at 06:21

## 2018-05-02 RX ADMIN — LACOSAMIDE SCH MG: 100 TABLET, FILM COATED ORAL at 20:49

## 2018-05-02 RX ADMIN — SODIUM CHLORIDE SCH MLS/HR: 900 INJECTION INTRAVENOUS at 10:49

## 2018-05-02 RX ADMIN — Medication SCH ML: at 08:25

## 2018-05-02 RX ADMIN — INSULIN ASPART SCH: 100 INJECTION, SOLUTION INTRAVENOUS; SUBCUTANEOUS at 20:49

## 2018-05-02 RX ADMIN — METOPROLOL SUCCINATE SCH MG: 50 TABLET, FILM COATED, EXTENDED RELEASE ORAL at 20:48

## 2018-05-02 RX ADMIN — ACETAMINOPHEN PRN MG: 325 TABLET ORAL at 00:35

## 2018-05-02 RX ADMIN — INSULIN ASPART SCH: 100 INJECTION, SOLUTION INTRAVENOUS; SUBCUTANEOUS at 12:00

## 2018-05-02 RX ADMIN — INSULIN ASPART SCH: 100 INJECTION, SOLUTION INTRAVENOUS; SUBCUTANEOUS at 08:00

## 2018-05-02 RX ADMIN — APIXABAN SCH MG: 5 TABLET, FILM COATED ORAL at 20:49

## 2018-05-02 RX ADMIN — ACETAMINOPHEN PRN MG: 325 TABLET ORAL at 17:34

## 2018-05-02 RX ADMIN — PANTOPRAZOLE SODIUM SCH MG: 20 TABLET, DELAYED RELEASE ORAL at 14:56

## 2018-05-02 RX ADMIN — Medication SCH ML: at 20:49

## 2018-05-02 NOTE — HHI.PR
Subjective


Remarks


Follow up for UTI. The patient reports feeling better overnight. Denies any 

further fevers. Still reporting dysuria however improved compared to yesterday. 

Denies any abdominal/suprapubic pain, nausea/vomiting, or diarrhea. Denies any 

other medical complaints at this time.





Objective


Vitals





Vital Signs








  Date Time  Temp Pulse Resp B/P (MAP) Pulse Ox O2 Delivery O2 Flow Rate FiO2


 


5/2/18 08:17 98.2 99 18 135/87 (103) 100   


 


5/2/18 01:35   15     


 


5/1/18 21:30 98.3 94 18 107/53 (71) 99   


 


5/1/18 17:31 98.4 96 19 122/60 (80) 99   


 


5/1/18 16:57        


 


5/1/18 16:50  82 16 122/78 (93) 97 Room Air  


 


5/1/18 12:00 98.3 92 16 127/76 (93) 100 Room Air  


 


5/1/18 11:41   16     


 


5/1/18 11:36 98.3 90 16 137/80 (99) 100   














I/O      


 


 5/1/18 5/1/18 5/1/18 5/2/18 5/2/18 5/2/18





 07:00 15:00 23:00 07:00 15:00 23:00


 


Intake Total  1000 ml    


 


Output Total    350 ml  


 


Balance  1000 ml  -350 ml  


 


      


 


Intake IV Total  1000 ml    


 


Output Urine Total    350 ml  


 


# Voids  1 1   








Result Diagram:  


5/1/18 1205                                                                    

            5/1/18 1205





Imaging





Last Impressions








Chest X-Ray 5/1/18 1141 Signed





Impressions: 





 Service Date/Time:  Tuesday, May 1, 2018 11:51 - CONCLUSION:  No acute 

disease.  





 There is no evidence of pneumonia.     Teo Carranza MD 


 


Abdomen/Pelvis CT 5/1/18 1141 Signed





Impressions: 





 Service Date/Time:  Tuesday, May 1, 2018 13:26 - CONCLUSION:  1. Dilation of 

the 





 extrahepatic biliary system up to 11 mm which is above normal post 





 cholecystectomy.  This could represent reservoir phenomenon, but since there 

is 





 dilation down to the ampulla, distal obstruction cannot be excluded.  May 





 consider performing a patent biliary tract scan to evaluate biliary excretory 





 dynamics. 2. No evidence of hydronephrosis.  No calcified stones seen.     





 Sean Barajas MD 


 


Renal Ultrasound 5/1/18 0000 Signed





Impressions: 





 Service Date/Time:  Tuesday, May 1, 2018 15:43 - CONCLUSION:  Negative renal 





 sonogram.     Sean Barajas MD 








Objective Remarks


GENERAL: Well-nourished, well-developed elderly female patient in NAD.


SKIN: Warm and dry. No rash.


HEENT:  Normocephalic. Atraumatic. Pupils equal and round. Mucous membranes 

pink and moist.


CARDIOVASCULAR: Regular rate and rhythm.  No murmur appreciated. 


RESPIRATORY: No accessory muscle use. Clear to auscultation. Breath sounds 

equal bilaterally.  


GASTROINTESTINAL: Abdomen soft, non-tender, nondistended. Normoactive bowel 

sounds x4.


MUSCULOSKELETAL: No obvious deformities. Extremities without clubbing, cyanosis

, or edema. 


NEUROLOGICAL: Awake and alert. No obvious cranial nerve deficits.  Motor 

grossly within normal limits.  Normal speech.


PSYCHIATRIC: Appropriate mood and affect; insight and judgment normal.


Medications and IVs





Current Medications








 Medications


  (Trade)  Dose


 Ordered  Sig/Aaron


 Route  Start Time


 Stop Time Status Last Admin


 


  (NS Flush)  2 ml  UNSCH  PRN


 IVF  5/1/18 11:45


     


 


 


  (NS Flush)  2 ml  UNSCH  PRN


 IV FLUSH  5/1/18 15:45


     


 


 


  (NS Flush)  2 ml  BID


 IV FLUSH  5/1/18 21:00


    5/1/18 21:27


 


 


  (Tylenol)  650 mg  Q4H  PRN


 PO  5/1/18 15:45


    5/2/18 00:35


 


 


  (Zofran Inj)  4 mg  Q6H  PRN


 IVP  5/1/18 15:45


     


 


 


  (Heparin Inj)  5,000 units  Q12H


 SQ  5/1/18 18:00


    5/1/18 18:45


 


 


  (Narcan Inj)  0.4 mg  UNSCH  PRN


 IV PUSH  5/1/18 15:45


     


 


 


  (Milk Of


 Magnesia Liq)  30 ml  Q12H  PRN


 PO  5/1/18 15:45


     


 


 


  (Senokot)  17.2 mg  Q12H  PRN


 PO  5/1/18 15:45


     


 


 


  (Dulcolax Supp)  10 mg  DAILY  PRN


 RECTAL  5/1/18 15:45


     


 


 


  (Lactulose Liq)  30 ml  DAILY  PRN


 PO  5/1/18 15:45


     


 


 


  (D50w (Vial) Inj)  50 ml  UNSCH  PRN


 IV PUSH  5/1/18 15:45


     


 


 


  (Glucagon Inj)  1 mg  UNSCH  PRN


 OTHER  5/1/18 15:45


     


 


 


  (NovoLOG


 SUPPLEMENTAL


 SCALE)  1  ACHS SLIDING  SCALE


 SQ  5/1/18 17:00


     


 


 


 Ceftriaxone


 Sodium 1000 mg/


 Sodium Chloride  100 ml @ 


 200 mls/hr  Q24H


 IV  5/2/18 07:00


     


 











A/P


Assessment and Plan


78-year-old female with hx of anemia, OA, anxiety, depression, cervical cancer, 

DM, HLD. CVA, GERD, esophageal stricture, seizures, presents with fever, dysuria

, suprapubic pain. 





UTI: Failed outpatient therapy.  Persistently symptomatic.  Follows with 

urology outpatient.


- Continue IV Rocephin.  


- Follow blood and urine cultures


- Renal ultrasound reviewed and unremarkable





CKD stage III: chronic, stable


- Avoid nephrotoxic agents





GERD: chronic


- Continue PPI.





Abnormal abdominal CT:


- Reviewed. No abdominal symptoms to account for biliary dilation, patient 

tolerating oral intake


- Monitor clinically. 





Diabetes Mellitus, type 2: chornic, on metformin as an outpatient.


- Hold metformin.


- Insulin sliding scale and diabetic diet.





Other chronic medical conditions stable, continue home medications as 

appropriate.  





DVT Prophylaxis: Heparin sq


Discharge Planning


Discharge pending final urine culture and sensitivities. Likely discharge 

tomorrow.











Naima Reardon PA-C May 2, 2018 9:13 am

## 2018-05-03 VITALS
HEART RATE: 76 BPM | TEMPERATURE: 98 F | DIASTOLIC BLOOD PRESSURE: 65 MMHG | RESPIRATION RATE: 16 BRPM | OXYGEN SATURATION: 96 % | SYSTOLIC BLOOD PRESSURE: 123 MMHG

## 2018-05-03 VITALS
OXYGEN SATURATION: 96 % | HEART RATE: 75 BPM | RESPIRATION RATE: 16 BRPM | TEMPERATURE: 98 F | SYSTOLIC BLOOD PRESSURE: 111 MMHG | DIASTOLIC BLOOD PRESSURE: 62 MMHG

## 2018-05-03 VITALS
SYSTOLIC BLOOD PRESSURE: 125 MMHG | DIASTOLIC BLOOD PRESSURE: 60 MMHG | TEMPERATURE: 98.7 F | HEART RATE: 78 BPM | RESPIRATION RATE: 16 BRPM | OXYGEN SATURATION: 100 %

## 2018-05-03 RX ADMIN — SODIUM CHLORIDE SCH MLS/HR: 900 INJECTION INTRAVENOUS at 08:21

## 2018-05-03 RX ADMIN — Medication SCH ML: at 08:21

## 2018-05-03 RX ADMIN — APIXABAN SCH MG: 5 TABLET, FILM COATED ORAL at 08:21

## 2018-05-03 RX ADMIN — LACOSAMIDE SCH MG: 100 TABLET, FILM COATED ORAL at 08:21

## 2018-05-03 RX ADMIN — ACETAMINOPHEN PRN MG: 325 TABLET ORAL at 00:55

## 2018-05-03 RX ADMIN — INSULIN ASPART SCH: 100 INJECTION, SOLUTION INTRAVENOUS; SUBCUTANEOUS at 08:00

## 2018-05-03 RX ADMIN — METOPROLOL SUCCINATE SCH MG: 50 TABLET, FILM COATED, EXTENDED RELEASE ORAL at 08:21

## 2018-05-03 RX ADMIN — PANTOPRAZOLE SODIUM SCH MG: 20 TABLET, DELAYED RELEASE ORAL at 08:21

## 2018-05-03 NOTE — HHI.FF
Face to Face Verification


Diagnosis:  


(1) Generalized weakness


(2) UTI (urinary tract infection)


(3) HTN (hypertension)


(4) JD (acute kidney injury)


Physical Therapy


Order:  Evaluate and Treat, Improve ambulation, Strength and gait training





Home Health Nursing








Order: Medical education





 Signs/symptoms of disease process





 Nursing assessment with vital signs

















I have seen patient Simi Dickinson on 5/3/18. My clinical findings 

support the need for the requested home health care services because:








 Ltd mobility - disease progression





 Deconditioned w/ increased weakness





 Med compliance is questionable





 Limited ability to care for self





 Infection w/ risk of complications














I certify that my clinical findings support that this patient is homebound 

because:








 Unsteady gait/balance





 Unsafe to leave home unassisted





 Unable to use public transportation

















Naima Reardon PA-C May 3, 2018 8:04 am

## 2018-05-03 NOTE — HHI.PR
Subjective


Remarks


Follow-up for UTI, failed outpatient treatment.  The patient reports she 

continues to feel better again today.  She denies any abdominal pain, nausea/

vomiting, or diarrhea.  She states her dysuria has improved.  Denies fevers or 

chills.  She wants to go home.  She has no other medical complaints at this 

time.  She does already have home health care arranged.





Objective


Vitals





Vital Signs








  Date Time  Temp Pulse Resp B/P (MAP) Pulse Ox O2 Delivery O2 Flow Rate FiO2


 


5/3/18 07:49 98.0 76 16 123/65 (84) 96   


 


5/3/18 05:05   14     


 


5/3/18 04:22 98.0 75 16 111/62 (78) 96   


 


5/3/18 01:55   14     


 


5/3/18 00:02 98.7 78 16 125/60 (81) 100   


 


5/2/18 21:08 98.6 96 16 145/65 (91) 100   


 


5/2/18 17:02 98.7 107 18 143/66 (91) 99   


 


5/2/18 13:17 98.6 100 18 135/60 (85) 100   














I/O      


 


 5/2/18 5/2/18 5/2/18 5/3/18 5/3/18 5/3/18





 07:00 15:00 23:00 07:00 15:00 23:00


 


Intake Total  100 ml    


 


Output Total 350 ml   1000 ml  


 


Balance -350 ml 100 ml  -1000 ml  


 


      


 


Intake IV Total  100 ml    


 


Output Urine Total 350 ml   1000 ml  


 


# Bowel Movements  1    








Result Diagram:  


5/2/18 1026                                                                    

            5/2/18 1026





Imaging





Last Impressions








Chest X-Ray 5/1/18 1141 Signed





Impressions: 





 Service Date/Time:  Tuesday, May 1, 2018 11:51 - CONCLUSION:  No acute 

disease.  





 There is no evidence of pneumonia.     Teo Carranza MD 


 


Abdomen/Pelvis CT 5/1/18 1141 Signed





Impressions: 





 Service Date/Time:  Tuesday, May 1, 2018 13:26 - CONCLUSION:  1. Dilation of 

the 





 extrahepatic biliary system up to 11 mm which is above normal post 





 cholecystectomy.  This could represent reservoir phenomenon, but since there 

is 





 dilation down to the ampulla, distal obstruction cannot be excluded.  May 





 consider performing a patent biliary tract scan to evaluate biliary excretory 





 dynamics. 2. No evidence of hydronephrosis.  No calcified stones seen.     





 Sean Barajas MD 


 


Renal Ultrasound 5/1/18 0000 Signed





Impressions: 





 Service Date/Time:  Tuesday, May 1, 2018 15:43 - CONCLUSION:  Negative renal 





 sonogram.     Sean Barajas MD 








Objective Remarks


GENERAL: Well-nourished, well-developed elderly female patient in NAD.


SKIN: Warm and dry. No rash.  Anterior neck with bandage covering old 

tracheostomy site that is healing.


HEENT:  Normocephalic. Atraumatic. Pupils equal and round. Mucous membranes 

pink and moist.


CARDIOVASCULAR: Regular rate and rhythm.  No murmur appreciated. 


RESPIRATORY: No accessory muscle use. Clear to auscultation. Breath sounds 

equal bilaterally.  


GASTROINTESTINAL: Abdomen soft, non-tender, nondistended. Normoactive bowel 

sounds x4.  Left mid abdomen with healing old PEG tube site.


MUSCULOSKELETAL: No obvious deformities. Extremities without clubbing, cyanosis

, or edema. 


NEUROLOGICAL: Awake and alert. No obvious cranial nerve deficits.  Motor 

grossly within normal limits.  Normal speech.


PSYCHIATRIC: Appropriate mood and affect; insight and judgment normal.


Procedures


None


Medications and IVs





Current Medications








 Medications


  (Trade)  Dose


 Ordered  Sig/Aaron


 Route  Start Time


 Stop Time Status Last Admin


 


  (NS Flush)  2 ml  UNSCH  PRN


 IVF  5/1/18 11:45


     


 


 


  (NS Flush)  2 ml  UNSCH  PRN


 IV FLUSH  5/1/18 15:45


     


 


 


  (NS Flush)  2 ml  BID


 IV FLUSH  5/1/18 21:00


    5/3/18 08:21


 


 


  (Tylenol)  650 mg  Q4H  PRN


 PO  5/1/18 15:45


    5/3/18 00:55


 


 


  (Zofran Inj)  4 mg  Q6H  PRN


 IVP  5/1/18 15:45


     


 


 


  (Narcan Inj)  0.4 mg  UNSCH  PRN


 IV PUSH  5/1/18 15:45


     


 


 


  (Milk Of


 Magnesia Liq)  30 ml  Q12H  PRN


 PO  5/1/18 15:45


     


 


 


  (Senokot)  17.2 mg  Q12H  PRN


 PO  5/1/18 15:45


     


 


 


  (Dulcolax Supp)  10 mg  DAILY  PRN


 RECTAL  5/1/18 15:45


     


 


 


  (Lactulose Liq)  30 ml  DAILY  PRN


 PO  5/1/18 15:45


     


 


 


  (D50w (Vial) Inj)  50 ml  UNSCH  PRN


 IV PUSH  5/1/18 15:45


     


 


 


  (Glucagon Inj)  1 mg  UNSCH  PRN


 OTHER  5/1/18 15:45


     


 


 


  (NovoLOG


 SUPPLEMENTAL


 SCALE)  1  ACHS SLIDING  SCALE


 SQ  5/1/18 17:00


     


 


 


 Ceftriaxone


 Sodium 1000 mg/


 Sodium Chloride  100 ml @ 


 200 mls/hr  Q24H


 IV  5/2/18 07:00


    5/3/18 08:21


 


 


  (Proair Hfa Inh)  2 puff  Q6H  PRN


 INH  5/2/18 13:15


     


 


 


  (Eliquis)  5 mg  BID


 PO  5/2/18 21:00


    5/3/18 08:21


 


 


  (Lioresal)  10 mg  TID  PRN


 PO  5/2/18 13:15


     


 


 


  (Vimpat)  100 mg  BID


 PO  5/2/18 21:00


    5/3/18 08:21


 


 


  (Toprol Xl)  50 mg  BID


 PO  5/2/18 21:00


    5/3/18 08:21


 


 


  (Pravachol)  40 mg  HS


 PO  5/2/18 21:00


    5/2/18 20:48


 


 


  (Ultram)  50 mg  Q4H  PRN


 PO  5/2/18 13:15


    5/3/18 04:01


 


 


  (Protonix)  20 mg  DAILY


 PO  5/2/18 13:15


    5/3/18 08:21


 











A/P


Assessment and Plan


78-year-old female with hx of anemia, OA, anxiety, depression, cervical cancer, 

DM, HLD. CVA, GERD, esophageal stricture, seizures, presents with fever, dysuria

, suprapubic pain. 





UTI: Failed outpatient therapy.  Persistently symptomatic.  Follows with 

urology outpatient.


- Continue IV Rocephin.  


- Blood cultures with no growth 2 days


- Urine culture with 1050 K mixed gram-positive patricia, likely contaminants


- Renal ultrasound reviewed and unremarkable


- Given patient's symptoms, will discharge on cefuroxime 5 days to complete 

total 7 day course antibiotics





CKD stage III: chronic, stable


- Avoid nephrotoxic agents





GERD: chronic


- Continue PPI.





Abnormal abdominal CT:


- Reviewed. No abdominal symptoms to account for biliary dilation 


- Monitor clinically, asymptomatic throughout admission, tolerating oral intake





Diabetes Mellitus, type 2: chornic, on metformin as an outpatient.


- Hold metformin.


- Insulin sliding scale and diabetic diet.


- Blood glucose stable throughout admission





Other chronic medical conditions stable, continue home medications as 

appropriate.  





DVT Prophylaxis: Heparin sq


Discharge Planning


Discharge patient to home with home health care


Condition on discharge: Stable


Heart healthy diet as tolerated


Ad Mary activity


Rx written: Cefuroxime 500 mg bid x5days (total 7 day treatment course)


Follow-up with primary care physician within 1 week











Naima Reardon PA-C May 3, 2018 9:01 am

## 2018-05-03 NOTE — HHI.DCPOC
Discharge Care Plan


Diagnosis:  


(1) UTI (urinary tract infection)


Goals to Promote Your Health


* To prevent worsening of your condition and complications


* To maintain your health at the optimal level


Directions to Meet Your Goals


*** Take your medications as prescribed


*** Follow your dietary instruction


*** Follow activity as directed








*** Keep your appointments as scheduled


*** Take your immunizations and boosters as scheduled


*** If your symptoms worsen call your PCP, if no PCP go to Urgent Care Center 

or Emergency Room***


*** Smoking is Dangerous to Your Health. Avoid second hand smoke***


***Call the 24-hour hour crisis hotline for domestic abuse at 1-501.344.2918***











Naima Reardon PA-C May 3, 2018 8:03 am

## 2020-06-25 NOTE — HHI.HP
HPI


Service


Critical Care Medicine


Primary Care Physician


No Primary Care Physician


Admission Diagnosis





Altered mental status, GCS 7.


Diagnosis:  


Travel History


International Travel<30 Days:  No


Contact w/Intl Traveler <30 Da:  No


Traveled to Known Affected Are:  No


History of Present Illness





HPI


This is a 77-year-old female that presented to the ED for evaluation of altered 

mental status. Per report, according to EMS the patient normally is more alert 

and  has a history of stroke, he was able to speak with the patient's sister 

who states that she is also power of , the patient apparently normally 

is ambulatory and can carry on a conversation without any difficulty. Per 

records reviewed, the patient was last seen normal and at her baseline at 1 am. 

The patient then sat down in her lazy chair, her sister also noted that she 

started beating her head against a piece of furniture.  The patient was noted 

to have a right frontal contusion upon presentation to the ED .She had a GCS of 

7-8 on arrival. The patient's medical history is significant for a recent 

admission 2017 for altered mental status, medication induced.  Her past 

medical history is also significant for a recent history of UTI and C. 

difficile in addition to her history of having a CVA and reportedly residual 

effects in the right upper and lower extremity.  Laboratory and imaging studies 

revealed that most likely the patient has a UTI, CT of the brain revealed no 

abnormality and the patient was noted to have an elevated lactate level.  The 

patient was emergently intubated in the ED, and the patient was noted to be 

significantly hypertensive and a nicardipine infusion was instituted.  Upon 

entering the ED the patient's blood pressure was noted to be 219/92, Cardizem 

infusion had been ordered.  The patient was noted to have spontaneous movement 

of the right upper and lower extremity with a gag reflex. Critical care 

medicine was consulted.





 History 


PFSH


Past Medical History


Hx Anticoagulant Therapy:  Yes (plavix)


Anemia:  Yes


Arthritis:  Yes


Anxiety:  Yes


Depression:  Yes


Cancer:  Yes (CERVICAL CA)


Cardiovascular Problems:  Yes


High Cholesterol:  Yes


Chest Pain:  No


Congestive Heart Failure:  No


Cerebrovascular Accident:  Yes


Diabetes:  Yes


Diminished Hearing:  No


Gastrointestinal Disorders:  Yes (esophageal strictures)


GERD:  Yes


Genitourinary:  Yes (incontinent)


Headaches:  No


Hypertension:  Yes


Musculoskeletal:  Yes (fracture to right ankle )


Neurologic:  Yes (SEIZURE DISORDER, HX CVA)


Reproductive:  Yes (CERVICAL CANCER)


Immunizations Current:  No


Migraines:  No


Seizures:  Yes


PNEUMOCCOCAL Vaccine (Year):  2


Pregnant?:  Not Pregnant


Menopausal:  Yes





Past Surgical History


Appendectomy:  Yes


Cardiac Surgery:  No


Cholecystectomy:  Yes


Ear Surgery:  No


Endocrine Surgery:  Yes


Eye Surgery:  No


Genitourinary Surgery:  Yes


Gynecologic Surgery:  Yes (HYSTERECTOMY,CERVICAL CA REMOVED.)


Hysterectomy:  No


Neurologic Surgery:  Yes (C4,C5,C6 PLATE PLACED)


Oral Surgery:  Yes


Thoracic Surgery:  No


Other Surgery:  Yes (LAPAROSCOPIC GREGG(WRAP STOMACH AROUND ESOPHAGUS TO HELP 

WITH GERD))





Social History


Alcohol Use:  No (DENIES)


Tobacco Use:  No (DENIES)


Substance Use:  No





 Allergies-Medications 


Allergies-Medications


(Allergen,Severity, Reaction):  


Coded Allergies:  


     Influenza Virus Vaccines (Unverified  Allergy, Intermediate, 10/27/17)


 "ALLERGIC TO EGGS SO I CAN NOT HAVE THE FLU SHOT"


     egg (Unverified  Allergy, Intermediate, 10/27/17)


     codeine (Unverified  Allergy, Mild, 10/27/17)


     meperidine (Unverified  Allergy, Mild, 10/27/17)


     morphine (Unverified  Allergy, Mild, 10/27/17)


     acetaminophen (Unverified  Adverse Reaction, Mild, HEADACHE, 10/27/17)


     hydrocodone (Unverified  Adverse Reaction, Mild, HEADACHE, 10/27/17)


Reported Meds & Prescriptions





Reported Meds & Active Scripts


Active


Eliquis (Apixaban) 5 Mg Tab 5 Mg PO BID


Reported


Baclofen 10 Mg Tab 10 Mg PO TID


Clopidogrel (Clopidogrel Bisulfate) 75 Mg Tab 75 Mg PO DAILY


Dexilant (Dexlansoprazole) 60 Mg Cap.dr.bp   


Trazodone (Trazodone HCl) 150 Mg Tablet 150 Mg PO HS


Vimpat (Lacosamide) 100 Mg Tab 100 Mg PO BID


Proair Hfa 8.5 GM Inh (Albuterol Sulfate) 90 Mcg/Act Aer 2 Puff INH Q6H PRN


     108 mcg/actuation


Metformin ER (Metformin HCl) 1,000 Mg Jass 1,000 Mg PO BID


     With evening meal


Toprol XL (Metoprolol Succinate) 50 Mg Tab 50 Mg PO BID


Pravachol (Pravastatin) 40 Mg Tab 40 Mg PO HS


Sertraline (Sertraline HCl) 25 Mg Tab 50 Mg PO DAILY








 ROS 


Review of Systems


ROS Limitations:  Unresponsive





Physical Exam


Vital Signs





Vital Signs








  Date Time  Temp Pulse Resp B/P (MAP) Pulse Ox O2 Delivery O2 Flow Rate FiO2


 


17 11:48        


 


17 11:24  68 14 196/88 (124) 100 Ventilator  


 


17 10:49  75 18 191/77 (115) 100 Room Air  


 


17 10:10     100   40


 


17 09:45     100   100


 


17 09:45     100   


 


17 09:15  74 16  98 Room Air  


 


17 09:15     99 Room Air  


 


17 08:37 98.6 77 16 178/79 (112)    








Laboratory





Laboratory Tests








Test


  17


08:55 17


09:54 17


09:59


 


White Blood Count 9.8   


 


Red Blood Count 3.65   


 


Hemoglobin 10.8   


 


Hematocrit 32.7   


 


Mean Corpuscular Volume 89.6   


 


Mean Corpuscular Hemoglobin 29.4   


 


Mean Corpuscular Hemoglobin


Concent 32.9 


  


  


 


 


Red Cell Distribution Width 15.7   


 


Platelet Count 401   


 


Mean Platelet Volume 7.4   


 


Neutrophils (%) (Auto) 73.8   


 


Lymphocytes (%) (Auto) 14.3   


 


Monocytes (%) (Auto) 10.7   


 


Eosinophils (%) (Auto) 0.7   


 


Basophils (%) (Auto) 0.5   


 


Neutrophils # (Auto) 7.2   


 


Lymphocytes # (Auto) 1.4   


 


Monocytes # (Auto) 1.1   


 


Eosinophils # (Auto) 0.1   


 


Basophils # (Auto) 0.1   


 


CBC Comment DIFF FINAL   


 


Differential Comment    


 


Prothrombin Time 11.3   


 


Prothromb Time International


Ratio 1.1 


  


  


 


 


Activated Partial


Thromboplast Time 20.8 


  


  


 


 


Blood Urea Nitrogen 29   


 


Creatinine 2.76   


 


Random Glucose 131   


 


Total Protein 9.1   


 


Albumin 4.4   


 


Calcium Level 9.3   


 


Alkaline Phosphatase 136   


 


Aspartate Amino Transf


(AST/SGOT) 17 


  


  


 


 


Alanine Aminotransferase


(ALT/SGPT) 15 


  


  


 


 


Total Bilirubin 0.1   


 


Sodium Level 138   


 


Potassium Level 4.2   


 


Chloride Level 110   


 


Carbon Dioxide Level 16.9   


 


Anion Gap 11   


 


Estimat Glomerular Filtration


Rate 17 


  


  


 


 


Lactic Acid Level 2.8   


 


Total Creatine Kinase 85   


 


Troponin I LESS THAN 0.02   


 


Thyroid Stimulating Hormone


3rd Gen 3.000 


  


  


 


 


Salicylates Level LESS THAN 1.7   


 


Acetaminophen Level LESS THAN 2.0   


 


Ethyl Alcohol Level LESS THAN 3   


 


Urine Color  YELLOW  


 


Urine Turbidity  HAZY  


 


Urine pH  5.5  


 


Urine Specific Gravity  1.019  


 


Urine Protein  100  


 


Urine Glucose (UA)  NEG  


 


Urine Ketones  NEG  


 


Urine Occult Blood  SMALL  


 


Urine Nitrite  NEG  


 


Urine Bilirubin  NEG  


 


Urine Urobilinogen  2.0  


 


Urine Leukocyte Esterase  LARGE  


 


Urine RBC  40  


 


Urine WBC    


 


Urine WBC Clumps  MANY  


 


Urine Squamous Epithelial


Cells 


  3 


  


 


 


Urine Bacteria  MANY  


 


Urine Hyaline Casts  149  


 


Urine Mucus  MANY  


 


Microscopic Urinalysis Comment


  


  CATH-CULTURE


IND 


 


 


Urine Opiates Screen  NEG  


 


Urine Barbiturates Screen  NEG  


 


Urine Amphetamines Screen  NEG  


 


Urine Benzodiazepines Screen  POS  


 


Urine Cocaine Screen  NEG  


 


Urine Cannabinoids Screen  NEG  


 


Blood Gas Puncture Site   LT RADIAL 


 


Blood Gas Patient Temperature   98.6 


 


Blood Gas HCO3   14 


 


Blood Gas Base Excess   -11.4 


 


Blood Gas Oxygen Saturation   99 


 


Arterial Blood pH   7.31 


 


Arterial Blood Partial


Pressure CO2 


  


  28 


 


 


Arterial Blood Partial


Pressure O2 


  


  442 


 


 


Arterial Blood Oxygen Content   14.7 


 


Arterial Blood


Carboxyhemoglobin 


  


  0.7 


 


 


Arterial Blood Methemoglobin   0.4 


 


Blood Gas Hemoglobin   9.8 


 


Oxygen Delivery Device   VENT 


 


Blood Gas Ventilator Setting   AC14/500/PEEP5 


 


Blood Gas Inspired Oxygen   100 














 Date/Time


Source Procedure


Growth Status


 


 


 17 08:55


Blood Peripheral Aerobic Blood Culture


Pending Received


 


 17 08:55


Blood Peripheral Anaerobic Blood Culture


Pending Received


 


 17 09:54


Urine Catheterized Urine Urine Culture


Pending Received








Result Diagram:  


17 0855                                                                  

              17 0855





Imaging





Last Impressions








Head Magnetic Resonance Angiography 17 1119 Signed





Impressions: 





 Service Date/Time:  2017 13:11 - CONCLUSION: Negative for 





 major branch vessel occlusion.      Pb López MD  FACR


 


Head CT 17 0851 Signed





Impressions: 





 Service Date/Time:  2017 09:02 - CONCLUSION:  Negative 

for 





 acute process..     Pb López MD  FACR


 


Chest X-Ray 17 0851 Signed





Impressions: 





 Service Date/Time:  2017 09:38 - CONCLUSION:  ET good 





 position. Nasogastric tube needs advanced.     Pb López MD  FACR


 


Cervical Spine CT 17 0000 Signed





Impressions: 





 Service Date/Time:  2017 09:07 - CONCLUSION:  Fusion as 





 above, negative for fracture.     Pb López MD  FACR











Septic Shock Reassessment


Septic shock perfusion:  reassessment completed





Caprini VTE Risk Assessment


Caprini VTE Risk Assessment:  Mod/High Risk (score >= 2)


Caprini Risk Assessment Model











 Point Value = 1          Point Value = 2  Point Value = 3  Point Value = 5


 


Age 41-60


Minor surgery


BMI > 25 kg/m2


Swollen legs


Varicose veins


Pregnancy or postpartum


History of unexplained or recurrent


   spontaneous 


Oral contraceptives or hormone


   replacement


Sepsis (< 1 month)


Serious lung disease, including


   pneumonia (< 1 month)


Abnormal pulmonary function


Acute myocardial infarction


Congestive heart failure (< 1 month)


History of inflammatory bowel disease


Medical patient at bed rest Age 61-74


Arthroscopic surgery


Major open surgery (> 45 min)


Laparoscopic surgery (> 45 min)


Malignancy


Confined to bed (> 72 hours)


Immobilizing plaster cast


Central venous access Age >= 75


History of VTE


Family history of VTE


Factor V Leiden


Prothrombin 72055S


Lupus anticoagulant


Anticardiolipin antibodies


Elevated serum homocysteine


Heparin-induced thrombocytopenia


Other congenital or acquired


   thrombophilia Stroke (< 1 month)


Elective arthroplasty


Hip, pelvis, or leg fracture


Acute spinal cord injury (< 1 month)








Prophylaxis Regimen











   Total Risk


Factor Score Risk Level Prophylaxis Regimen


 


0-1      Low Early ambulation


 


2 Moderate Order ONE of the following:


*Sequential Compression Device (SCD)


*Heparin 5000 units SQ BID


 


3-4 Higher Order ONE of the following medications:


*Heparin 5000 units SQ TID


*Enoxaparin/Lovenox 40 mg SQ daily (WT < 150 kg, CrCl > 30 mL/min)


*Enoxaparin/Lovenox 30 mg SQ daily (WT < 150 kg, CrCl > 10-29 mL/min)


*Enoxaparin/Lovenox 30 mg SQ BID (WT < 150 kg, CrCl > 30 mL/min)


AND/OR


*Sequential Compression Device (SCD)


 


5 or more Highest Order ONE of the following medications:


*Heparin 5000 units SQ TID (Preferred with Epidurals)


*Enoxaparin/Lovenox 40 mg SQ daily (WT < 150 kg, CrCl > 30 mL/min)


*Enoxaparin/Lovenox 30 mg SQ daily (WT < 150 kg, CrCl > 10-29 mL/min)


*Enoxaparin/Lovenox 30 mg SQ BID (WT < 150 kg, CrCl > 30 mL/min)


AND


*Sequential Compression Device (SCD)











Assessment and Plan


Assessment and Plan


This is a 77-year-old female with a history of a previous stroke presenting 

with altered mental status for unknown origin possibly bleed,CVA, toxic 

encephalopathy versus metabolic encephalopathy.  Intubated for airway 

protection. Plan admit to ICU.


Plan by systems: 


Neurologic:


Toxicity versus metabolic encephalopathy


History of CVA


H/O seizures/





Neurochecks per ICU protocol


DC Versed infusion


Patient reportedly is allergic to propofol, fentanyl infusion for ventilator 

synchrony


Daily sedation vacation


TSH normal-3.0


Obtain random cortisol level, ammonia level


Obtain MRI brain


 MRA brain-negative for major branch occlusion 


 CT brain-no acute intracranial process


Obtain EEG


Patient normally takes Vimpat 100 mg BID, will continue


Will hold patient's home meds baclofen 10 mg 3 times a day, and sertraline 50 

mg twice a day, confirmed concern for serotonin syndrome and rhabdomyolysis.  

Patient has history of altered mental status secondary to being medication 

induced








Respiratory:


Acute hypoxemic respiratory failure


Maintain O2 sat greater than 92%


DuoNeb nebs every 6 hours scheduled, every 2 hours when necessary


Daily CPAP trials


Obtain sputum culture


Chest x-rays ABGs when clinically indicated





Cardiovascular:


Hypertensive emergency


History of hypertension


Nicardipine infusion maintain SBP < 180, wean as tolerated 


Labetalol, hydralazine IV , PRN to maintain SBP <160


Initial troponin <0.02, continue to trend


Begin metoprolol XL 50 mg BID, (home med)





Renal:





Probable UTI (recent  UTI )


Maintain Erazo catheter


-- Strict I/Os 





FEN/GI:


Chronic kidney disease stage III


JD


Acute on chronic kidney disease


Monitor BMP


Replete electrolytes as needed


Previous Creatinine 2017- 1.15, upon admission 2.76


Maintain OGT, advance 6 inches


Yasir pravastatin 40 mg daily at bedtime


Zofran for nausea


Famotidine for GI prophylaxis


Bowel regimen


Obtain creatinine kinase levels





Heme/ID:


Sepsis


Lactic acidemia


Monitor CBC


Follow-up urine blood and sputum cultures


Follow-up influenza and pneumococcal urine antigen


Obtain C. difficile antigen, patient  has recent history of C. difficile colitis


Patient normally on Plavix hold for now (await MRI results)





Endocrine:


Diabetes mellitus


Glucose monitoring per ICU protocol, low dose regimen


Hold metformin-patient has lactic acidosis, which can because by metformin


-- SSI 


Prophylaxis:


GI Prophylaxis


Famotidine twice a day


DVT Prophylaxis


-- SCDs


Lines:


Peripheral IVs providing adequate access.  Central line if indicated


Dispo:


my billing statement 


This patient remains critically ill with one or more organ systems which are or 

may become a threat to life. I have spent in excess of 42 minutes 

discontinuously in the care and management of this patient. This time is 

exclusive of procedures, and includes, but is not limited to, evaluation of the 

patient, review of the medical record, discussions with family, consultants, 

nursing staff, or respiratory therapy, and documentation in the medical record.


Code Status


Full


Discussed Condition With


Dr. Barrett, ICU RN at bedside (Fely Ford MD Dec 24, 2017 14:20 Patient has been scheduled with ortho.  Appt canceled for tomorrow with TEN.  ARMANDO Fairbanks R.N.

## 2023-09-05 NOTE — EKG
Date Performed: 10/27/2017       Time Performed: 21:00:24

 

PTAGE:      77 years

 

EKG:      Sinus rhythm 

 

 NORMAL ECG 

 

NO PREVIOUS TRACING            

 

DOCTOR:   Jessika Harris  Interpretating Date/Time  10/29/2017 20:57:33
Date Performed: 10/27/2017       Time Performed: 21:00:24

 

PTAGE:      77 years

 

EKG:      Sinus rhythm 

 

 NORMAL ECG 

 

NO PREVIOUS TRACING            

 

DOCTOR:   Jessika Harris  Interpretating Date/Time  10/29/2017 20:57:33
Date Performed: 10/27/2017       Time Performed: 21:00:24

 

PTAGE:      77 years

 

EKG:      Sinus rhythm 

 

 NORMAL ECG 

 

NO PREVIOUS TRACING            

 

DOCTOR:   Jessika Harris  Interpretating Date/Time  10/29/2017 20:57:33
 used

## 2025-07-02 NOTE — HHI.CCPN
Subjective


Remarks/Hospital Course


This is a 77-year-old female that presented to the ED for evaluation of altered 

mental status. Per report, according to EMS the patient normally is more alert 

and  has a history of stroke, he was able to speak with the patient's sister 

who states that she is also power of , the patient apparently normally 

is ambulatory and can carry on a conversation without any difficulty. Per 

records reviewed, the patient was last seen normal and at her baseline at 1 am. 

The patient then sat down in her lazy chair, her sister also noted that she 

started beating her head against a piece of furniture.  The patient was noted 

to have a right frontal contusion upon presentation to the ED .She had a GCS of 

7-8 on arrival. The patient's medical history is significant for a recent 

admission 09/2017 for altered mental status, medication induced.  Her past 

medical history is also significant for a recent history of UTI and C. 

difficile in addition to her history of having a CVA and reportedly residual 

effects in the right upper and lower extremity.  Laboratory and imaging studies 

revealed that most likely the patient has a UTI, CT of the brain revealed no 

abnormality and the patient was noted to have an elevated lactate level.  The 

patient was emergently intubated in the ED, and the patient was noted to be 

significantly hypertensive and a nicardipine infusion was instituted.  Upon 

entering the ED the patient's blood pressure was noted to be 219/92, Cardizem 

infusion had been ordered.  The patient was noted to have spontaneous movement 

of the right upper and lower extremity with a gag reflex. Critical care 

medicine was consulted.





12/25: The patient was weaned off of nicardipine infusion.  Normotensive the 

patient continues on labetalol twice a day scheduled home dosing.  EEG 

attempted last evening however due to patient's movement bilaterally to 

complete artifact EEG reordered.  Fentanyl infusion discontinued for daily 

sedation vacation approximately 7 hours ago the patient remains nonresponsive.  

Opens eyes spontaneously, moves limbs spontaneously but does not follow any 

commands.  Brain MRI/MRA, and CT all negative findings.  EEG scheduled for 

a.m..  The patient continues in severe metabolic acidosis, 2 Amps of sodium 

bicarbonate IV push given this a.m., sodium bicarbonate infusion increased.  

Lactate is cleared, creatinine is improving, CT of the abdomen and pelvis is 

pending this afternoon.


12/26 CT report from yesterday shows bladder edema consistent with cystitis.  

URine culture with GNR  Blood cultures NGTD. . Getting EEG now. 


12/27:  Resting comfortable in bed in no acute distress.  No obvious seizure 

activity.  Plan for lumbar puncture in AM.  Low-grade temperatures overnight.  

Tolerating tube feeding at goal.  No bowel movement


12/28:  Continues EEG has been discontinued.  Tmax 100.  Currently 99.8.  No 

bowel movement since admission.  Tolerating tube feeds at goal.  No active 

seizure activity overnight.


12/29:  Tmax 100.  Currently 99.9.  Became tachycardic with sedation lowered so 

RN increased midazolam to 9 milligrams an hour.  Also fentanyl drip at 250 mcg 

per hour.  No bowel movement since admission.  Tolerating tube feedings with 

only 40 cc residuals.  Receiving methylnaltrexone milligrams subcutaneous 1 

along with mineral oil and suppository.  KUB pending.  Patient improved 

neurological exam.  Blinks with my suddenhand  movements towards face.  

Positive gag.  Withdraws to pain in all 4 extremities.


12/30 LP results not yet finalized. On versed 7 mg/hr and fentanyl 250 mcg/hr. 

EEG from 12/29 - no epileptiform features. 





Subjective


12/31 Weaning fentanyl down but remains on versed for seizure suppression. 

Because she had difficult to control subclinical seizures, will need  close EEG 

monitoring for  benzo weaning.  


Was breathing on PSV for couple of hours day, even on sedation. 


CSF negative final culture.  No Leukocytosis or fever.  Will stop antibiotic. 

Updated sister.





Objective





Vital Signs








  Date Time  Temp Pulse Resp B/P (MAP) Pulse Ox O2 Delivery O2 Flow Rate FiO2


 


12/31/17 18:00  116      


 


12/31/17 16:00 99.0  19 160/73 (102) 98   


 


12/31/17 14:56        30














Intake and Output   


 


 12/31/17 12/31/17 1/1/18





 08:00 16:00 00:00


 


Intake Total 1358 ml 205 ml 1090.1 ml


 


Output Total 2450 ml  850 ml


 


Balance -1092 ml 205 ml 240.1 ml








Result Diagram:  


12/31/17 0433                                                                  

              12/31/17 0433





Imaging





Last Impressions








Chest X-Ray 12/29/17 0600 Signed





Impressions: 





 Service Date/Time:  Friday, December 29, 2017 04:41 - CONCLUSION:  No 





 significant interval change.     Christian Mendiola MD 


 


Lumbar Puncture Fluoroscopy 12/28/17 0000 Signed





Impressions: 





 Service Date/Time:  Thursday, December 28, 2017 09:39 - CONCLUSION: 





 Uncomplicated fluoroscopically guided lumbar puncture.     Reji Townsend MD 


 


Brain MRI 12/28/17 0000 Signed





Impressions: 





 Service Date/Time:  Thursday, December 28, 2017 10:24 - CONCLUSION:  No acute 





 intracranial findings     Bronson Mar MD 


 


Upper Extremity Ultrasound 12/27/17 0000 Signed





Impressions: 





 Service Date/Time:  Wednesday, December 27, 2017 21:32 - CONCLUSION:  1. No 





 sonographic evidence for right upper extremity DVT.     Reji Townsend MD 


 


Lower Extremity Ultrasound 12/27/17 0000 Signed





Impressions: 





 Service Date/Time:  Wednesday, December 27, 2017 21:12 - CONCLUSION:  1. No 





 sonographic evidence for lower extremity DVT.     Reji Townsend MD 


 


Abdomen/Pelvis CT 12/25/17 0000 Signed





Impressions: 





 Service Date/Time:  Monday, December 25, 2017 16:05 - CONCLUSION:  1. Mild 

edema 





 surrounding the urinary bladder, question cystitis. Erazo catheter in place. 

2. 





 Status post cholecystectomy.  3. Left lower lobe atelectasis. 4. Nasogastric 





 tube tip at the gastroesophageal junction.    Christian Mendiola MD 


 


Neck Magnetic Resonance Angiography 12/24/17 1119 Signed





Impressions: 





 Service Date/Time:  Sunday, December 24, 2017 13:11 - CONCLUSION:  Negative 

for 





 hemodynamically significant carotid stenosis     Pb óLpez MD  FACR


 


Head Magnetic Resonance Angiography 12/24/17 1119 Signed





Impressions: 





 Service Date/Time:  Sunday, December 24, 2017 13:11 - CONCLUSION: Negative for 





 major branch vessel occlusion.      Pb López MD  FACR


 


Head CT 12/24/17 0851 Signed





Impressions: 





 Service Date/Time:  Sunday, December 24, 2017 09:02 - CONCLUSION:  Negative 

for 





 acute process..     Pb López MD  FACR


 


Cervical Spine CT 12/24/17 0000 Signed





Impressions: 





 Service Date/Time:  Sunday, December 24, 2017 09:07 - CONCLUSION:  Fusion as 





 above, negative for fracture.     Pb López MD  FACR








Objective Remarks


GENERAL: 77-year-old  female currently orotracheally intubated


SKIN: Warm and dry.


HEAD: Atraumatic. Normocephalic.  


EYES: R periorbital ecchmosis. Pupils equal and round 1-2 mm bilaterally. No 

scleral icterus. No injection or drainage. 


ENT: No nasal bleeding or discharge. Mucous membranes pink and moist.


NECK: Trachea midline. No JVD. 


CARDIOVASCULAR: RRR.  S1, S2 without murmur


RESPIRATORY: No accessory muscle use.Breath sounds equal bilaterally.  Coarse. 


GASTROINTESTINAL: Abdomen soft, non-tender, nondistended. No guarding. 


MUSCULOSKELETAL: Right upper extremity with 2+ edema and erythema.  Negative 

Doppler ultrasound 12/27 for DVT


NEUROLOGICAL: Cranial nerves II through XII appear grossly intact, gaze 

conjugate.  Eyes open with noxious stimuli..  Does not track.  Positive gag.  

Positive corneal reflex.    Withdraws to pain in all 4 extremities currently.  

Per report the patient does have deficits status post CVA of right upper and 

lower extremity patient with post polio syndrome with wasting bilateral lower 

extremities.


Date of Insertion:  Dec 24, 2017





A/P


Assessment and Plan


Neuro/Psych





Post polio syndrome bilateral lower extremity weakness


Status epilepticus


History of CVATIA with R sided deficits - right lentiform nucleus


H/O seizures


Anxiety/depression


Left maxillary/ethmoid fluid levels question sinusitis





12/28 interpretation EEG - Abnormal EEG due to some mild to moderate slowing, 

but also some occasional sharps and spike and slow waves seen, but improved 

from prior EEG's.  Clinical correlation.


[id number] milligrams twice a day will be continued.  


Loaded with fosphenytoin - currently fosphenytoin  200 mg IV every  8 hours.  

LEevel 13 12/30


Levetiracetam 500 mg IV every 6 hours


Lorazepam 2 mg IV every 5 minutes as needed.  Seizures


Versed drip currently at 7 mg/h. 


Fentanyl drip currently at 150 mcg an hour, weaning as tolerated. Does have h/o 

chronic pain. 


TSH normal-3.0


Random cortisol level only 3.5. Unclear significance as patient had lactic 

acidemia but also hypertensive at the time.  Recheck 12/28  12.3.  Cosyntropin 

test ordered 12/29, had adequate response 





12/24 MRI brain-Moderate periventricular white matter changes are evident.  

There is no restricted diffusion.  There is moderate atrophy with dilatation of 

ventricular sulcal spaces.  There are no extra-axial fluid collections 

appreciated.  Posterior fossa is unremarkable.  


12/24 MRA brain-negative for major branch occlusion 


12/24 CT brain-no acute intracranial process


Patient's home meds baclofen 10 mg 3 times a day, tramadol 50 mg every 4 hours 

as needed and sertraline 50 mg twice a day have been on hold due to  concern 

for serotonin syndrome, seizure activity and rhabdomyolysis.  


   Patient has history of altered mental status secondary to being medication 

induced.  Reported by family member -the patient had discontinued baclofen for 

approximately 3 months and took her initial dose of baclofen on 12/23 with a 

TID schedule dosing.


MRI brain 12/28 - There is mild periventricular white matter T2 prolongation. A 

tiny lacunar infarct in the right lentiform nucleus appears old. There is no 

evidence of intracranial mass or hemorrhage. The brainstem and posterior fossa 

structures are unremarkable. There is nothing to suggest acute infarction. 

There is fluid in the left maxillary sinus and occasional ethmoid sinuses.


LP - HSV negative, final cx neg. 


Dr. Martin has been actively following. Dr. Lo covering weekend, states Dr. Martin rounding 1/1. Per his documentation 12/29, to remain on versed for few 

days. 





Respiratory:





Acute hypoxemic respiratory failure





[facility] 16/500/1/5/35


Ventilator bundle


Maintain O2 sat greater than 92%


Albuterol/ipratropium aerosols nebs every 6 hours scheduled, albuterol aerosols 

every 2 hours when necessary


Follow-up chest x-ray 12/29





Cardiovascular:





Hypertensive emergency-resolved


History of hypertension


Hyperlipidemia


Congestive heart failure - ejection fraction 55-60% 2010





Labetalol, hydralazine IV , PRN to maintain SBP <160


Serial troponins were negative.


Continue metoprolol 25 mg every 6 hours 


   On metoprolol 50 mg sustained release daily at home


continue clopidogrel 75 mg by mouth daily/home medication Resumed 12/30





Continue pravastatin 40 mg by mouth daily/home medication for dyslipidemia





Renal//FEN:





Chronic kidney disease stage IIIB


Hypo-magnesium





Maintain Erazo catheter


-- Strict I/Os 


Monitor urine output


Monitor BMP


Replete electrolytes as needed


Previous Creatinine 09/2017- 1.15, upon admission 2.76, now at baseline


Maintain OGT -continue Glucerna with goal rate 50 ml/hr per nutrition's 

recommendations


Lasix 40 mg IV now





GI:





Esophageal stricture -history of esophageal dilatation


GERD


Elevated ammonia


Constipation





Patient is currently on Glucerna 1.5 goal 50 cc per hour per nutrition's 

recommendations 


Lansoprazole 30 mg daily GI prophylaxis. On Dexlansoprazole 60 mg times daily 

at home


Docusate sodium/senna 1 tablet twice a day for bowel regimen.  polyethylene 

glycol 17 g twice a day and lactulose 30 cc 4x a day


   1 dose of methylnaltrexone 12 MG SUBCUTANEOUS 1 AND 30 CC MINERAL OIL 1 12/ 29


Now having BMs after aggressive above bowel regimen. 


KUB  12/29 - mild ileus


Tolerating tube feeds. 


Ammonia level Recheck in a.m. 12/30 <10





ID:





Escherichia coli UTI





Monitor CBC


Blood cultures 12/24 - negative


Urine culture 12/24 -  Escherichia coli, pansensitive


Strep pneumococcal antigen 12/24  - negative 


CSF 12/28 Gram stain, fungal and AFB negative as of 12/29





Recent history of C. difficile colitis. Lactinex tid. 


Has been on Abx that would cover E coli UTI since 12/24 #8. 


Now with LP final cultures negative. Will d/c vanc and rocephin.  


Acyclovir initiated by Dr. Martin, now discontinued with HSV 1 and 2 negative. . 








HEME/ONC:





History of cervical cancer


Normocytic anemia


History of right lower extremity DVT - posterior tibial and femoral vein 

previously on Apixaban





Clopidogrel 75 mg daily initially held for LP.  Restarted 12/30


Lactate level 1.8


Recheck Dopplers bilateral and right upper lower extremities 12/28 negative for 

DVT





Endocrine:





Diabetes mellitus


Low cortisol





Glucose monitoring per ICU protocol, low dose regimen Novulog every 6 hours


Hold metformin thousand milligrams twice a day





Prophylaxis:


GI Prophylaxis


Lansoprazole 30 mg daily


DVT Prophylaxis


-- SCDs continue heparin 5000 units subcutaneous every 8 hours..  Discussed 

with Dr. Martin.





Lines:


Peripheral IVs providing adequate access.  





Patient's sister updated 12/31 and questions answered. 





Level II follow-up











Jacque Fuentes MD Dec 31, 2017 19:17 TCC spoke with pt's son Tejas for SNF choices. #1 [facility], #2 [facility], and #3 [facility]. Referral sent. TCC following.     1324: Per Dr. Sprague during rounds pt is medically ready pending accepting facility and auth. Waiting for accepting facility. TCC following.